# Patient Record
Sex: FEMALE | Race: WHITE | NOT HISPANIC OR LATINO | Employment: OTHER | URBAN - METROPOLITAN AREA
[De-identification: names, ages, dates, MRNs, and addresses within clinical notes are randomized per-mention and may not be internally consistent; named-entity substitution may affect disease eponyms.]

---

## 2019-07-21 ENCOUNTER — HOSPITAL ENCOUNTER (INPATIENT)
Facility: HOSPITAL | Age: 79
LOS: 14 days | DRG: 987 | End: 2019-08-04
Attending: SURGERY | Admitting: INTERNAL MEDICINE
Payer: MEDICARE

## 2019-07-21 ENCOUNTER — APPOINTMENT (INPATIENT)
Dept: RADIOLOGY | Facility: HOSPITAL | Age: 79
DRG: 987 | End: 2019-07-21
Payer: MEDICARE

## 2019-07-21 DIAGNOSIS — J96.02 ACUTE HYPERCAPNIC RESPIRATORY FAILURE (HCC): ICD-10-CM

## 2019-07-21 DIAGNOSIS — L89.811: ICD-10-CM

## 2019-07-21 DIAGNOSIS — L89.153 PRESSURE INJURY OF SACRAL REGION, STAGE 3 (HCC): ICD-10-CM

## 2019-07-21 DIAGNOSIS — Z51.5 COMFORT MEASURES ONLY STATUS: ICD-10-CM

## 2019-07-21 DIAGNOSIS — N90.89 LABIAL LESION: ICD-10-CM

## 2019-07-21 DIAGNOSIS — K92.2 GI BLEED: ICD-10-CM
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
## 2019-07-21 DIAGNOSIS — E43 SEVERE PROTEIN-CALORIE MALNUTRITION (HCC): ICD-10-CM

## 2019-07-21 PROBLEM — R26.2 AMBULATORY DYSFUNCTION: Status: ACTIVE | Noted: 2019-07-21

## 2019-07-21 PROBLEM — S00.03XA SCALP HEMATOMA: Status: ACTIVE | Noted: 2019-07-21

## 2019-07-21 LAB
ABO GROUP BLD: NORMAL
ALBUMIN SERPL BCP-MCNC: 2.5 G/DL (ref 3.5–5)
ALP SERPL-CCNC: 77 U/L (ref 46–116)
ALT SERPL W P-5'-P-CCNC: 35 U/L (ref 12–78)
ANION GAP SERPL CALCULATED.3IONS-SCNC: 6 MMOL/L (ref 4–13)
ARTERIAL PATENCY WRIST A: NO
AST SERPL W P-5'-P-CCNC: 74 U/L (ref 5–45)
BASE EXCESS BLDA CALC-SCNC: 11 MMOL/L (ref -2–3)
BASE EXCESS BLDA CALC-SCNC: 2.4 MMOL/L
BASE EXCESS BLDA CALC-SCNC: 5 MMOL/L (ref -2–3)
BASOPHILS # BLD AUTO: 0.06 THOUSANDS/ΜL (ref 0–0.1)
BASOPHILS NFR BLD AUTO: 0 % (ref 0–1)
BILIRUB SERPL-MCNC: 0.78 MG/DL (ref 0.2–1)
BLD GP AB SCN SERPL QL: NEGATIVE
BUN SERPL-MCNC: 16 MG/DL (ref 5–25)
CA-I BLD-SCNC: 1 MMOL/L (ref 1.12–1.32)
CA-I BLD-SCNC: 1.15 MMOL/L (ref 1.12–1.32)
CALCIUM SERPL-MCNC: 8.3 MG/DL (ref 8.3–10.1)
CHLORIDE SERPL-SCNC: 100 MMOL/L (ref 100–108)
CK MB SERPL-MCNC: 1.7 % (ref 0–2.5)
CK MB SERPL-MCNC: 24.4 NG/ML (ref 0–5)
CK SERPL-CCNC: 1438 U/L (ref 26–192)
CO2 SERPL-SCNC: 31 MMOL/L (ref 21–32)
CREAT SERPL-MCNC: 0.47 MG/DL (ref 0.6–1.3)
EOSINOPHIL # BLD AUTO: 0 THOUSAND/ΜL (ref 0–0.61)
EOSINOPHIL NFR BLD AUTO: 0 % (ref 0–6)
ERYTHROCYTE [DISTWIDTH] IN BLOOD BY AUTOMATED COUNT: 12.5 % (ref 11.6–15.1)
GFR SERPL CREATININE-BSD FRML MDRD: 95 ML/MIN/1.73SQ M
GLUCOSE SERPL-MCNC: 113 MG/DL (ref 65–140)
GLUCOSE SERPL-MCNC: 119 MG/DL (ref 65–140)
GLUCOSE SERPL-MCNC: 119 MG/DL (ref 65–140)
HCO3 BLDA-SCNC: 29.1 MMOL/L (ref 22–28)
HCO3 BLDA-SCNC: 34.1 MMOL/L (ref 24–30)
HCO3 BLDA-SCNC: 36.9 MMOL/L (ref 24–30)
HCT VFR BLD AUTO: 49.7 % (ref 34.8–46.1)
HCT VFR BLD CALC: 47 % (ref 34.8–46.1)
HCT VFR BLD CALC: 48 % (ref 34.8–46.1)
HGB BLD-MCNC: 16.6 G/DL (ref 11.5–15.4)
HGB BLDA-MCNC: 16 G/DL (ref 11.5–15.4)
HGB BLDA-MCNC: 16.3 G/DL (ref 11.5–15.4)
IMM GRANULOCYTES # BLD AUTO: 0.07 THOUSAND/UL (ref 0–0.2)
IMM GRANULOCYTES NFR BLD AUTO: 0 % (ref 0–2)
INR PPP: 1.47 (ref 0.84–1.19)
LYMPHOCYTES # BLD AUTO: 0.93 THOUSANDS/ΜL (ref 0.6–4.47)
LYMPHOCYTES NFR BLD AUTO: 6 % (ref 14–44)
MCH RBC QN AUTO: 31.3 PG (ref 26.8–34.3)
MCHC RBC AUTO-ENTMCNC: 33.4 G/DL (ref 31.4–37.4)
MCV RBC AUTO: 94 FL (ref 82–98)
MONOCYTES # BLD AUTO: 1.18 THOUSAND/ΜL (ref 0.17–1.22)
MONOCYTES NFR BLD AUTO: 7 % (ref 4–12)
NASAL CANNULA: 4
NEUTROPHILS # BLD AUTO: 14.69 THOUSANDS/ΜL (ref 1.85–7.62)
NEUTS SEG NFR BLD AUTO: 87 % (ref 43–75)
NRBC BLD AUTO-RTO: 0 /100 WBCS
O2 CT BLDA-SCNC: 21.1 ML/DL (ref 16–23)
OXYHGB MFR BLDA: 93.2 % (ref 94–97)
PCO2 BLD: 36 MMOL/L (ref 21–32)
PCO2 BLD: 38 MMOL/L (ref 21–32)
PCO2 BLD: 51.2 MM HG (ref 42–50)
PCO2 BLD: 71.2 MM HG (ref 42–50)
PCO2 BLDA: 52.5 MM HG (ref 36–44)
PH BLD: 7.29 [PH] (ref 7.3–7.4)
PH BLD: 7.47 [PH] (ref 7.3–7.4)
PH BLDA: 7.36 [PH] (ref 7.35–7.45)
PLATELET # BLD AUTO: 294 THOUSANDS/UL (ref 149–390)
PMV BLD AUTO: 10.6 FL (ref 8.9–12.7)
PO2 BLD: 21 MM HG (ref 35–45)
PO2 BLD: 80 MM HG (ref 35–45)
PO2 BLDA: 73.7 MM HG (ref 75–129)
POTASSIUM BLD-SCNC: 3.2 MMOL/L (ref 3.5–5.3)
POTASSIUM BLD-SCNC: 3.7 MMOL/L (ref 3.5–5.3)
POTASSIUM SERPL-SCNC: 4.1 MMOL/L (ref 3.5–5.3)
PROT SERPL-MCNC: 6.6 G/DL (ref 6.4–8.2)
PROTHROMBIN TIME: 17.4 SECONDS (ref 11.6–14.5)
RBC # BLD AUTO: 5.3 MILLION/UL (ref 3.81–5.12)
RH BLD: POSITIVE
SAO2 % BLD FROM PO2: 36 % (ref 95–98)
SAO2 % BLD FROM PO2: 94 % (ref 95–98)
SODIUM BLD-SCNC: 138 MMOL/L (ref 136–145)
SODIUM BLD-SCNC: 140 MMOL/L (ref 136–145)
SODIUM SERPL-SCNC: 137 MMOL/L (ref 136–145)
SPECIMEN EXPIRATION DATE: NORMAL
SPECIMEN SOURCE: ABNORMAL
TSH SERPL DL<=0.05 MIU/L-ACNC: 1.1 UIU/ML (ref 0.36–3.74)
WBC # BLD AUTO: 16.93 THOUSAND/UL (ref 4.31–10.16)

## 2019-07-21 PROCEDURE — 85610 PROTHROMBIN TIME: CPT | Performed by: SURGERY

## 2019-07-21 PROCEDURE — 94760 N-INVAS EAR/PLS OXIMETRY 1: CPT

## 2019-07-21 PROCEDURE — 82553 CREATINE MB FRACTION: CPT | Performed by: SURGERY

## 2019-07-21 PROCEDURE — 86901 BLOOD TYPING SEROLOGIC RH(D): CPT | Performed by: PHYSICIAN ASSISTANT

## 2019-07-21 PROCEDURE — 82947 ASSAY GLUCOSE BLOOD QUANT: CPT

## 2019-07-21 PROCEDURE — 82805 BLOOD GASES W/O2 SATURATION: CPT | Performed by: STUDENT IN AN ORGANIZED HEALTH CARE EDUCATION/TRAINING PROGRAM

## 2019-07-21 PROCEDURE — 73552 X-RAY EXAM OF FEMUR 2/>: CPT

## 2019-07-21 PROCEDURE — 36415 COLL VENOUS BLD VENIPUNCTURE: CPT | Performed by: SURGERY

## 2019-07-21 PROCEDURE — NC001 PR NO CHARGE: Performed by: EMERGENCY MEDICINE

## 2019-07-21 PROCEDURE — 94668 MNPJ CHEST WALL SBSQ: CPT

## 2019-07-21 PROCEDURE — 82550 ASSAY OF CK (CPK): CPT | Performed by: SURGERY

## 2019-07-21 PROCEDURE — 86900 BLOOD TYPING SEROLOGIC ABO: CPT | Performed by: PHYSICIAN ASSISTANT

## 2019-07-21 PROCEDURE — 1123F ACP DISCUSS/DSCN MKR DOCD: CPT | Performed by: OBSTETRICS & GYNECOLOGY

## 2019-07-21 PROCEDURE — 84443 ASSAY THYROID STIM HORMONE: CPT | Performed by: SURGERY

## 2019-07-21 PROCEDURE — 36600 WITHDRAWAL OF ARTERIAL BLOOD: CPT

## 2019-07-21 PROCEDURE — 80053 COMPREHEN METABOLIC PANEL: CPT | Performed by: SURGERY

## 2019-07-21 PROCEDURE — 82330 ASSAY OF CALCIUM: CPT

## 2019-07-21 PROCEDURE — 99285 EMERGENCY DEPT VISIT HI MDM: CPT

## 2019-07-21 PROCEDURE — 94664 DEMO&/EVAL PT USE INHALER: CPT

## 2019-07-21 PROCEDURE — 85014 HEMATOCRIT: CPT

## 2019-07-21 PROCEDURE — 72170 X-RAY EXAM OF PELVIS: CPT

## 2019-07-21 PROCEDURE — 94660 CPAP INITIATION&MGMT: CPT

## 2019-07-21 PROCEDURE — 84295 ASSAY OF SERUM SODIUM: CPT

## 2019-07-21 PROCEDURE — 36415 COLL VENOUS BLD VENIPUNCTURE: CPT | Performed by: PHYSICIAN ASSISTANT

## 2019-07-21 PROCEDURE — 84132 ASSAY OF SERUM POTASSIUM: CPT

## 2019-07-21 PROCEDURE — 93005 ELECTROCARDIOGRAM TRACING: CPT

## 2019-07-21 PROCEDURE — 82803 BLOOD GASES ANY COMBINATION: CPT

## 2019-07-21 PROCEDURE — 86850 RBC ANTIBODY SCREEN: CPT | Performed by: PHYSICIAN ASSISTANT

## 2019-07-21 PROCEDURE — 85025 COMPLETE CBC W/AUTO DIFF WBC: CPT | Performed by: SURGERY

## 2019-07-21 PROCEDURE — 99221 1ST HOSP IP/OBS SF/LOW 40: CPT | Performed by: SURGERY

## 2019-07-21 RX ORDER — DOCUSATE SODIUM 100 MG/1
100 CAPSULE, LIQUID FILLED ORAL 2 TIMES DAILY
Status: DISCONTINUED | OUTPATIENT
Start: 2019-07-21 | End: 2019-07-24

## 2019-07-21 RX ORDER — THIAMINE MONONITRATE (VIT B1) 100 MG
100 TABLET ORAL DAILY
Status: DISCONTINUED | OUTPATIENT
Start: 2019-07-21 | End: 2019-07-25

## 2019-07-21 RX ORDER — DIAZEPAM 5 MG/1
5 TABLET ORAL EVERY 6 HOURS PRN
COMMUNITY
End: 2019-08-04 | Stop reason: HOSPADM

## 2019-07-21 RX ORDER — SODIUM CHLORIDE, SODIUM GLUCONATE, SODIUM ACETATE, POTASSIUM CHLORIDE, MAGNESIUM CHLORIDE, SODIUM PHOSPHATE, DIBASIC, AND POTASSIUM PHOSPHATE .53; .5; .37; .037; .03; .012; .00082 G/100ML; G/100ML; G/100ML; G/100ML; G/100ML; G/100ML; G/100ML
125 INJECTION, SOLUTION INTRAVENOUS CONTINUOUS
Status: DISCONTINUED | OUTPATIENT
Start: 2019-07-21 | End: 2019-07-21

## 2019-07-21 RX ORDER — PRAVASTATIN SODIUM 40 MG
40 TABLET ORAL
Status: DISCONTINUED | OUTPATIENT
Start: 2019-07-21 | End: 2019-07-26

## 2019-07-21 RX ORDER — CHLORHEXIDINE GLUCONATE 0.12 MG/ML
15 RINSE ORAL EVERY 12 HOURS SCHEDULED
Status: DISCONTINUED | OUTPATIENT
Start: 2019-07-21 | End: 2019-07-26

## 2019-07-21 RX ORDER — LISINOPRIL 10 MG/1
10 TABLET ORAL DAILY
Status: DISCONTINUED | OUTPATIENT
Start: 2019-07-22 | End: 2019-07-21

## 2019-07-21 RX ORDER — ACETAMINOPHEN 325 MG/1
975 TABLET ORAL EVERY 8 HOURS PRN
Status: DISCONTINUED | OUTPATIENT
Start: 2019-07-21 | End: 2019-07-27

## 2019-07-21 RX ORDER — PROPRANOLOL HYDROCHLORIDE 60 MG/1
60 TABLET ORAL 2 TIMES DAILY
COMMUNITY
End: 2019-08-04 | Stop reason: HOSPADM

## 2019-07-21 RX ORDER — SODIUM CHLORIDE, SODIUM GLUCONATE, SODIUM ACETATE, POTASSIUM CHLORIDE, MAGNESIUM CHLORIDE, SODIUM PHOSPHATE, DIBASIC, AND POTASSIUM PHOSPHATE .53; .5; .37; .037; .03; .012; .00082 G/100ML; G/100ML; G/100ML; G/100ML; G/100ML; G/100ML; G/100ML
1000 INJECTION, SOLUTION INTRAVENOUS ONCE
Status: COMPLETED | OUTPATIENT
Start: 2019-07-21 | End: 2019-07-21

## 2019-07-21 RX ORDER — PROPRANOLOL HYDROCHLORIDE 20 MG/1
60 TABLET ORAL EVERY 12 HOURS SCHEDULED
Status: DISCONTINUED | OUTPATIENT
Start: 2019-07-22 | End: 2019-07-22

## 2019-07-21 RX ORDER — SODIUM CHLORIDE, SODIUM GLUCONATE, SODIUM ACETATE, POTASSIUM CHLORIDE, MAGNESIUM CHLORIDE, SODIUM PHOSPHATE, DIBASIC, AND POTASSIUM PHOSPHATE .53; .5; .37; .037; .03; .012; .00082 G/100ML; G/100ML; G/100ML; G/100ML; G/100ML; G/100ML; G/100ML
75 INJECTION, SOLUTION INTRAVENOUS CONTINUOUS
Status: DISCONTINUED | OUTPATIENT
Start: 2019-07-21 | End: 2019-07-21

## 2019-07-21 RX ORDER — SIMVASTATIN 20 MG
20 TABLET ORAL
COMMUNITY
End: 2019-08-04 | Stop reason: HOSPADM

## 2019-07-21 RX ORDER — BISACODYL 10 MG
10 SUPPOSITORY, RECTAL RECTAL DAILY PRN
Status: DISCONTINUED | OUTPATIENT
Start: 2019-07-21 | End: 2019-07-26

## 2019-07-21 RX ORDER — ONDANSETRON 2 MG/ML
4 INJECTION INTRAMUSCULAR; INTRAVENOUS EVERY 6 HOURS PRN
Status: DISCONTINUED | OUTPATIENT
Start: 2019-07-21 | End: 2019-08-03

## 2019-07-21 RX ORDER — LANOLIN ALCOHOL/MO/W.PET/CERES
400 CREAM (GRAM) TOPICAL DAILY
Status: DISCONTINUED | OUTPATIENT
Start: 2019-07-21 | End: 2019-07-27

## 2019-07-21 RX ORDER — HEPARIN SODIUM 5000 [USP'U]/ML
5000 INJECTION, SOLUTION INTRAVENOUS; SUBCUTANEOUS EVERY 8 HOURS SCHEDULED
Status: DISCONTINUED | OUTPATIENT
Start: 2019-07-21 | End: 2019-07-22

## 2019-07-21 RX ORDER — AMLODIPINE BESYLATE AND BENAZEPRIL HYDROCHLORIDE 5; 10 MG/1; MG/1
1 CAPSULE ORAL DAILY
COMMUNITY
End: 2019-08-04 | Stop reason: HOSPADM

## 2019-07-21 RX ORDER — SODIUM CHLORIDE, SODIUM GLUCONATE, SODIUM ACETATE, POTASSIUM CHLORIDE, MAGNESIUM CHLORIDE, SODIUM PHOSPHATE, DIBASIC, AND POTASSIUM PHOSPHATE .53; .5; .37; .037; .03; .012; .00082 G/100ML; G/100ML; G/100ML; G/100ML; G/100ML; G/100ML; G/100ML
100 INJECTION, SOLUTION INTRAVENOUS CONTINUOUS
Status: DISCONTINUED | OUTPATIENT
Start: 2019-07-21 | End: 2019-07-25

## 2019-07-21 RX ORDER — AMLODIPINE BESYLATE 5 MG/1
5 TABLET ORAL DAILY
Status: DISCONTINUED | OUTPATIENT
Start: 2019-07-22 | End: 2019-07-21

## 2019-07-21 RX ORDER — SENNOSIDES 8.6 MG
2 TABLET ORAL DAILY
Status: DISCONTINUED | OUTPATIENT
Start: 2019-07-21 | End: 2019-07-24

## 2019-07-21 RX ADMIN — HEPARIN SODIUM 5000 UNITS: 5000 INJECTION INTRAVENOUS; SUBCUTANEOUS at 22:08

## 2019-07-21 RX ADMIN — SODIUM CHLORIDE, SODIUM GLUCONATE, SODIUM ACETATE, POTASSIUM CHLORIDE, MAGNESIUM CHLORIDE, SODIUM PHOSPHATE, DIBASIC, AND POTASSIUM PHOSPHATE 1000 ML: .53; .5; .37; .037; .03; .012; .00082 INJECTION, SOLUTION INTRAVENOUS at 18:30

## 2019-07-21 RX ADMIN — SODIUM CHLORIDE, SODIUM GLUCONATE, SODIUM ACETATE, POTASSIUM CHLORIDE, MAGNESIUM CHLORIDE, SODIUM PHOSPHATE, DIBASIC, AND POTASSIUM PHOSPHATE 75 ML/HR: .53; .5; .37; .037; .03; .012; .00082 INJECTION, SOLUTION INTRAVENOUS at 20:57

## 2019-07-21 RX ADMIN — SODIUM CHLORIDE, SODIUM GLUCONATE, SODIUM ACETATE, POTASSIUM CHLORIDE, MAGNESIUM CHLORIDE, SODIUM PHOSPHATE, DIBASIC, AND POTASSIUM PHOSPHATE 125 ML/HR: .53; .5; .37; .037; .03; .012; .00082 INJECTION, SOLUTION INTRAVENOUS at 15:00

## 2019-07-21 NOTE — RESPIRATORY THERAPY NOTE
RT Protocol Note  Luan Ruffin 66 y o  female MRN: [id number]  Unit/Bed#: [facility] 609-01 Encounter: [id number]    Assessment    Principal Problem:    Fall  Active Problems:    Pressure injury of head, stage 1    Pressure injury of sacral region, stage 3 (HCC)    Severe protein-calorie malnutrition ([address] )    Ambulatory dysfunction      Home Pulmonary Medications:  None listed         History reviewed  No pertinent past medical history    Social History     Socioeconomic History    Marital status: Single     Spouse name: None    Number of children: None    Years of education: None    Highest education level: None   Occupational History    None   Social Needs    Financial resource strain: None    Food insecurity:     Worry: None     Inability: None    Transportation needs:     Medical: None     Non-medical: None   Tobacco Use    Smoking status: Never Smoker    Smokeless tobacco: Never Used   Substance and Sexual Activity    Alcohol use: Not Currently     Frequency: Monthly or less     Drinks per session: 1 or 2     Binge frequency: Less than monthly     Comment: only socially in the past    Drug use: Never    Sexual activity: Not Currently     Partners: Male     Birth control/protection: None   Lifestyle    Physical activity:     Days per week: None     Minutes per session: None    Stress: None   Relationships    Social connections:     Talks on phone: None     Gets together: None     Attends Judaism service: None     Active member of club or organization: None     Attends meetings of clubs or organizations: None     Relationship status: None    Intimate partner violence:     Fear of current or ex partner: None     Emotionally abused: None     Physically abused: None     Forced sexual activity: None   Other Topics Concern    None   Social History Narrative    None       Subjective         Objective    Physical Exam:   Assessment Type: Assess only  General Appearance: Awake  Respiratory Pattern: Assisted  Chest Assessment: Chest expansion symmetrical  Bilateral Breath Sounds: Diminished, Coarse(slightly coarse)    Vitals:  Blood pressure 137/67, pulse 94, temperature (!) 97 °F (36 1 °C), resp  rate 16, weight 42 5 kg (93 lb 11 1 oz), SpO2 97 %  Imaging and other studies: I have personally reviewed pertinent reports  Plan    Respiratory Plan: Vent/NIV/HFNC        Resp Comments: pt assessed at this time for respiratory protocol  pt is on bipap at this time  pt is being transferred to ICU  pt admitted for a fall, pt has no pulmonary history and does not take any pulmonary medications

## 2019-07-21 NOTE — PROGRESS NOTES
Resident paged by patient's nurse  Patient was noted to be hypoxic and tachycardic on the floor and not responding to 4 L of nasal cannula  Upon arrival patient's oxygen was noted to be around 84% and heart rate 130  Asked nurse to go grab a non rebreather oxygen mask and run a 1 L bolus  Patient was bumped to 10 L nasal cannula and improved to 87% oxygen sat  Non-rebreather was then placed and patient improved to 95%  Respiratory was called for an ABG which showed a pH of 7 28 and the patient was hypercapnic with a pCO2 of 71 2  Decision was made to place patient on BiPAP and upgrade her to the surgical critical care team for further monitoring  Family was updated  Attempt was made to call daughter to establish patient is code status, however this was unsuccessful  Given family members who were here they have deferred to patient's daughter and requested a level 1 full code until this status is confirmed with daughter as decision maker

## 2019-07-21 NOTE — ED PROVIDER NOTES
Emergency Department Airway Evaluation and Management Form    History  Obtained from: EMS  Patient has no allergy information on record  No chief complaint on file  HPI  Pt  Found down after 2 days  Pressure ulcers  Hip pain right  No past medical history on file  No past surgical history on file  No family history on file  Social History     Tobacco Use    Smoking status: Not on file   Substance Use Topics    Alcohol use: Not on file    Drug use: Not on file     I have reviewed and agree with the history as documented  Review of Systems  Unable to complete secondary to acuity  Physical Exam  /63   Pulse 80   Temp (!) 96 9 °F (36 1 °C) (Tympanic)   Resp 22   Wt 42 5 kg (93 lb 11 1 oz)   SpO2 97% Comment: 3l NC    Physical Exam  Airway intact  Trachea midline  Breath sounds bilaterally  Chest nontender  Pulses intact  Vitals reviewed  GCS 14  Moves all 4 extremities  ED Medications  Medications - No data to display    Intubation  Procedures    Notes  No acute airway issues      Final Diagnosis  Final diagnoses:   None       ED Provider  Electronically Signed by     Duarte Hansen MD  07/21/19 7659

## 2019-07-21 NOTE — TRAUMA DOCUMENTATION
Daughter (Anny Hernandez) [phone]    She is currently in [facility]

## 2019-07-21 NOTE — H&P
H&P Exam - Trauma   Hector Miller 66 y o  female MRN: [id number]  Unit/Bed#: ED 20 Encounter: [id number]    Assessment/Plan   Trauma Alert: Level B  Model of Arrival: Ambulance  Trauma Team: Attending Dr Audi Santacruz and Residents Lashonda Marrero  Consultants: None    Trauma Active Problems:   Fall w/ prolonged downtime  Multiple pressure wounds  Incidental finding lung nodule and fungating vulvar mass    Trauma Plan:   Admit to trauma service  CT C-Spine negative, no pain, C-Collar cleared in standard fashion  IVF 75cc/hr  Geriatrics cs, MVI, ensure supplementation  Local wound care to pressure ulcers, wound care consult   Dedicated R hip/knee XR pending as R leg internally rotated  Home antihypertensives  Hold home benzodiazepine    Chief Complaint: "I lost weight recently"    History of Present Illness   HPI:  Hector Miller is a 66 y o  female w/ PMHx significant for HPTN, and HLD who presents with multiple pressure wounds after a fall and estimated downtime 24hrs  Per the patient's family the patient spoke to them on the phone at approximately noon on 7/20 but had not answered her phone last evening or this morning  As a result they went to her residence early this afternoon to check on her, and found her face down on the kitchen floor unable to move prompting EMS activation  Patient remembers falling, cannot recall if she lost consciousness or not  No complaints at this time  Admits to recent history of weight loss and short term memory difficulty  Mechanism:Fall    Review of Systems   Constitutional: Positive for fever  Respiratory: Negative  Cardiovascular: Negative  Gastrointestinal: Negative  Genitourinary: Negative  Musculoskeletal: Negative  Neurological: Negative  Historical Information   History is unobtainable from the patient due to poor short term memory    Efforts to obtain history included the following sources: family member, other medical personnel    No past medical history on file  No past surgical history on file  Social History   Social History     Substance and Sexual Activity   Alcohol Use Not on file     Social History     Substance and Sexual Activity   Drug Use Not on file     Social History     Tobacco Use   Smoking Status Not on file       There is no immunization history on file for this patient  Last Tetanus: unknown  Family History: Non-contributory  Unable to obtain/limited by poor short term memory      Meds/Allergies   all current active meds have been reviewed, current meds:   Current Facility-Administered Medications   Medication Dose Route Frequency    acetaminophen (TYLENOL) tablet 975 mg  975 mg Oral Q8H PRN    bisacodyl (DULCOLAX) rectal suppository 10 mg  10 mg Rectal Daily PRN    docusate sodium (COLACE) capsule 100 mg  100 mg Oral BID    multi-electrolyte (ISOLYTE-S PH 7 4 equivalent) IV solution  125 mL/hr Intravenous Continuous    ondansetron (ZOFRAN) injection 4 mg  4 mg Intravenous Q6H PRN    pravastatin (PRAVACHOL) tablet 40 mg  40 mg Oral Daily With Dinner    senna (SENOKOT) tablet 17 2 mg  2 tablet Oral Daily    and PTA meds:   Prior to Admission Medications   Prescriptions Last Dose Informant Patient Reported? Taking?    amLODIPine-benazepril (LOTREL 5-10) 5-10 MG per capsule   Yes Yes   Sig: Take 1 capsule by mouth daily   diazepam (VALIUM) 5 mg tablet   Yes Yes   Sig: Take 5 mg by mouth every 6 (six) hours as needed for anxiety   propranolol (INDERAL) 60 mg tablet   Yes Yes   Sig: Take 60 mg by mouth 2 (two) times a day   simvastatin (ZOCOR) 20 mg tablet   Yes Yes   Sig: Take 20 mg by mouth daily at bedtime      Facility-Administered Medications: None       No Known Allergies      PHYSICAL EXAM        Objective   Vitals:   First set: Temperature: (!) 96 9 °F (36 1 °C) (07/21/19 1411)  Pulse: (!) 129 (07/21/19 1411)  Respirations: 22 (07/21/19 1411)  Blood Pressure: (!) 179/85 (07/21/19 1411)    Primary Survey:   (A) Airway: intact  (B) Breathing: B/L breath sounds  (C) Circulation: Pulses:   normal  (D) Disabliity:  GCS Total:  14, Eye Opening:   Spontaneous = 4, Motor Response: Obeys commands = 6 and Verbal Response:  Confused = 4  (E) Expose:  Completed    Secondary Survey: (Click on Physical Exam tab above)  Physical Exam   Constitutional:   Frail, cachectic, bitemporal wasting     HENT:   Head:       Eyes: Pupils are equal, round, and reactive to light  EOM are normal  No scleral icterus  Neck: Neck supple  No JVD present  No tracheal deviation present  Rigid C-Collar in place   Cardiovascular: Normal rate, regular rhythm and normal heart sounds  No murmur heard  Pulmonary/Chest: Effort normal and breath sounds normal  No respiratory distress  Pes excavatum   Abdominal: Soft  Bowel sounds are normal  She exhibits no distension  There is no tenderness  Genitourinary:         Musculoskeletal: Normal range of motion  She exhibits no edema  Neurological: She is alert  Skin: Skin is warm and dry  Capillary refill takes less than 2 seconds  Invasive Devices     Peripheral Intravenous Line            Peripheral IV 07/21/19 Left Antecubital less than 1 day    Peripheral IV 07/21/19 Right Forearm less than 1 day                Lab Results:   Results: I have personally reviewed pertinent reports  and I have personally reviewed pertinent films in PACS, BMP/CMP:   Lab Results   Component Value Date    CO2 38 (H) 07/21/2019    GLUCOSE 119 07/21/2019   , CBC:   Lab Results   Component Value Date    WBC 16 93 (H) 07/21/2019    HGB 16 6 (H) 07/21/2019    HGB 16 3 (H) 07/21/2019    HCT 49 7 (H) 07/21/2019    HCT 48 (H) 07/21/2019    MCV 94 07/21/2019     07/21/2019    MCH 31 3 07/21/2019    MCHC 33 4 07/21/2019    RDW 12 5 07/21/2019    MPV 10 6 07/21/2019    NRBC 0 07/21/2019   , Coagulation:   Lab Results   Component Value Date    INR 1 47 (H) 07/21/2019    and CK:  No results found for: CKTOTAL  Imaging/EKG Studies:   CXR: No acute traumatic injurty  Pelvis XR: No acute traumatic injury  CTH:No acute intracranial abnormality  Left scalp soft tissue swelling /left preseptal/left forehead soft tissue swelling  CT C-Spine: no acute fracture or dislocation  CT C/a/p:Limited exam without IV or oral contrast and motion artifact      No acute posttraumatic CT noncontrast findings      Lung nodule for which nonemergent outpatient unenhanced chest CT is recommended in 3 months, I cannot exclude neoplasm    Other Studies: Fast negativex4    Code Status: Level 1 - Full Code  Advance Directive and Living Will:      Power of :    POLST:

## 2019-07-22 ENCOUNTER — APPOINTMENT (INPATIENT)
Dept: RADIOLOGY | Facility: HOSPITAL | Age: 79
DRG: 987 | End: 2019-07-22
Payer: MEDICARE

## 2019-07-22 ENCOUNTER — TELEPHONE (OUTPATIENT)
Dept: OTHER | Facility: OTHER | Age: 79
End: 2019-07-22

## 2019-07-22 PROBLEM — J96.02 ACUTE HYPERCAPNIC RESPIRATORY FAILURE (HCC): Status: ACTIVE | Noted: 2019-07-22

## 2019-07-22 LAB
ANION GAP SERPL CALCULATED.3IONS-SCNC: 4 MMOL/L (ref 4–13)
ARTERIAL PATENCY WRIST A: YES
ATRIAL RATE: 122 BPM
ATRIAL RATE: 125 BPM
BASE EXCESS BLDA CALC-SCNC: 3 MMOL/L
BASE EXCESS BLDA CALC-SCNC: 4.4 MMOL/L
BASE EXCESS BLDA CALC-SCNC: 4.9 MMOL/L
BASOPHILS # BLD AUTO: 0.03 THOUSANDS/ΜL (ref 0–0.1)
BASOPHILS NFR BLD AUTO: 0 % (ref 0–1)
BILIRUB UR QL STRIP: NEGATIVE
BUN SERPL-MCNC: 11 MG/DL (ref 5–25)
CALCIUM SERPL-MCNC: 8.1 MG/DL (ref 8.3–10.1)
CHLORIDE SERPL-SCNC: 98 MMOL/L (ref 100–108)
CK MB SERPL-MCNC: 1.8 % (ref 0–2.5)
CK MB SERPL-MCNC: 15.6 NG/ML (ref 0–5)
CK SERPL-CCNC: 864 U/L (ref 26–192)
CLARITY UR: CLEAR
CO2 SERPL-SCNC: 35 MMOL/L (ref 21–32)
COLOR UR: ABNORMAL
CREAT SERPL-MCNC: 0.43 MG/DL (ref 0.6–1.3)
EOSINOPHIL # BLD AUTO: 0 THOUSAND/ΜL (ref 0–0.61)
EOSINOPHIL NFR BLD AUTO: 0 % (ref 0–6)
ERYTHROCYTE [DISTWIDTH] IN BLOOD BY AUTOMATED COUNT: 12.6 % (ref 11.6–15.1)
GFR SERPL CREATININE-BSD FRML MDRD: 98 ML/MIN/1.73SQ M
GLUCOSE SERPL-MCNC: 74 MG/DL (ref 65–140)
GLUCOSE SERPL-MCNC: 89 MG/DL (ref 65–140)
GLUCOSE UR STRIP-MCNC: NEGATIVE MG/DL
HCO3 BLDA-SCNC: 30.9 MMOL/L (ref 22–28)
HCO3 BLDA-SCNC: 33.1 MMOL/L (ref 22–28)
HCO3 BLDA-SCNC: 33.1 MMOL/L (ref 22–28)
HCT VFR BLD AUTO: 46.5 % (ref 34.8–46.1)
HGB BLD-MCNC: 15.1 G/DL (ref 11.5–15.4)
HGB UR QL STRIP.AUTO: NEGATIVE
IMM GRANULOCYTES # BLD AUTO: 0.08 THOUSAND/UL (ref 0–0.2)
IMM GRANULOCYTES NFR BLD AUTO: 0 % (ref 0–2)
IPAP: 16
KETONES UR STRIP-MCNC: ABNORMAL MG/DL
LEUKOCYTE ESTERASE UR QL STRIP: NEGATIVE
LYMPHOCYTES # BLD AUTO: 1.23 THOUSANDS/ΜL (ref 0.6–4.47)
LYMPHOCYTES NFR BLD AUTO: 6 % (ref 14–44)
MCH RBC QN AUTO: 31.2 PG (ref 26.8–34.3)
MCHC RBC AUTO-ENTMCNC: 32.5 G/DL (ref 31.4–37.4)
MCV RBC AUTO: 96 FL (ref 82–98)
MONOCYTES # BLD AUTO: 1.33 THOUSAND/ΜL (ref 0.17–1.22)
MONOCYTES NFR BLD AUTO: 7 % (ref 4–12)
NASAL CANNULA: 3
NEUTROPHILS # BLD AUTO: 16.6 THOUSANDS/ΜL (ref 1.85–7.62)
NEUTS SEG NFR BLD AUTO: 87 % (ref 43–75)
NITRITE UR QL STRIP: NEGATIVE
NON VENT- BIPAP: ABNORMAL
NON VENT- BIPAP: ABNORMAL
NRBC BLD AUTO-RTO: 0 /100 WBCS
O2 CT BLDA-SCNC: 21.2 ML/DL (ref 16–23)
O2 CT BLDA-SCNC: 21.3 ML/DL (ref 16–23)
O2 CT BLDA-SCNC: 21.4 ML/DL (ref 16–23)
OXYHGB MFR BLDA: 97.6 % (ref 94–97)
OXYHGB MFR BLDA: 98 % (ref 94–97)
OXYHGB MFR BLDA: 98 % (ref 94–97)
P AXIS: 77 DEGREES
P AXIS: 79 DEGREES
PCO2 BLDA: 61.3 MM HG (ref 36–44)
PCO2 BLDA: 63.9 MM HG (ref 36–44)
PCO2 BLDA: 67.6 MM HG (ref 36–44)
PEEP MAX SETTING VENT: 5 CM[H2O]
PEEP MAX SETTING VENT: 5 CM[H2O]
PH BLDA: 7.31 [PH] (ref 7.35–7.45)
PH BLDA: 7.32 [PH] (ref 7.35–7.45)
PH BLDA: 7.33 [PH] (ref 7.35–7.45)
PH UR STRIP.AUTO: 6 [PH]
PHOSPHATE SERPL-MCNC: 3.4 MG/DL (ref 2.3–4.1)
PLATELET # BLD AUTO: 266 THOUSANDS/UL (ref 149–390)
PMV BLD AUTO: 10.9 FL (ref 8.9–12.7)
PO2 BLDA: 132.3 MM HG (ref 75–129)
PO2 BLDA: 147.4 MM HG (ref 75–129)
PO2 BLDA: 148.1 MM HG (ref 75–129)
POTASSIUM SERPL-SCNC: 3.3 MMOL/L (ref 3.5–5.3)
PR INTERVAL: 150 MS
PR INTERVAL: 164 MS
PROT UR STRIP-MCNC: NEGATIVE MG/DL
QRS AXIS: 72 DEGREES
QRS AXIS: 76 DEGREES
QRSD INTERVAL: 88 MS
QRSD INTERVAL: 88 MS
QT INTERVAL: 296 MS
QT INTERVAL: 300 MS
QTC INTERVAL: 421 MS
QTC INTERVAL: 433 MS
RBC # BLD AUTO: 4.84 MILLION/UL (ref 3.81–5.12)
SODIUM SERPL-SCNC: 137 MMOL/L (ref 136–145)
SP GR UR STRIP.AUTO: 1.02 (ref 1–1.03)
SPECIMEN SOURCE: ABNORMAL
T WAVE AXIS: 163 DEGREES
T WAVE AXIS: 245 DEGREES
UROBILINOGEN UR QL STRIP.AUTO: 1 E.U./DL
VENT BIPAP FIO2: 40 %
VENT BIPAP FIO2: 40 %
VENTRICULAR RATE: 122 BPM
VENTRICULAR RATE: 125 BPM
VIT B12 SERPL-MCNC: 1826 PG/ML (ref 100–900)
WBC # BLD AUTO: 19.27 THOUSAND/UL (ref 4.31–10.16)

## 2019-07-22 PROCEDURE — 99232 SBSQ HOSP IP/OBS MODERATE 35: CPT | Performed by: SURGERY

## 2019-07-22 PROCEDURE — 94760 N-INVAS EAR/PLS OXIMETRY 1: CPT

## 2019-07-22 PROCEDURE — 94668 MNPJ CHEST WALL SBSQ: CPT

## 2019-07-22 PROCEDURE — 93010 ELECTROCARDIOGRAM REPORT: CPT | Performed by: INTERNAL MEDICINE

## 2019-07-22 PROCEDURE — 94660 CPAP INITIATION&MGMT: CPT

## 2019-07-22 PROCEDURE — 82553 CREATINE MB FRACTION: CPT | Performed by: STUDENT IN AN ORGANIZED HEALTH CARE EDUCATION/TRAINING PROGRAM

## 2019-07-22 PROCEDURE — 87040 BLOOD CULTURE FOR BACTERIA: CPT | Performed by: FAMILY MEDICINE

## 2019-07-22 PROCEDURE — 81003 URINALYSIS AUTO W/O SCOPE: CPT | Performed by: FAMILY MEDICINE

## 2019-07-22 PROCEDURE — 82805 BLOOD GASES W/O2 SATURATION: CPT | Performed by: PHYSICIAN ASSISTANT

## 2019-07-22 PROCEDURE — 85025 COMPLETE CBC W/AUTO DIFF WBC: CPT | Performed by: STUDENT IN AN ORGANIZED HEALTH CARE EDUCATION/TRAINING PROGRAM

## 2019-07-22 PROCEDURE — 82607 VITAMIN B-12: CPT | Performed by: FAMILY MEDICINE

## 2019-07-22 PROCEDURE — 99222 1ST HOSP IP/OBS MODERATE 55: CPT | Performed by: NURSE PRACTITIONER

## 2019-07-22 PROCEDURE — 71045 X-RAY EXAM CHEST 1 VIEW: CPT

## 2019-07-22 PROCEDURE — 36600 WITHDRAWAL OF ARTERIAL BLOOD: CPT

## 2019-07-22 PROCEDURE — 82948 REAGENT STRIP/BLOOD GLUCOSE: CPT

## 2019-07-22 PROCEDURE — 82805 BLOOD GASES W/O2 SATURATION: CPT | Performed by: STUDENT IN AN ORGANIZED HEALTH CARE EDUCATION/TRAINING PROGRAM

## 2019-07-22 PROCEDURE — 80048 BASIC METABOLIC PNL TOTAL CA: CPT | Performed by: STUDENT IN AN ORGANIZED HEALTH CARE EDUCATION/TRAINING PROGRAM

## 2019-07-22 PROCEDURE — 82550 ASSAY OF CK (CPK): CPT | Performed by: STUDENT IN AN ORGANIZED HEALTH CARE EDUCATION/TRAINING PROGRAM

## 2019-07-22 PROCEDURE — 84100 ASSAY OF PHOSPHORUS: CPT | Performed by: PHYSICIAN ASSISTANT

## 2019-07-22 RX ORDER — POTASSIUM CHLORIDE 14.9 MG/ML
20 INJECTION INTRAVENOUS ONCE
Status: COMPLETED | OUTPATIENT
Start: 2019-07-22 | End: 2019-07-22

## 2019-07-22 RX ORDER — SODIUM CHLORIDE, SODIUM GLUCONATE, SODIUM ACETATE, POTASSIUM CHLORIDE, MAGNESIUM CHLORIDE, SODIUM PHOSPHATE, DIBASIC, AND POTASSIUM PHOSPHATE .53; .5; .37; .037; .03; .012; .00082 G/100ML; G/100ML; G/100ML; G/100ML; G/100ML; G/100ML; G/100ML
500 INJECTION, SOLUTION INTRAVENOUS ONCE
Status: COMPLETED | OUTPATIENT
Start: 2019-07-22 | End: 2019-07-23

## 2019-07-22 RX ORDER — HEPARIN SODIUM 5000 [USP'U]/ML
5000 INJECTION, SOLUTION INTRAVENOUS; SUBCUTANEOUS EVERY 12 HOURS SCHEDULED
Status: DISCONTINUED | OUTPATIENT
Start: 2019-07-22 | End: 2019-07-26

## 2019-07-22 RX ADMIN — HEPARIN SODIUM 5000 UNITS: 5000 INJECTION INTRAVENOUS; SUBCUTANEOUS at 21:38

## 2019-07-22 RX ADMIN — SODIUM CHLORIDE, SODIUM GLUCONATE, SODIUM ACETATE, POTASSIUM CHLORIDE, MAGNESIUM CHLORIDE, SODIUM PHOSPHATE, DIBASIC, AND POTASSIUM PHOSPHATE 500 ML: .53; .5; .37; .037; .03; .012; .00082 INJECTION, SOLUTION INTRAVENOUS at 23:43

## 2019-07-22 RX ADMIN — HEPARIN SODIUM 5000 UNITS: 5000 INJECTION INTRAVENOUS; SUBCUTANEOUS at 06:02

## 2019-07-22 RX ADMIN — CALCIUM GLUCONATE 1 G: 98 INJECTION, SOLUTION INTRAVENOUS at 08:16

## 2019-07-22 RX ADMIN — CHLORHEXIDINE GLUCONATE 0.12% ORAL RINSE 15 ML: 1.2 LIQUID ORAL at 08:00

## 2019-07-22 RX ADMIN — POTASSIUM CHLORIDE 20 MEQ: 200 INJECTION, SOLUTION INTRAVENOUS at 07:47

## 2019-07-22 RX ADMIN — POTASSIUM CHLORIDE 20 MEQ: 200 INJECTION, SOLUTION INTRAVENOUS at 11:35

## 2019-07-22 RX ADMIN — SODIUM CHLORIDE, SODIUM GLUCONATE, SODIUM ACETATE, POTASSIUM CHLORIDE, MAGNESIUM CHLORIDE, SODIUM PHOSPHATE, DIBASIC, AND POTASSIUM PHOSPHATE 75 ML/HR: .53; .5; .37; .037; .03; .012; .00082 INJECTION, SOLUTION INTRAVENOUS at 11:01

## 2019-07-22 RX ADMIN — CHLORHEXIDINE GLUCONATE 0.12% ORAL RINSE 15 ML: 1.2 LIQUID ORAL at 21:38

## 2019-07-22 RX ADMIN — POTASSIUM CHLORIDE 20 MEQ: 200 INJECTION, SOLUTION INTRAVENOUS at 09:27

## 2019-07-22 NOTE — PROGRESS NOTES
Progress Note/Tertiary Exam - Critical Care   Sourav Sandoval 66 y o  female MRN: [id number]  Unit/Bed#: ICU 12 Encounter: [id number]    Assessment:   Principal Problem:    Fall  Active Problems:    Pressure injury of head, stage 1    Pressure injury of sacral region, stage 3 (HCC)    Severe protein-calorie malnutrition (HCC)    Ambulatory dysfunction  Resolved Problems:    * No resolved hospital problems   *      Plan  Neuro:   1) Hx of dementia  · Pain controlled with:  · PRN: tylenol  · Delirium Precautions  · CAM ICU per protocol  · Regulate sleep/wake cycle+  · Trend neuro exam  2) Hx headaches  · Continue PTA propanolol    CV:   1) Hx HTN  · Currently with mild hypotension  · Hold PTA lotrel  · MAP goal > 65  · Systolic (99DCV), VHN:189 , Min:91 , Max:179   ·   · Rhythm: NSR  · Follow rhythm on telemetry    Lun) Acute hypoxic and hypercapnic respiratory insufficiency  · Likely 2/2 aspiration event  · Weaned off BiPAP last PM  · Repeat ABG revealed resolution of acidosis and improvement of hypercapnia  · F/u ABG this AM  · Aspiration precautions  · Pulmonary Hygiene, encourage IS, respiratory clearance protocol  · Wean NC as able    GI:   1) Suspected aspiration event  · NPO pending formal speech and swallow eval today  · Bowel regimen: colace , sennakot  and dulcolax suppository     FEN:   · Fluid/Diuretic plan: isolyte at 84cc/hr  · Nutrition/diet plan: NPO currently- pending formal speech and swallow  · Malnutrition evidenced by cachexia and BMI 14 9  · If not cleared by speech, may require alternative means of enteral feeding (I e - keofed)  · Geriatrics consult  · Replete electrolytes with goals: K >4 0, Mag >2 0, and Phos >3 0    :   1) Vulvar mass  · Will require outpatient follow-up  · 'Indwelling Chu present: no   · Trend UOP and BUN/creat  · Strict I and O    Intake/Output Summary (Last 24 hours) at 2019 0614  Last data filed at 2019 0400  Gross per 24 hour   Intake    Output 527 ml   Net -527 ml   ·     ID:   1) Leukocytosis  · Concern for poss  Asp  PNA  · Remains afebrile, but increasing leukocytosis  · Continue to monitor off abx  · Trend temps and WBC count  · Temp (24hrs), Av 7 °F (36 5 °C), Min:96 9 °F (36 1 °C), Max:98 9 °F (37 2 °C)  ·     Heme:   1) No acute issues  · Trend hgb and plts  · Transfuse as needed for goal hgb >7 0    Endo:   1) No acute issues  · Glycemic control plan: Blood glucose controlled on current regimen    MSK/Skin:  1) Mild CK elevation, multiple pressure ulcers  · Continue IVF for CK elevation  · Local wound care to pressure ulcers- all present on admission  · Wound care consult  · Mobility goal: early mobility as able in medical course  · PT consult: yes  · OT consult: yes  · Frequent turning and pressure off-loading  ISAR Screening Tool    1  Before the illness or injury that brought you to the Emergency, did you need someone to help you on a regular basis? 1=Yes   2  Since the illness or injury that brought you to the Emergency, have you needed more help than usual to take care of yourself? 1=Yes   3  Have you been hospitalized for one or more nights during the past 6 months (excluding a stay in the Emergency Department)? 0=No   4  In general, do you see well? 0=Yes   5  In general, do you have serious problems with your memory? 0=No   6  Do you take more than three different medications everyday? 1=Yes   TOTAL   3     · Did you order a geriatric consult if the score was 2 or greater?: yes    VTE Prophylaxis:  · Pharmacologic Prophylaxis:Heparin  · Mechanical Prophylaxis: sequential compression device    Disposition: Pt remains stable  Continue to observe in ICU, but may be appropriate for transition to SD/MS today      ______________________________________________________________________      HPI/24hr events:  History evening, patient found to be hypoxic on floor following a witnessed aspiration event  Placed non-rebreather    ABG revealed mixed hypoxic and hypercapnic respiratory failure  Patient placed on BiPAP  Weaned off BiPAP yesterday evening to 4 L nasal cannula  Repeat ABG on BiPAP revealed resolution respiratory acidosis and improvement of hypercapnia  Patient continues to sat well on 4 L nasal cannula  Review of Systems   Constitutional: Negative for chills, diaphoresis, fatigue and fever  Eyes: Negative for pain and visual disturbance  Respiratory: Negative for cough, shortness of breath and wheezing  Cardiovascular: Negative for chest pain and palpitations  Gastrointestinal: Negative for abdominal distention, abdominal pain, constipation, diarrhea, nausea and vomiting  Musculoskeletal: Negative for back pain, neck pain and neck stiffness  Neurological: Negative for dizziness, facial asymmetry, weakness, light-headedness, numbness and headaches      ______________________________________________________________________  Temperature:   Temp (24hrs), Av 7 °F (36 5 °C), Min:96 9 °F (36 1 °C), Max:98 9 °F (37 2 °C)    Current Temperature: 97 9 °F (36 6 °C)    Vitals:    19 0300 19 0400 19 0500 19 0600   BP: 121/64 (!) 91/48 (!) 94/47    Pulse: 86 76 74    Resp: (!) 28 13 13    Temp:       TempSrc:       SpO2: 100% 100% 100%    Weight:    32 4 kg (71 lb 6 9 oz)   Height:                  Weights:   IBW: 40 9 kg    Body mass index is 14 93 kg/m²    Weight (last 2 days)     Date/Time   Weight    19 0600   32 4 (71 43)    19 2045   32 4 (71 43)    19 14:16:44   42 5 (93 7)            Height: 4' 10" (147 3 cm)  Hemodynamic Monitoring:  N/A     Noninvasive/Ventilator Settings:  Respiratory    Lab Data (Last 4 hours)    None         O2/Vent Data (Last 4 hours)    None              Lab Results   Component Value Date    PHART 7 361 2019    UXH6EYZ 52 5 (H) 2019    PO2ART 73 7 (L) 2019    RXR9DPA 29 1 (H) 2019    BEART 2 4 2019    SOURCE Brachial, Right 2019 SpO2: SpO2: 100 %, SpO2 Activity:  , SpO2 Device: O2 Device: Nasal cannula, Capnography:    ______________________________________________________________________  Physical Exam:     Physical Exam   Constitutional: She appears lethargic  She appears cachectic  She is sleeping  No distress  Nasal cannula in place  HENT:   Head: Normocephalic and atraumatic  Nose: Nose normal    Eyes: Pupils are equal, round, and reactive to light  Conjunctivae are normal    Neck: Normal range of motion  Neck supple  Cardiovascular: Normal rate, regular rhythm, normal heart sounds and intact distal pulses  Pulmonary/Chest: Effort normal  She has no wheezes  She has rales (Significant crackles throughout both lungs in all fields)  Abdominal: Soft  Bowel sounds are normal  She exhibits no distension  There is no tenderness  There is no guarding  Musculoskeletal: Normal range of motion  She exhibits no edema  Neurological: She appears lethargic  GCS 11: E3, V3, M5  Per RN report, pt gave full name and date of birth this AM     Skin: Skin is warm and dry  Capillary refill takes less than 2 seconds  She is not diaphoretic  Multiple pressure ulcer areas (unstageable on left side of face, stage 3/unstageable pressure ulcer over mons pubis, stage 3/unstageable sacral ulcer)     ______________________________________________________________________  Intake and Outputs:  I/O       07/20 0701 - 07/21 0700 07/21 0701 - 07/22 0700    Urine (mL/kg/hr)  527    Stool  0    Total Output  527    Net  -527          Unmeasured Urine Occurrence  1 x    Unmeasured Stool Occurrence  1 x         Nutrition:        Diet Orders   (From admission, onward)            Start     Ordered    07/21/19 2025  Diet NPO  Diet effective now     Question Answer Comment   Diet Type NPO    RD to adjust diet per protocol?  Yes        07/21/19 2029 07/21/19 1530  Dietary nutrition supplements  Once     Question Answer Comment   Select Supplement: Ensure Enlive-Chocolate    Frequency Breakfast, Lunch, Dinner        07/21/19 1529        Labs:   Results from last 7 days   Lab Units 07/21/19  1845 07/21/19  1422   WBC Thousand/uL  --  16 93*   HEMOGLOBIN g/dL  --  16 6*   I STAT HEMOGLOBIN g/dl 16 0* 16 3*   HEMATOCRIT %  --  49 7*   HEMATOCRIT, ISTAT % 47* 48*   PLATELETS Thousands/uL  --  294   NEUTROS PCT %  --  87*   MONOS PCT %  --  7    Results from last 7 days   Lab Units 07/21/19  1845 07/21/19  1422   SODIUM mmol/L  --  137   POTASSIUM mmol/L  --  4 1   CHLORIDE mmol/L  --  100   CO2 mmol/L  --  31   CO2, I-STAT mmol/L 36* 38*   BUN mg/dL  --  16   CREATININE mg/dL  --  0 47*   CALCIUM mg/dL  --  8 3   ALK PHOS U/L  --  77   ALT U/L  --  35   AST U/L  --  74*   GLUCOSE, ISTAT mg/dl 119 119              Results from last 7 days   Lab Units 07/21/19  1423   INR  1 47*         No results found for: TROPONINI  Imaging:  I have personally reviewed pertinent reports     and I have personally reviewed pertinent films in PACS  Micro:  No results found for: Ellen Gayle, WOUNDCULT, SPUTUMCULTUR  Allergies: No Known Allergies  Medications:   Scheduled Meds:  Current Facility-Administered Medications:  acetaminophen 975 mg Oral Q8H PRN Rad Corey MD    bisacodyl 10 mg Rectal Daily PRN Rad Corey MD    chlorhexidine 15 mL Swish & Spit Q12H [facility] Rad Corey MD    docusate sodium 100 mg Oral BID Rad Corey MD    folic acid 410 mcg Oral Daily Rad Corey MD    heparin (porcine) 5,000 Units Subcutaneous [facility] Rad Corey MD    multi-electrolyte 84 mL/hr Intravenous Continuous Rad Corey MD Last Rate: 84 mL/hr (07/21/19 2133)   multivitamin-minerals 1 tablet Oral Daily Rad Corey MD    ondansetron 4 mg Intravenous Q6H PRN Rad Corey MD    pravastatin 40 mg Oral Daily With Felix Segura MD    propranolol 60 mg Oral Q12H [facility] Rad Corey MD    senna 2 tablet Oral Daily Rad Corey MD    thiamine 100 mg Oral Daily Rad Corey MD      Continuous Infusions:  multi-electrolyte 84 mL/hr Last Rate: 84 mL/hr (07/21/19 2133)     PRN Meds:    acetaminophen 975 mg Q8H PRN   bisacodyl 10 mg Daily PRN   ondansetron 4 mg Q6H PRN       Invasive lines and devices: Invasive Devices     Peripheral Intravenous Line            Peripheral IV 07/21/19 Left Antecubital 1 day    Peripheral IV 07/21/19 Right Forearm 1 day                   Counseling / Coordination of Care  Total Critical Care time spent 47 minutes excluding procedures, teaching and family updates  Code Status: Level 1 - Full Code    Portions of the record may have been created with voice recognition software  Occasional wrong word or "sound a like" substitutions may have occurred due to the inherent limitations of voice recognition software  Read the chart carefully and recognize, using context, where substitutions have occurred      Otilio Proctor PA-C

## 2019-07-22 NOTE — CONSULTS
Consult Note - Wound   Blease Denise 66 y o  female MRN: [id number]  Unit/Bed#: ICU 12 Encounter: [id number]      History and Present Illness: Patient is a 66year old female that was found down and estimated 24 hours   Patient was found face down on the kitchen floor and unable to move  Patient with acute hypercapnic respiratory failure , severe protein- calorie malnutrition and several pressure injuries related to the length of time on the floor  Assessment Findings:   1  Bilateral heels dry and intact   2  Bilateral knees with blanchable redness   3  Left hip stage 2 - present on admission   4  Mid lower abdominal area - unstageable area present on admission Area appearance of a absorbed DTI blood blistered that is intact  5  Right hip - stage 1 - present on admission area   6  Sacral - evolving DTI with mid center open area - present on admission   Patient has bony prominences of the region   7  Left upper face and left lower face / neck - unstageable areas - present on admission   Areas with slough and brown tissue in the wound bed   Upper area is appearance of a abrasion and pressure mixed           Plan:   1  Hydraguard to bilateral heels bid and prn   2  Apply skin nourishing cream to the skin daily   3  EHOB cushion when OOB in the chair   4  Turn and reposition every 2 hours to offload and prevent pressure   5  Cleanse sacral , buttocks with soap and water apply calazime paste tid and prn   6  Left face / neck area - cleanse with NSS apply hydrocolloid change every 3 days   7  Right and left hip - cleanse with soap and water then apply allevyn foam xiomara with a T and date change every 3 days   8  Midline lower abdominal area - apply 3 M no sting cavilon daily to the [address] [facility] [address] / unstageable area   9  Elevate heels off of the bed with pillows           Vitals: Blood pressure (!) 101/48, pulse 70, temperature 97 9 °F (36 6 °C), temperature source Axillary, resp   rate 14, height 4' 10" (1 473 m), weight 32 4 kg (71 lb 6 9 oz), SpO2 100 %  ,Body mass index is 14 93 kg/m²  Wound 07/21/19 Pressure Injury Hip Anterior; Left (Active)   Wound Image   7/22/2019 12:17 PM   Staging Stage II 7/22/2019  8:00 AM   Sandra-wound Assessment Clean;Dry; Intact 7/22/2019 12:00 PM   Wound Length (cm) 1 5 cm 7/22/2019 12:17 PM   Wound Width (cm) 1 5 cm 7/22/2019 12:17 PM   Calculated Wound Area (cm^2) 2 25 cm^2 7/22/2019 12:17 PM   Drainage Amount None 7/22/2019 12:00 PM   Dressing Protective barrier; Foam, Silicon (eg  Allevyn, etc) 7/22/2019 12:17 PM       Wound 07/22/19 Pressure Injury Sacrum Mid (Active)   Wound Image   7/22/2019 12:24 PM   Wound Description Fragile; Non-blanchable erythema 7/22/2019 12:00 PM   Staging Deep Tissue Injury 7/22/2019 12:24 PM   Sandra-wound Assessment Maceration;Pink 7/22/2019 12:00 PM   Wound Length (cm) 3 cm 7/22/2019 12:24 PM   Wound Width (cm) 3 cm 7/22/2019 12:24 PM   Calculated Wound Area (cm^2) 9 cm^2 7/22/2019 12:24 PM   Drainage Amount None 7/22/2019 12:00 PM   Treatments Site care 7/22/2019  8:00 AM   Dressing Open to air 7/22/2019 12:00 PM       Wound 07/22/19 Pressure Injury Other (Comment) Anterior (Active)   Wound Image   7/22/2019 12:17 PM   Wound Description Non-blanchable erythema 7/22/2019 12:00 PM   Staging Unstagable 7/22/2019 12:00 PM   Sandra-wound Assessment Dry; Intact 7/22/2019 12:00 PM   Wound Length (cm) 2 5 cm 7/22/2019 12:17 PM   Wound Width (cm) 5 5 cm 7/22/2019 12:17 PM   Calculated Wound Area (cm^2) 13 75 cm^2 7/22/2019 12:17 PM   Drainage Amount None 7/22/2019 12:00 PM   Dressing Open to air 7/22/2019 12:00 PM       Wound 07/22/19 Pressure Injury Hip Anterior;Proximal;Right (Active)   Wound Image   7/22/2019 12:18 PM   Wound Description Dry; Intact 7/22/2019 12:00 PM   Staging Stage I 7/22/2019 12:18 PM   Sandra-wound Assessment Dry; Intact 7/22/2019 12:00 PM   Wound Length (cm) 2 5 cm 7/22/2019 12:18 PM   Wound Width (cm) 2 cm 7/22/2019 12:18 PM   Calculated Wound Area (cm^2) 5 cm^2 7/22/2019 12:18 PM   Drainage Amount None 7/22/2019 12:00 PM   Dressing Protective barrier; Foam, Silicon (eg  Allevyn, etc) 7/22/2019 12:00 PM   Dressing Status Clean;Dry; Intact 7/22/2019 12:00 PM       Wound 07/22/19 Pressure Injury Face (Active)   Wound Image   7/22/2019 12:20 PM   Wound Description Dry; Intact 7/22/2019 12:00 PM   Staging Unstagable 7/22/2019 12:20 PM   Sandra-wound Assessment Dry; Intact 7/22/2019 12:00 PM   Wound Length (cm) 2 7 cm 7/22/2019 12:20 PM   Wound Width (cm) 1 cm 7/22/2019 12:20 PM   Calculated Wound Area (cm^2) 2 7 cm^2 7/22/2019 12:20 PM   Drainage Amount None 7/22/2019 12:00 PM   Dressing Hydrocolloid 7/22/2019 12:00 PM   Dressing Status Clean;Dry; Intact 7/22/2019 12:00 PM       Wound 07/22/19 Pressure Injury (Active)   Wound Image   7/22/2019 12:22 PM   Wound Description Dry; Intact 7/22/2019 12:00 PM   Staging Unstagable 7/22/2019 12:22 PM   Sandra-wound Assessment Dry; Intact 7/22/2019 12:00 PM   Wound Length (cm) 4 cm 7/22/2019 12:22 PM   Wound Width (cm) 2 cm 7/22/2019 12:22 PM   Calculated Wound Area (cm^2) 8 cm^2 7/22/2019 12:22 PM   Drainage Amount None 7/22/2019 12:00 PM   Dressing Open to air 7/22/2019 12:00 PM       Wound care will follow weekly call with questions or concerns at [address] [place] [address]

## 2019-07-22 NOTE — PLAN OF CARE
Problem: Nutrition/Hydration-ADULT  Goal: Nutrient/Hydration intake appropriate for improving, restoring or maintaining nutritional needs  Description  Monitor and assess patient's nutrition/hydration status for malnutrition (ex- brittle hair, bruises, dry skin, pale skin and conjunctiva, muscle wasting, smooth red tongue, and disorientation)  Collaborate with interdisciplinary team and initiate plan and interventions as ordered  Monitor patient's weight and dietary intake as ordered or per policy  Utilize nutrition screening tool and intervene per policy  Determine patient's food preferences and provide high-protein, high-caloric foods as appropriate       INTERVENTIONS:  - Monitor oral intake, urinary output, labs, and treatment plans  - Assess nutrition and hydration status and recommend course of action  - Evaluate amount of meals eaten  - Assist patient with eating if necessary   - Allow adequate time for meals  - Recommend/ encourage appropriate diets, oral nutritional supplements, and vitamin/mineral supplements  - Order, calculate, and assess calorie counts as needed  - Recommend, monitor, and adjust tube feedings and TPN/PPN based on assessed needs  - Assess need for intravenous fluids  - Provide specific nutrition/hydration education as appropriate  - Include patient/family/caregiver in decisions related to nutrition  Outcome: Progressing     Problem: Prexisting or High Potential for Compromised Skin Integrity  Goal: Skin integrity is maintained or improved  Description  INTERVENTIONS:  - Identify patients at risk for skin breakdown  - Assess and monitor skin integrity  - Assess and monitor nutrition and hydration status  - Monitor labs (i e  albumin)  - Assess for incontinence   - Turn and reposition patient  - Assist with mobility/ambulation  - Relieve pressure over bony prominences  - Avoid friction and shearing  - Provide appropriate hygiene as needed including keeping skin clean and dry  - Evaluate need for skin moisturizer/barrier cream  - Collaborate with interdisciplinary team (i e  Nutrition, Rehabilitation, etc )   - Patient/family teaching  Outcome: Progressing     Problem: Potential for Falls  Goal: Patient will remain free of falls  Description  INTERVENTIONS:  - Assess patient frequently for physical needs  -  Identify cognitive and physical deficits and behaviors that affect risk of falls    -  [place] fall precautions as indicated by assessment   - Educate patient/family on patient safety including physical limitations  - Instruct patient to call for assistance with activity based on assessment  - Modify environment to reduce risk of injury  - Consider OT/PT consult to assist with strengthening/mobility  Outcome: Progressing     Problem: PAIN - ADULT  Goal: Verbalizes/displays adequate comfort level or baseline comfort level  Description  Interventions:  - Encourage patient to monitor pain and request assistance  - Assess pain using appropriate pain scale  - Administer analgesics based on type and severity of pain and evaluate response  - Implement non-pharmacological measures as appropriate and evaluate response  - Consider cultural and social influences on pain and pain management  - Notify physician/advanced practitioner if interventions unsuccessful or patient reports new pain  Outcome: Progressing     Problem: INFECTION - ADULT  Goal: Absence or prevention of progression during hospitalization  Description  INTERVENTIONS:  - Assess and monitor for signs and symptoms of infection  - Monitor lab/diagnostic results  - Monitor all insertion sites, i e  indwelling lines, tubes, and drains  - Monitor endotracheal (as able) and nasal secretions for changes in amount and color  - [place] appropriate cooling/warming therapies per order  - Administer medications as ordered  - Instruct and encourage patient and family to use good hand hygiene technique  - Identify and instruct in appropriate isolation precautions for identified infection/condition  Outcome: Progressing     Problem: SAFETY ADULT  Goal: Patient will remain free of falls  Description  INTERVENTIONS:  - Assess patient frequently for physical needs  -  Identify cognitive and physical deficits and behaviors that affect risk of falls  -  [place] fall precautions as indicated by assessment   - Educate patient/family on patient safety including physical limitations  - Instruct patient to call for assistance with activity based on assessment  - Modify environment to reduce risk of injury  - Consider OT/PT consult to assist with strengthening/mobility  Outcome: Progressing     Problem: DISCHARGE PLANNING  Goal: Discharge to home or other facility with appropriate resources  Description  INTERVENTIONS:  - Identify barriers to discharge w/patient and caregiver  - Arrange for needed discharge resources and transportation as appropriate  - Identify discharge learning needs (meds, wound care, etc )  - Arrange for interpretive services to assist at discharge as needed  - Refer to Case Management Department for coordinating discharge planning if the patient needs post-hospital services based on physician/advanced practitioner order or complex needs related to functional status, cognitive ability, or social support system  Outcome: Progressing     Problem: Knowledge Deficit  Goal: Patient/family/caregiver demonstrates understanding of disease process, treatment plan, medications, and discharge instructions  Description  Complete learning assessment and assess knowledge base    Interventions:  - Provide teaching at level of understanding  - Provide teaching via preferred learning methods  Outcome: Progressing

## 2019-07-22 NOTE — PHYSICAL THERAPY NOTE
Physical Therapy Cancellation Note      PT orders received, chart review performed  Pt currently on BiPAP due to CO2 levels  PT to hold evaluation at this time and continue to follow       Augie Landin, PT, DPT

## 2019-07-22 NOTE — CONSULTS
Consultation - Geriatric Medicine   Olivier Islas 66 y o  female MRN: [id number]  Unit/Bed#: ICU 12 Encounter: [id number]      Assessment/Plan     Assessment/Plan:  1 )  Ambulatory dysfunction- no assistive device at baseline  PT/OT eval pending - will likely require short term rehab for strength and balance training  Fall precautions  2 )  Fall- first fall per pt, does not remember events  PT/OT  Eval for reason for fall  3 )  Physical deconditioning- related to acute and chronic conditions  Turn and reposition frequently  Monitor skin integrity  Ensure adequate hydration/nutrition  DVT proph (heparin)  Encourage cough and deep breathing exercises  4 )  Short term memory loss - chronic for pt  Unable to eval minicog secondary to acute condition, however, pt forgetful during interview  Recommend follow up with  Senior care for full cog/functional eval as well as pt/family support  Family considering increased care for pt safety  5 )  Delirium risk -related to underlying memory loss, recent fall, environment change  First-line treatment of delirium is reinforce, redirect, reassure  Include family as able  Avoid deliriogenic medications such as tramadol, Benadryl, Ambien, high-dose narcotics, benzodiazepine  Identify and treat reversible causes confusion such as pain, constipation, urinary retention, hypoxia, electrolyte imbalance, anemia, infection  Engage in daily activity (social, physical, cognitive)  Monitor sleep/wake cycles  Ensure adequate hydration nutrition  Mobilize early and often according to patient plan of care  6 )  PCM- +weight loss  Encourage sm, frequent protein/nutritionally dense meals and snacks  Protein supplements as needed  Consider nutrition consult to optimize diet for healing   7 )  Swallow issues - hypoxia likely secondary to aspiration  ST eval - follow diet as directed  Aspiration precautions  8 )  Pressure areas - from prolonged down time  Wound care following    Off load pressure  Local wound care  Nutritional supplements and monitor  9 )  Constipation - since admission  [place] continues  Monitor  10 )  MIld pain - "sore"  Tylenol prn  Consider geriatric pain protocol if pt experiences more pain once more mobile  All other acute and chronic conditions managed by med team       History of Present Illness   Physician Requesting Consult: Netta Lyles MD  Reason for Consult / Principal Problem: fall  Hx and PE limited by: memory loss  HPI: Zay Ventura is a 66y o  year old female who presents with multiple pressure wounds after fall  Pt lives alone and was found >24hr after fall  PMH includes weight loss and short term memory loss  During stay pt had hypoxic episode and is now in ICU with bipap, likely related to aspiration  Patient seen - sister at bedside  Poor historian - aware she is in [place], but unaware that she had a fall, despite frequent reminders from sister  Per sister patient lives at home alone and is somewhat socially isolated per family  Family checks in on her daily  She is fairly independent with adl/iadls  She does not use assistive dev ice at home and does not have fall history  She wears glasses, no dentures or hearing aides  She denies chronic pain, insomnia, anxiety/deprsession  She denies urinary incont and constipation  She reports poor appetite and weight loss  Her daughter often cooks for her  Presently she reports mild soreness  Inpatient consult to Gerontology  Consult performed by: KATHIE Benson  Consult ordered by: Aristides Salomon MD          Review of Systems   Constitutional: Positive for activity change and appetite change  Negative for chills and fatigue  HENT: Negative for congestion  Respiratory: Negative for cough and shortness of breath  Cardiovascular: Negative for chest pain  Gastrointestinal: Negative for abdominal pain, constipation, diarrhea, nausea and vomiting          Swallow issues last night   Genitourinary: Negative for difficulty urinating  Musculoskeletal: Positive for gait problem  Skin: Positive for wound  Neurological: Negative for dizziness and light-headedness  Psychiatric/Behavioral: Positive for decreased concentration (forgetful)  Geriatric Conditions: memory, adl/iadls ability, amb dys    Historical Information   History reviewed  No pertinent past medical history  History reviewed  No pertinent surgical history  Social History   Social History     Substance and Sexual Activity   Alcohol Use Not Currently    Frequency: Monthly or less    Drinks per session: 1 or 2    Binge frequency: Less than monthly    Comment: only socially in the past     Social History     Substance and Sexual Activity   Drug Use Never     Social History     Tobacco Use   Smoking Status Never Smoker   Smokeless Tobacco Never Used     Family History: non-contributory    Meds/Allergies   all current active meds have been reviewed    No Known Allergies    Objective     Intake/Output Summary (Last 24 hours) at 7/23/2019 0940  Last data filed at 7/23/2019 0800  Gross per 24 hour   Intake 2762 45 ml   Output 710 ml   Net 2052  45 ml     Invasive Devices     Peripheral Intravenous Line            Peripheral IV 07/21/19 Left Antecubital 2 days    Peripheral IV 07/21/19 Right Forearm 2 days                Physical Exam   Constitutional: She appears well-developed  No distress  thin   HENT:   Head: Normocephalic  Cardiovascular: Normal rate and regular rhythm  Exam reveals no friction rub  No murmur heard  Pulmonary/Chest: Effort normal and breath sounds normal  No respiratory distress  She has no wheezes  She has no rales  Poor effort  Bipap on   Abdominal: Soft  Bowel sounds are normal  She exhibits no distension  There is no tenderness  There is no guarding  Musculoskeletal: Normal range of motion  Neurological: She is alert     Oriented to person, place, partial time/situation  Forgetful  Able to make needs known  Unable to eval mini cog secondary to weakness     Skin: Skin is warm and dry  She is not diaphoretic  Multiple skin wounds including left cheek   Psychiatric: She has a normal mood and affect  Nursing note and vitals reviewed        Lab Results:   Lab Results   Component Value Date    WBC 11 12 (H) 07/23/2019    RBC 4 26 07/23/2019    HGB 13 0 07/23/2019    HCT 41 9 07/23/2019    MCV 98 07/23/2019     07/23/2019     Lab Results   Component Value Date    FQURSDNM93 1,826 (H) 07/22/2019     No results found for: TSH, J6FWWDX, FREET4  No components found for: VITAMIND1, 25-DIHYDROXY  Lab Results   Component Value Date    SODIUM 143 07/23/2019    K 3 4 (L) 07/23/2019     07/23/2019    CO2 33 (H) 07/23/2019    CO2 36 (H) 07/21/2019    BUN 16 07/23/2019    CREATININE 0 29 (L) 07/23/2019    GLUCOSE 119 07/21/2019    CALCIUM 8 3 07/23/2019    AST 74 (H) 07/21/2019    ALT 35 07/21/2019    ALKPHOS 77 07/21/2019    EGFR 111 07/23/2019     Lab Results   Component Value Date    COLORU Dk Yellow 07/22/2019    CLARITYU Clear 07/22/2019    SPECGRAV 1 024 07/22/2019    PHUR 6 0 07/22/2019    LEUKOCYTESUR Negative 07/22/2019    NITRITE Negative 07/22/2019    GLUCOSEU Negative 07/22/2019    KETONESU 15 (1+) (A) 07/22/2019    BILIRUBINUR Negative 07/22/2019    BLOODU Negative 07/22/2019     No results found for: PREALBUMIN, LABPRE  Lab Results   Component Value Date    INR 1 47 (H) 07/21/2019     No results found for: BLOODCX  No results found for: URINECX    Imaging Studies: reviewed in EMR    EKG:  Qtc - 421ms    Therapies:   PT/OT: eval pending    VTE Prophylaxis: Heparin    Code Status: Level 1 - Full Code  Advance Directive and Living Will:      Power of :    POLST:      Family and Social Support: sister present, daughter returning tomorrow  Freedom of Choice: Yes

## 2019-07-22 NOTE — PLAN OF CARE
Problem: Nutrition/Hydration-ADULT  Goal: Nutrient/Hydration intake appropriate for improving, restoring or maintaining nutritional needs  Description  Monitor and assess patient's nutrition/hydration status for malnutrition (ex- brittle hair, bruises, dry skin, pale skin and conjunctiva, muscle wasting, smooth red tongue, and disorientation)  Collaborate with interdisciplinary team and initiate plan and interventions as ordered  Monitor patient's weight and dietary intake as ordered or per policy  Utilize nutrition screening tool and intervene per policy  Determine patient's food preferences and provide high-protein, high-caloric foods as appropriate       INTERVENTIONS:  - Monitor oral intake, urinary output, labs, and treatment plans  - Assess nutrition and hydration status and recommend course of action  - Evaluate amount of meals eaten  - Assist patient with eating if necessary   - Allow adequate time for meals  - Recommend/ encourage appropriate diets, oral nutritional supplements, and vitamin/mineral supplements  - Order, calculate, and assess calorie counts as needed  - Recommend, monitor, and adjust tube feedings and TPN/PPN based on assessed needs  - Assess need for intravenous fluids  - Provide specific nutrition/hydration education as appropriate  - Include patient/family/caregiver in decisions related to nutrition  Outcome: Progressing     Problem: Prexisting or High Potential for Compromised Skin Integrity  Goal: Skin integrity is maintained or improved  Description  INTERVENTIONS:  - Identify patients at risk for skin breakdown  - Assess and monitor skin integrity  - Assess and monitor nutrition and hydration status  - Monitor labs (i e  albumin)  - Assess for incontinence   - Turn and reposition patient  - Assist with mobility/ambulation  - Relieve pressure over bony prominences  - Avoid friction and shearing  - Provide appropriate hygiene as needed including keeping skin clean and dry  - Evaluate need for skin moisturizer/barrier cream  - Collaborate with interdisciplinary team (i e  Nutrition, Rehabilitation, etc )   - Patient/family teaching  Outcome: Progressing     Problem: Potential for Falls  Goal: Patient will remain free of falls  Description  INTERVENTIONS:  - Assess patient frequently for physical needs  -  Identify cognitive and physical deficits and behaviors that affect risk of falls    -  [place] fall precautions as indicated by assessment   - Educate patient/family on patient safety including physical limitations  - Instruct patient to call for assistance with activity based on assessment  - Modify environment to reduce risk of injury  - Consider OT/PT consult to assist with strengthening/mobility  Outcome: Progressing     Problem: PAIN - ADULT  Goal: Verbalizes/displays adequate comfort level or baseline comfort level  Description  Interventions:  - Encourage patient to monitor pain and request assistance  - Assess pain using appropriate pain scale  - Administer analgesics based on type and severity of pain and evaluate response  - Implement non-pharmacological measures as appropriate and evaluate response  - Consider cultural and social influences on pain and pain management  - Notify physician/advanced practitioner if interventions unsuccessful or patient reports new pain  Outcome: Progressing     Problem: INFECTION - ADULT  Goal: Absence or prevention of progression during hospitalization  Description  INTERVENTIONS:  - Assess and monitor for signs and symptoms of infection  - Monitor lab/diagnostic results  - Monitor all insertion sites, i e  indwelling lines, tubes, and drains  - Monitor endotracheal (as able) and nasal secretions for changes in amount and color  - [place] appropriate cooling/warming therapies per order  - Administer medications as ordered  - Instruct and encourage patient and family to use good hand hygiene technique  - Identify and instruct in appropriate isolation precautions for identified infection/condition  Outcome: Progressing     Problem: SAFETY ADULT  Goal: Patient will remain free of falls  Description  INTERVENTIONS:  - Assess patient frequently for physical needs  -  Identify cognitive and physical deficits and behaviors that affect risk of falls  -  [place] fall precautions as indicated by assessment   - Educate patient/family on patient safety including physical limitations  - Instruct patient to call for assistance with activity based on assessment  - Modify environment to reduce risk of injury  - Consider OT/PT consult to assist with strengthening/mobility  Outcome: Progressing     Problem: DISCHARGE PLANNING  Goal: Discharge to home or other facility with appropriate resources  Description  INTERVENTIONS:  - Identify barriers to discharge w/patient and caregiver  - Arrange for needed discharge resources and transportation as appropriate  - Identify discharge learning needs (meds, wound care, etc )  - Arrange for interpretive services to assist at discharge as needed  - Refer to Case Management Department for coordinating discharge planning if the patient needs post-hospital services based on physician/advanced practitioner order or complex needs related to functional status, cognitive ability, or social support system  Outcome: Progressing     Problem: Knowledge Deficit  Goal: Patient/family/caregiver demonstrates understanding of disease process, treatment plan, medications, and discharge instructions  Description  Complete learning assessment and assess knowledge base    Interventions:  - Provide teaching at level of understanding  - Provide teaching via preferred learning methods  Outcome: Progressing

## 2019-07-22 NOTE — PROGRESS NOTES
Patient was brought up from ER for admition r/t fall at home and laying face down for 2 days  Multiple pressure ulcers, stage 1-2, on frontal bony prominences  While patient was setling in O2 sats dropped into upper 70's while on 2L O2 and HR increased into the 130's  Trauma notified, responded and had respiratory draw blood gases and put patient on Hi flow O2 and bolus NSS wide open  Was determined neede to be placed as level 1 step down and arrangements made for patient to be transferred to ICU bed 12

## 2019-07-22 NOTE — DISCHARGE INSTR - OTHER ORDERS
Plan:   1  Hydraguard to bilateral heels bid and prn   2  Apply skin nourishing cream to the skin daily   3  EHOB cushion when OOB in the chair   4  Turn and reposition every 2 hours to offload and prevent pressure   5  Cleanse sacral , buttocks with soap and water apply calazime paste tid and prn   6  Left face / neck area - cleanse with NSS apply hydrocolloid change every 3 days   7  Right and left hip - cleanse with soap and water then apply allevyn foam xiomara with a T and date change every 3 days   8  Midline lower abdominal area - apply 3 M no sting cavilon daily to the [address] / unstageable area   9   Elevate heels off of the bed with pillows

## 2019-07-22 NOTE — OCCUPATIONAL THERAPY NOTE
OT CANCEL NOTE    OT orders received  Chart reviewed  Pt is currently on BiPAP and not appropriate for skilled OT services  Will hold initial OT evaluation  Will continue to follow pt on caseload and see pt when medically stable and as clinically appropriate      Winter MONTOYA, OTR/L

## 2019-07-22 NOTE — NUTRITION
Replete K+  If unable to safely advance PO diet in next 24 hrs then consider an alternate means of nutrition support and reconsult RD for recs  Check Ionized Ca

## 2019-07-22 NOTE — PROGRESS NOTES
Progress Note - Critical Care   Luis Lopez 66 y o  female MRN: [id number]  Unit/Bed#: [facility] 609-01 Encounter: [id number]    Assessment: 66year old F status post fall, found down with multiple pressure ulcers, now with acute hypoxic and hypercapnic respiratory insufficiency secondary to likely aspiration    Plan:      Neuro: hx of dementia   - maintain sleep wake cycle   - delirium precautions   - pain control prn with tylenol for now   - hx of propanolol for headaches, hold for now while npo                 CV: hx of hypertension   - holding po meds   - labetalol prn                 Lung: acute hypoxic/hypercapnic respiratory insufficiency likely secondary to aspiraton   - continue BiPAP for now, wean off at 2200   - recheck ABG   - aspiration precautions   - pulmonary toilet                 GI: NPO   - speech evaluation                 FEN: elevated CK, malnutrition with BMI 14 9   - continue isolyte @ 75   - follow urine output   - trend creatinine   - resume diet when cleared by speech, otherwise consider keofed                 : vulvar mass   - will likely need further workup                 ID: no acute issues                 Heme: no acute issues   - heparin ppx                 Endo: no acute issues                            Msk/Skin: mild CK elevation, multiple pressure ulcers   - frequent turning and offloading   - local wound care                 Disposition: SD1    Chief Complaint: can you talk to my daughter? HPI/24hr events: hypoxic on the floor after witnessed aspiration event   Responded to facemask and biPAP    Physical Exam:  General: no acute events, awake and alert  cachectic  Eyes: PERRL  ENT: moist mucous membranes  Neck: supple  CV: RRR +S1/S2  Chest: breath sounds bilaterally  Abdomen: soft, NT ND  Extremities: atraumatic, no edema      Vitals:    07/21/19 1700 07/21/19 1710 07/21/19 1720 07/21/19 1900   BP:       Pulse: 93 87 94    Resp:       Temp: (!) 97 °F (36 1 °C)      TempSrc: SpO2: 98% 98% 97% 100%   Weight:                 Temperature:   Temp (24hrs), Av °F (36 1 °C), Min:96 9 °F (36 1 °C), Max:97 °F (36 1 °C)    Current: Temperature: (!) 97 °F (36 1 °C)    Weights:   IBW: -92 5 kg    There is no height or weight on file to calculate BMI  Weight (last 2 days)     Date/Time   Weight    19 14:16:44   42 5 (93 7)              Hemodynamic Monitoring:  N/A     Non-Invasive/Invasive Ventilation Settings:  Respiratory    Lab Data (Last 4 hours)    None         O2/Vent Data (Last 4 hours)       1900          Non-Invasive Ventilation Mode BiPAP                 No results found for: PHART, VBH1APU, PO2ART, CIR9RIF, A2OWODVK, BEART, SOURCE  SpO2: SpO2: 100 %    Intake and Outputs:  I/O     None        Nutrition:        Diet Orders   (From admission, onward)            Start     Ordered    19  Diet NPO  Diet effective now     Question Answer Comment   Diet Type NPO    RD to adjust diet per protocol?  Yes        19 1530  Dietary nutrition supplements  Once     Question Answer Comment   Select Supplement: Ensure Enlive-Chocolate    Frequency Breakfast, Lunch, Dinner        19 1529          Labs:   Results from last 7 days   Lab Units 19  1845 19  1422   WBC Thousand/uL  --  16 93*   HEMOGLOBIN g/dL  --  16 6*   I STAT HEMOGLOBIN g/dl 16 0* 16 3*   HEMATOCRIT %  --  49 7*   HEMATOCRIT, ISTAT % 47* 48*   PLATELETS Thousands/uL  --  294   NEUTROS PCT %  --  87*   MONOS PCT %  --  7    Results from last 7 days   Lab Units 19  1845 19  1422   SODIUM mmol/L  --  137   POTASSIUM mmol/L  --  4 1   CHLORIDE mmol/L  --  100   CO2 mmol/L  --  31   CO2, I-STAT mmol/L 36* 38*   BUN mg/dL  --  16   CREATININE mg/dL  --  0 47*   CALCIUM mg/dL  --  8 3   ALK PHOS U/L  --  77   ALT U/L  --  35   AST U/L  --  74*   GLUCOSE, ISTAT mg/dl 119 119              Results from last 7 days   Lab Units 19  1423   INR  1 47*         No results found for: TROPONINI    Imaging: I have personally reviewed pertinent reports  EKG: sinus    Micro:  No results found for: Silvio Sanchez, WOUNDCULT, SPUTUMCULTUR    Allergies: No Known Allergies    Medications:   Scheduled Meds:  Current Facility-Administered Medications:  acetaminophen 975 mg Oral Q8H PRN Denny Keita PA-C    [START ON 7/22/2019] amLODIPine 5 mg Oral Daily Denny Keita PA-C    And        [START ON 7/22/2019] lisinopril 10 mg Oral Daily Diya Davila PA-C    bisacodyl 10 mg Rectal Daily PRN Diya Davila PA-C    docusate sodium 100 mg Oral BID Diya Davila PA-C    folic acid 504 mcg Oral Daily Denny Keita PA-C    multi-electrolyte 75 mL/hr Intravenous Continuous Denny Keita PA-C Last Rate: 75 mL/hr (07/21/19 1530)   multivitamin-minerals 1 tablet Oral Daily Pilar R ALEXIA Davila    ondansetron 4 mg Intravenous Q6H PRN Denny Keita PA-C    pravastatin 40 mg Oral Daily With Alexander Aguirre PA-C    [START ON 7/22/2019] propranolol 60 mg Oral Q12H [address] Diya Davila PA-C    senna 2 tablet Oral Daily Denny Keita PA-C    thiamine 100 mg Oral Daily Diya Davila PA-C      Continuous Infusions:  multi-electrolyte 75 mL/hr Last Rate: 75 mL/hr (07/21/19 1530)     PRN Meds:    acetaminophen 975 mg Q8H PRN   bisacodyl 10 mg Daily PRN   ondansetron 4 mg Q6H PRN       VTE Pharmacologic Prophylaxis: Heparin  VTE Mechanical Prophylaxis: sequential compression device    Invasive lines and devices: Invasive Devices     Peripheral Intravenous Line            Peripheral IV 07/21/19 Left Antecubital less than 1 day    Peripheral IV 07/21/19 Right Forearm less than 1 day                    Code Status: Level 1 - Full Code     Portions of the record may have been created with voice recognition software    Occasional wrong word or "sound a like" substitutions may have occurred due to the inherent limitations of voice recognition software  Read the chart carefully and recognize, using context, where substitutions have occurred       Shahram Melo MD

## 2019-07-22 NOTE — UTILIZATION REVIEW
Initial Clinical Review    Admission: Date/Time/Statement: 7/21/19 @ 1427     Orders Placed This Encounter   Procedures    Inpatient Admission     Standing Status:   Standing     Number of Occurrences:   1     Order Specific Question:   Admitting Physician     Answer:   Dillon Killian     Order Specific Question:   Level of Care     Answer:   Med Surg [16]     Order Specific Question:   Bed Type     Answer:   Trauma [7]     Order Specific Question:   Estimated length of stay     Answer:   More than 2 Midnights     Order Specific Question:   Certification     Answer:   I certify that inpatient services are medically necessary for this patient for a duration of greater than two midnights  See H&P and MD Progress Notes for additional information about the patient's course of treatment  ED Arrival Information     Expected Arrival Acuity Means of Arrival Escorted By Service Admission Type    - 7/21/2019 14:09 Immediate Ambulance OS Emergency [address]    Arrival Complaint    -        Chief Complaint   Patient presents with    Fall     Pt last seen 2 days ago  Pt found down on the floor, face down  Pt has deformity noted to the RLE  Pt has baseline dementia and stated that she "fell"  Assessment/Plan: 67 yo f presents to the ER after being found face down for > 24 hours on the kitchen floor unable to move  Patient remembers falling, unclear if she had LOC GCS 14, found to have multiple pressure wounds on her frontal bony prominences  She became  hypoxic, O2 sats dropped into the upper 70's  and HR increased to 130's  She was started on Hi Flow o2 and 1 L NS Bolus  Admitted Inpatient to a level 1 step down bed with a diagnosis of  Acute respiratory failure  S/p fall             ED Triage Vitals   Temperature Pulse Respirations Blood Pressure SpO2   07/21/19 1411 07/21/19 1411 07/21/19 1411 07/21/19 1411 07/21/19 1411   (!) 96 9 °F (36 1 °C) (!) 129 22 (!) 179/85 90 % - 3 L NC o2 Temp Source Heart Rate Source Patient Position - Orthostatic VS BP Location FiO2 (%)   07/21/19 1411 07/21/19 1411 07/21/19 1411 -- --   Tympanic Monitor Lying        Pain Score       07/22/19 0800       No Pain        Wt Readings from Last 1 Encounters:   07/22/19 32 4 kg (71 lb 6 9 oz)     Additional Vital Signs:   Date/Time  Temp  Pulse  Resp  BP  MAP (mmHg)  SpO2  O2 Device  Patient Position - Orthostatic VS   07/22/19 0900    74  15  84/54Abnormal   66  100 %       07/22/19 0800  97 5 °F (36 4 °C)  66  12  94/55  72  99 %  Other (comment)      O2 Device: BiPAP at 07/22/19 0800   07/22/19 0500    74  13  94/47Abnormal   59  100 %       07/22/19 0300    86  28Abnormal   121/64  90  100 %       07/22/19 0200    94  20  124/65  83  95 %       07/22/19 0100    94  28Abnormal   124/69  95  100 %       07/22/19 0000  97 9 °F (36 6 °C)  86  31Abnormal   139/68  106  98 %       07/21/19 2300    94  32Abnormal   113/67  80  96 %       07/21/19 2249            98 %  Nasal cannula   4 L NC      07/21/19 2200    98  26Abnormal   127/58  89  96 %       07/21/19 2100    92  26Abnormal   132/68  90  100 %       07/21/19 2045  98 9 °F (37 2 °C)  96  28Abnormal   117/90  109  100 %       07/21/19 1700  97 °F (36 1 °C)Abnormal   93        98 %       07/21/19 1659      16  137/67           07/21/19 15:00:09    86  17  149/76    97 %    Lying   07/21/19 1445    96  16  155/85    98 %       07/21/19 1430    77  16  148/71    98 %       07/21/19 14:16:44    80  22  149/63    97 %     Lying     7/21 6:58 PM  - pt became hypoxic and tachycardic ot responding to 4 L NC o2 - sat was 94% and  - started on a non rebreather and 1 L Isolyte bolus given - ABG showed pH 7 28  pCo2 71 2 placed on Bipap       Pertinent Labs/Diagnostic Test Results:   Results from last 7 days   Lab Units 07/22/19  0602 07/21/19  1845 07/21/19  1422   WBC Thousand/uL 19 27*  --  16 93* HEMOGLOBIN g/dL 15 1  --  16 6*   I STAT HEMOGLOBIN g/dl  --  16 0* 16 3*   HEMATOCRIT % 46 5*  --  49 7*   HEMATOCRIT, ISTAT %  --  47* 48*   PLATELETS Thousands/uL 266  --  294   NEUTROS ABS Thousands/µL 16 60*  --  14 69*     Results from last 7 days   Lab Units 07/22/19  0602 07/21/19  1845 07/21/19  1422   SODIUM mmol/L 137  --  137   POTASSIUM mmol/L 3 3*  --  4 1   CHLORIDE mmol/L 98*  --  100   CO2 mmol/L 35*  --  31   CO2, I-STAT mmol/L  --  36* 38*   ANION GAP mmol/L 4  --  6   BUN mg/dL 11  --  16   CREATININE mg/dL 0 43*  --  0 47*   EGFR ml/min/1 73sq m 98  --  95   CALCIUM mg/dL 8 1*  --  8 3   CALCIUM, IONIZED, ISTAT mmol/L  --  1 15 1 00*   PHOSPHORUS mg/dL 3 4  --   --      Results from last 7 days   Lab Units 07/21/19  1422   AST U/L 74*   ALT U/L 35   ALK PHOS U/L 77   TOTAL PROTEIN g/dL 6 6   ALBUMIN g/dL 2 5*   TOTAL BILIRUBIN mg/dL 0 78     Results from last 7 days   Lab Units 07/22/19  0602 07/21/19  1422   GLUCOSE RANDOM mg/dL 89 113     Results from last 7 days   Lab Units 07/22/19  0711 07/21/19  2244   PH ART  7 308* 7 361   PCO2 ART mm Hg 67 6* 52 5*   PO2 ART mm Hg 147 4* 73 7*   HCO3 ART mmol/L 33 1* 29 1*   BASE EXC ART mmol/L 4 4 2 4   O2 CONTENT ART mL/dL 21 4 21 1   O2 HGB, ARTERIAL % 98 0* 93 2*   ABG SOURCE  Radial, Left Brachial, Right     Results from last 7 days   Lab Units 07/21/19  1845 07/21/19  1422   PH, SANTOS I-STAT  7 289* 7 466*   PCO2, SANTOS ISTAT mm HG 71 2* 51 2*   PO2, SANTOS ISTAT mm HG 80 0* 21 0*   HCO3, SANTOS ISTAT mmol/L 34 1* 36 9*   I STAT BASE EXC mmol/L 5* 11*   I STAT O2 SAT % 94* 36*     Results from last 7 days   Lab Units 07/21/19  1422   CK TOTAL U/L 1,438*   CK MB INDEX % 1 7   CK MB ng/mL 24 4*     Results from last 7 days   Lab Units 07/21/19  1423   PROTIME seconds 17 4*   INR  1 47*       CXR: No acute traumatic injurty  Pelvis XR: No acute traumatic injury  CTH:No acute intracranial abnormality    Left scalp soft tissue swelling /left preseptal/left forehead soft tissue swelling  CT C-Spine: no acute fracture or dislocation  CT C/a/p:Limited exam without IV or oral contrast and motion artifact      ECG - none    ED Treatment:   Medication Administration from 07/21/2019 1355 to 07/21/2019 1646       Date/Time Order Dose Route Action     07/21/2019 1500 multi-electrolyte (ISOLYTE-S PH 7 4 equivalent) IV solution 125 mL/hr Intravenous New Bag     07/21/2019 1530 multi-electrolyte (ISOLYTE-S PH 7 4 equivalent) IV solution 75 mL/hr Intravenous Rate/Dose Change        History reviewed  No pertinent past medical history  Present on Admission:   Pressure injury of head, stage 1   Pressure injury of sacral region, stage 3 (HCC)      Admitting Diagnosis: Hip injury [S79 919A]  Injury, unspecified, initial encounter [T14 90XA]  Age/Sex: 66 y o  female  Admission Orders:    Current Facility-Administered Medications:  acetaminophen 975 mg Oral Q8H PRN    bisacodyl 10 mg Rectal Daily PRN    chlorhexidine 15 mL Swish & Spit Q12H [address]    docusate sodium 100 mg Oral BID    folic acid 709 mcg Oral Daily    heparin (porcine) 5,000 Units Subcutaneous Q8H [address]    multi-electrolyte 75 mL/hr Intravenous Continuous    multivitamin-minerals 1 tablet Oral Daily    ondansetron 4 mg Intravenous Q6H PRN    potassium chloride 20 mEq Intravenous Once 7/22   Followed by       potassium chloride 20 mEq Intravenous Once 7/22   pravastatin 40 mg Oral Daily With Dinner    senna 2 tablet Oral Daily    thiamine 100 mg Oral Daily    Calcium Gluconate   1 G IV Once  7/22   Isolyte 1L IV Once 7/21            Nursing Orders -  VS q 2 - I & O q 20  Daily weights  - Turn q 2 -  neuro cks q shift - dysphagia eval  - up with assistance - Diet NPO - PT/OT  Speech therapy eval    IP CONSULT TO GERONTOLOGY    IP CONSULT TO WOUND CARE - pressure ulcers     Network Utilization Review Department  Phone: [phone]; Fax [phone]  [email or web address]  ATTENTION: Please call with any questions or concerns to [phone]  and carefully listen to the prompts so that you are directed to the right person  Send all requests for admission clinical reviews, approved or denied determinations and any other requests to fax [phone]   All voicemails are confidential

## 2019-07-22 NOTE — SPEECH THERAPY NOTE
Speech Language/Pathology  Attempted x2 to assess the pt  Pt remains on BIPAP  Pt's sister is in her room, states yesterday the pt took a sip of juice, could not get it down & then began to sound gurgly & was unable to catch her breath  She was then transferred to ICU requiring BIPAP  Per sister, the pt was a poor eater at home- c/o no appetite & stated she lived on Ensure  Will fully assess when she is off of BIPAP

## 2019-07-22 NOTE — SOCIAL WORK
Pt is asleep, on BIPAP  CM met pt's sister Yue Chacon at bedside, introduce self and made aware of CM role at dc  Pt has a LW and POA  POA is Dtr [phone] currently on vacation and will be at the hospital tomorrow 7/23  Pt was  and Marlen Aguilera is the only child  Pt lives alone in a 2 story house with 1 CHRISTY from the back door  Pt has a 1st flr set up  Pt was IPTA with all ADL's, drive and retired  PCP is Dr Nuha Thakkar  Pharmacy is [facility]  Pt has no DME  Pt has no hx with [facility] and [facility]  CM will follow with pt's dc needs  CM reviewed d/c planning process including the following: identifying help at home, patient preference for d/c planning needs, Discharge Lounge, [facility] Meds to Bed program, availability of treatment team to discuss questions or concerns patient and/or family may have regarding understanding medications and recognizing signs and symptoms once discharged  CM also encouraged patient to follow up with all recommended appointments after discharge  Patient advised of importance for patient and family to participate in managing patients medical well being

## 2019-07-22 NOTE — MALNUTRITION/BMI
This medical record reflects one or more clinical indicators suggestive of malnutrition  Malnutrition Findings:   Malnutrition type: Chronic illness(in the setting of severe orbital-hollow look, depressions dark Elim IRA reflects body fat loss; temples hollowing, scooping, depression, clavicle protruding prominent bone reflects muscle mass loss; treated with rec for PO diet when deemed safe to swallow )  Degree of Malnutrition: Other severe protein calorie malnutrition  Malnutrition Characteristics: Muscle loss, Fat loss, Inadequate energy    BMI Findings:  BMI Classifications: Underweight < 18 5(BMI = 14 93)     Body mass index is 14 93 kg/m²  See Nutrition note dated 7/22/2019 for additional details  Completed nutrition assessment is viewable in the nutrition documentation

## 2019-07-22 NOTE — RESPIRATORY THERAPY NOTE
RT Protocol Note  Thomas Perez 66 y o  female MRN: [id number]  Unit/Bed#: ICU 12 Encounter: [id number]    Assessment    Principal Problem:    Fall  Active Problems:    Pressure injury of head, stage 1    Pressure injury of sacral region, stage 3 (HCC)    Severe protein-calorie malnutrition ([address] )    Ambulatory dysfunction      Home Pulmonary Medications:  None       History reviewed  No pertinent past medical history    Social History     Socioeconomic History    Marital status: Single     Spouse name: None    Number of children: None    Years of education: None    Highest education level: None   Occupational History    None   Social Needs    Financial resource strain: None    Food insecurity:     Worry: None     Inability: None    Transportation needs:     Medical: None     Non-medical: None   Tobacco Use    Smoking status: Never Smoker    Smokeless tobacco: Never Used   Substance and Sexual Activity    Alcohol use: Not Currently     Frequency: Monthly or less     Drinks per session: 1 or 2     Binge frequency: Less than monthly     Comment: only socially in the past    Drug use: Never    Sexual activity: Not Currently     Partners: Male     Birth control/protection: None   Lifestyle    Physical activity:     Days per week: None     Minutes per session: None    Stress: None   Relationships    Social connections:     Talks on phone: None     Gets together: None     Attends Synagogue service: None     Active member of club or organization: None     Attends meetings of clubs or organizations: None     Relationship status: None    Intimate partner violence:     Fear of current or ex partner: None     Emotionally abused: None     Physically abused: None     Forced sexual activity: None   Other Topics Concern    None   Social History Narrative    None       Subjective         Objective    Physical Exam:   Assessment Type: Assess only  General Appearance: Alert, Awake  Respiratory Pattern: Normal  Chest Assessment: Chest expansion symmetrical  Bilateral Breath Sounds: Crackles  R Breath Sounds: Crackles, Coarse  L Breath Sounds: Crackles  O2 Device: 4L NC    Vitals:  Blood pressure 127/58, pulse 98, temperature 98 9 °F (37 2 °C), temperature source Rectal, resp  rate (!) 26, height 4' 10" (1 473 m), weight 32 4 kg (71 lb 6 9 oz), SpO2 98 %  Results from last 7 days   Lab Units 07/21/19  2244   PH ART  7 361   PCO2 ART mm Hg 52 5*   PO2 ART mm Hg 73 7*   HCO3 ART mmol/L 29 1*   BASE EXC ART mmol/L 2 4   O2 CONTENT ART mL/dL 21 1   O2 HGB, ARTERIAL % 93 2*   ABG SOURCE  Brachial, Right   SABI TEST  No       Imaging and other studies: I have personally reviewed pertinent reports  O2 Device: 4L NC     Plan    Respiratory Plan: Vent/NIV/HFNC  Airway Clearance Plan: Flutter, Incentive Spirometer     Resp Comments: (P) Pt found down s/p fall with likely aspiration  Pt now weaned off of bipap and is awake/alert  Pt appears comfortable and in no distress sating well on 4L NC  BS are coarse/crackles bilaterally  CT of chest shows pleural parenchymal thickening and bronchiectasis changes  Pt started on IS and flutter valve for secretion clearance  Pt has nonproductive cough at this time  Pt needs coaching and encouragement  Will continue to encourage IS/flutter valve per AW clearance protocol

## 2019-07-22 NOTE — RESPIRATORY THERAPY NOTE
resp care      07/22/19 0731   Non-Invasive Information   Interface Face mask   Non-Invasive Ventilation Mode BiPAP   $ CPAP/BiPAP Charge - Initial & Daily Mgmt Yes   $ Pulse Oximetry Spot Check Charge Completed   Resp Comments Pt placed back on bipap at this time due to CO2 level   Bipap settings adjusted to 14/5 per PA/    Non-Invasive Settings   IPAP (cm) 14 cm   EPAP (cm) 5 cm   Rate (Set) 8   FiO2 (%) 40   Rise Time 2   Inspiratory Time (Set) 1   Humidification   (heater)   Temperature (Set) 31   Non-Invasive Readings   Total Rate 13   Vt (mL) (Mech) 471   MV (Mech) 6   Peak Pressure (Obs) 15   Spontaneous Vt (mL) 470   Heater Temperature (Obs) 31   Leak (lpm) 0   Skin Intervention Skin intact   Non-Invasive Alarms   Insp Pressure High (cm H20) 30   Insp Pressure Low (cm H20) 5   Low Insp Pressure Time (sec) 20 sec   MV Low (L/min) 3   Vt High (mL) 1200   Vt Low (mL) 200   High Resp Rate (BPM) 45 BPM   Low Resp Rate (BPM) 8 BPM   Apnea Interval (sec) 20   Apnea Rate 8

## 2019-07-23 ENCOUNTER — APPOINTMENT (INPATIENT)
Dept: RADIOLOGY | Facility: HOSPITAL | Age: 79
DRG: 987 | End: 2019-07-23
Payer: MEDICARE

## 2019-07-23 ENCOUNTER — APPOINTMENT (INPATIENT)
Dept: NON INVASIVE DIAGNOSTICS | Facility: HOSPITAL | Age: 79
DRG: 987 | End: 2019-07-23
Payer: MEDICARE

## 2019-07-23 LAB
ANION GAP SERPL CALCULATED.3IONS-SCNC: 6 MMOL/L (ref 4–13)
BASOPHILS # BLD AUTO: 0.01 THOUSANDS/ΜL (ref 0–0.1)
BASOPHILS NFR BLD AUTO: 0 % (ref 0–1)
BUN SERPL-MCNC: 16 MG/DL (ref 5–25)
CA-I BLD-SCNC: 1.11 MMOL/L (ref 1.12–1.32)
CALCIUM SERPL-MCNC: 8.3 MG/DL (ref 8.3–10.1)
CHLORIDE SERPL-SCNC: 104 MMOL/L (ref 100–108)
CO2 SERPL-SCNC: 33 MMOL/L (ref 21–32)
CORTIS SERPL-MCNC: 45.9 UG/DL
CREAT SERPL-MCNC: 0.29 MG/DL (ref 0.6–1.3)
EOSINOPHIL # BLD AUTO: 0 THOUSAND/ΜL (ref 0–0.61)
EOSINOPHIL NFR BLD AUTO: 0 % (ref 0–6)
ERYTHROCYTE [DISTWIDTH] IN BLOOD BY AUTOMATED COUNT: 12.9 % (ref 11.6–15.1)
GFR SERPL CREATININE-BSD FRML MDRD: 111 ML/MIN/1.73SQ M
GLUCOSE SERPL-MCNC: 137 MG/DL (ref 65–140)
GLUCOSE SERPL-MCNC: 63 MG/DL (ref 65–140)
GLUCOSE SERPL-MCNC: 83 MG/DL (ref 65–140)
GLUCOSE SERPL-MCNC: 89 MG/DL (ref 65–140)
HCT VFR BLD AUTO: 41.9 % (ref 34.8–46.1)
HGB BLD-MCNC: 13 G/DL (ref 11.5–15.4)
IMM GRANULOCYTES # BLD AUTO: 0.02 THOUSAND/UL (ref 0–0.2)
IMM GRANULOCYTES NFR BLD AUTO: 0 % (ref 0–2)
LYMPHOCYTES # BLD AUTO: 1.06 THOUSANDS/ΜL (ref 0.6–4.47)
LYMPHOCYTES NFR BLD AUTO: 10 % (ref 14–44)
MCH RBC QN AUTO: 30.5 PG (ref 26.8–34.3)
MCHC RBC AUTO-ENTMCNC: 31 G/DL (ref 31.4–37.4)
MCV RBC AUTO: 98 FL (ref 82–98)
MONOCYTES # BLD AUTO: 0.76 THOUSAND/ΜL (ref 0.17–1.22)
MONOCYTES NFR BLD AUTO: 7 % (ref 4–12)
NEUTROPHILS # BLD AUTO: 9.27 THOUSANDS/ΜL (ref 1.85–7.62)
NEUTS SEG NFR BLD AUTO: 83 % (ref 43–75)
NRBC BLD AUTO-RTO: 0 /100 WBCS
PHOSPHATE SERPL-MCNC: 2.7 MG/DL (ref 2.3–4.1)
PLATELET # BLD AUTO: 225 THOUSANDS/UL (ref 149–390)
PMV BLD AUTO: 11.5 FL (ref 8.9–12.7)
POTASSIUM SERPL-SCNC: 3.4 MMOL/L (ref 3.5–5.3)
RBC # BLD AUTO: 4.26 MILLION/UL (ref 3.81–5.12)
SODIUM SERPL-SCNC: 143 MMOL/L (ref 136–145)
WBC # BLD AUTO: 11.12 THOUSAND/UL (ref 4.31–10.16)

## 2019-07-23 PROCEDURE — 84100 ASSAY OF PHOSPHORUS: CPT | Performed by: PHYSICIAN ASSISTANT

## 2019-07-23 PROCEDURE — 94760 N-INVAS EAR/PLS OXIMETRY 1: CPT

## 2019-07-23 PROCEDURE — G8997 SWALLOW GOAL STATUS: HCPCS

## 2019-07-23 PROCEDURE — 94668 MNPJ CHEST WALL SBSQ: CPT

## 2019-07-23 PROCEDURE — 71045 X-RAY EXAM CHEST 1 VIEW: CPT

## 2019-07-23 PROCEDURE — 97163 PT EVAL HIGH COMPLEX 45 MIN: CPT

## 2019-07-23 PROCEDURE — 92610 EVALUATE SWALLOWING FUNCTION: CPT

## 2019-07-23 PROCEDURE — 80048 BASIC METABOLIC PNL TOTAL CA: CPT | Performed by: PHYSICIAN ASSISTANT

## 2019-07-23 PROCEDURE — 93306 TTE W/DOPPLER COMPLETE: CPT

## 2019-07-23 PROCEDURE — G8979 MOBILITY GOAL STATUS: HCPCS

## 2019-07-23 PROCEDURE — 82948 REAGENT STRIP/BLOOD GLUCOSE: CPT

## 2019-07-23 PROCEDURE — 82330 ASSAY OF CALCIUM: CPT | Performed by: PHYSICIAN ASSISTANT

## 2019-07-23 PROCEDURE — 94660 CPAP INITIATION&MGMT: CPT

## 2019-07-23 PROCEDURE — 82533 TOTAL CORTISOL: CPT | Performed by: FAMILY MEDICINE

## 2019-07-23 PROCEDURE — 85025 COMPLETE CBC W/AUTO DIFF WBC: CPT | Performed by: PHYSICIAN ASSISTANT

## 2019-07-23 PROCEDURE — NC001 PR NO CHARGE: Performed by: ANESTHESIOLOGY

## 2019-07-23 PROCEDURE — 99233 SBSQ HOSP IP/OBS HIGH 50: CPT | Performed by: ANESTHESIOLOGY

## 2019-07-23 PROCEDURE — 93306 TTE W/DOPPLER COMPLETE: CPT | Performed by: INTERNAL MEDICINE

## 2019-07-23 PROCEDURE — G8996 SWALLOW CURRENT STATUS: HCPCS

## 2019-07-23 PROCEDURE — G8978 MOBILITY CURRENT STATUS: HCPCS

## 2019-07-23 RX ORDER — MIRTAZAPINE 15 MG/1
15 TABLET, FILM COATED ORAL
Status: DISCONTINUED | OUTPATIENT
Start: 2019-07-23 | End: 2019-07-23

## 2019-07-23 RX ORDER — MIRTAZAPINE 15 MG/1
7.5 TABLET, FILM COATED ORAL
Status: DISCONTINUED | OUTPATIENT
Start: 2019-07-23 | End: 2019-07-27

## 2019-07-23 RX ORDER — ALBUMIN, HUMAN INJ 5% 5 %
12.5 SOLUTION INTRAVENOUS ONCE
Status: COMPLETED | OUTPATIENT
Start: 2019-07-23 | End: 2019-07-23

## 2019-07-23 RX ADMIN — Medication 1 TABLET: at 09:40

## 2019-07-23 RX ADMIN — PRAVASTATIN SODIUM 40 MG: 40 TABLET ORAL at 16:42

## 2019-07-23 RX ADMIN — CHLORHEXIDINE GLUCONATE 0.12% ORAL RINSE 15 ML: 1.2 LIQUID ORAL at 08:46

## 2019-07-23 RX ADMIN — THIAMINE HCL TAB 100 MG 100 MG: 100 TAB at 09:40

## 2019-07-23 RX ADMIN — BISACODYL 10 MG: 10 SUPPOSITORY RECTAL at 12:58

## 2019-07-23 RX ADMIN — ALBUMIN (HUMAN) 12.5 G: 12.5 SOLUTION INTRAVENOUS at 01:57

## 2019-07-23 RX ADMIN — SODIUM CHLORIDE, SODIUM GLUCONATE, SODIUM ACETATE, POTASSIUM CHLORIDE, MAGNESIUM CHLORIDE, SODIUM PHOSPHATE, DIBASIC, AND POTASSIUM PHOSPHATE 40 ML/HR: .53; .5; .37; .037; .03; .012; .00082 INJECTION, SOLUTION INTRAVENOUS at 22:26

## 2019-07-23 RX ADMIN — HEPARIN SODIUM 5000 UNITS: 5000 INJECTION INTRAVENOUS; SUBCUTANEOUS at 08:46

## 2019-07-23 RX ADMIN — CHLORHEXIDINE GLUCONATE 0.12% ORAL RINSE 15 ML: 1.2 LIQUID ORAL at 20:22

## 2019-07-23 RX ADMIN — DOCUSATE SODIUM 100 MG: 100 CAPSULE, LIQUID FILLED ORAL at 09:40

## 2019-07-23 RX ADMIN — SENNOSIDES 17.2 MG: 8.6 TABLET, FILM COATED ORAL at 09:40

## 2019-07-23 RX ADMIN — HEPARIN SODIUM 5000 UNITS: 5000 INJECTION INTRAVENOUS; SUBCUTANEOUS at 20:22

## 2019-07-23 RX ADMIN — MIRTAZAPINE 7.5 MG: 15 TABLET, FILM COATED ORAL at 22:27

## 2019-07-23 RX ADMIN — SODIUM CHLORIDE, SODIUM GLUCONATE, SODIUM ACETATE, POTASSIUM CHLORIDE, MAGNESIUM CHLORIDE, SODIUM PHOSPHATE, DIBASIC, AND POTASSIUM PHOSPHATE 75 ML/HR: .53; .5; .37; .037; .03; .012; .00082 INJECTION, SOLUTION INTRAVENOUS at 06:06

## 2019-07-23 RX ADMIN — DOCUSATE SODIUM 100 MG: 100 CAPSULE, LIQUID FILLED ORAL at 18:21

## 2019-07-23 RX ADMIN — FOLIC ACID TAB 400 MCG 400 MCG: 400 TAB at 09:40

## 2019-07-23 NOTE — SOCIAL WORK
CM met pt's dtr Dio Farris at bedside, introduce self and made aware of CM role at [facility]  Dio Farris reported that pt lives alone on a 2 story house with 1 CHRISTY and 13 steps to the 2nd flr bathroom and bedroom  Pt was dx with anxiety with medication and PCP Dr Palacios Confer manage  Pt has no hx with IP psych, alc and druh tx  Pt has transportation when [facility]  Dio Farris gave hospital copy of pt's LW and POA, in pt's chart

## 2019-07-23 NOTE — PLAN OF CARE
Problem: PHYSICAL THERAPY ADULT  Goal: Performs mobility at highest level of function for planned discharge setting  See evaluation for individualized goals  Description    Note:   Prognosis: Good  Problem List: Decreased strength, Decreased endurance, Impaired balance, Decreased mobility, Decreased cognition, Pain, Decreased skin integrity, Decreased safety awareness  Assessment: Pt is a 65 yo female admitted to [place] [address] on 7/21/2019 s/p fall in kitchen and being on the ground for 24 hours  Two patient identifiers were used to confirm  DX: multiple pressure sores on head and sacral region, acute hypoxic and hypercapnic respiratory insufficiency secondary likely to aspiration  Imaging includes: CT of chest/pelvis: (-) for cardiopulm, no acute osseous findings at pelvis, upper lobe interstitial changes  CT cervical spine: no acute fracture or dislocation  Nima of femur: no acute osseous abnormality  Pt's impairments include increased pain in rectum, limited mobility, and decreased endurance  These impairments limit the ability of the patient to perform mobility without increased assistance, return to PLOF and participate in everyday life activities  Pt would benefit from continued skilled therapy while in the hospital to improve functional mobility and to lower the level of assist needed for mobility  Recommend discharge to rehab  At the end of the session the patient was left in seated position with call bell and phone within reach  Pt's daughter was present during some of the session  Education about the importance of sitting upright in the chair and pressure relief when compared to supine in the bed  Barriers to Discharge: Inaccessible home environment     Recommendation: (rehab)     PT - OK to Discharge: No    See flowsheet documentation for full assessment

## 2019-07-23 NOTE — PROGRESS NOTES
Progress Note - ICU Transfer to Step down/med  surg  Sarah Hodgkin 66 y o  female MRN: [id number]    Unit/Bed#: ICU 12 Encounter: [id number]    Code Status: Level 1 - Full Code    Reason for ICU adm: fall w/ prolonged downtime    Active problems: Principal Problem:    Fall  Active Problems:    Pressure injury of head, stage 1    Pressure injury of sacral region, stage 3 (HCC)    Severe protein-calorie malnutrition (HCC)    Ambulatory dysfunction    Acute hypercapnic respiratory failure ([address] )  Resolved Problems:    * No resolved hospital problems  *    Consultants: trauma, geriatric, wound care, nutrition    History of Present Illness/Summary of clinical course:   Pt was initially admitted under trauma service after found down but family member after prolonged period of time, estimated 24hr  Family last spoke to patient around noon of 7/20/19 but was unable to reach her despite multiple attempt  A family member when to pt's home on the afternoon of 7/21/19 and found her face down on the kitchen floor and unable to move  Pt's PMHx significant for dementia, recent weight loss, HTN and HDL  Pt had trauma x ray and CT scan of head, cervical spine, chest, abdomen and pelvis as well as x ray of the femur  No significant osseous trauma noted  Incidental finding includes nodular and interstitial changes including 9mm left upper lobe lung nodule  Pt has multiple wound consistent with pressure ulcers on left face, abdomen, pelvis, sacrum and over the mons pubis  Pt also had 1 fungating mass on the right vulva as noted by CCU H&P  Pt was initially on med surg floor but was noted to be hypoxic and tachycardic and not responding to nasal cannula oxygen  Pt's oxygenation improved with non-rebreather  ABG showed patient to have pH of 7 28 and pCO2 of 71 2 pt was placed on bipap and admitted to the ICU  Pt did have a witness aspiration event on 7/21/19  Pt's pCO2 improved on bipap    On the day of transfer, pt received bipap overnight and now maintaining adequate saturation with 4L NC  Pt is hemodynamically stable and passed for dyphagia 3 diet  Pt had bowel movement and making adequate urine  Pt no longer require ICU level care, stable to transfer to med surg floor  Recent or scheduled procedures:   none    Outstanding/pending diagnostics:   Alpha-1-antitrypsin, AM cortiol, procalcitonin, blood, urine, sputum culture        Mobilization Plan: PT/OT consulted    Nutrition Plan: dysphagia 3 with nectar thick liquid and nutritional supplement  Discharge Plan:   Patient should be ready for [facility] surg on 7[facility]23[facility]  Initial PT/OT/ST Recommendations: awaiting recommendation  Initial /Plan: awaiting recommendation     Specific Diagnosis Plan:  Neuro:   Dementia  -CAM icu  -maintain day night cycle  -Vit B12 wnl  -hold PTA valium  -head CT negative for acute intracranial pathology     Apathy:  -starting Remeron 7 5mg HS for appetite and depressed mood     Pain:  -tylenol 975mg Q8hr prn     CV:   HTN  -hold amlodipine-benazepril, soft bp  -contninue simvastatin 20mg HS  -continue to monitor     Pulm:   Hypercarbic respiratory failure  -improving, maintaining saturation with NC  -follow up CXR to evaluate for aspiration  -follow up sputum culture  -if mental status declines/worsening O2 saturation, repeat ABG  -continuous pulse ox    CT chest abdomen pelvis 7/21/19:  Numerous nodular and interstitial changes including 9 mm left upper lobe lung nodule  Short-term follow-up recommended with 3 month unenhanced chest CT  Limited exam without IV or oral contrast and motion artifact  No acute posttraumatic CT noncontrast findings      Pulmonary nodule  -will discuss with patient and family  -outpatient follow up vs  Wait and see    -f/u alpha-1-antitypsin level     F/E/N  F: isolyte at 40cc/hr  E: goal K/Phos/Mg   4 0/3 0/2 0  N: dysphagia level 3 diet, nectar thick, nutritional supplements  Pt at risk of developing re-feeding syndrome, watch for hypo-phosphatemia      GI:  Bowel regiment with senokot  Patient had a BM today with manual disimpaction   See nutrition for plans regarding malnourishment   Patient may need keofed for tube feed if oral intake inadequate     : Monitor urine output  eGFR: 111  UA negative nitrites/leukocytes, +ketone, likely dehydrated     ID:   Follow up fever carve, cultures  -procalcitonin pending     Heme:   Mild leukocytosis, continue to monitor     Endo: Follow up morning cortisol  Q6 POCT glucose                Msk/Skin:   S/p fall  CK trending down, continue IVF  PT/OT consulted    Spoke with Rich Shaw at 3:10 regarding transfer         Philippe Villaseñor MD  Family Medicine Resident  PGY-2

## 2019-07-23 NOTE — PHYSICAL THERAPY NOTE
Physical Therapy Evaluation    Patient Name: Larisa Carlin    CKXQO'Z Date: 7/23/2019     Problem List  Patient Active Problem List   Diagnosis    Fall    Pressure injury of head, stage 1    Pressure injury of sacral region, stage 3 ([address] )    Scalp hematoma    Severe protein-calorie malnutrition ([address] )    Ambulatory dysfunction    Acute hypercapnic respiratory failure ([address] )        Past Medical History  History reviewed  No pertinent past medical history  Past Surgical History  History reviewed  No pertinent surgical history  07/23/19 0538   Note Type   Note type Eval/Treat   Pain Assessment   Pain Assessment Canela-Baker FACES   Canela-Baker FACES Pain Rating 6   Pain Type Acute pain   Pain Location Rectum   Home Living   Type of Home House   Home Layout Two level   Additional Comments Pt lives at home alone in a multi story home  Pt's bathroom is on the second floor but bedroom and kitchen on the first  Pt was I for mobility and ADL's  Pt reports that someone cooks for her  Pt has 1 CHRISTY and steps to get to the second floor  Pt does not own any DME  Pt approved verbally that the therapist can ask her daughter questions  Pt's daughter commented that the plan for her mom will be to not return to her home but look for assisted living options  This has been a discussion with the patient for multiple years prior      Prior Function   Level of [place] Independent with ADLs and functional mobility   Lives With   (alone)   Rece[facility]   IADLs Needs assistance   Falls in the last 6 months 1 to 4   Restrictions/Precautions   Weight Bearing Precautions Per Order No   Other Precautions Cognitive;O2;Fall Risk;Pain;Telemetry;Multiple lines   General   Family/Caregiver Present Yes   Cognition   Overall Cognitive Status Impaired   Arousal/Participation Alert   Orientation Level Oriented to person;Disoriented to place; Disoriented to time;Disoriented to situation   Memory Decreased recall of recent events   Following Commands Follows one step commands with increased time or repetition   RLE Assessment   RLE Assessment WNL   LLE Assessment   LLE Assessment WNL   Coordination   Movements are Fluid and Coordinated 1   Sensation WFL   Light Touch   RLE Light Touch Grossly intact   LLE Light Touch Grossly intact   Bed Mobility   Rolling L 3  Moderate assistance   Supine to Sit 3  Moderate assistance   Additional Comments mulitple cueing to perform steps throughout transfer  Repetition needed   Transfers   Sit to Stand 3  Moderate assistance   Additional items Assist x 2   Stand to Sit 3  Moderate assistance   Additional items Assist x 2   Ambulation/Elevation   Gait pattern Step to;Excessively slow; Foward flexed; Antalgic   Gait Assistance 4  Minimal assist   Additional items Assist x 2   Assistive Device None   Distance 2ftx1   Balance   Static Sitting Poor +   Dynamic Sitting Poor   Static Standing Poor -   Dynamic Standing Poor -   Ambulatory Poor   Endurance Deficit   Endurance Deficit Yes   Activity Tolerance   Activity Tolerance Patient limited by fatigue;Patient limited by pain   Nurse Made Aware Nurse approved therapy session   Assessment   Prognosis Good   Problem List Decreased strength;Decreased endurance; Impaired balance;Decreased mobility; Decreased cognition;Pain;Decreased skin integrity; Decreased safety awareness   Assessment Pt is a 65 yo female admitted to [place] [address] on 7/21/2019 s/p fall in kitchen and being on the ground for 24 hours  Two patient identifiers were used to confirm  DX: multiple pressure sores on head and sacral region, acute hypoxic and hypercapnic respiratory insufficiency secondary likely to aspiration  Imaging includes: CT of chest/pelvis: (-) for cardiopulm, no acute osseous findings at pelvis, upper lobe interstitial changes  CT cervical spine: no acute fracture or dislocation   Nima of femur: no acute osseous abnormality  Pt's impairments include increased pain in rectum, limited mobility, and decreased endurance  These impairments limit the ability of the patient to perform mobility without increased assistance, return to PLOF and participate in everyday life activities  Pt would benefit from continued skilled therapy while in the hospital to improve functional mobility and to lower the level of assist needed for mobility  Recommend discharge to rehab  At the end of the session the patient was left in seated position with call bell and phone within reach  Pt's daughter was present during some of the session  Education about the importance of sitting upright in the chair and pressure relief when compared to supine in the bed  Barriers to Discharge Inaccessible home environment   Goals   STG Expiration Date 08/02/19   Short Term Goal #1 STG 1: Pt will perform bed mobility at a S level to increase participation in activity  STG 2: Pt will perform transfers at a S level to improve mobility and reduce the need for assistance upon d/c home  STG 3: Pt will ambulate 50ft, least restrictive device at a S level to improve safety with mobility and decrease the level of assist needed  Treatment Day 0   Plan   Treatment/Interventions LE strengthening/ROM; Functional transfer training; Therapeutic exercise; Endurance training;Bed mobility;Gait training   PT Frequency   (3-5x a week)   Recommendation   Recommendation   (rehab)   PT - OK to Discharge No   Modified [place] Scale   Modified [place] Scale 4   Barthel Index   Feeding 5   Bathing 0   Grooming Score 0   Dressing Score 0   Bladder Score 0   Bowels Score 0   Toilet Use Score 0   Transfers (Bed/Chair) Score 5   Mobility (Level Surface) Score 0   Stairs Score 0   Barthel Index Score 10   Rachel Singletary, Pt, DPT

## 2019-07-23 NOTE — PROGRESS NOTES
Progress Note - Critical Care   Sonya Reddy 66 y o  female MRN: [id number]  Unit/Bed#: ICU 12 Encounter: [id number]    ______________________________________________________________________  Assessment and Plan:     Patient Active Problem List    Diagnosis Date Noted    Acute hypercapnic respiratory failure ([facility] [address] ) 07/22/2019    Fall 07/21/2019    Pressure injury of head, stage 1 07/21/2019    Pressure injury of sacral region, stage 3 ([facility] [address] ) 07/21/2019    Scalp hematoma 07/21/2019    Severe protein-calorie malnutrition ([facility] ) 07/21/2019    Ambulatory dysfunction 07/21/2019         Invasive lines and devices: Invasive Devices     Peripheral Intravenous Line            Peripheral IV 07/21/19 Left Antecubital 2 days    Peripheral IV 07/21/19 Right Forearm 2 days                 Neuro:   Dementia  -CAM icu  -maintain day night cycle  -Vit B12 wnl  -hold PTA valium  -head CT negative for acute intracranial pathology    Apathy:  Consider MDD in the setting of chronic illness, will evaluate with PHQ-9  Consider starting SSRI    Pain:  -tylenol 975mg Q8hr prn    CV:   HTN  -hold amlodipine-benazepril, soft bp  -contninue simvastatin 20mg HS  -continue to monitor    Pulm:   Hypercarbic respiratory failure  -follow up CXR to evaluate for aspiration, witnessed aspiration event by family  -follow up sputum culture  -trial off bipap  -if mental status declines, repeat ABG  -continuous pulse ox    Pulmonary nodule  -will discuss with patient and family  -outpatient follow up vs  Wait and see    F/E/N  F: isolyte at 75cc/jr  E: goal K/Phos/Mg   4 0/3 0/2 0  N: currently NPO, will follow up speech and swallow, if passed will advance diet as tolerated with nutritional supplement  Monitor for refeeding syndrome    GI:  Currently NPO  Bowel regiment with senokot  See nutrition for plans regarding malnourishment     :    Monitor urine output  eGFR: 111  UA negative nitrites/leukocytes, +ketone, likely dehydrated    ID: Follow up fever carve, cultures  -procalcitonin pending    Heme:   Mild leukocytosis, continue to monitor    Endo: Follow up morning cortisol  Q6 POCT glucose     Msk/Skin:   S/p fall  CK trending down, continue IVF  PT/OT when appropriate    Disposition:   Pending disposition after goal of care discussion with daughter  Code Status: Level 1 - Full Code    ______________________________________________________________________    HPI/24hr events:   No acute events per night team  Pt was placed back on bipap overnight  Pt seen and evaluated at bedside  Pt sleeping, awoken after a few gentle commands  Pt is somnolent but does answer questions and follow commands  Pt denies any pain or difficulty breathing  Pt states "she wasn't this sick" before  When told about needing to eat and gain weight, pt responded "I don't care"  Pt seems apathetic regarding her medical situation, stating she doesn't want to live this long and that her parents  "young" as well  Pt denies any recent falls prior to this episode     ______________________________________________________________________    Physical Exam   Constitutional: No distress  Frail, cachectic, older than stated age, poor glooming, severely malnurished   HENT:   Nose: Nose normal    Edema present around left orbit around left sided facial ulceration   Eyes:   Some edema on the left side of the face surrounding ulceration   Cardiovascular: Normal rate and regular rhythm  Pulmonary/Chest: Effort normal and breath sounds normal  No stridor  She has no wheezes  On bipap   Abdominal: Soft  Bowel sounds are normal  She exhibits no distension  There is no tenderness  Musculoskeletal: She exhibits no edema, tenderness or deformity  Weakly moves all 4 extremities on command  Extremely thin extremities   Neurological: She is alert  Pt follows command and answers questions   Skin: She is not diaphoretic     Dry, scaling of the skin noted on bilateral feet Psychiatric:   Depressed mood, apathetic   Vitals reviewed  ______________________________________________________________________  Vitals:    19 0400 19 0425 19 0500 19 0600   BP: (!) 93/44  (!) 90/45 98/55   BP Location: Left arm      Pulse: 68  66 68   Resp: 18  15 21   Temp: (!) 97 °F (36 1 °C)      TempSrc: Axillary      SpO2: 99% 99% 99% 100%   Weight:    31 9 kg (70 lb 5 2 oz)   Height:                  Temperature:   Temp (24hrs), Av 6 °F (36 4 °C), Min:97 °F (36 1 °C), Max:98 5 °F (36 9 °C)    Current Temperature: (!) 97 °F (36 1 °C)    Weights:   IBW: 40 9 kg    Body mass index is 14 7 kg/m²  Weight (last 2 days)     Date/Time   Weight    19 06   31 9 (70 33)    19 1728   32 4 (71 43)    19 0600   32 4 (71 43)    19 2045   32 4 (71 43)    19 14:16:44   42 5 (93 7)              Non-Invasive/Invasive Ventilation Settings:  Respiratory    Lab Data (Last 4 hours)    None         O2/Vent Data (Last 4 hours)      425          Non-Invasive Ventilation Mode BiPAP AVAP                 Lab Results   Component Value Date    PHART 7 321 (L) 2019    HGO9LAM 61 3 (HH) 2019    PO2ART 132 3 (H) 2019    GXJ0RQL 30 9 (H) 2019    BEART 3 0 2019    SOURCE Radial, Right 2019       Intake and Outputs:  I/O       701 -  0700  07 -  0700  07 -  0700    I V  (mL/kg)  3040 3 (95 3)     IV Piggyback  650     Total Intake(mL/kg)  3690 3 (115 7)     Urine (mL/kg/hr) 527 1024 (1 3)     Stool 0      Total Output 527 1024     Net -527 +2666 3            Unmeasured Urine Occurrence 1 x      Unmeasured Stool Occurrence 1 x            Nutrition:        Diet Orders   (From admission, onward)            Start     Ordered    19  Diet NPO  Diet effective now     Question Answer Comment   Diet Type NPO    RD to adjust diet per protocol?  Yes        19          Labs:   Results from last 7 days   Lab Units 07/23/19  0615 07/22/19  0602 07/21/19  1845 07/21/19  1422   WBC Thousand/uL 11 12* 19 27*  --  16 93*   HEMOGLOBIN g/dL 13 0 15 1  --  16 6*   I STAT HEMOGLOBIN g/dl  --   --  16 0* 16 3*   HEMATOCRIT % 41 9 46 5*  --  49 7*   HEMATOCRIT, ISTAT %  --   --  47* 48*   PLATELETS Thousands/uL 225 266  --  294   NEUTROS PCT % 83* 87*  --  87*   MONOS PCT % 7 7  --  7     Results from last 7 days   Lab Units 07/23/19  0615 07/22/19  0602 07/21/19  1845 07/21/19  1422   POTASSIUM mmol/L 3 4* 3 3*  --  4 1   CHLORIDE mmol/L 104 98*  --  100   CO2 mmol/L 33* 35*  --  31   CO2, I-STAT mmol/L  --   --  36* 38*   BUN mg/dL 16 11  --  16   CREATININE mg/dL 0 29* 0 43*  --  0 47*   CALCIUM mg/dL 8 3 8 1*  --  8 3   ALK PHOS U/L  --   --   --  77   ALT U/L  --   --   --  35   AST U/L  --   --   --  74*   GLUCOSE, ISTAT mg/dl  --   --  119 119         Lab Results   Component Value Date    PHOS 2 7 07/23/2019    PHOS 3 4 07/22/2019      Results from last 7 days   Lab Units 07/21/19  1423   INR  1 47*     No results found for: TROPONINI      ABG:  Lab Results   Component Value Date    PHART 7 321 (L) 07/22/2019    MEM3MMW 61 3 (HH) 07/22/2019    PO2ART 132 3 (H) 07/22/2019    RXN1RGV 30 9 (H) 07/22/2019    BEART 3 0 07/22/2019    SOURCE Radial, Right 07/22/2019       Imaging: I have personally reviewed pertinent reports  XR trauma 7/21/19:  1  Upper lobe predominant interstitial changes  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, favor sarcoidosis  2   No acute cardiopulmonary findings  3   No acute osseous findings at the pelvis  CT C spine 7/21/19:  No acute fracture or dislocation    CT C/A/P 7/21/19:  Limited exam without IV or oral contrast and motion artifact  No acute posttraumatic CT noncontrast findings  Lung nodule for which nonemergent outpatient unenhanced chest CT is recommended in 3 months, I cannot exclude neoplasm  CT head wo contrast 7/21/19:  No acute intracranial abnormality    Left scalp soft tissue swelling /left preseptal/left forehead soft tissue swelling  XR femur 2 vw right 7/21/19:  No acute osseous abnormality    XR pelvis AP 7/21/19:  No acute osseous abnormality    CXR portable 7/22/19:  No radiographic evidence of aspiration or other acute cardiopulmonary abnormality      CXR portable ICU 7/23/19:  Pending official read    EKG: Telemetry reviewed     Micro:  No results found for: Abhijeet Nicole, WOUNDCULT, SPUTUMCULTUR    Allergies: No Known Allergies    Medications:   Scheduled Meds:  Current Facility-Administered Medications:  acetaminophen 975 mg Oral Q8H PRN Nathan Garcia MD    bisacodyl 10 mg Rectal Daily PRN Nathan Garcia MD    chlorhexidine 15 mL Swish & Spit Q12H [address] Nathan Garcia MD    docusate sodium 100 mg Oral BID Nathan Garcia MD    folic acid 124 mcg Oral Daily Nathan Garcia MD    heparin (porcine) 5,000 Units Subcutaneous Q12H [address] [place], [place]    multi-electrolyte 75 mL/hr Intravenous Continuous Nathan Garcia MD Last Rate: 75 mL/hr (07/23/19 0606)   multivitamin-minerals 1 tablet Oral Daily Nathan Garcia MD    ondansetron 4 mg Intravenous Q6H PRN Nathan Garcia MD    pravastatin 40 mg Oral Daily With Syed Stevens MD    senna 2 tablet Oral Daily Nathan Garcia MD    thiamine 100 mg Oral Daily Nathan Garcia MD      Continuous Infusions:  multi-electrolyte 75 mL/hr Last Rate: 75 mL/hr (07/23/19 0606)     PRN Meds:    acetaminophen 975 mg Q8H PRN   bisacodyl 10 mg Daily PRN   ondansetron 4 mg Q6H PRN       VTE Pharmacologic Prophylaxis: Heparin  VTE Mechanical Prophylaxis: sequential compression device      Dorothy Paula MD  Family Medicine Resident  PGY-2

## 2019-07-23 NOTE — SPEECH THERAPY NOTE
Bedside Swallow Evaluation:    Summary:  Pt presents w/ mild oropharyngeal dysphagia with regular solids and thin liquids characterized by min prolonged mastication time with regular solids and min delayed swallow initiation time with thin liquids  The patient did not present with any overt cough with thin liquids, but there is concern for recent aspiration event with thin liquids which led to respiratory distress and need for bi-pap mask  From H&P: Kenan Ceron is a 66 y o  female w/ PMHx significant for HPTN, and HLD who presents with multiple pressure wounds after a fall and estimated downtime 24hrs  Per the patient's family the patient spoke to them on the phone at approximately noon on 7/20 but had not answered her phone last evening or this morning  As a result they went to her residence early this afternoon to check on her, and found her face down on the kitchen floor unable to move prompting EMS activation  Patient remembers falling, cannot recall if she lost consciousness or not  No complaints at this time  Admits to recent history of weight loss and short term memory difficulty  Patient's sister reported that she took a sip of juice upon admission and had difficulty swallowing it, with resulting gurgly vocal quality  The patient was unable to catch her breath and was transferred to ICU requiring bipap  Dysphagia consult received and is able to be completed today as patient is off bipap  Recommendations:  Diet: Level 3/soft  Liquid: Nectar thick   Meds: Whole with liquid  Supervision: Assist with tray set up   Positioning:Upright  Strategies: Pt to take PO/Meds only when fully alert and upright     Oral care: As needed   Aspiration precautions    Therapy Prognosis: Good  Prognosis considerations: Poor PO intake at home  Frequency: 3-5 x week    Consider consult w/:  Nutrition-possible supplement for malnutrition and poor PO intake     Reason for consult:  R/o aspiration  Determine safest and least restrictive diet  Change in mental status  respiratory compromise  Family reported cough w/ PO  poor intake  weight loss     Precautions:  None    Current diet:  NPO  Premorbid diet[de-identified]  Regular with thin liquids   Previous VBS:  None on record  O2 requirement:  NC  Voice/Speech:  WFL  Social:  Lives alone with good support   Follows commands:  WFL                        Cognitive Status:  Appears adequate  Oral [facility] exam:  Dentition: WFL-natural  Labial strength and ROM: WFL  Lingual strength and ROM: WFL  Mandibular strength and ROM: WFL  Velum: Not visualized  Secretion management: [facility]  Oral care provided    Items administered:  Puree,, hard solid, nectar thick liquid, thin liquids  Liquids were taken by straw  Oral stage: Mild  Lip closure: WFL  Mastication: Min prolonged with regular solid  Bolus formation: WFL  Bolus control: WFL  Transfer: WFL  Oral residue: No  Pocketing: No    Pharyngeal stage: Mild  Swallow promptness: Min delayed with thin liquids   Laryngeal rise: WFL  Wet voice: No  Throat clear: No  Cough: No  Secondary swallows: No  Audible swallows: No    Esophageal stage:  No s/s reported    Aspiration precautions posted    Results d/w:  Pt, nursing    Goal(s):  Pt will tolerate least restrictive diet w/out s/s aspiration or oral/pharyngeal difficulties

## 2019-07-24 PROBLEM — E86.0 DEHYDRATION: Status: ACTIVE | Noted: 2019-07-24

## 2019-07-24 PROBLEM — E87.6 HYPOKALEMIA: Status: ACTIVE | Noted: 2019-07-24

## 2019-07-24 PROBLEM — N90.89 VULVAR MASS: Status: ACTIVE | Noted: 2019-07-24

## 2019-07-24 LAB
ANION GAP SERPL CALCULATED.3IONS-SCNC: 3 MMOL/L (ref 4–13)
BASOPHILS # BLD AUTO: 0.02 THOUSANDS/ΜL (ref 0–0.1)
BASOPHILS NFR BLD AUTO: 0 % (ref 0–1)
BUN SERPL-MCNC: 8 MG/DL (ref 5–25)
CALCIUM SERPL-MCNC: 8 MG/DL (ref 8.3–10.1)
CHLORIDE SERPL-SCNC: 102 MMOL/L (ref 100–108)
CO2 SERPL-SCNC: 39 MMOL/L (ref 21–32)
CREAT SERPL-MCNC: 0.28 MG/DL (ref 0.6–1.3)
EOSINOPHIL # BLD AUTO: 0.02 THOUSAND/ΜL (ref 0–0.61)
EOSINOPHIL NFR BLD AUTO: 0 % (ref 0–6)
ERYTHROCYTE [DISTWIDTH] IN BLOOD BY AUTOMATED COUNT: 12.7 % (ref 11.6–15.1)
GFR SERPL CREATININE-BSD FRML MDRD: 113 ML/MIN/1.73SQ M
GLUCOSE SERPL-MCNC: 111 MG/DL (ref 65–140)
GLUCOSE SERPL-MCNC: 153 MG/DL (ref 65–140)
GLUCOSE SERPL-MCNC: 73 MG/DL (ref 65–140)
GLUCOSE SERPL-MCNC: 76 MG/DL (ref 65–140)
HCT VFR BLD AUTO: 39.5 % (ref 34.8–46.1)
HGB BLD-MCNC: 12.6 G/DL (ref 11.5–15.4)
IMM GRANULOCYTES # BLD AUTO: 0.03 THOUSAND/UL (ref 0–0.2)
IMM GRANULOCYTES NFR BLD AUTO: 0 % (ref 0–2)
LYMPHOCYTES # BLD AUTO: 1.01 THOUSANDS/ΜL (ref 0.6–4.47)
LYMPHOCYTES NFR BLD AUTO: 11 % (ref 14–44)
MAGNESIUM SERPL-MCNC: 2.2 MG/DL (ref 1.6–2.6)
MCH RBC QN AUTO: 31.5 PG (ref 26.8–34.3)
MCHC RBC AUTO-ENTMCNC: 31.9 G/DL (ref 31.4–37.4)
MCV RBC AUTO: 99 FL (ref 82–98)
MONOCYTES # BLD AUTO: 0.74 THOUSAND/ΜL (ref 0.17–1.22)
MONOCYTES NFR BLD AUTO: 8 % (ref 4–12)
NEUTROPHILS # BLD AUTO: 7.68 THOUSANDS/ΜL (ref 1.85–7.62)
NEUTS SEG NFR BLD AUTO: 81 % (ref 43–75)
NRBC BLD AUTO-RTO: 0 /100 WBCS
PHOSPHATE SERPL-MCNC: 2.5 MG/DL (ref 2.3–4.1)
PLATELET # BLD AUTO: 227 THOUSANDS/UL (ref 149–390)
PMV BLD AUTO: 11.2 FL (ref 8.9–12.7)
POTASSIUM SERPL-SCNC: 3.3 MMOL/L (ref 3.5–5.3)
RBC # BLD AUTO: 4 MILLION/UL (ref 3.81–5.12)
SODIUM SERPL-SCNC: 144 MMOL/L (ref 136–145)
WBC # BLD AUTO: 9.5 THOUSAND/UL (ref 4.31–10.16)

## 2019-07-24 PROCEDURE — 88305 TISSUE EXAM BY PATHOLOGIST: CPT | Performed by: PATHOLOGY

## 2019-07-24 PROCEDURE — 0UBMXZX EXCISION OF VULVA, EXTERNAL APPROACH, DIAGNOSTIC: ICD-10-PCS | Performed by: OBSTETRICS & GYNECOLOGY

## 2019-07-24 PROCEDURE — 94760 N-INVAS EAR/PLS OXIMETRY 1: CPT

## 2019-07-24 PROCEDURE — 82948 REAGENT STRIP/BLOOD GLUCOSE: CPT

## 2019-07-24 PROCEDURE — 83735 ASSAY OF MAGNESIUM: CPT | Performed by: FAMILY MEDICINE

## 2019-07-24 PROCEDURE — 82103 ALPHA-1-ANTITRYPSIN TOTAL: CPT | Performed by: FAMILY MEDICINE

## 2019-07-24 PROCEDURE — 99232 SBSQ HOSP IP/OBS MODERATE 35: CPT | Performed by: NURSE PRACTITIONER

## 2019-07-24 PROCEDURE — 85025 COMPLETE CBC W/AUTO DIFF WBC: CPT | Performed by: FAMILY MEDICINE

## 2019-07-24 PROCEDURE — 92526 ORAL FUNCTION THERAPY: CPT

## 2019-07-24 PROCEDURE — 80048 BASIC METABOLIC PNL TOTAL CA: CPT | Performed by: FAMILY MEDICINE

## 2019-07-24 PROCEDURE — 99232 SBSQ HOSP IP/OBS MODERATE 35: CPT | Performed by: INTERNAL MEDICINE

## 2019-07-24 PROCEDURE — 84100 ASSAY OF PHOSPHORUS: CPT | Performed by: FAMILY MEDICINE

## 2019-07-24 PROCEDURE — 56605 BIOPSY OF VULVA/PERINEUM: CPT | Performed by: OBSTETRICS & GYNECOLOGY

## 2019-07-24 PROCEDURE — 94668 MNPJ CHEST WALL SBSQ: CPT

## 2019-07-24 PROCEDURE — 99221 1ST HOSP IP/OBS SF/LOW 40: CPT | Performed by: OBSTETRICS & GYNECOLOGY

## 2019-07-24 RX ORDER — DOCUSATE SODIUM 100 MG/1
100 CAPSULE, LIQUID FILLED ORAL 2 TIMES DAILY
Status: DISCONTINUED | OUTPATIENT
Start: 2019-07-24 | End: 2019-07-26

## 2019-07-24 RX ORDER — SENNOSIDES 8.6 MG
1 TABLET ORAL
Status: DISCONTINUED | OUTPATIENT
Start: 2019-07-24 | End: 2019-07-26

## 2019-07-24 RX ORDER — POTASSIUM CHLORIDE 750 MG/1
10 TABLET, EXTENDED RELEASE ORAL
Status: DISCONTINUED | OUTPATIENT
Start: 2019-07-24 | End: 2019-07-25

## 2019-07-24 RX ORDER — LIDOCAINE HYDROCHLORIDE 10 MG/ML
5 INJECTION, SOLUTION EPIDURAL; INFILTRATION; INTRACAUDAL; PERINEURAL ONCE
Status: COMPLETED | OUTPATIENT
Start: 2019-07-24 | End: 2019-07-24

## 2019-07-24 RX ADMIN — HEPARIN SODIUM 5000 UNITS: 5000 INJECTION INTRAVENOUS; SUBCUTANEOUS at 08:59

## 2019-07-24 RX ADMIN — PRAVASTATIN SODIUM 40 MG: 40 TABLET ORAL at 16:40

## 2019-07-24 RX ADMIN — Medication 1 TABLET: at 08:59

## 2019-07-24 RX ADMIN — SENNOSIDES 17.2 MG: 8.6 TABLET, FILM COATED ORAL at 08:59

## 2019-07-24 RX ADMIN — THIAMINE HCL TAB 100 MG 100 MG: 100 TAB at 08:59

## 2019-07-24 RX ADMIN — HEPARIN SODIUM 5000 UNITS: 5000 INJECTION INTRAVENOUS; SUBCUTANEOUS at 22:07

## 2019-07-24 RX ADMIN — CHLORHEXIDINE GLUCONATE 0.12% ORAL RINSE 15 ML: 1.2 LIQUID ORAL at 08:59

## 2019-07-24 RX ADMIN — LIDOCAINE HYDROCHLORIDE 5 ML: 10 INJECTION, SOLUTION EPIDURAL; INFILTRATION; INTRACAUDAL; PERINEURAL at 13:30

## 2019-07-24 RX ADMIN — POTASSIUM CHLORIDE 10 MEQ: 750 TABLET, EXTENDED RELEASE ORAL at 16:40

## 2019-07-24 RX ADMIN — FOLIC ACID TAB 400 MCG 400 MCG: 400 TAB at 09:00

## 2019-07-24 RX ADMIN — MIRTAZAPINE 7.5 MG: 15 TABLET, FILM COATED ORAL at 22:07

## 2019-07-24 RX ADMIN — SODIUM CHLORIDE, SODIUM GLUCONATE, SODIUM ACETATE, POTASSIUM CHLORIDE, MAGNESIUM CHLORIDE, SODIUM PHOSPHATE, DIBASIC, AND POTASSIUM PHOSPHATE 100 ML/HR: .53; .5; .37; .037; .03; .012; .00082 INJECTION, SOLUTION INTRAVENOUS at 16:41

## 2019-07-24 RX ADMIN — DOCUSATE SODIUM 100 MG: 100 CAPSULE, LIQUID FILLED ORAL at 08:59

## 2019-07-24 NOTE — ASSESSMENT & PLAN NOTE
Unclear etiology  Patient has no memory of the event  Was found down in her home with multiple pressure ulcerations  PT/OT evaluation  Monitor orthostatics

## 2019-07-24 NOTE — PLAN OF CARE
Problem: Nutrition/Hydration-ADULT  Goal: Nutrient/Hydration intake appropriate for improving, restoring or maintaining nutritional needs  Description  Monitor and assess patient's nutrition/hydration status for malnutrition (ex- brittle hair, bruises, dry skin, pale skin and conjunctiva, muscle wasting, smooth red tongue, and disorientation)  Collaborate with interdisciplinary team and initiate plan and interventions as ordered  Monitor patient's weight and dietary intake as ordered or per policy  Utilize nutrition screening tool and intervene per policy  Determine patient's food preferences and provide high-protein, high-caloric foods as appropriate  INTERVENTIONS:  - Monitor oral intake, urinary output, labs, and treatment plans  - Assess nutrition and hydration status and recommend course of action  - Evaluate amount of meals eaten  - Assist patient with eating if necessary   - Allow adequate time for meals  - Recommend/ encourage appropriate diets, oral nutritional supplements, and vitamin/mineral supplements  - Order, calculate, and assess calorie counts as needed  - Recommend, monitor, and adjust tube feedings and TPN/PPN based on assessed needs  - Assess need for intravenous fluids  - Provide specific nutrition/hydration education as appropriate  - Include patient/family/caregiver in decisions related to nutrition  Outcome: Progressing  Note:   Pt diet transitioned from nectar thick dysphagia III diet to thin liquids  Consuming decent amount of meals and tolerating PO pills        Problem: Prexisting or High Potential for Compromised Skin Integrity  Goal: Skin integrity is maintained or improved  Description  INTERVENTIONS:  - Identify patients at risk for skin breakdown  - Assess and monitor skin integrity  - Assess and monitor nutrition and hydration status  - Monitor labs (i e  albumin)  - Assess for incontinence   - Turn and reposition patient  - Assist with mobility/ambulation  - Relieve pressure over bony prominences  - Avoid friction and shearing  - Provide appropriate hygiene as needed including keeping skin clean and dry  - Evaluate need for skin moisturizer/barrier cream  - Collaborate with interdisciplinary team (i e  Nutrition, Rehabilitation, etc )   - Patient/family teaching  Outcome: Progressing     Problem: Potential for Falls  Goal: Patient will remain free of falls  Description  INTERVENTIONS:  - Assess patient frequently for physical needs  -  Identify cognitive and physical deficits and behaviors that affect risk of falls    -  [place] fall precautions as indicated by assessment   - Educate patient/family on patient safety including physical limitations  - Instruct patient to call for assistance with activity based on assessment  - Modify environment to reduce risk of injury  - Consider OT/PT consult to assist with strengthening/mobility  Outcome: Progressing     Problem: PAIN - ADULT  Goal: Verbalizes/displays adequate comfort level or baseline comfort level  Description  Interventions:  - Encourage patient to monitor pain and request assistance  - Assess pain using appropriate pain scale  - Administer analgesics based on type and severity of pain and evaluate response  - Implement non-pharmacological measures as appropriate and evaluate response  - Consider cultural and social influences on pain and pain management  - Notify physician/advanced practitioner if interventions unsuccessful or patient reports new pain  Outcome: Progressing     Problem: INFECTION - ADULT  Goal: Absence or prevention of progression during hospitalization  Description  INTERVENTIONS:  - Assess and monitor for signs and symptoms of infection  - Monitor lab/diagnostic results  - Monitor all insertion sites, i e  indwelling lines, tubes, and drains  - Monitor endotracheal (as able) and nasal secretions for changes in amount and color  - [place] appropriate cooling/warming therapies per order  - Administer medications as ordered  - Instruct and encourage patient and family to use good hand hygiene technique  - Identify and instruct in appropriate isolation precautions for identified infection/condition  Outcome: Progressing  Note:   WBC count decreased from 11 12 to 9 50 within a 24-hour time frame  Problem: SAFETY ADULT  Goal: Patient will remain free of falls  Description  INTERVENTIONS:  - Assess patient frequently for physical needs  -  Identify cognitive and physical deficits and behaviors that affect risk of falls  -  [place] fall precautions as indicated by assessment   - Educate patient/family on patient safety including physical limitations  - Instruct patient to call for assistance with activity based on assessment  - Modify environment to reduce risk of injury  - Consider OT/PT consult to assist with strengthening/mobility  Outcome: Progressing     Problem: DISCHARGE PLANNING  Goal: Discharge to home or other facility with appropriate resources  Description  INTERVENTIONS:  - Identify barriers to discharge w/patient and caregiver  - Arrange for needed discharge resources and transportation as appropriate  - Identify discharge learning needs (meds, wound care, etc )  - Arrange for interpretive services to assist at discharge as needed  - Refer to Case Management Department for coordinating discharge planning if the patient needs post-hospital services based on physician/advanced practitioner order or complex needs related to functional status, cognitive ability, or social support system  Outcome: Progressing     Problem: Knowledge Deficit  Goal: Patient/family/caregiver demonstrates understanding of disease process, treatment plan, medications, and discharge instructions  Description  Complete learning assessment and assess knowledge base    Interventions:  - Provide teaching at level of understanding  - Provide teaching via preferred learning methods  Outcome: Progressing

## 2019-07-24 NOTE — PROGRESS NOTES
Progress Note - Yokasta Walker 1940, 66 y o  female MRN: [id number]    Unit/Bed#: [facility] 933-01 Encounter: [id number]    Primary Care Provider: Marva Yo DO   Date and time admitted to hospital: 7/21/2019  2:09 PM        * Fall  Assessment & Plan  Unclear etiology  Patient has no memory of the event  Was found down in her home with multiple pressure ulcerations  PT/OT evaluation  Monitor orthostatics  Vulvar mass  Assessment & Plan  Status post biopsy today by OBGYN  Await biopsy results    Severe protein-calorie malnutrition ([address] )  Assessment & Plan  Malnutrition Findings:   Malnutrition type: Chronic illness(in the setting of severe orbital-hollow look, depressions dark [place] reflects body fat loss; temples hollowing, scooping, depression, clavicle protruding prominent bone reflects muscle mass loss; treated with rec for PO diet when deemed safe to swallow )  Degree of Malnutrition: Other severe protein calorie malnutrition    BMI Findings:  BMI Classifications: Underweight < 18 5(BMI = 14 93)     Body mass index is 15 09 kg/m²  Nutritionist evaluation  Protein supplements  Electrolyte supplements  Encourage oral intake    Dehydration  Assessment & Plan  Continue IV fluids  Monitor BMP  Monitor orthostatics    Hypokalemia  Assessment & Plan  Continue supplementation  BMP daily for now  Monitor phosphorus level as well    Acute hypercapnic respiratory failure [facility])  Assessment & Plan  This has resolved  Possibly secondary to aspiration pneumonitis  Was on BiPAP in ICU, currently on room air without shortness of breath  Chest x-ray unremarkable  Continue to monitor respiratory status closely    Ambulatory dysfunction  Assessment & Plan  With the due to malnutrition, deconditioning  PT OT eval   Will likely need placement at a rehab facility versus long-term care facility      Pressure injury of sacral region, stage 3 (HCC)  Assessment & Plan  Local wound care    Pressure injury of head, stage 1  Assessment & Plan  Local wound care        VTE Pharmacologic Prophylaxis:   Pharmacologic: Heparin  Mechanical VTE Prophylaxis in Place: Yes    Patient Centered Rounds: I have performed bedside rounds with nursing staff today  Education and Discussions with Family / Patient:  Patient, plan of care    Time Spent for Care: 20 minutes  More than 50% of total time spent on counseling and coordination of care as described above  Current Length of Stay: 3 day(s)    Current Patient Status: Inpatient   Certification Statement: The patient will continue to require additional inpatient hospital stay due to Discharge planning    Discharge Plan: To be determined    Code Status: Level 1 - Full Code      Subjective:   Denies any acute complaints at this time but history gathering is limited secondary to a poor memory  Objective:     Vitals:   Temp (24hrs), Av 1 °F (36 7 °C), Min:97 6 °F (36 4 °C), Max:98 4 °F (36 9 °C)    Temp:  [97 6 °F (36 4 °C)-98 4 °F (36 9 °C)] 98 2 °F (36 8 °C)  HR:  [68-86] 82  Resp:  [13-23] 20  BP: ()/(43-60) 120/54  SpO2:  [95 %-100 %] 98 %  Body mass index is 15 09 kg/m²  Input and Output Summary (last 24 hours): Intake/Output Summary (Last 24 hours) at 2019 1510  Last data filed at 2019 1319  Gross per 24 hour   Intake 1308 67 ml   Output 975 ml   Net 333 67 ml       Physical Exam:     Physical Exam   Constitutional:   Thin elderly female, sitting in chair   HENT:   Pressure ulceration left face   Eyes: Pupils are equal, round, and reactive to light  EOM are normal    Neck: Neck supple  Cardiovascular: Normal rate and regular rhythm  Pulmonary/Chest: Effort normal  She has no wheezes  She has no rales  Abdominal: Soft  She exhibits no distension  Musculoskeletal: Normal range of motion  She exhibits no edema  Neurological: She is alert  forgetfull   Skin: Skin is warm and dry     Multiple pressure ulcerations       Additional Data: Labs:    Results from last 7 days   Lab Units 07/24/19  0619   WBC Thousand/uL 9 50   HEMOGLOBIN g/dL 12 6   HEMATOCRIT % 39 5   PLATELETS Thousands/uL 227   NEUTROS PCT % 81*   LYMPHS PCT % 11*   MONOS PCT % 8   EOS PCT % 0     Results from last 7 days   Lab Units 07/24/19  0619  07/21/19  1422   SODIUM mmol/L 144   < > 137   POTASSIUM mmol/L 3 3*   < > 4 1   CHLORIDE mmol/L 102   < > 100   CO2 mmol/L 39*   < > 31   CO2, I-STAT   --    < > 38*   BUN mg/dL 8   < > 16   CREATININE mg/dL 0 28*   < > 0 47*   ANION GAP mmol/L 3*   < > 6   CALCIUM mg/dL 8 0*   < > 8 3   ALBUMIN g/dL  --   --  2 5*   TOTAL BILIRUBIN mg/dL  --   --  0 78   ALK PHOS U/L  --   --  77   ALT U/L  --   --  35   AST U/L  --   --  74*   GLUCOSE RANDOM mg/dL 76   < > 113    < > = values in this interval not displayed  Results from last 7 days   Lab Units 07/21/19  1423   INR  1 47*     Results from last 7 days   Lab Units 07/24/19  1121 07/24/19  0625 07/23/19  2354 07/23/19  1723 07/23/19  1144 07/22/19  1817   POC GLUCOSE mg/dl 111 73 89 83 137 74                   * I Have Reviewed All Lab Data Listed Above  * Additional Pertinent Lab Tests Reviewed: All Labs Within Last 24 Hours Reviewed        Recent Cultures (last 7 days):     Results from last 7 days   Lab Units 07/22/19  1818   BLOOD CULTURE  No Growth at 24 hrs  No Growth at 24 hrs         Last 24 Hours Medication List:     Current Facility-Administered Medications:  acetaminophen 975 mg Oral Q8H PRN Mindy Jung MD    bisacodyl 10 mg Rectal Daily PRN Mindy Jung MD    chlorhexidine 15 mL Swish & Spit Q12H [address] Mindy Jung MD    docusate sodium 100 mg Oral BID Mindy Jung MD    folic acid 970 mcg Oral Daily Mindy Jung MD    heparin (porcine) 5,000 Units Subcutaneous Q12H [address] Mindy Jung MD    mirtazapine 7 5 mg Oral HS Mindy Jung MD    multi-electrolyte 100 mL/hr Intravenous Continuous Cammy Farmer MD Last Rate: 100 mL/hr (07/24/19 1209)   multivitamin-minerals 1 tablet Oral Daily Gloria Dalal MD    ondansetron 4 mg Intravenous Q6H PRN Gloria Dalal MD    potassium chloride 10 mEq Oral TID With Meals Rodrick Trejo MD    pravastatin 40 mg Oral Daily With Jonathan John MD    senna 2 tablet Oral Daily Gloria Dalal MD    thiamine 100 mg Oral Daily Gloria Dalal MD         Today, Patient Was Seen By: Peggy Ansari MD    ** Please Note: Dictation voice to text software may have been used in the creation of this document   **

## 2019-07-24 NOTE — OCCUPATIONAL THERAPY NOTE
Occupational Therapy Cancellation Note    Orders received, chart reviewed  Attempted to see pt x2; pt with with diarrhea during both attempts, nursing staff present/aware  Pt requesting to defer session  OT to continue to follow and re-attempt evaluation as able        Parris Mcgovern, OT

## 2019-07-24 NOTE — SOCIAL WORK
CM met with pt and pt's dtr Alo Bianchi to discuss inpt rehab recommendations  A post acute care recommendation was made by your care team for [facility]  Discussed Freedom of Choice with both patient and caregiver  List of facilities given to caregiver via in person  caregiver aware the list is custom filtered for them by preference  and that [facility] post acute providers are designated  Pt's dtr requesting referral to [facility]  Referral made via [address]  Pt's dtr to review SNF list and to provide additional SNF choices

## 2019-07-24 NOTE — ASSESSMENT & PLAN NOTE
With the due to malnutrition, deconditioning  PT OT eval   Will likely need placement at a rehab facility versus long-term care facility

## 2019-07-24 NOTE — PROGRESS NOTES
Progress Note - Clarke Lynch 66 y o  female MRN: [id number]    Unit/Bed#: [facility] 933-01 Encounter: [id number]      Assessment/Plan:  1 )  Ambulatory dysfunction:  Cont pt/ot - rec short term rehab - agree  2 )  Fall:  Cont pt/ot  Fall precautions  Fall reason workup  3 )  Physical deconditioning:  Monitor skin integrity  Turn and reposition frequently  C&db exercises  Cont discussing with family through rehab:  Safety of pt returning home alone in deconditioned state  4 )  ST memory loss:  Frequent reminders  F/u with  senior care associates after acute condition resolved for further support  5 )  Diarrhea:  Reported by pt  Change senna to prn, hold colace for loose stool  6 )  Delirium risk:  No behaviors recorded  Cont delirium precautions as previously written  7 )  PCM:  Nutrition following  Cont to encourage sm frequent meal/snack  Keep hydrated  8 )  Pressure areas: cont local care  Wound team following  Off load pressure  Optimize diet  9 )  Mild pain:  No use of prn tylenol per EMR  Cont to monitor  Subjective:   Patient seen for geriatric follow up  She denies pain  Denies cp/sob/cough  Denies gu distress  Reports diarrhea started this afternoon and continues  She appears dehydrated with chapped lips  She states she is not hungry today  She appears weak  Pt was seen by ob/gyn for labial mass today  Objective:     Vitals: Blood pressure 120/54, pulse 82, temperature 98 2 °F (36 8 °C), resp  rate 20, height 4' 10" (1 473 m), weight 32 7 kg (72 lb 3 2 oz), SpO2 98 %  ,Body mass index is 15 09 kg/m²  Intake/Output Summary (Last 24 hours) at 7/24/2019 1618  Last data filed at 7/24/2019 1319  Gross per 24 hour   Intake 1228 67 ml   Output 975 ml   Net 253 67 ml       Physical Exam:   General:  A&O x3, no distress,thin, dry lips  Cards:  Reg rate & rhythm, norm heart sounds- no murmur, rub, gallop  Pulm:  Norm effort/no distress, cta, no w/r/r      Abd:  Soft, nt, nd, +bs  MS:  Norm ROM, no focal weakness  Ext:  W/d, no edema  Multiple abrasions noted- left cheek, abd  Invasive Devices     Peripheral Intravenous Line            Peripheral IV 07/23/19 Right;Ventral (anterior) Forearm less than 1 day          Drain            Urethral Catheter Non-latex 16 Fr  less than 1 day                Lab, Imaging and other studies: I have personally reviewed pertinent reports      VTE Pharmacologic Prophylaxis: Heparin  VTE Mechanical Prophylaxis: sequential compression device

## 2019-07-24 NOTE — ASSESSMENT & PLAN NOTE
Malnutrition Findings:   Malnutrition type: Chronic illness(in the setting of severe orbital-hollow look, depressions dark [place] reflects body fat loss; temples hollowing, scooping, depression, clavicle protruding prominent bone reflects muscle mass loss; treated with rec for PO diet when deemed safe to swallow )  Degree of Malnutrition: Other severe protein calorie malnutrition    BMI Findings:  BMI Classifications: Underweight < 18 5(BMI = 14 93)     Body mass index is 15 09 kg/m²     Nutritionist evaluation  Protein supplements  Electrolyte supplements  Encourage oral intake

## 2019-07-24 NOTE — ASSESSMENT & PLAN NOTE
This has resolved  Possibly secondary to aspiration pneumonitis  Was on BiPAP in ICU, currently on room air without shortness of breath  Chest x-ray unremarkable    Continue to monitor respiratory status closely

## 2019-07-24 NOTE — PROGRESS NOTES
Biopsy  Date/Time: 7/24/2019 1:47 PM  Performed by: Colletta No, MD  Authorized by: Colletta No, MD     Procedure Details - Skin Biopsy:     Biopsy tissue type: skin    Biopsy method: punch biopsy      Body area: Anogenital    Anogenital location:  Vulva    Vaginal Lesion: Yes      Simple Excision: No      Malignancy: malignancy unknown       Informed consent obtained  Time out was performed and patient was positioned in supine position with knees abducted  [id number] vulvar mass was noted on left labia  5cc of 1% Lidocaine was used for analgesia  Area was cleaned with Betadine  Punch biopsies were obtained using T[place]  Monsel solution was applied for hemostasis  Johan Grover MD  OB/GYN  7/24/2019  1:54 PM

## 2019-07-24 NOTE — SPEECH THERAPY NOTE
Speech Language/Pathology    Speech/Language Pathology Progress Note    Patient Name: Victor Manuel CAMPOSJ Date: 7/24/2019     Problem List  Patient Active Problem List   Diagnosis    Fall    Pressure injury of head, stage 1    Pressure injury of sacral region, stage 3 ([facility] )    Scalp hematoma    Severe protein-calorie malnutrition ([facility] )    Ambulatory dysfunction    Acute hypercapnic respiratory failure ([facility] )        Past Medical History  History reviewed  No pertinent past medical history  Past Surgical History  History reviewed  No pertinent surgical history  Subjective:  "I am really not water drinker"     Objective: The patient is seen for f/u dysphagia therapy  Per daughter patient was off the floor for biopsy and is more fatigued today  She is able to maintain alertness throughout session and is agreeable to PO trials of thin liquids  The patient is trialed with thin liquids via straw  She takes small, single sips  Swallow initiation appears timely, with good laryngeal rise  The patient has an audible swallow, but no overt s/s aspiration  She refuses trial of solids  Respiratory status appears improved today  She continues on NC, but is not SOB  Assessment:  The patient tolerated trials of thin liquids well  Plan/Recommendations:  Recommend level 3/soft diet with thin liquids   Continue ST

## 2019-07-24 NOTE — PLAN OF CARE
Problem: Nutrition/Hydration-ADULT  Goal: Nutrient/Hydration intake appropriate for improving, restoring or maintaining nutritional needs  Description  Monitor and assess patient's nutrition/hydration status for malnutrition (ex- brittle hair, bruises, dry skin, pale skin and conjunctiva, muscle wasting, smooth red tongue, and disorientation)  Collaborate with interdisciplinary team and initiate plan and interventions as ordered  Monitor patient's weight and dietary intake as ordered or per policy  Utilize nutrition screening tool and intervene per policy  Determine patient's food preferences and provide high-protein, high-caloric foods as appropriate       INTERVENTIONS:  - Monitor oral intake, urinary output, labs, and treatment plans  - Assess nutrition and hydration status and recommend course of action  - Evaluate amount of meals eaten  - Assist patient with eating if necessary   - Allow adequate time for meals  - Recommend/ encourage appropriate diets, oral nutritional supplements, and vitamin/mineral supplements  - Order, calculate, and assess calorie counts as needed  - Recommend, monitor, and adjust tube feedings and TPN/PPN based on assessed needs  - Assess need for intravenous fluids  - Provide specific nutrition/hydration education as appropriate  - Include patient/family/caregiver in decisions related to nutrition  Outcome: Progressing     Problem: Prexisting or High Potential for Compromised Skin Integrity  Goal: Skin integrity is maintained or improved  Description  INTERVENTIONS:  - Identify patients at risk for skin breakdown  - Assess and monitor skin integrity  - Assess and monitor nutrition and hydration status  - Monitor labs (i e  albumin)  - Assess for incontinence   - Turn and reposition patient  - Assist with mobility/ambulation  - Relieve pressure over bony prominences  - Avoid friction and shearing  - Provide appropriate hygiene as needed including keeping skin clean and dry  - Evaluate need for skin moisturizer/barrier cream  - Collaborate with interdisciplinary team (i e  Nutrition, Rehabilitation, etc )   - Patient/family teaching  Outcome: Progressing     Problem: Potential for Falls  Goal: Patient will remain free of falls  Description  INTERVENTIONS:  - Assess patient frequently for physical needs  -  Identify cognitive and physical deficits and behaviors that affect risk of falls    -  [place] fall precautions as indicated by assessment   - Educate patient/family on patient safety including physical limitations  - Instruct patient to call for assistance with activity based on assessment  - Modify environment to reduce risk of injury  - Consider OT/PT consult to assist with strengthening/mobility  Outcome: Progressing     Problem: PAIN - ADULT  Goal: Verbalizes/displays adequate comfort level or baseline comfort level  Description  Interventions:  - Encourage patient to monitor pain and request assistance  - Assess pain using appropriate pain scale  - Administer analgesics based on type and severity of pain and evaluate response  - Implement non-pharmacological measures as appropriate and evaluate response  - Consider cultural and social influences on pain and pain management  - Notify physician/advanced practitioner if interventions unsuccessful or patient reports new pain  Outcome: Progressing     Problem: INFECTION - ADULT  Goal: Absence or prevention of progression during hospitalization  Description  INTERVENTIONS:  - Assess and monitor for signs and symptoms of infection  - Monitor lab/diagnostic results  - Monitor all insertion sites, i e  indwelling lines, tubes, and drains  - Monitor endotracheal (as able) and nasal secretions for changes in amount and color  - [place] appropriate cooling/warming therapies per order  - Administer medications as ordered  - Instruct and encourage patient and family to use good hand hygiene technique  - Identify and instruct in appropriate isolation precautions for identified infection/condition  Outcome: Progressing     Problem: SAFETY ADULT  Goal: Patient will remain free of falls  Description  INTERVENTIONS:  - Assess patient frequently for physical needs  -  Identify cognitive and physical deficits and behaviors that affect risk of falls  -  [place] fall precautions as indicated by assessment   - Educate patient/family on patient safety including physical limitations  - Instruct patient to call for assistance with activity based on assessment  - Modify environment to reduce risk of injury  - Consider OT/PT consult to assist with strengthening/mobility  Outcome: Progressing     Problem: DISCHARGE PLANNING  Goal: Discharge to home or other facility with appropriate resources  Description  INTERVENTIONS:  - Identify barriers to discharge w/patient and caregiver  - Arrange for needed discharge resources and transportation as appropriate  - Identify discharge learning needs (meds, wound care, etc )  - Arrange for interpretive services to assist at discharge as needed  - Refer to Case Management Department for coordinating discharge planning if the patient needs post-hospital services based on physician/advanced practitioner order or complex needs related to functional status, cognitive ability, or social support system  Outcome: Progressing     Problem: Knowledge Deficit  Goal: Patient/family/caregiver demonstrates understanding of disease process, treatment plan, medications, and discharge instructions  Description  Complete learning assessment and assess knowledge base    Interventions:  - Provide teaching at level of understanding  - Provide teaching via preferred learning methods  Outcome: Progressing

## 2019-07-24 NOTE — CONSULTS
Consult - OB/GYN   Alaina Thornton 66 y o  female MRN: [id number]  Unit/Bed#: [facility] 933-01 Encounter: [id number]    66 y o   postmenopausal female     Reason for consult: Fungating vulvar mass    Chief complaint:  Constipation    HPI:  Camilo Wise was admitted s/p fall, found down for approximately 24 hours  She was found to have multiple pressure wounds but no traumatic osseous injuries  She was admitted under trauma and was initially on med-surg  She was transferred to ICU due hypoxia and tachycardia requiring BiPAP after witnessed aspiration event on   She was stabilized and transferred back to med-surg on   On initial critical care exam, she was noted to have a fungating vulvar mass  Patient states that she has not noticed any changes in that area  She denies complaints other than constipation  She denies vaginal pain, discharge, burning, or bleeding  She has a history of an uncomplicated vaginal delivery 54 years ago  She states that she has not seen a GYN since that time  She is not sure if she ever had a pap smear  She denies any previous gynecologic problems  She has all pelvic organs intact  She denies any known history of gynecologic cancers  She has baseline dementia and hypertension  She states that at baseline, she is usually active, continent, and without complaint  She states that her appetite is often poor but her daughter supplies her with meals  She has lost approximately 20lbs over the past few months  Her daughter, Darrion Mckenzie, supplemented and agreed with her history  Active Problems:  Patient Active Problem List   Diagnosis    Fall    Pressure injury of head, stage 1    Pressure injury of sacral region, stage 3 ([address] )    Scalp hematoma    Severe protein-calorie malnutrition ([address] )    Ambulatory dysfunction    Acute hypercapnic respiratory failure (HCC)     PMH:  History reviewed  No pertinent past medical history  PSH:  History reviewed   No pertinent surgical history  Meds:  No current facility-administered medications on file prior to encounter  Current Outpatient Medications on File Prior to Encounter   Medication Sig Dispense Refill    amLODIPine-benazepril (LOTREL 5-10) 5-10 MG per capsule Take 1 capsule by mouth daily      diazepam (VALIUM) 5 mg tablet Take 5 mg by mouth every 6 (six) hours as needed for anxiety      propranolol (INDERAL) 60 mg tablet Take 60 mg by mouth 2 (two) times a day      simvastatin (ZOCOR) 20 mg tablet Take 20 mg by mouth daily at bedtime         Allergies:  No Known Allergies    Physical Exam:  /60   Pulse 82   Temp 98 2 °F (36 8 °C)   Resp 20   Ht 4' 10" (1 473 m)   Wt 32 7 kg (72 lb 3 2 oz)   SpO2 98%   BMI 15 09 kg/m²     Physical Exam   Constitutional: She is oriented to person, place, and time  Vital signs are normal  She has a sickly appearance  No distress  Frail, malnourished    HENT:   Head:       Cardiovascular: Normal rate, regular rhythm and normal heart sounds  Exam reveals no gallop and no friction rub  No murmur heard  Pulmonary/Chest: Effort normal and breath sounds normal  No stridor  No respiratory distress  She has no wheezes  She has no rales  Genitourinary: Pelvic exam was performed with patient supine  No labial fusion  There is no rash, tenderness or lesion on the right labia  There is lesion on the left labia  There is no rash or tenderness on the left labia  There is tenderness in the vagina  No bleeding in the vagina  Genitourinary Comments: [id number] fungating skin-colored friable vulvar mass on left labia, nontender  Digital examination of vagina unremarkable  Chu in place  Evidence bowel incontinence with leaked stool on pad   Neurological: She is alert and oriented to person, place, and time  Skin: Skin is warm and dry  She is not diaphoretic  Assessment:   66 y o  Gayle Case with 3x3cm fungating left labial mass    Plan:   Biopsy obtained   Will follow up biopsy results and plan for likely GYN ONC follow up outpatient unless results are available while still inpatient  Patient examined with Dr Lori Parsons MD  OB/GYN  7/24/2019  1:43 PM

## 2019-07-25 ENCOUNTER — APPOINTMENT (INPATIENT)
Dept: RADIOLOGY | Facility: HOSPITAL | Age: 79
DRG: 987 | End: 2019-07-25
Payer: MEDICARE

## 2019-07-25 LAB
A1AT SERPL-MCNC: 158 MG/DL (ref 90–200)
AMMONIA PLAS-SCNC: 45 UMOL/L (ref 11–35)
ANION GAP SERPL CALCULATED.3IONS-SCNC: 0 MMOL/L (ref 4–13)
ANION GAP SERPL CALCULATED.3IONS-SCNC: 2 MMOL/L (ref 4–13)
BASE EX.OXY STD BLDV CALC-SCNC: 59.3 % (ref 60–80)
BASE EX.OXY STD BLDV CALC-SCNC: 95.2 % (ref 60–80)
BASE EXCESS BLDV CALC-SCNC: 11 MMOL/L
BASE EXCESS BLDV CALC-SCNC: 16.5 MMOL/L
BASOPHILS # BLD AUTO: 0.01 THOUSANDS/ΜL (ref 0–0.1)
BASOPHILS NFR BLD AUTO: 0 % (ref 0–1)
BUN SERPL-MCNC: 5 MG/DL (ref 5–25)
BUN SERPL-MCNC: 7 MG/DL (ref 5–25)
CALCIUM SERPL-MCNC: 7.9 MG/DL (ref 8.3–10.1)
CALCIUM SERPL-MCNC: 8.2 MG/DL (ref 8.3–10.1)
CHLORIDE SERPL-SCNC: 98 MMOL/L (ref 100–108)
CHLORIDE SERPL-SCNC: 99 MMOL/L (ref 100–108)
CO2 SERPL-SCNC: 40 MMOL/L (ref 21–32)
CO2 SERPL-SCNC: 41 MMOL/L (ref 21–32)
CREAT SERPL-MCNC: 0.17 MG/DL (ref 0.6–1.3)
CREAT SERPL-MCNC: 0.3 MG/DL (ref 0.6–1.3)
EOSINOPHIL # BLD AUTO: 0.02 THOUSAND/ΜL (ref 0–0.61)
EOSINOPHIL NFR BLD AUTO: 0 % (ref 0–6)
ERYTHROCYTE [DISTWIDTH] IN BLOOD BY AUTOMATED COUNT: 12.6 % (ref 11.6–15.1)
ERYTHROCYTE [DISTWIDTH] IN BLOOD BY AUTOMATED COUNT: 12.6 % (ref 11.6–15.1)
GFR SERPL CREATININE-BSD FRML MDRD: 110 ML/MIN/1.73SQ M
GFR SERPL CREATININE-BSD FRML MDRD: 133 ML/MIN/1.73SQ M
GLUCOSE SERPL-MCNC: 103 MG/DL (ref 65–140)
GLUCOSE SERPL-MCNC: 77 MG/DL (ref 65–140)
GLUCOSE SERPL-MCNC: 79 MG/DL (ref 65–140)
GLUCOSE SERPL-MCNC: 81 MG/DL (ref 65–140)
GLUCOSE SERPL-MCNC: 91 MG/DL (ref 65–140)
GLUCOSE SERPL-MCNC: 96 MG/DL (ref 65–140)
HCO3 BLDV-SCNC: 36.4 MMOL/L (ref 24–30)
HCO3 BLDV-SCNC: 45.3 MMOL/L (ref 24–30)
HCT VFR BLD AUTO: 33.8 % (ref 34.8–46.1)
HCT VFR BLD AUTO: 38.3 % (ref 34.8–46.1)
HGB BLD-MCNC: 10.6 G/DL (ref 11.5–15.4)
HGB BLD-MCNC: 11.8 G/DL (ref 11.5–15.4)
IMM GRANULOCYTES # BLD AUTO: 0.02 THOUSAND/UL (ref 0–0.2)
IMM GRANULOCYTES NFR BLD AUTO: 0 % (ref 0–2)
LYMPHOCYTES # BLD AUTO: 1.08 THOUSANDS/ΜL (ref 0.6–4.47)
LYMPHOCYTES NFR BLD AUTO: 13 % (ref 14–44)
MAGNESIUM SERPL-MCNC: 2.1 MG/DL (ref 1.6–2.6)
MAGNESIUM SERPL-MCNC: 2.1 MG/DL (ref 1.6–2.6)
MCH RBC QN AUTO: 30.6 PG (ref 26.8–34.3)
MCH RBC QN AUTO: 30.6 PG (ref 26.8–34.3)
MCHC RBC AUTO-ENTMCNC: 30.8 G/DL (ref 31.4–37.4)
MCHC RBC AUTO-ENTMCNC: 31.4 G/DL (ref 31.4–37.4)
MCV RBC AUTO: 98 FL (ref 82–98)
MCV RBC AUTO: 99 FL (ref 82–98)
MONOCYTES # BLD AUTO: 0.8 THOUSAND/ΜL (ref 0.17–1.22)
MONOCYTES NFR BLD AUTO: 10 % (ref 4–12)
NEUTROPHILS # BLD AUTO: 6.39 THOUSANDS/ΜL (ref 1.85–7.62)
NEUTS SEG NFR BLD AUTO: 77 % (ref 43–75)
NRBC BLD AUTO-RTO: 0 /100 WBCS
O2 CT BLDV-SCNC: 11.4 ML/DL
O2 CT BLDV-SCNC: 16 ML/DL
PCO2 BLDV: 51.7 MM HG (ref 42–50)
PCO2 BLDV: 75.5 MM HG (ref 42–50)
PH BLDV: 7.4 [PH] (ref 7.3–7.4)
PH BLDV: 7.46 [PH] (ref 7.3–7.4)
PHOSPHATE SERPL-MCNC: 1.9 MG/DL (ref 2.3–4.1)
PHOSPHATE SERPL-MCNC: 2.6 MG/DL (ref 2.3–4.1)
PLATELET # BLD AUTO: 203 THOUSANDS/UL (ref 149–390)
PLATELET # BLD AUTO: 211 THOUSANDS/UL (ref 149–390)
PMV BLD AUTO: 11.2 FL (ref 8.9–12.7)
PMV BLD AUTO: 11.8 FL (ref 8.9–12.7)
PO2 BLDV: 30.2 MM HG (ref 35–45)
PO2 BLDV: 82.9 MM HG (ref 35–45)
POTASSIUM SERPL-SCNC: 3.1 MMOL/L (ref 3.5–5.3)
POTASSIUM SERPL-SCNC: 3.8 MMOL/L (ref 3.5–5.3)
RBC # BLD AUTO: 3.46 MILLION/UL (ref 3.81–5.12)
RBC # BLD AUTO: 3.86 MILLION/UL (ref 3.81–5.12)
SODIUM SERPL-SCNC: 139 MMOL/L (ref 136–145)
SODIUM SERPL-SCNC: 141 MMOL/L (ref 136–145)
WBC # BLD AUTO: 6.94 THOUSAND/UL (ref 4.31–10.16)
WBC # BLD AUTO: 8.32 THOUSAND/UL (ref 4.31–10.16)

## 2019-07-25 PROCEDURE — 97116 GAIT TRAINING THERAPY: CPT

## 2019-07-25 PROCEDURE — 92526 ORAL FUNCTION THERAPY: CPT

## 2019-07-25 PROCEDURE — 99232 SBSQ HOSP IP/OBS MODERATE 35: CPT | Performed by: INTERNAL MEDICINE

## 2019-07-25 PROCEDURE — 82140 ASSAY OF AMMONIA: CPT | Performed by: INTERNAL MEDICINE

## 2019-07-25 PROCEDURE — 84100 ASSAY OF PHOSPHORUS: CPT | Performed by: INTERNAL MEDICINE

## 2019-07-25 PROCEDURE — 94760 N-INVAS EAR/PLS OXIMETRY 1: CPT

## 2019-07-25 PROCEDURE — 82805 BLOOD GASES W/O2 SATURATION: CPT | Performed by: INTERNAL MEDICINE

## 2019-07-25 PROCEDURE — 94760 N-INVAS EAR/PLS OXIMETRY 1: CPT | Performed by: SOCIAL WORKER

## 2019-07-25 PROCEDURE — 82948 REAGENT STRIP/BLOOD GLUCOSE: CPT

## 2019-07-25 PROCEDURE — 83735 ASSAY OF MAGNESIUM: CPT | Performed by: INTERNAL MEDICINE

## 2019-07-25 PROCEDURE — 97110 THERAPEUTIC EXERCISES: CPT

## 2019-07-25 PROCEDURE — 70450 CT HEAD/BRAIN W/O DYE: CPT

## 2019-07-25 PROCEDURE — 85027 COMPLETE CBC AUTOMATED: CPT | Performed by: NURSE PRACTITIONER

## 2019-07-25 PROCEDURE — 80048 BASIC METABOLIC PNL TOTAL CA: CPT | Performed by: INTERNAL MEDICINE

## 2019-07-25 PROCEDURE — 85025 COMPLETE CBC W/AUTO DIFF WBC: CPT | Performed by: INTERNAL MEDICINE

## 2019-07-25 RX ORDER — DEXTROSE, SODIUM CHLORIDE, AND POTASSIUM CHLORIDE 5; .9; .15 G/100ML; G/100ML; G/100ML
75 INJECTION INTRAVENOUS CONTINUOUS
Status: DISCONTINUED | OUTPATIENT
Start: 2019-07-25 | End: 2019-07-25

## 2019-07-25 RX ORDER — LACTULOSE 20 G/30ML
20 SOLUTION ORAL 3 TIMES DAILY
Status: DISCONTINUED | OUTPATIENT
Start: 2019-07-25 | End: 2019-07-27

## 2019-07-25 RX ORDER — DEXTROSE, SODIUM CHLORIDE, AND POTASSIUM CHLORIDE 5; .9; .15 G/100ML; G/100ML; G/100ML
100 INJECTION INTRAVENOUS CONTINUOUS
Status: DISCONTINUED | OUTPATIENT
Start: 2019-07-25 | End: 2019-07-26

## 2019-07-25 RX ORDER — HYDROCORTISONE ACETATE 25 MG/1
25 SUPPOSITORY RECTAL 2 TIMES DAILY
Status: DISCONTINUED | OUTPATIENT
Start: 2019-07-25 | End: 2019-07-27

## 2019-07-25 RX ORDER — POTASSIUM CHLORIDE 20 MEQ/1
20 TABLET, EXTENDED RELEASE ORAL
Status: DISCONTINUED | OUTPATIENT
Start: 2019-07-25 | End: 2019-07-26

## 2019-07-25 RX ADMIN — FOLIC ACID TAB 400 MCG 400 MCG: 400 TAB at 08:20

## 2019-07-25 RX ADMIN — CHLORHEXIDINE GLUCONATE 0.12% ORAL RINSE 15 ML: 1.2 LIQUID ORAL at 08:19

## 2019-07-25 RX ADMIN — POTASSIUM CHLORIDE 20 MEQ: 1500 TABLET, EXTENDED RELEASE ORAL at 16:56

## 2019-07-25 RX ADMIN — HEPARIN SODIUM 5000 UNITS: 5000 INJECTION INTRAVENOUS; SUBCUTANEOUS at 20:45

## 2019-07-25 RX ADMIN — Medication 1 TABLET: at 08:19

## 2019-07-25 RX ADMIN — HYDROCORTISONE ACETATE 25 MG: 25 SUPPOSITORY RECTAL at 18:51

## 2019-07-25 RX ADMIN — DIBASIC SODIUM PHOSPHATE, MONOBASIC POTASSIUM PHOSPHATE AND MONOBASIC SODIUM PHOSPHATE 1 TABLET: 852; 155; 130 TABLET ORAL at 12:27

## 2019-07-25 RX ADMIN — POTASSIUM CHLORIDE 10 MEQ: 750 TABLET, EXTENDED RELEASE ORAL at 08:19

## 2019-07-25 RX ADMIN — POTASSIUM CHLORIDE, DEXTROSE MONOHYDRATE AND SODIUM CHLORIDE 75 ML/HR: 150; 5; 900 INJECTION, SOLUTION INTRAVENOUS at 20:34

## 2019-07-25 RX ADMIN — POTASSIUM CHLORIDE 20 MEQ: 1500 TABLET, EXTENDED RELEASE ORAL at 12:27

## 2019-07-25 RX ADMIN — ACETAMINOPHEN 975 MG: 325 TABLET ORAL at 14:29

## 2019-07-25 RX ADMIN — BISACODYL 10 MG: 10 SUPPOSITORY RECTAL at 16:56

## 2019-07-25 RX ADMIN — LACTULOSE 20 G: 10 SOLUTION ORAL at 20:40

## 2019-07-25 RX ADMIN — SODIUM CHLORIDE, SODIUM GLUCONATE, SODIUM ACETATE, POTASSIUM CHLORIDE, MAGNESIUM CHLORIDE, SODIUM PHOSPHATE, DIBASIC, AND POTASSIUM PHOSPHATE 100 ML/HR: .53; .5; .37; .037; .03; .012; .00082 INJECTION, SOLUTION INTRAVENOUS at 02:35

## 2019-07-25 RX ADMIN — DOCUSATE SODIUM 100 MG: 100 CAPSULE, LIQUID FILLED ORAL at 08:19

## 2019-07-25 RX ADMIN — SENNOSIDES 8.6 MG: 8.6 TABLET, FILM COATED ORAL at 14:29

## 2019-07-25 RX ADMIN — DIBASIC SODIUM PHOSPHATE, MONOBASIC POTASSIUM PHOSPHATE AND MONOBASIC SODIUM PHOSPHATE 1 TABLET: 852; 155; 130 TABLET ORAL at 16:56

## 2019-07-25 RX ADMIN — THIAMINE HCL TAB 100 MG 100 MG: 100 TAB at 08:19

## 2019-07-25 RX ADMIN — PRAVASTATIN SODIUM 40 MG: 40 TABLET ORAL at 16:55

## 2019-07-25 RX ADMIN — HEPARIN SODIUM 5000 UNITS: 5000 INJECTION INTRAVENOUS; SUBCUTANEOUS at 08:20

## 2019-07-25 NOTE — PLAN OF CARE
Problem: Nutrition/Hydration-ADULT  Goal: Nutrient/Hydration intake appropriate for improving, restoring or maintaining nutritional needs  Description  Monitor and assess patient's nutrition/hydration status for malnutrition (ex- brittle hair, bruises, dry skin, pale skin and conjunctiva, muscle wasting, smooth red tongue, and disorientation)  Collaborate with interdisciplinary team and initiate plan and interventions as ordered  Monitor patient's weight and dietary intake as ordered or per policy  Utilize nutrition screening tool and intervene per policy  Determine patient's food preferences and provide high-protein, high-caloric foods as appropriate       INTERVENTIONS:  - Monitor oral intake, urinary output, labs, and treatment plans  - Assess nutrition and hydration status and recommend course of action  - Evaluate amount of meals eaten  - Assist patient with eating if necessary   - Allow adequate time for meals  - Recommend/ encourage appropriate diets, oral nutritional supplements, and vitamin/mineral supplements  - Order, calculate, and assess calorie counts as needed  - Recommend, monitor, and adjust tube feedings and TPN/PPN based on assessed needs  - Assess need for intravenous fluids  - Provide specific nutrition/hydration education as appropriate  - Include patient/family/caregiver in decisions related to nutrition  Outcome: Progressing  Note:   Limited progress

## 2019-07-25 NOTE — RESPIRATORY THERAPY NOTE
resp care      07/25/19 0758   Respiratory Assessment   Assessment Type Pre-treatment   General Appearance Awake; Alert   Respiratory Pattern Normal   Chest Assessment Chest expansion symmetrical   Bilateral Breath Sounds Diminished;Crackles   Resp Comments Pt refusing BIPAP hs  Pt states there is nothing wrong with her breathing  Pt has dry n/p cough  Pt instructed on IS  Will d/c flutter valve and cont IS per ACP x24 hrs  Will get order to d/c bipap     Additional Assessments   SpO2 96 %

## 2019-07-25 NOTE — SOCIAL WORK
TYLER called patient daughter back and informed her that patient was not accepted at [facility]  TYLER reviewed need for SNF choice and was informed by daughter that she will be reviewing choice and will make determination  CM informed patients daughter that she can go today however, daughter need to review choices with patient, her spouse, and patient sister  CM department will await choice with hope that patient daughter will provide choices once she arrives at [facility]

## 2019-07-25 NOTE — PLAN OF CARE
Problem: PHYSICAL THERAPY ADULT  Goal: Performs mobility at highest level of function for planned discharge setting  See evaluation for individualized goals  Description  Treatment/Interventions: Functional transfer training, LE strengthening/ROM, Therapeutic exercise, Endurance training, Patient/family training, Equipment eval/education, Bed mobility, Gait training, Spoke to MD, Spoke to nursing, Spoke to case management, Family  Equipment Recommended: Brett Mu       See flowsheet documentation for full assessment, interventions and recommendations  7/25/2019 1608 by Joseph Petty, PT  Outcome: Progressing  Note:   Prognosis: Fair  Problem List: Decreased strength, Decreased endurance, Impaired balance, Decreased mobility, Decreased cognition, Impaired judgement, Decreased safety awareness, Decreased skin integrity, Pain  Assessment: pt able to inc ambulation distance to 100 feet with use of RW on various surfaces needing minAx1 and chair follow  Inc L lateral lean and L steering of RW during upright mobility  Pt needs constant max verbal instruction and cueing for RW management,manuvering and gait sequence during upright mobility  Pt able to perform sit to stand transfers modAx1  Pt fatigues quickly and reports inc ABD pain during mobility and at rest  Pt able to perform and complete BLE ther ex HEP in sitting AROM  Pt would cont to benefit from skilled inpt PT services to maximize functional independence  Barriers to Discharge: Inaccessible home environment     Recommendation: Short-term skilled PT     PT - OK to Discharge: No    See flowsheet documentation for full assessment       7/25/2019 1607 by Joseph Petty, PT  Outcome: Progressing  Note:   Prognosis: Fair  Problem List: Decreased strength, Decreased endurance, Impaired balance, Decreased mobility, Decreased cognition, Impaired judgement, Decreased safety awareness, Decreased skin integrity, Pain  Assessment: pt able to inc ambulation distance to 100 feet with use of RW on various surfaces needing minAx1 and chair follow  Inc L lateral lean and L steering of RW during upright mobility  Pt needs constant max verbal instruction and cueing for RW management,manuvering and gait sequence during upright mobility  Pt able to perform sit to stand transfers modAx1  Pt fatigues quickly and reports inc ABD pain during mobility and at rest  Pt able to perform and complete BLE ther ex HEP in sitting AROM  Pt would cont to benefit from skilled inpt PT services to maximize functional independence  Barriers to Discharge: Inaccessible home environment     Recommendation: Short-term skilled PT     PT - OK to Discharge: No    See flowsheet documentation for full assessment

## 2019-07-25 NOTE — SPEECH THERAPY NOTE
Speech Language/Pathology    Speech/Language Pathology Progress Note    Patient Name: Clarke Lynch  GNKUL'A Date: 7/25/2019     Problem List  Patient Active Problem List   Diagnosis    Fall    Pressure injury of head, stage 1    Pressure injury of sacral region, stage 3 ([facility] [address] )    Scalp hematoma    Severe protein-calorie malnutrition ([facility] [address] )    Ambulatory dysfunction    Acute hypercapnic respiratory failure ([facility] [address] )    Vulvar mass    Hypokalemia    Dehydration        Past Medical History  History reviewed  No pertinent past medical history  Past Surgical History  History reviewed  No pertinent surgical history  Subjective:  "I hate to waste food, but I keep feeling like I have to have a BM"    Objective: The patient is awake and alert  She is OOB in chair and finishing lunch meal  She had approx 25% intake  The patient needs max encouragement to eat jello as she is perseverating on stomach discomfort and the fear of not making it to the bathroom  With encouragement, the patient does accept jello and 2 sips of thin liquids  She feeds herself  Rate and bite size are adequate  The patient appears to tolerate well, with good oral control and no overt s/s aspiration  She takes small sips via straw with no overt s/s aspiration  The patient will need encouragement to help improve PO intake  Assessment:  Patient appears to be tolerating soft diet and thin liquids well  Plan/Recommendations:  Continue current diet  Continue ST to assess for possible diet upgrade

## 2019-07-25 NOTE — PLAN OF CARE
Problem: Nutrition/Hydration-ADULT  Goal: Nutrient/Hydration intake appropriate for improving, restoring or maintaining nutritional needs  Description  Monitor and assess patient's nutrition/hydration status for malnutrition (ex- brittle hair, bruises, dry skin, pale skin and conjunctiva, muscle wasting, smooth red tongue, and disorientation)  Collaborate with interdisciplinary team and initiate plan and interventions as ordered  Monitor patient's weight and dietary intake as ordered or per policy  Utilize nutrition screening tool and intervene per policy  Determine patient's food preferences and provide high-protein, high-caloric foods as appropriate       INTERVENTIONS:  - Monitor oral intake, urinary output, labs, and treatment plans  - Assess nutrition and hydration status and recommend course of action  - Evaluate amount of meals eaten  - Assist patient with eating if necessary   - Allow adequate time for meals  - Recommend/ encourage appropriate diets, oral nutritional supplements, and vitamin/mineral supplements  - Order, calculate, and assess calorie counts as needed  - Recommend, monitor, and adjust tube feedings and TPN/PPN based on assessed needs  - Assess need for intravenous fluids  - Provide specific nutrition/hydration education as appropriate  - Include patient/family/caregiver in decisions related to nutrition  Outcome: Progressing     Problem: Prexisting or High Potential for Compromised Skin Integrity  Goal: Skin integrity is maintained or improved  Description  INTERVENTIONS:  - Identify patients at risk for skin breakdown  - Assess and monitor skin integrity  - Assess and monitor nutrition and hydration status  - Monitor labs (i e  albumin)  - Assess for incontinence   - Turn and reposition patient  - Assist with mobility/ambulation  - Relieve pressure over bony prominences  - Avoid friction and shearing  - Provide appropriate hygiene as needed including keeping skin clean and dry  - Evaluate need for skin moisturizer/barrier cream  - Collaborate with interdisciplinary team (i e  Nutrition, Rehabilitation, etc )   - Patient/family teaching  Outcome: Progressing     Problem: Potential for Falls  Goal: Patient will remain free of falls  Description  INTERVENTIONS:  - Assess patient frequently for physical needs  -  Identify cognitive and physical deficits and behaviors that affect risk of falls    -  [place] fall precautions as indicated by assessment   - Educate patient/family on patient safety including physical limitations  - Instruct patient to call for assistance with activity based on assessment  - Modify environment to reduce risk of injury  - Consider OT/PT consult to assist with strengthening/mobility  Outcome: Progressing     Problem: PAIN - ADULT  Goal: Verbalizes/displays adequate comfort level or baseline comfort level  Description  Interventions:  - Encourage patient to monitor pain and request assistance  - Assess pain using appropriate pain scale  - Administer analgesics based on type and severity of pain and evaluate response  - Implement non-pharmacological measures as appropriate and evaluate response  - Consider cultural and social influences on pain and pain management  - Notify physician/advanced practitioner if interventions unsuccessful or patient reports new pain  Outcome: Progressing     Problem: INFECTION - ADULT  Goal: Absence or prevention of progression during hospitalization  Description  INTERVENTIONS:  - Assess and monitor for signs and symptoms of infection  - Monitor lab/diagnostic results  - Monitor all insertion sites, i e  indwelling lines, tubes, and drains  - Monitor endotracheal (as able) and nasal secretions for changes in amount and color  - [place] appropriate cooling/warming therapies per order  - Administer medications as ordered  - Instruct and encourage patient and family to use good hand hygiene technique  - Identify and instruct in appropriate isolation precautions for identified infection/condition  Outcome: Progressing     Problem: SAFETY ADULT  Goal: Patient will remain free of falls  Description  INTERVENTIONS:  - Assess patient frequently for physical needs  -  Identify cognitive and physical deficits and behaviors that affect risk of falls  -  [place] fall precautions as indicated by assessment   - Educate patient/family on patient safety including physical limitations  - Instruct patient to call for assistance with activity based on assessment  - Modify environment to reduce risk of injury  - Consider OT/PT consult to assist with strengthening/mobility  Outcome: Progressing     Problem: DISCHARGE PLANNING  Goal: Discharge to home or other facility with appropriate resources  Description  INTERVENTIONS:  - Identify barriers to discharge w/patient and caregiver  - Arrange for needed discharge resources and transportation as appropriate  - Identify discharge learning needs (meds, wound care, etc )  - Arrange for interpretive services to assist at discharge as needed  - Refer to Case Management Department for coordinating discharge planning if the patient needs post-hospital services based on physician/advanced practitioner order or complex needs related to functional status, cognitive ability, or social support system  Outcome: Progressing     Problem: Knowledge Deficit  Goal: Patient/family/caregiver demonstrates understanding of disease process, treatment plan, medications, and discharge instructions  Description  Complete learning assessment and assess knowledge base    Interventions:  - Provide teaching at level of understanding  - Provide teaching via preferred learning methods  Outcome: Progressing

## 2019-07-25 NOTE — SOCIAL WORK
LOS 4 DAYS  CM reviewed chart and [facility] referral and determined that [facility] for [facility]  SNF list was provided to patient daughter/POA for selection of additional choice for SNF  CM called patients daughters home phone at [phone] and reached patient son lucille  CM informed patients son lucille that CM was calling to speak with Marlen Aguilera, patients daughter about discharge planning and SNF choices  Cm was informed by Rosie He that he would call her, informed her that I would be calling so she wasn't upset or worried  CM attempted to call Marlen Aguilera at alternative phone at [phone] and did not make contact  CM will try again in few minutes  CM department will follow through discharge

## 2019-07-26 ENCOUNTER — APPOINTMENT (INPATIENT)
Dept: RADIOLOGY | Facility: HOSPITAL | Age: 79
DRG: 987 | End: 2019-07-26
Payer: MEDICARE

## 2019-07-26 PROBLEM — J96.22 ACUTE AND CHRONIC RESPIRATORY FAILURE WITH HYPERCAPNIA (HCC): Status: ACTIVE | Noted: 2019-07-22

## 2019-07-26 PROBLEM — G93.40 ENCEPHALOPATHY: Status: ACTIVE | Noted: 2019-07-26

## 2019-07-26 LAB
ALBUMIN SERPL BCP-MCNC: 2.1 G/DL (ref 3.5–5)
ALP SERPL-CCNC: 57 U/L (ref 46–116)
ALT SERPL W P-5'-P-CCNC: 36 U/L (ref 12–78)
ANION GAP SERPL CALCULATED.3IONS-SCNC: 0 MMOL/L (ref 4–13)
APTT PPP: 35 SECONDS (ref 23–37)
AST SERPL W P-5'-P-CCNC: 40 U/L (ref 5–45)
BASOPHILS # BLD AUTO: 0.02 THOUSANDS/ΜL (ref 0–0.1)
BASOPHILS NFR BLD AUTO: 0 % (ref 0–1)
BILIRUB DIRECT SERPL-MCNC: 0.06 MG/DL (ref 0–0.2)
BILIRUB SERPL-MCNC: 0.41 MG/DL (ref 0.2–1)
BUN SERPL-MCNC: 3 MG/DL (ref 5–25)
CALCIUM SERPL-MCNC: 7.8 MG/DL (ref 8.3–10.1)
CHLORIDE SERPL-SCNC: 102 MMOL/L (ref 100–108)
CO2 SERPL-SCNC: 39 MMOL/L (ref 21–32)
CREAT SERPL-MCNC: 0.28 MG/DL (ref 0.6–1.3)
EOSINOPHIL # BLD AUTO: 0.07 THOUSAND/ΜL (ref 0–0.61)
EOSINOPHIL NFR BLD AUTO: 1 % (ref 0–6)
ERYTHROCYTE [DISTWIDTH] IN BLOOD BY AUTOMATED COUNT: 12.6 % (ref 11.6–15.1)
GFR SERPL CREATININE-BSD FRML MDRD: 113 ML/MIN/1.73SQ M
GLUCOSE SERPL-MCNC: 113 MG/DL (ref 65–140)
HCT VFR BLD AUTO: 35.8 % (ref 34.8–46.1)
HEMOCCULT STL QL: POSITIVE
HEMOCCULT STL QL: POSITIVE
HGB BLD-MCNC: 11.1 G/DL (ref 11.5–15.4)
IMM GRANULOCYTES # BLD AUTO: 0.02 THOUSAND/UL (ref 0–0.2)
IMM GRANULOCYTES NFR BLD AUTO: 0 % (ref 0–2)
INR PPP: 1.31 (ref 0.84–1.19)
LYMPHOCYTES # BLD AUTO: 1.07 THOUSANDS/ΜL (ref 0.6–4.47)
LYMPHOCYTES NFR BLD AUTO: 15 % (ref 14–44)
MAGNESIUM SERPL-MCNC: 2 MG/DL (ref 1.6–2.6)
MCH RBC QN AUTO: 30.6 PG (ref 26.8–34.3)
MCHC RBC AUTO-ENTMCNC: 31 G/DL (ref 31.4–37.4)
MCV RBC AUTO: 99 FL (ref 82–98)
MONOCYTES # BLD AUTO: 0.55 THOUSAND/ΜL (ref 0.17–1.22)
MONOCYTES NFR BLD AUTO: 8 % (ref 4–12)
NEUTROPHILS # BLD AUTO: 5.25 THOUSANDS/ΜL (ref 1.85–7.62)
NEUTS SEG NFR BLD AUTO: 76 % (ref 43–75)
NRBC BLD AUTO-RTO: 0 /100 WBCS
PHOSPHATE SERPL-MCNC: 2.6 MG/DL (ref 2.3–4.1)
PLATELET # BLD AUTO: 203 THOUSANDS/UL (ref 149–390)
PMV BLD AUTO: 11.3 FL (ref 8.9–12.7)
POTASSIUM SERPL-SCNC: 4 MMOL/L (ref 3.5–5.3)
PROT SERPL-MCNC: 5.2 G/DL (ref 6.4–8.2)
PROTHROMBIN TIME: 15.9 SECONDS (ref 11.6–14.5)
RBC # BLD AUTO: 3.63 MILLION/UL (ref 3.81–5.12)
SODIUM SERPL-SCNC: 141 MMOL/L (ref 136–145)
WBC # BLD AUTO: 6.98 THOUSAND/UL (ref 4.31–10.16)

## 2019-07-26 PROCEDURE — 99221 1ST HOSP IP/OBS SF/LOW 40: CPT | Performed by: INTERNAL MEDICINE

## 2019-07-26 PROCEDURE — C9113 INJ PANTOPRAZOLE SODIUM, VIA: HCPCS | Performed by: INTERNAL MEDICINE

## 2019-07-26 PROCEDURE — 85730 THROMBOPLASTIN TIME PARTIAL: CPT | Performed by: NURSE PRACTITIONER

## 2019-07-26 PROCEDURE — 76705 ECHO EXAM OF ABDOMEN: CPT

## 2019-07-26 PROCEDURE — 80076 HEPATIC FUNCTION PANEL: CPT | Performed by: INTERNAL MEDICINE

## 2019-07-26 PROCEDURE — 97535 SELF CARE MNGMENT TRAINING: CPT

## 2019-07-26 PROCEDURE — G8987 SELF CARE CURRENT STATUS: HCPCS

## 2019-07-26 PROCEDURE — 85025 COMPLETE CBC W/AUTO DIFF WBC: CPT | Performed by: INTERNAL MEDICINE

## 2019-07-26 PROCEDURE — G8988 SELF CARE GOAL STATUS: HCPCS

## 2019-07-26 PROCEDURE — 85610 PROTHROMBIN TIME: CPT | Performed by: NURSE PRACTITIONER

## 2019-07-26 PROCEDURE — 84100 ASSAY OF PHOSPHORUS: CPT | Performed by: INTERNAL MEDICINE

## 2019-07-26 PROCEDURE — 99232 SBSQ HOSP IP/OBS MODERATE 35: CPT | Performed by: INTERNAL MEDICINE

## 2019-07-26 PROCEDURE — 80048 BASIC METABOLIC PNL TOTAL CA: CPT | Performed by: INTERNAL MEDICINE

## 2019-07-26 PROCEDURE — 83735 ASSAY OF MAGNESIUM: CPT | Performed by: INTERNAL MEDICINE

## 2019-07-26 PROCEDURE — 82272 OCCULT BLD FECES 1-3 TESTS: CPT | Performed by: NURSE PRACTITIONER

## 2019-07-26 PROCEDURE — 94760 N-INVAS EAR/PLS OXIMETRY 1: CPT

## 2019-07-26 PROCEDURE — 97167 OT EVAL HIGH COMPLEX 60 MIN: CPT

## 2019-07-26 RX ORDER — PANTOPRAZOLE SODIUM 40 MG/1
40 INJECTION, POWDER, FOR SOLUTION INTRAVENOUS EVERY 12 HOURS SCHEDULED
Status: DISCONTINUED | OUTPATIENT
Start: 2019-07-26 | End: 2019-07-27

## 2019-07-26 RX ORDER — DEXTROSE MONOHYDRATE 50 MG/ML
25 INJECTION, SOLUTION INTRAVENOUS CONTINUOUS
Status: DISCONTINUED | OUTPATIENT
Start: 2019-07-26 | End: 2019-07-27

## 2019-07-26 RX ORDER — MORPHINE SULFATE 100 MG/5ML
5 SOLUTION ORAL EVERY 4 HOURS PRN
Status: DISCONTINUED | OUTPATIENT
Start: 2019-07-26 | End: 2019-07-29

## 2019-07-26 RX ORDER — HEPARIN SODIUM 5000 [USP'U]/ML
5000 INJECTION, SOLUTION INTRAVENOUS; SUBCUTANEOUS EVERY 12 HOURS SCHEDULED
Status: DISCONTINUED | OUTPATIENT
Start: 2019-07-26 | End: 2019-07-26

## 2019-07-26 RX ORDER — MORPHINE SULFATE 100 MG/5ML
10 SOLUTION ORAL EVERY 4 HOURS PRN
Status: DISCONTINUED | OUTPATIENT
Start: 2019-07-26 | End: 2019-07-29

## 2019-07-26 RX ADMIN — CHLORHEXIDINE GLUCONATE 0.12% ORAL RINSE 15 ML: 1.2 LIQUID ORAL at 08:42

## 2019-07-26 RX ADMIN — DIBASIC SODIUM PHOSPHATE, MONOBASIC POTASSIUM PHOSPHATE AND MONOBASIC SODIUM PHOSPHATE 1 TABLET: 852; 155; 130 TABLET ORAL at 12:55

## 2019-07-26 RX ADMIN — MIRTAZAPINE 7.5 MG: 15 TABLET, FILM COATED ORAL at 21:02

## 2019-07-26 RX ADMIN — POTASSIUM CHLORIDE 20 MEQ: 1500 TABLET, EXTENDED RELEASE ORAL at 08:42

## 2019-07-26 RX ADMIN — Medication 1 TABLET: at 08:42

## 2019-07-26 RX ADMIN — LACTULOSE 20 G: 10 SOLUTION ORAL at 08:42

## 2019-07-26 RX ADMIN — ACETAMINOPHEN 975 MG: 325 TABLET ORAL at 20:55

## 2019-07-26 RX ADMIN — DEXTROSE 25 ML/HR: 5 SOLUTION INTRAVENOUS at 13:17

## 2019-07-26 RX ADMIN — DOCUSATE SODIUM 100 MG: 100 CAPSULE, LIQUID FILLED ORAL at 08:42

## 2019-07-26 RX ADMIN — POTASSIUM CHLORIDE 20 MEQ: 1500 TABLET, EXTENDED RELEASE ORAL at 12:55

## 2019-07-26 RX ADMIN — FOLIC ACID TAB 400 MCG 400 MCG: 400 TAB at 08:47

## 2019-07-26 RX ADMIN — HYDROCORTISONE ACETATE 25 MG: 25 SUPPOSITORY RECTAL at 08:42

## 2019-07-26 RX ADMIN — PANTOPRAZOLE SODIUM 40 MG: 40 INJECTION, POWDER, FOR SOLUTION INTRAVENOUS at 21:02

## 2019-07-26 RX ADMIN — DIBASIC SODIUM PHOSPHATE, MONOBASIC POTASSIUM PHOSPHATE AND MONOBASIC SODIUM PHOSPHATE 1 TABLET: 852; 155; 130 TABLET ORAL at 08:42

## 2019-07-26 RX ADMIN — THIAMINE HYDROCHLORIDE 500 MG: 100 INJECTION, SOLUTION INTRAMUSCULAR; INTRAVENOUS at 08:48

## 2019-07-26 RX ADMIN — PANTOPRAZOLE SODIUM 40 MG: 40 INJECTION, POWDER, FOR SOLUTION INTRAVENOUS at 12:55

## 2019-07-26 NOTE — PROGRESS NOTES
Progress Note - Makayla Chavez 1940, 66 y o  female MRN: [id number]    Unit/Bed#: [facility] 933-01 Encounter: [id number]    Primary Care Provider: Lucie Martinez DO   Date and time admitted to hospital: 7/21/2019  2:09 PM        * Fall  Assessment & Plan  Unclear etiology  Possibly due to hypercapnic respiratory failure  Patient has no memory of the event  Was found down in her home with multiple pressure ulcerations  PT/OT evaluation  Monitor orthostatics  Vulvar mass  Assessment & Plan  Status post biopsy by OBGYN  Await biopsy results    Severe protein-calorie malnutrition ([facility] [address] )  Assessment & Plan  Malnutrition Findings:   Malnutrition type: Chronic illness(in the setting of severe orbital-hollow look, depressions dark [place] reflects body fat loss; temples hollowing, scooping, depression, clavicle protruding prominent bone reflects muscle mass loss; treated with rec for PO diet when deemed safe to swallow )  Degree of Malnutrition: Other severe protein calorie malnutrition    BMI Findings:  BMI Classifications: Underweight < 18 5(BMI = 14 93)     Body mass index is 15 67 kg/m²  Nutritionist evaluation  Protein supplements  Electrolyte supplements  Encourage oral intake    Dehydration  Assessment & Plan  Continue IV fluids  Monitor BMP  Monitor orthostatics    Hypokalemia  Assessment & Plan  Continue supplementation  BMP daily for now  Monitor phosphorus level as well    Acute hypercapnic respiratory failure (HCC)  Assessment & Plan  Continues to have elevated CO2  Possibility of interstitial lung disease versus due to malnutrition  Mental status worsening today  Repeat VBG, placed on BiPAP  Chest x-ray unremarkable  Pulmonology evaluation    Ambulatory dysfunction  Assessment & Plan  With the due to malnutrition, deconditioning  PT OT eval   Will likely need placement at a rehab facility versus long-term care facility      Pressure injury of sacral region, stage 3 ([facility] [address] )  Assessment & Plan  Local wound care    Pressure injury of head, stage 1  Assessment & Plan  Local wound care        VTE Pharmacologic Prophylaxis:   Pharmacologic: Heparin  Mechanical VTE Prophylaxis in Place: Yes    Patient Centered Rounds: I have performed bedside rounds with nursing staff today  Education and Discussions with Family / Patient:  Patient and daughter, plan of care    Time Spent for Care: 20 minutes  More than 50% of total time spent on counseling and coordination of care as described above  Current Length of Stay: 4 day(s)    Current Patient Status: Inpatient   Certification Statement: The patient will continue to require additional inpatient hospital stay due to Evaluate encephalopathy, awaiting biopsy, awaiting pulmonology evaluation    Discharge Plan:  SNF when stable    Code Status: Level 1 - Full Code      Subjective:   Reports hemorrhoidal pain this morning  Later had confusion, disorientation  Objective:     Vitals:   Temp (24hrs), Av °F (37 2 °C), Min:98 1 °F (36 7 °C), Max:99 7 °F (37 6 °C)    Temp:  [98 1 °F (36 7 °C)-99 7 °F (37 6 °C)] 99 1 °F (37 3 °C)  HR:  [79-94] 79  Resp:  [16-18] 18  BP: ()/(46-75) 100/55  SpO2:  [93 %-99 %] 97 %  Body mass index is 15 67 kg/m²  Input and Output Summary (last 24 hours): Intake/Output Summary (Last 24 hours) at 2019  Last data filed at 2019 1728  Gross per 24 hour   Intake 1864 99 ml   Output 3700 ml   Net -1835 01 ml       Physical Exam:     Physical Exam   Constitutional:   Malnourished and chronically ill-appearing elderly female, lying in bed   HENT:   Head: Normocephalic and atraumatic  Eyes: No scleral icterus  Right pupil is 2 mm, nonreactive  Left pupil reactive 4 mm   Neck: Neck supple  Cardiovascular: Normal rate and regular rhythm  Pulmonary/Chest: Effort normal    Abdominal: Soft  Musculoskeletal: She exhibits no edema     Neurological:   Alert and oriented to person only  No pronator drift  Moving all 4 extremities   Skin: Skin is warm and dry  Additional Data:     Labs:    Results from last 7 days   Lab Units 07/25/19  0439   WBC Thousand/uL 8 32   HEMOGLOBIN g/dL 11 8   HEMATOCRIT % 38 3   PLATELETS Thousands/uL 203   NEUTROS PCT % 77*   LYMPHS PCT % 13*   MONOS PCT % 10   EOS PCT % 0     Results from last 7 days   Lab Units 07/25/19  1903  07/21/19  1422   SODIUM mmol/L 141   < > 137   POTASSIUM mmol/L 3 8   < > 4 1   CHLORIDE mmol/L 99*   < > 100   CO2 mmol/L 40*   < > 31   CO2, I-STAT   --    < > 38*   BUN mg/dL 7   < > 16   CREATININE mg/dL 0 30*   < > 0 47*   ANION GAP mmol/L 2*   < > 6   CALCIUM mg/dL 8 2*   < > 8 3   ALBUMIN g/dL  --   --  2 5*   TOTAL BILIRUBIN mg/dL  --   --  0 78   ALK PHOS U/L  --   --  77   ALT U/L  --   --  35   AST U/L  --   --  74*   GLUCOSE RANDOM mg/dL 79   < > 113    < > = values in this interval not displayed  Results from last 7 days   Lab Units 07/21/19  1423   INR  1 47*     Results from last 7 days   Lab Units 07/25/19  1717 07/25/19  1201 07/25/19  0609 07/25/19  0056 07/24/19  1730 07/24/19  1121 07/24/19  0625 07/23/19  2354 07/23/19  1723 07/23/19  1144 07/22/19  1817   POC GLUCOSE mg/dl 103 81 91 96 153* 111 73 89 83 137 74                   * I Have Reviewed All Lab Data Listed Above  * Additional Pertinent Lab Tests Reviewed: All Labs Within Last 24 Hours Reviewed    Imaging:    Imaging Reports Reviewed Today Include: CT scan      Recent Cultures (last 7 days):     Results from last 7 days   Lab Units 07/22/19  1818   BLOOD CULTURE  No Growth at 48 hrs  No Growth at 48 hrs         Last 24 Hours Medication List:     Current Facility-Administered Medications:  acetaminophen 975 mg Oral Q8H PRN Erik Salinas MD   bisacodyl 10 mg Rectal Daily PRN Erik Salinas MD   chlorhexidine 15 mL [facility] Erik Salinas MD   dextrose 5 % and sodium chloride 0 9 % with KCl 20 mEq/L 75 mL/hr Intravenous Continuous Sofia Gutierrez MD   docusate sodium 100 mg Oral BID KATHIE Boothe   folic acid 727 mcg Oral Daily Emy Carballo MD   heparin (porcine) 5,000 Units Subcutaneous Q12H [address] Eym Carballo MD   hydrocortisone 25 mg Rectal BID Xander Castillo MD   lactulose 20 g Oral TID Xander Castillo MD   mirtazapine 7 5 mg Oral HS Emy Carballo MD   multivitamin-minerals 1 tablet Oral Daily Emy Carballo MD   ondansetron 4 mg Intravenous Q6H PRN Emy Carballo MD   potassium chloride 20 mEq Oral TID With Meals Xander Castillo MD   potassium-sodium phosphateS 1 tablet Oral TID With Meals Xander Castillo MD   pravastatin 40 mg Oral Daily With Jame Mac MD   senna 1 tablet Oral HS PRN KATHIE Boothe   [START ON 7/26/2019] thiamine 500 mg Intravenous Daily Xander Castillo MD        Today, Patient Was Seen By: Abdi Matamoros MD    ** Please Note: Dictation voice to text software may have been used in the creation of this document   **

## 2019-07-26 NOTE — CONSULTS
Pulmonary Consult Note    Patient Name: Scarlett Leon  : 1940  CSN: [id number]  Consulting Provider: Keeley Nichols MD  Referring Provider: Dr Saeed Hardwick    Reason for Consult: Hypercapnic resp failure  Chief Complaint: Fall    Subjective   HISTORY OF PRESENTING ILLNESS  Scarlett Leon  is a 66 y o  female who has past medical history of dementia, hypertension and hyperlipidemia presented after a fall  Patient is an extremely poor historian and confused  She knows that she is not home, but cannot tell me where she is at present time  She is able to state her full name but does not know the year or present  Collateral information obtained from the chart, as well as patient's son-in-law  Patient has had declining mental status for the past 5 years, especially with short-term memory  She has been losing significant weight and per her son-in-law, forgets to eat and has been living off ensure for the past 5 years  She lives at home alone although family lives nearby  Has multiple wounds and is extremely malnourished, unable to take care of herself  Patient was admitted on  after a fall at home with estimated down time 24 Hours  Per family, had spoken to patient the day prior but patient was not answering the phone and when family went to check on her, they found her face down on the kitchen floor unable to move prompting EMS activation  Patient does not recall this  Patient underwent trauma x-ray and CT scan of the head, C-spine, chest, abdomen/pelvis, as well as x-ray of the femur  No significant osseous trauma was noted  On CT of the chest, patient was noted to have incidental nodular and interstitial changes prominently in the upper lobes  The patient was noted to be more hypoxic and tachycardic on the floor, ABG showed hypercapnic respiratory failure with a pH of 7 28 and pCO2 of 71, the patient was placed on BiPAP and transferred to ICU    Patient was also found to have a fungating left labial mass biopsy was obtained by Ob/gyn  Patient's respiratory status improved in ICU and she was transferred out to the medical floor  Yesterday, she was noted to be more confused and hypotensive  venous blood gas was done, which revealed a pH of 7 39 and pCO2 of 75  Patient was placed on BiPAP and repeat VBG was done 7 46/ 51  Patient is now resting comfortably on 1 L nasal cannula  She remains confused but cooperative  She denies any fever, chills, nausea, vomiting, chest pain, shortness of breath, dizziness  She repeatedly asks to talk to her daughter  She is alert and oriented x1 to self  Review of Systems   Constitutional: Positive for fatigue and unexpected weight change  Negative for chills and fever  HENT: Negative for congestion, rhinorrhea, sneezing and sore throat  Respiratory: Negative for cough, shortness of breath and wheezing  Cardiovascular: Negative for chest pain, palpitations and leg swelling  Gastrointestinal: Negative for abdominal pain, constipation, diarrhea, nausea and vomiting  Genitourinary: Negative for dysuria  Musculoskeletal: Negative for arthralgias  Neurological: Negative for dizziness and numbness  Psychiatric/Behavioral: Positive for confusion  The patient is nervous/anxious  PAST MEDICAL HISTORY  Hypertension, hyperlipidemia, dementia    History reviewed  No pertinent surgical history  FAMILY HISTORY  History reviewed  No pertinent family history  HOME MEDICATIONS    No current facility-administered medications on file prior to encounter        Current Outpatient Medications on File Prior to Encounter   Medication Sig    amLODIPine-benazepril (LOTREL 5-10) 5-10 MG per capsule Take 1 capsule by mouth daily    diazepam (VALIUM) 5 mg tablet Take 5 mg by mouth every 6 (six) hours as needed for anxiety    propranolol (INDERAL) 60 mg tablet Take 60 mg by mouth 2 (two) times a day    simvastatin (ZOCOR) 20 mg tablet Take 20 mg by mouth daily at bedtime       Current Facility-Administered Medications:     acetaminophen (TYLENOL) tablet 975 mg, 975 mg, Oral, Q8H PRN, Jc Escalante MD, 975 mg at 07/25/19 1429    bisacodyl (DULCOLAX) rectal suppository 10 mg, 10 mg, Rectal, Daily PRN, Jc Escalante MD, 10 mg at 07/25/19 1656    chlorhexidine (PERIDEX) 0 12 % oral rinse 15 mL, 15 mL, [employer] [place], Q12H [address], Jc Escalante MD, 15 mL at 07/26/19 0842    dextrose 5 % and sodium chloride 0 9 % with KCl 20 mEq/L infusion (premix), 100 mL/hr, Intravenous, Continuous, KATHIE Gillespie, Last Rate: 100 mL/hr at 07/25/19 2215, 100 mL/hr at 07/25/19 2215    docusate sodium (COLACE) capsule 100 mg, 100 mg, Oral, BID, KATHIE Colon, 100 mg at 41/42/82 8466    folic acid (FOLVITE) tablet 400 mcg, 400 mcg, Oral, Daily, Jc Escalante MD, 400 mcg at 07/26/19 0847    heparin (porcine) subcutaneous injection 5,000 Units, 5,000 Units, Subcutaneous, Q12H [address] **AND** [CANCELED] Platelet count, , , Once, Sera Pradhan MD    hydrocortisone [facility]) rectal suppository 25 mg, 25 mg, Rectal, BID, Ariadne Nogueira MD, 25 mg at 07/26/19 0842    lactulose 20 g/30 mL oral solution 20 g, 20 g, Oral, TID, Ariadne Nogueira MD, 20 g at 07/26/19 9967    mirtazapine (REMERON) tablet 7 5 mg, 7 5 mg, Oral, HS, Jc Escalante MD, 7 5 mg at 07/24/19 2207    multivitamin-minerals (CENTRUM) tablet 1 tablet, 1 tablet, Oral, Daily, Jc Escalante MD, 1 tablet at 07/26/19 0842    ondansetron (ZOFRAN) injection 4 mg, 4 mg, Intravenous, Q6H PRN, Jc Escalante MD    potassium chloride (K-DUR,KLOR-CON) CR tablet 20 mEq, 20 mEq, Oral, TID With Meals, Ariadne Nogueira MD, 20 mEq at 07/26/19 0842    potassium-sodium phosphateS (K-PHOS,PHOSPHA 250) -250 mg tablet 1 tablet, 1 tablet, Oral, TID With Meals, Ariadne Nogueira MD, 1 tablet at 07/26/19 0842    pravastatin (PRAVACHOL) tablet 40 mg, 40 mg, Oral, Daily With Dinner, Jc Escalante MD, 40 mg at 07/25/19 1655    senna (SENOKOT) tablet 8 6 mg, 1 tablet, Oral, HS PRN, KATHIE Gutierrez, 8 6 mg at 07/25/19 1429    thiamine (VITAMIN B1) 500 mg in sodium chloride 0 9 % 50 mL IVPB, 500 mg, Intravenous, Daily, Jonny Del Rosario MD, Last Rate: 100 mL/hr at 07/26/19 0848, 500 mg at 07/26/19 0848      ALLERGIES  No Known Allergies    SOCIAL HISTORY    Social History     Tobacco Use   Smoking Status Never Smoker   Smokeless Tobacco Never Used     Social History     Substance and Sexual Activity   Alcohol Use Not Currently    Frequency: Monthly or less    Drinks per session: 1 or 2    Binge frequency: Less than monthly    Comment: only socially in the past     Social History     Substance and Sexual Activity   Drug Use Never     Living Situation: Lives at home alone       Objective     Vitals:    07/26/19 0320 07/26/19 0559 07/26/19 0725 07/26/19 0805   BP: 103/60  114/64    Pulse: 74  91 89   Resp: 18  20    Temp: (!) 96 8 °F (36 °C)  98 8 °F (37 1 °C)    TempSrc:       SpO2: 97%  94% 94%   Weight:  35 kg (77 lb 2 6 oz)     Height:           Physical Exam   Constitutional: No distress  Thin, malnourished, chronically ill-appearing   HENT:   Head: Normocephalic and atraumatic  Mouth/Throat: No oropharyngeal exudate  Dry mucous membranes   Eyes: No scleral icterus  Right pupil 2 mm, nonreactive  Left pupil 4 mm, reactive   Cardiovascular: Normal rate, regular rhythm and normal heart sounds  No murmur heard  Pulmonary/Chest: Breath sounds normal  No respiratory distress  She has no wheezes  Scattered rhonchi bilateral upper lobes   Abdominal: Soft  Bowel sounds are normal  She exhibits no distension  There is no tenderness  Musculoskeletal: She exhibits no edema  Neurological: She is alert  Oriented x1 to self  Moves all 4 extremities spontaneously  No focal neurological deficits noted   Skin: She is not diaphoretic     Multiple pressure ulcerations          LABS  Results from last 7 days   Lab Units 07/26/19  6306 07/25/19  9238 07/25/19  7234 07/24/19  0619   WBC Thousand/uL 6 98 6 94 8 32 9 50   HEMOGLOBIN g/dL 11 1* 10 6* 11 8 12 6   HEMATOCRIT % 35 8 33 8* 38 3 39 5   PLATELETS Thousands/uL 203 211 203 227   NEUTROS ABS Thousands/µL 5 25  --  6 39 7 68*     Results from last 7 days   Lab Units 07/26/19  0454 07/25/19  1903 07/25/19  0439  07/21/19  1845 07/21/19  1422   GLUCOSE, ISTAT mg/dl  --   --   --   --  119 119   POTASSIUM mmol/L 4 0 3 8 3 1*   < >  --  4 1   CHLORIDE mmol/L 102 99* 98*   < >  --  100   CO2 mmol/L 39* 40* 41*   < >  --  31   CO2, I-STAT mmol/L  --   --   --   --  36* 38*   BUN mg/dL 3* 7 5   < >  --  16   CREATININE mg/dL 0 28* 0 30* 0 17*   < >  --  0 47*   CALCIUM mg/dL 7 8* 8 2* 7 9*   < >  --  8 3   AST U/L 40  --   --   --   --  74*   ALT U/L 36  --   --   --   --  35   ALK PHOS U/L 57  --   --   --   --  77    < > = values in this interval not displayed  Results from last 7 days   Lab Units 07/21/19  1845 07/21/19  1422   GLUCOSE, ISTAT mg/dl 119 119      No results found for: HGBA1C, HGBA1C      Invalid input(s): CKMBMASS, MYOGLOBULIN  No results found for: Alba Katia  Results from last 7 days   Lab Units 07/26/19  0543 07/21/19  1423   INR  1 31* 1 47*     No components found for: Aundrea Walt  Results from last 7 days   Lab Units 07/26/19  0543 07/21/19  1423   INR  1 31* 1 47*   PTT seconds 35  --            Invalid input(s): SILKE  Invalid input(s): PROCA  No components found for: BLOODCULTURE  No components found for: UACULT  No components found for: RSPRTRYCULT  No components found for: GRMSTAIN       IMAGING  CT head wo contrast   Final Result      No acute intracranial hemorrhage seen   Mild periventricular and white matter hypodensity seen related to chronic small vessel ischemic changes                  Workstation performed: [id number]         XR chest portable ICU   Final Result      No acute cardiopulmonary disease              Workstation performed: [id number]         XR chest portable ICU   Final Result      No radiographic evidence of aspiration or other acute cardiopulmonary abnormality  Workstation performed: [id number]         XR pelvis ap only 1 or 2 vw   Final Result      No acute osseous abnormality  Workstation performed: [id number]         XR femur 2 vw right   Final Result      No acute osseous abnormality  Workstation performed: [id number]         CT spine cervical without contrast   Final Result      No acute fracture or dislocation  Workstation performed: [id number]         CT chest abdomen pelvis wo contrast   Final Result      Limited exam without IV or oral contrast and motion artifact  No acute posttraumatic CT noncontrast findings  Lung nodule for which nonemergent outpatient unenhanced chest CT is recommended in 3 months, I cannot exclude neoplasm  Workstation performed: [id number]         CT head wo contrast   Final Result      No acute intracranial abnormality  Left scalp soft tissue swelling /left preseptal/left forehead soft tissue swelling  Workstation performed: [id number]         XR trauma multiple   Final Result      1  Upper lobe predominant interstitial changes  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, favor sarcoidosis  2   No acute cardiopulmonary findings  3   No acute osseous findings at the pelvis  Workstation performed: [id number]         XR chest 1 view   Final Result      1  Upper lobe predominant interstitial changes  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, favor sarcoidosis  2   No acute cardiopulmonary findings  3   No acute osseous findings at the pelvis  Workstation performed: [id number]         XR pelvis ap only 1 or 2 vw   Final Result      1  Upper lobe predominant interstitial changes  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, favor sarcoidosis  2   No acute cardiopulmonary findings  3   No acute osseous findings at the pelvis                 Workstation performed: [id number] right upper quadrant    (Results Pending)       ASSESSMENT AND PLAN     Acute and chronic respiratory failure with hypercapnia (HCC)  Assessment & Plan  - patient presenting with fall and altered mentation  - was noted to be more hypercapnic on the floor, necessitating transitioned to BiPAP  - initial pH of 7 28 with pCO2 of 71, improved after trial of BiPAP  - patient was again found to be confused yesterday with a VBG showing 7 39/75 was placed on BiPAP  - doubt her confusion was due to hypercapnia given compensation and normal pH  Her bicarb on most recent BMP is 39, indicating more of a chronic process and compensation of her respiratory acidosis   - possible etiology of this chronic hypercapnia may be due to some mild interstitial disease versus aspiration events given her declining mental status and functional status, or more likely due to muscle wasting, cachexia and subsequent hypoventilation  - would check procalcitonin to rule out infection given hypotension yesterday, although most recent chest x-ray was relatively unremarkable    Sputum cultures pending and blood cultures have remained without growth to date  - would check arterial blood gas today for more accurate measures of hypercapnia  - recommend noctural bipap     Dehydration  Assessment & Plan  - c/w IV hydration per primary team, appears hypovolemic on exam, borderline hypotensive     Vulvar mass  Assessment & Plan  Followed by ob/gyn s/p biopsy     Ambulatory dysfunction  Assessment & Plan  C/w pt/ot     Severe protein-calorie malnutrition ([address] )  Assessment & Plan  Malnutrition Findings:   Malnutrition type: Chronic illness(in the setting of severe orbital-hollow look, depressions dark [place] reflects body fat loss; temples hollowing, scooping, depression, clavicle protruding prominent bone reflects muscle mass loss; treated with rec for PO diet when deemed safe to swallow )  Degree of Malnutrition: Other severe protein calorie malnutrition    BMI Findings:  BMI Classifications: Underweight < 18 5(BMI = 14 93)     Body mass index is 16 13 kg/m²  Nutrition eval  Diet supplements  Po intake encouraged       Pressure injury of sacral region, stage 3 (HCC)  Assessment & Plan  Wound care     Pressure injury of head, stage 1  Assessment & Plan  Wound care       Case was and to be discussed with Dr Erika Faustin MD  Pulmonary/Critical Care Fellow, PGY4  [facility]

## 2019-07-26 NOTE — ASSESSMENT & PLAN NOTE
Continues to have elevated CO2  Possibility of interstitial lung disease versus due to malnutrition leading to chest wall muscle atrophy  Chest x-ray unremarkable    Pulmonology evaluation pending

## 2019-07-26 NOTE — HOSPICE NOTE
Pt assessed, approved for routine level of care on hospice at SNF or at home, by Dr  Clymer Ferron  Called daughter Alo Bianchi and left voicemail to let her know weekend liaison would reach out to her to meet and discuss hospice cares  Pt does not meet criteria for inpatient hospice at this time  CM had left for the day, was not able to update case management in person

## 2019-07-26 NOTE — ASSESSMENT & PLAN NOTE
Unclear etiology  Possibly due to hypercapnic respiratory failure  Patient has no memory of the event  Was found down in her home with multiple pressure ulcerations  PT/OT evaluation  Monitor orthostatics

## 2019-07-26 NOTE — ASSESSMENT & PLAN NOTE
Malnutrition Findings:   Malnutrition type: Chronic illness(in the setting of severe orbital-hollow look, depressions dark [place] reflects body fat loss; temples hollowing, scooping, depression, clavicle protruding prominent bone reflects muscle mass loss; treated with rec for PO diet when deemed safe to swallow )  Degree of Malnutrition: Other severe protein calorie malnutrition    BMI Findings:  BMI Classifications: Underweight < 18 5(BMI = 14 93)     Body mass index is 16 13 kg/m²     Nutritionist evaluation  Protein supplements  Electrolyte supplements  Encourage oral intake

## 2019-07-26 NOTE — ASSESSMENT & PLAN NOTE
Malnutrition Findings:   Malnutrition type: Chronic illness(in the setting of severe orbital-hollow look, depressions dark [place] reflects body fat loss; temples hollowing, scooping, depression, clavicle protruding prominent bone reflects muscle mass loss; treated with rec for PO diet when deemed safe to swallow )  Degree of Malnutrition: Other severe protein calorie malnutrition    BMI Findings:  BMI Classifications: Underweight < 18 5(BMI = 14 93)     Body mass index is 15 67 kg/m²     Nutritionist evaluation  Protein supplements  Electrolyte supplements  Encourage oral intake

## 2019-07-26 NOTE — PROGRESS NOTES
Progress Note - Maryse Narayanan 1940, 66 y o  female MRN: [id number]    Unit/Bed#: [facility] 933-01 Encounter: [id number]    Primary Care Provider: Viet Adamson DO   Date and time admitted to hospital: 7/21/2019  2:09 PM        * Fall  Assessment & Plan  Unclear etiology  Possibly due to hypercapnic respiratory failure  Patient has no memory of the event  Was found down in her home with multiple pressure ulcerations  PT/OT evaluation  Monitor orthostatics  Encephalopathy  Assessment & Plan  Patient episode of worsening mental status last night where she was disoriented  Lab work and CT head at that time were unremarkable, except for elevated ammonia  Started on p o  Lactulose, awaiting ultrasound of right upper quadrant  Suspect micronutrient deficiency as well, IV thiamine added  Mental status has significantly improved  Vulvar mass  Assessment & Plan  Status post biopsy by OBGYN  Await biopsy results    Severe protein-calorie malnutrition ([address] )  Assessment & Plan  Malnutrition Findings:   Malnutrition type: Chronic illness(in the setting of severe orbital-hollow look, depressions dark [place] reflects body fat loss; temples hollowing, scooping, depression, clavicle protruding prominent bone reflects muscle mass loss; treated with rec for PO diet when deemed safe to swallow )  Degree of Malnutrition: Other severe protein calorie malnutrition    BMI Findings:  BMI Classifications: Underweight < 18 5(BMI = 14 93)     Body mass index is 16 13 kg/m²  Nutritionist evaluation  Protein supplements  Electrolyte supplements  Encourage oral intake    Dehydration  Assessment & Plan  Continue IV fluids  Oral intake is very poor  Monitor BMP    Monitor orthostatics    Hypokalemia  Assessment & Plan  Continue supplementation  BMP daily for now  Monitor phosphorus level as well    Acute and chronic respiratory failure with hypercapnia (HCC)  Assessment & Plan  Continues to have elevated CO2  Possibility of interstitial lung disease versus due to malnutrition leading to chest wall muscle atrophy  Chest x-ray unremarkable  Pulmonology evaluation pending    Ambulatory dysfunction  Assessment & Plan  With the due to malnutrition, deconditioning  PT OT eval   Will likely need placement at a rehab facility versus long-term care facility  Pressure injury of sacral region, stage 3 (HCC)  Assessment & Plan  Local wound care    Pressure injury of head, stage 1  Assessment & Plan  Local wound care        VTE Pharmacologic Prophylaxis:   Pharmacologic: Heparin  Mechanical VTE Prophylaxis in Place: Yes    Patient Centered Rounds: I have performed bedside rounds with nursing staff today  Education and Discussions with Family / Patient:  While rounding on the patient with nursing staff today the patient spontaneously stated that if her health worsened she would not want any heroic measures to prolong her life  I clarified and asked whether she would want to be intubated in the setting of worsening respiratory failure and she declined  I asked whether the patient would want CPR to resuscitate her if her heart stopped and she declined that as well stating that we should allow for natural death  She stated that she had a nice life and has a nice family and was content with approaching the end of her life  I discussed the possibility of hospice upon discharge and she seemed agreeable  Will discuss with the patient's daughter when she comes to the hospital today  Time Spent for Care: 20 minutes  More than 50% of total time spent on counseling and coordination of care as described above  Current Length of Stay: 5 day(s)    Current Patient Status: Inpatient   Certification Statement: The patient will continue to require additional inpatient hospital stay due to Evaluate encephalopathy, awaiting biopsies    Discharge Plan:   To be determined    Code Status: Level 1 - Full Code      Subjective:   Reports some constipation, incomplete evacuation  Denies shortness of breath, chest pain  Appetite is poor  Objective:     Vitals:   Temp (24hrs), Av 8 °F (37 1 °C), Min:96 8 °F (36 °C), Max:99 7 °F (37 6 °C)    Temp:  [96 8 °F (36 °C)-99 7 °F (37 6 °C)] 98 8 °F (37 1 °C)  HR:  [74-94] 89  Resp:  [16-20] 20  BP: ()/(40-76) 114/64  SpO2:  [94 %-99 %] 94 %  Body mass index is 16 13 kg/m²  Input and Output Summary (last 24 hours): Intake/Output Summary (Last 24 hours) at 2019 3130  Last data filed at 2019 0801  Gross per 24 hour   Intake 2586 67 ml   Output 3425 ml   Net -838 33 ml       Physical Exam:     Physical Exam   Constitutional:   Cachectic elderly female, sitting in armchair, appears comfortable   HENT:   Pressure ulceration left face   Eyes: EOM are normal    Neck: Neck supple  Cardiovascular: Normal rate and regular rhythm  Pulmonary/Chest: Effort normal  She has no wheezes  She has no rales  Abdominal: Soft  She exhibits no distension  There is no tenderness  Musculoskeletal: She exhibits no edema  Neurological: She is alert  Oriented to person place and year   Psychiatric: She has a normal mood and affect   Her behavior is normal      Additional Data:     Labs:    Results from last 7 days   Lab Units 19  0454   WBC Thousand/uL 6 98   HEMOGLOBIN g/dL 11 1*   HEMATOCRIT % 35 8   PLATELETS Thousands/uL 203   NEUTROS PCT % 76*   LYMPHS PCT % 15   MONOS PCT % 8   EOS PCT % 1     Results from last 7 days   Lab Units 19  0454   SODIUM mmol/L 141   POTASSIUM mmol/L 4 0   CHLORIDE mmol/L 102   CO2 mmol/L 39*   BUN mg/dL 3*   CREATININE mg/dL 0 28*   ANION GAP mmol/L 0*   CALCIUM mg/dL 7 8*   ALBUMIN g/dL 2 1*   TOTAL BILIRUBIN mg/dL 0 41   ALK PHOS U/L 57   ALT U/L 36   AST U/L 40   GLUCOSE RANDOM mg/dL 113     Results from last 7 days   Lab Units 19  0543   INR  1 31*     Results from last 7 days   Lab Units 19  1717 19  1201 19  0609 19  0056 07/24/19  1730 07/24/19  1121 07/24/19  0625 07/23/19  2354 07/23/19  1723 07/23/19  1144 07/22/19  1817   POC GLUCOSE mg/dl 103 81 91 96 153* 111 73 89 83 137 74                   * I Have Reviewed All Lab Data Listed Above  * Additional Pertinent Lab Tests Reviewed: All Labs Within Last 24 Hours Reviewed    Recent Cultures (last 7 days):     Results from last 7 days   Lab Units 07/22/19  1818   BLOOD CULTURE  No Growth at 72 hrs  No Growth at 72 hrs  Last 24 Hours Medication List:     Current Facility-Administered Medications:  acetaminophen 975 mg Oral Q8H PRN Radha Armas MD    bisacodyl 10 mg Rectal Daily PRN Radha Armas MD    chlorhexidine 15 mL Swish [employer] Spit Q12H [address] Radha Armas MD    dextrose 5 % and sodium chloride 0 9 % with KCl 20 mEq/L 100 mL/hr Intravenous Continuous KATHIE Pace Last Rate: 100 mL/hr (07/25/19 2215)   docusate sodium 100 mg Oral BID KATHIE Carrizales    folic acid 278 mcg Oral Daily Radha Armas MD    heparin (porcine) 5,000 Units Subcutaneous Q12H [address] KATHIE Diallo    hydrocortisone 25 mg Rectal BID Kingsley Aase, MD    lactulose 20 g Oral TID Kingsley Aase, MD    mirtazapine 7 5 mg Oral HS Radha Armas MD    multivitamin-minerals 1 tablet Oral Daily Radha Armas MD    ondansetron 4 mg Intravenous Q6H PRN Radha Armas MD    potassium chloride 20 mEq Oral TID With Meals Kingsley Aase, MD    potassium-sodium phosphateS 1 tablet Oral TID With Meals Kingsley Aase, MD    pravastatin 40 mg Oral Daily With Anna Mondragon MD    senna 1 tablet Oral HS PRN KATHIE Carrizales    thiamine 500 mg Intravenous Daily Kingsley Aase, MD Last Rate: 500 mg (07/26/19 0848)        Today, Patient Was Seen By: Hailee Ceron MD    ** Please Note: Dictation voice to text software may have been used in the creation of this document   **

## 2019-07-26 NOTE — PROGRESS NOTES
Patient found to be confused beyond her normal forgetfulness  Patient's nephew See Haywood at bedside  Patient states that, "he's relations, but I don't know his name " States that, "we are in her house," and, "I am 35years old " Up until this point, patient has been oriented to place and time, but forgetful of situation  The right pupil is +2 and non reactive and the left is +5 and reactive  Patient is uncooperative during neuro assessment and would not allow for full evaluation of tongue deviation, smile and strength  Speech is clear at this time  BP is hypotensive 90's/40's  Will continue to monitor  Physician notified of findings

## 2019-07-26 NOTE — ASSESSMENT & PLAN NOTE
- patient presenting with fall and altered mentation  - was noted to be more hypercapnic on the floor, necessitating transitioned to BiPAP  - initial pH of 7 28 with pCO2 of 71, improved after trial of BiPAP  - patient was again found to be confused yesterday with a VBG showing 7 39/75 was placed on BiPAP  - doubt her confusion was due to hypercapnia given compensation and normal pH  Her bicarb on most recent BMP is 39, indicating more of a chronic process and compensation of her respiratory acidosis   - possible etiology of this chronic hypercapnia may be due to some mild interstitial disease versus aspiration events given her declining mental status and functional status, or more likely due to muscle wasting, cachexia and subsequent hypoventilation  - would check procalcitonin to rule out infection given hypotension yesterday, although most recent chest x-ray was relatively unremarkable    Sputum cultures pending and blood cultures have remained without growth to date  - would check arterial blood gas today for more accurate measures of hypercapnia  - recommend noctural bipap

## 2019-07-26 NOTE — PLAN OF CARE
Problem: Nutrition/Hydration-ADULT  Goal: Nutrient/Hydration intake appropriate for improving, restoring or maintaining nutritional needs  Description  Monitor and assess patient's nutrition/hydration status for malnutrition (ex- brittle hair, bruises, dry skin, pale skin and conjunctiva, muscle wasting, smooth red tongue, and disorientation)  Collaborate with interdisciplinary team and initiate plan and interventions as ordered  Monitor patient's weight and dietary intake as ordered or per policy  Utilize nutrition screening tool and intervene per policy  Determine patient's food preferences and provide high-protein, high-caloric foods as appropriate       INTERVENTIONS:  - Monitor oral intake, urinary output, labs, and treatment plans  - Assess nutrition and hydration status and recommend course of action  - Evaluate amount of meals eaten  - Assist patient with eating if necessary   - Allow adequate time for meals  - Recommend/ encourage appropriate diets, oral nutritional supplements, and vitamin/mineral supplements  - Order, calculate, and assess calorie counts as needed  - Recommend, monitor, and adjust tube feedings and TPN/PPN based on assessed needs  - Assess need for intravenous fluids  - Provide specific nutrition/hydration education as appropriate  - Include patient/family/caregiver in decisions related to nutrition  Outcome: Progressing     Problem: Prexisting or High Potential for Compromised Skin Integrity  Goal: Skin integrity is maintained or improved  Description  INTERVENTIONS:  - Identify patients at risk for skin breakdown  - Assess and monitor skin integrity  - Assess and monitor nutrition and hydration status  - Monitor labs (i e  albumin)  - Assess for incontinence   - Turn and reposition patient  - Assist with mobility/ambulation  - Relieve pressure over bony prominences  - Avoid friction and shearing  - Provide appropriate hygiene as needed including keeping skin clean and dry  - Evaluate need for skin moisturizer/barrier cream  - Collaborate with interdisciplinary team (i e  Nutrition, Rehabilitation, etc )   - Patient/family teaching  Outcome: Progressing     Problem: Potential for Falls  Goal: Patient will remain free of falls  Description  INTERVENTIONS:  - Assess patient frequently for physical needs  -  Identify cognitive and physical deficits and behaviors that affect risk of falls    -  [place] fall precautions as indicated by assessment   - Educate patient/family on patient safety including physical limitations  - Instruct patient to call for assistance with activity based on assessment  - Modify environment to reduce risk of injury  - Consider OT/PT consult to assist with strengthening/mobility  Outcome: Progressing     Problem: PAIN - ADULT  Goal: Verbalizes/displays adequate comfort level or baseline comfort level  Description  Interventions:  - Encourage patient to monitor pain and request assistance  - Assess pain using appropriate pain scale  - Administer analgesics based on type and severity of pain and evaluate response  - Implement non-pharmacological measures as appropriate and evaluate response  - Consider cultural and social influences on pain and pain management  - Notify physician/advanced practitioner if interventions unsuccessful or patient reports new pain  Outcome: Progressing     Problem: INFECTION - ADULT  Goal: Absence or prevention of progression during hospitalization  Description  INTERVENTIONS:  - Assess and monitor for signs and symptoms of infection  - Monitor lab/diagnostic results  - Monitor all insertion sites, i e  indwelling lines, tubes, and drains  - Monitor endotracheal (as able) and nasal secretions for changes in amount and color  - [place] appropriate cooling/warming therapies per order  - Administer medications as ordered  - Instruct and encourage patient and family to use good hand hygiene technique  - Identify and instruct in appropriate isolation precautions for identified infection/condition  Outcome: Progressing     Problem: SAFETY ADULT  Goal: Patient will remain free of falls  Description  INTERVENTIONS:  - Assess patient frequently for physical needs  -  Identify cognitive and physical deficits and behaviors that affect risk of falls  -  [place] fall precautions as indicated by assessment   - Educate patient/family on patient safety including physical limitations  - Instruct patient to call for assistance with activity based on assessment  - Modify environment to reduce risk of injury  - Consider OT/PT consult to assist with strengthening/mobility  Outcome: Progressing     Problem: DISCHARGE PLANNING  Goal: Discharge to home or other facility with appropriate resources  Description  INTERVENTIONS:  - Identify barriers to discharge w/patient and caregiver  - Arrange for needed discharge resources and transportation as appropriate  - Identify discharge learning needs (meds, wound care, etc )  - Arrange for interpretive services to assist at discharge as needed  - Refer to Case Management Department for coordinating discharge planning if the patient needs post-hospital services based on physician/advanced practitioner order or complex needs related to functional status, cognitive ability, or social support system  Outcome: Progressing     Problem: Knowledge Deficit  Goal: Patient/family/caregiver demonstrates understanding of disease process, treatment plan, medications, and discharge instructions  Description  Complete learning assessment and assess knowledge base    Interventions:  - Provide teaching at level of understanding  - Provide teaching via preferred learning methods  Outcome: Progressing

## 2019-07-26 NOTE — ASSESSMENT & PLAN NOTE
Continues to have elevated CO2  Possibility of interstitial lung disease versus due to malnutrition  Mental status worsening today  Repeat VBG, placed on BiPAP  Chest x-ray unremarkable    Pulmonology evaluation

## 2019-07-26 NOTE — PLAN OF CARE
Problem: OCCUPATIONAL THERAPY ADULT  Goal: Performs self-care activities at highest level of function for planned discharge setting  See evaluation for individualized goals  Description  Treatment Interventions: ADL retraining, Functional transfer training, UE strengthening/ROM, Endurance training, Cognitive reorientation, Patient/family training, Equipment evaluation/education, Compensatory technique education, Energy conservation, Activityengagement  Equipment Recommended: Bedside commode       See flowsheet documentation for full assessment, interventions and recommendations  Note:   Limitation: Decreased ADL status, Decreased UE strength, Decreased cognition, Decreased Safe judgement during ADL, Decreased endurance, Decreased self-care trans, Decreased high-level ADLs  Prognosis: Good  Assessment: Pt is a 66 y o  female who was [facility] to [facility] on 7/21/2019 with Fall   Pt foundf down in kitchen 24 hours; found with multiple pressure ulcerations, including head, sacrum, vulvar mass  Unclear etiology of fall - possibly d/t hypercapnic respiratory failure  Pt also with hypokalemia and dehydration upon presentation to hospital  CT head/c-spine negative for acute changes; X-ray femur negative for acute osseous abnormality  Pt is a poor historian 2' cognitive deficits - information obtained from EMR  At baseline pt was completing ADLs/mobility IND, IADLs with assist  Pt lives alone in a 2 SH with 2nd floor bathroom and 1 CHRISTY  Currently pt requires MOD-MAX A for overall ADLS and MOD A for functional mobility/transfers with RW  Pt also seen for MoCA cognitive assessment - see below; pt's baseline cognition unclear - per EMR, pt with chronic STM loss  Medical team aware of pt's current cognitive status  Pt fixating on wanting to see her dtr throughout session, often re-directing conversation to this topic; RN aware  Pt also with decreased recall of events earlier in the day and week   Pt continuously stating "I'm in a different room than I was, I don't recognize this " Pt currently presents with impairments in the following categories -steps to enter environment, limited home support, difficulty performing ADLS, difficulty performing IADLS  and limited insight into deficits activity tolerance, endurance, standing balance/tolerance, UE strength, FMC, memory, insight, safety , judgement  and attention   These impairments, as well as pt's fatigue, pain, impulsivity, decreased caregiver support, risk for falls and home environment  limit pt's ability to safely engage in all baseline areas of occupation, includinggrooming, bathing, dressing, toileting and functional mobility/transfers From OT standpoint, recommend [address] upon D/C  OT will continue to follow to address the below stated goals        OT Discharge Recommendation: Short Term Rehab  OT - OK to Discharge: Yes(when medically appropriate)

## 2019-07-26 NOTE — OCCUPATIONAL THERAPY NOTE
[address] Evaluation     Patient Name: Alaina Thornton  KTIMM'O Date: 7/26/2019  Problem List  Patient Active Problem List   Diagnosis    Fall    Pressure injury of head, stage 1    Pressure injury of sacral region, stage 3 ([address] )    Scalp hematoma    Severe protein-calorie malnutrition ([address] )    Ambulatory dysfunction    Acute and chronic respiratory failure with hypercapnia (HCC)    Vulvar mass    Hypokalemia    Dehydration     Past Medical History  History reviewed  No pertinent past medical history  Past Surgical History  History reviewed  No pertinent surgical history  07/26/19 0901   Note Type   Note type Eval/Treat   Restrictions/Precautions   Weight Bearing Precautions Per Order No   Other Precautions Cognitive; Chair Alarm; Impulsive;Multiple lines;O2;Fall Risk  ( 5 L NC)   Pain Assessment   Pain Assessment No/denies pain   Pain Score No Pain   Home Living   Type of Home House   Home Layout Two level  (bathroom 2nd floor, 1 CHRISTY)   Home Equipment   (none per pt)   Additional Comments Pt is a poor historian 2' cognitive deficits, information obtained from EMR   Prior Function   Level of [place] Independent with ADLs and functional mobility   Lives With Alone   Receives Help From Family; Neighbor   ADL Assistance Independent   IADLs Needs assistance   Falls in the last 6 months 1 to 4   Lifestyle   Autonomy At baseline pt was completing ADLs/mobility IND, IADLs with assist     Reciprocal Relationships supportive dtr   Service to Others retired   Intrinsic Gratification pt enjoys spending time with dtr   Psychosocial   Psychosocial (WDL) WDL   Subjective   Subjective "I'm not a very good memory person"   ADL   Eating Assistance 4  Minimal Assistance   Grooming Assistance 4  Minimal [address] 3  Moderate Assistance   LB [address] 2  Maximal [address] 3  Moderate [address] 2  Maximal Assistance LB Dressing Deficit Don/doff R sock; Don/doff L sock   Toileting Assistance  2  Maximal Assistance   Toileting Deficit Steadying;Verbal cueing;Supervison/safety; Increased time to complete;Clothing management up;Perineal hygiene  (standing matt care w/ B/L UE support on RW)   Bed Mobility   Additional Comments Pt seated OOB upon arrival   Transfers   Sit to Stand 3  Moderate assistance   Additional items Assist x 1; Armrests; Increased time required;Verbal cues   Stand to Sit 3  Moderate assistance   Additional items Assist x 1; Armrests; Increased time required;Verbal cues   Additional Comments RW - VCs/TCs for safe hand placement   Functional Mobility   Functional Mobility 3  Moderate assistance   Additional Comments short distance w/in room   Additional items Rolling walker   Balance   Static Sitting Good   Dynamic Sitting Fair   Static Standing Poor   Dynamic Standing Poor   Activity Tolerance   Activity Tolerance Patient limited by fatigue   Nurse Made Aware Spoke to RN - pt appropriate to be seen   RUE Assessment   RUE Assessment X  (grossly 3+/5, difficulty following commands)   LUE Assessment   LUE Assessment X  (grossly 3+/5, difficulty following commands)   Hand Function   Gross Motor Coordination Functional   Fine Motor Coordination Impaired   Vision-Basic Assessment   Current Vision Wears glasses for distance only   Cognition   Overall Cognitive Status Impaired   Arousal/Participation Alert; Cooperative   Attention Attends with cues to redirect   Orientation Level Oriented to person;Disoriented to place; Disoriented to time;Disoriented to situation   Memory Decreased short term memory;Decreased recall of recent events;Decreased recall of precautions   Following Commands Follows one step commands with increased time or repetition   Cognition Assessment Tools MOCA   Score 10   Assessment   Limitation Decreased ADL status; Decreased UE strength;Decreased cognition;Decreased Safe judgement during ADL;Decreased endurance;Decreased self-care trans;Decreased high-level ADLs   Prognosis Good   Assessment Pt is a 66 y o  female who was admitted to [facility] on 7/21/2019 with Fall   Pt foundf down in kitchen 24 hours; found with multiple pressure ulcerations, including head, sacrum, vulvar mass  Unclear etiology of fall - possibly d/t hypercapnic respiratory failure  Pt also with hypokalemia and dehydration upon presentation to hospital  CT head/c-spine negative for acute changes; X-ray femur negative for acute osseous abnormality  Pt is a poor historian 2' cognitive deficits - information obtained from EMR  At baseline pt was completing ADLs/mobility IND, IADLs with assist  Pt lives alone in a 2 SH with 2nd floor bathroom and 1 CHRISTY  Currently pt requires MOD-MAX A for overall ADLS and MOD A for functional mobility/transfers with RW  Pt also seen for MoCA cognitive assessment - see below; pt's baseline cognition unclear - per EMR, pt with chronic STM loss  Medical team aware of pt's current cognitive status  Pt fixating on wanting to see her dtr throughout session, often re-directing conversation to this topic; RN aware  Pt also with decreased recall of events earlier in the day and week  Pt continuously stating "I'm in a different room than I was, I don't recognize this " Pt currently presents with impairments in the following categories -steps to enter environment, limited home support, difficulty performing ADLS, difficulty performing IADLS  and limited insight into deficits activity tolerance, endurance, standing balance/tolerance, UE strength, FMC, memory, insight, safety , judgement  and attention    These impairments, as well as pt's fatigue, pain, impulsivity, decreased caregiver support, risk for falls and home environment  limit pt's ability to safely engage in all baseline areas of occupation, includinggrooming, bathing, dressing, toileting and functional mobility/transfers From OT standpoint, recommend SHORT TERM REHAB upon D/C  OT will continue to follow to address the below stated goals  Goals   Patient Goals to see her dtr today & "to get comfortable"   LTG Time Frame 10-14   Long Term Goal #1 see goals below   Plan   Treatment Interventions ADL retraining;Functional transfer training;UE strengthening/ROM; Endurance training;Cognitive reorientation;Patient/family training;Equipment evaluation/education; Compensatory technique education; Energy conservation; Activityengagement   Goal Expiration Date 19   Treatment Day 1   OT Frequency 3-5x/wk   Additional Treatment Session   Start Time 44   End Time 09   Treatment Assessment Pt seen for administration of [place] Cognitive Assessment (MoCA) to further assess cognitive skills/deficits  Pt scored  10/30 indicating MODERATE cognitive impairment for age/education  See below for score breakdown  Recommendation   OT Discharge Recommendation Short Term Rehab   Equipment Recommended Bedside commode   OT - OK to Discharge Yes  (when medically appropriate)   Barthel Index   Feeding 5   Bathing 0   Grooming Score 0   Dressing Score 5   Bladder Score 0   Bowels Score 5   Toilet Use Score 0   Transfers (Bed/Chair) Score 5   Mobility (Level Surface) Score 0   Stairs Score 0   Barthel Index Score 20   Modified [place] Scale   Modified [place] Scale 4       Pt seen for administration of [place] Cognitive Assessment (MoCA) to further assess cognitive skills/deficits  Pt scored   10/30 indicating MODERATE cognitive impairment for age/education  Scores on MoCA as follows:    Visuospatial / Executive Function:    2/5  Pt unable to perform alternating number/letter task, copy cube accurately, and place hands at correct time on clock  Pt able to correctly draw clock contour and place numbers in clock  [place]/3    Attention:   2/6  Pt unable to repeat list of numbers, able to perform 1/5 serial subtractions from 100      Language:  1/3  Able to repeat 1/2 sentences accurately; unable to name 11 words beginning with letter F in 1 minute  Abstraction: 2/2    Delayed Recall:   0/5  Pt able to recall 0/5 words after 5 minutes    Orientation:    1/6   Pt oriented to year; disoriented to date, month, day of week, place, and city  Goals:    MOD IND toileting & clothing management with use of AE as needed  MOD IND UB/LB ADLs with use of AE as needed  MOD IND functional transfers/mobility to all surfaces with Fair+ to Good balance/safety to participate in dynamic ADLs  MOD IND feeding & grooming after set up  Participate in ongoing functional cognitive assessment with Good attention/participation to assist with safe D/C planning  Increase activity tolerance to Good for 40-45 minutes of participation in functional tasks  Pt to attend to 10 minute functional task with no more than 1 VC to redirect  Pt to be A&Ox3 consistently with self-initiated use of external compensatory device as needed  Improve standing balance to Fair+ to Good for 8-10 minutes of participation in functional tasks  Increase B/L UE strength by 1/2 grade MMT to increase independence in UB ADLs and functional transfers          Florin Ordoñez, OT

## 2019-07-26 NOTE — ASSESSMENT & PLAN NOTE
Patient episode of worsening mental status last night where she was disoriented  Lab work and CT head at that time were unremarkable, except for elevated ammonia  Started on p o  Lactulose, awaiting ultrasound of right upper quadrant  Suspect micronutrient deficiency as well, IV thiamine added  Mental status has significantly improved

## 2019-07-26 NOTE — ASSESSMENT & PLAN NOTE
Malnutrition Findings:   Malnutrition type: Chronic illness(in the setting of severe orbital-hollow look, depressions dark [place] reflects body fat loss; temples hollowing, scooping, depression, clavicle protruding prominent bone reflects muscle mass loss; treated with rec for PO diet when deemed safe to swallow )  Degree of Malnutrition: Other severe protein calorie malnutrition    BMI Findings:  BMI Classifications: Underweight < 18 5(BMI = 14 93)     Body mass index is 16 13 kg/m²       Nutrition eval  Diet supplements  Po intake encouraged

## 2019-07-26 NOTE — SOCIAL WORK
CM received consult for hospice services for pt  CM met with pt's dtr Alia Nj to discuss same and to offer hospice agency choice  Pt's dtr requesting referral to -hospice  Referral made via [address] [place] [address] Drive

## 2019-07-27 PROBLEM — E87.6 HYPOKALEMIA: Status: RESOLVED | Noted: 2019-07-24 | Resolved: 2019-07-27

## 2019-07-27 PROBLEM — K92.2 GI BLEEDING: Status: ACTIVE | Noted: 2019-07-27

## 2019-07-27 LAB
ANION GAP SERPL CALCULATED.3IONS-SCNC: 0 MMOL/L (ref 4–13)
BACTERIA BLD CULT: NORMAL
BACTERIA BLD CULT: NORMAL
BASOPHILS # BLD AUTO: 0.03 THOUSANDS/ΜL (ref 0–0.1)
BASOPHILS NFR BLD AUTO: 1 % (ref 0–1)
BUN SERPL-MCNC: 4 MG/DL (ref 5–25)
CALCIUM SERPL-MCNC: 8.6 MG/DL (ref 8.3–10.1)
CHLORIDE SERPL-SCNC: 99 MMOL/L (ref 100–108)
CO2 SERPL-SCNC: 38 MMOL/L (ref 21–32)
CREAT SERPL-MCNC: 0.34 MG/DL (ref 0.6–1.3)
EOSINOPHIL # BLD AUTO: 0.08 THOUSAND/ΜL (ref 0–0.61)
EOSINOPHIL NFR BLD AUTO: 1 % (ref 0–6)
ERYTHROCYTE [DISTWIDTH] IN BLOOD BY AUTOMATED COUNT: 12.8 % (ref 11.6–15.1)
GFR SERPL CREATININE-BSD FRML MDRD: 106 ML/MIN/1.73SQ M
GLUCOSE SERPL-MCNC: 90 MG/DL (ref 65–140)
HCT VFR BLD AUTO: 40.2 % (ref 34.8–46.1)
HGB BLD-MCNC: 12.7 G/DL (ref 11.5–15.4)
IMM GRANULOCYTES # BLD AUTO: 0.03 THOUSAND/UL (ref 0–0.2)
IMM GRANULOCYTES NFR BLD AUTO: 1 % (ref 0–2)
LYMPHOCYTES # BLD AUTO: 0.97 THOUSANDS/ΜL (ref 0.6–4.47)
LYMPHOCYTES NFR BLD AUTO: 17 % (ref 14–44)
MCH RBC QN AUTO: 30.8 PG (ref 26.8–34.3)
MCHC RBC AUTO-ENTMCNC: 31.6 G/DL (ref 31.4–37.4)
MCV RBC AUTO: 98 FL (ref 82–98)
MONOCYTES # BLD AUTO: 0.45 THOUSAND/ΜL (ref 0.17–1.22)
MONOCYTES NFR BLD AUTO: 8 % (ref 4–12)
NEUTROPHILS # BLD AUTO: 4.13 THOUSANDS/ΜL (ref 1.85–7.62)
NEUTS SEG NFR BLD AUTO: 72 % (ref 43–75)
NRBC BLD AUTO-RTO: 0 /100 WBCS
PHOSPHATE SERPL-MCNC: 3.3 MG/DL (ref 2.3–4.1)
PLATELET # BLD AUTO: 248 THOUSANDS/UL (ref 149–390)
PMV BLD AUTO: 10.3 FL (ref 8.9–12.7)
POTASSIUM SERPL-SCNC: 3.9 MMOL/L (ref 3.5–5.3)
RBC # BLD AUTO: 4.12 MILLION/UL (ref 3.81–5.12)
SODIUM SERPL-SCNC: 137 MMOL/L (ref 136–145)
WBC # BLD AUTO: 5.69 THOUSAND/UL (ref 4.31–10.16)

## 2019-07-27 PROCEDURE — 80048 BASIC METABOLIC PNL TOTAL CA: CPT | Performed by: INTERNAL MEDICINE

## 2019-07-27 PROCEDURE — 85025 COMPLETE CBC W/AUTO DIFF WBC: CPT | Performed by: INTERNAL MEDICINE

## 2019-07-27 PROCEDURE — 84100 ASSAY OF PHOSPHORUS: CPT | Performed by: INTERNAL MEDICINE

## 2019-07-27 PROCEDURE — 99232 SBSQ HOSP IP/OBS MODERATE 35: CPT | Performed by: INTERNAL MEDICINE

## 2019-07-27 RX ORDER — HYDROCORTISONE ACETATE 25 MG/1
25 SUPPOSITORY RECTAL 2 TIMES DAILY PRN
Status: DISCONTINUED | OUTPATIENT
Start: 2019-07-27 | End: 2019-08-04 | Stop reason: HOSPADM

## 2019-07-27 RX ADMIN — MORPHINE SULFATE 5 MG: 100 SOLUTION ORAL at 14:45

## 2019-07-27 RX ADMIN — MORPHINE SULFATE 5 MG: 100 SOLUTION ORAL at 23:45

## 2019-07-27 NOTE — PLAN OF CARE
Problem: Nutrition/Hydration-ADULT  Goal: Nutrient/Hydration intake appropriate for improving, restoring or maintaining nutritional needs  Description  Monitor and assess patient's nutrition/hydration status for malnutrition (ex- brittle hair, bruises, dry skin, pale skin and conjunctiva, muscle wasting, smooth red tongue, and disorientation)  Collaborate with interdisciplinary team and initiate plan and interventions as ordered  Monitor patient's weight and dietary intake as ordered or per policy  Utilize nutrition screening tool and intervene per policy  Determine patient's food preferences and provide high-protein, high-caloric foods as appropriate       INTERVENTIONS:  - Monitor oral intake, urinary output, labs, and treatment plans  - Assess nutrition and hydration status and recommend course of action  - Evaluate amount of meals eaten  - Assist patient with eating if necessary   - Allow adequate time for meals  - Recommend/ encourage appropriate diets, oral nutritional supplements, and vitamin/mineral supplements  - Order, calculate, and assess calorie counts as needed  - Recommend, monitor, and adjust tube feedings and TPN/PPN based on assessed needs  - Assess need for intravenous fluids  - Provide specific nutrition/hydration education as appropriate  - Include patient/family/caregiver in decisions related to nutrition  Outcome: Progressing     Problem: Prexisting or High Potential for Compromised Skin Integrity  Goal: Skin integrity is maintained or improved  Description  INTERVENTIONS:  - Identify patients at risk for skin breakdown  - Assess and monitor skin integrity  - Assess and monitor nutrition and hydration status  - Monitor labs (i e  albumin)  - Assess for incontinence   - Turn and reposition patient  - Assist with mobility/ambulation  - Relieve pressure over bony prominences  - Avoid friction and shearing  - Provide appropriate hygiene as needed including keeping skin clean and dry  - Evaluate need for skin moisturizer/barrier cream  - Collaborate with interdisciplinary team (i e  Nutrition, Rehabilitation, etc )   - Patient/family teaching  Outcome: Progressing     Problem: Potential for Falls  Goal: Patient will remain free of falls  Description  INTERVENTIONS:  - Assess patient frequently for physical needs  -  Identify cognitive and physical deficits and behaviors that affect risk of falls    -  [place] fall precautions as indicated by assessment   - Educate patient/family on patient safety including physical limitations  - Instruct patient to call for assistance with activity based on assessment  - Modify environment to reduce risk of injury  - Consider OT/PT consult to assist with strengthening/mobility  Outcome: Progressing     Problem: PAIN - ADULT  Goal: Verbalizes/displays adequate comfort level or baseline comfort level  Description  Interventions:  - Encourage patient to monitor pain and request assistance  - Assess pain using appropriate pain scale  - Administer analgesics based on type and severity of pain and evaluate response  - Implement non-pharmacological measures as appropriate and evaluate response  - Consider cultural and social influences on pain and pain management  - Notify physician/advanced practitioner if interventions unsuccessful or patient reports new pain  Outcome: Progressing     Problem: INFECTION - ADULT  Goal: Absence or prevention of progression during hospitalization  Description  INTERVENTIONS:  - Assess and monitor for signs and symptoms of infection  - Monitor lab/diagnostic results  - Monitor all insertion sites, i e  indwelling lines, tubes, and drains  - Monitor endotracheal (as able) and nasal secretions for changes in amount and color  - Blanca appropriate cooling/warming therapies per order  - Administer medications as ordered  - Instruct and encourage patient and family to use good hand hygiene technique  - Identify and instruct in appropriate isolation precautions for identified infection/condition  Outcome: Progressing     Problem: SAFETY ADULT  Goal: Patient will remain free of falls  Description  INTERVENTIONS:  - Assess patient frequently for physical needs  -  Identify cognitive and physical deficits and behaviors that affect risk of falls  -  [place] fall precautions as indicated by assessment   - Educate patient/family on patient safety including physical limitations  - Instruct patient to call for assistance with activity based on assessment  - Modify environment to reduce risk of injury  - Consider OT/PT consult to assist with strengthening/mobility  Outcome: Progressing     Problem: DISCHARGE PLANNING  Goal: Discharge to home or other facility with appropriate resources  Description  INTERVENTIONS:  - Identify barriers to discharge w/patient and caregiver  - Arrange for needed discharge resources and transportation as appropriate  - Identify discharge learning needs (meds, wound care, etc )  - Arrange for interpretive services to assist at discharge as needed  - Refer to Case Management Department for coordinating discharge planning if the patient needs post-hospital services based on physician/advanced practitioner order or complex needs related to functional status, cognitive ability, or social support system  Outcome: Progressing     Problem: Knowledge Deficit  Goal: Patient/family/caregiver demonstrates understanding of disease process, treatment plan, medications, and discharge instructions  Description  Complete learning assessment and assess knowledge base    Interventions:  - Provide teaching at level of understanding  - Provide teaching via preferred learning methods  Outcome: Progressing

## 2019-07-27 NOTE — PROGRESS NOTES
Progress Note - Luan Ruffin 1940, 66 y o  female MRN: [id number]    Unit/Bed#: [facility] 933-01 Encounter: [id number]    Primary Care Provider: Mckayla Montanze DO   Date and time admitted to hospital: 7/21/2019  2:09 PM        * Fall  Assessment & Plan  Unclear etiology  Possibly due to hypercapnic respiratory failure  Patient has no memory of the event  Was found down in her home with multiple pressure ulcerations  Local wound care, turn q 2 hours    Encephalopathy  Assessment & Plan  Mental status waxes and wanes, currently better this morning  Lab work and CT head at that time were unremarkable, except for elevated ammonia  Was started on p o  Lactulose but is currently refusing medications      Vulvar mass  Assessment & Plan  Status post biopsy by OBGYN  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)  Patient is currently refusing all medical care and requesting hospice    Severe protein-calorie malnutrition ([address] )  Assessment & Plan  Malnutrition Findings:   Malnutrition type: Chronic illness(in the setting of severe orbital-hollow look, depressions dark [place] reflects body fat loss; temples hollowing, scooping, depression, clavicle protruding prominent bone reflects muscle mass loss; treated with rec for PO diet when deemed safe to swallow )  Degree of Malnutrition: Other severe protein calorie malnutrition    BMI Findings:  BMI Classifications: Underweight < 18 5(BMI = 14 93)     Body mass index is 15 48 kg/m²  Currently comfort care  Pleasure feeds only    GI bleeding  Assessment & Plan  Patient had melena yesterday with heme-positive stools  Currently refusing endoscopy  Refusing PPI    Due to comfort care measures will discontinue hemoglobin testing    Dehydration  Assessment & Plan  Refusing IV hydration, will discontinue IV fluids    Acute and chronic respiratory failure with hypercapnia [facility])  Assessment & Plan  Currently comfort care, refusing BiPAP    Ambulatory dysfunction  Assessment & Plan  Discharge planning in progress  Family to meet with hospice liaison this weekend  Suspect discharged to SNF with hospice    Pressure injury of sacral region, stage 3 (HCC)  Assessment & Plan  Local wound care    Pressure injury of head, stage 1  Assessment & Plan  Local wound care      VTE Pharmacologic Prophylaxis:   Pharmacologic: Patient has refused VTE prophylaxis  Mechanical VTE Prophylaxis in Place: No    Patient Centered Rounds: I have performed bedside rounds with nursing staff today  Education and Discussions with Family / Patient:  Patient and daughter, plan of care    Time Spent for Care: 20 minutes  More than 50% of total time spent on counseling and coordination of care as described above  Current Length of Stay: 6 day(s)    Current Patient Status: Inpatient   Certification Statement: The patient will continue to require additional inpatient hospital stay due to Discharge planning    Discharge Plan:  Hospice    Code Status: Level 4 - Comfort Care      Subjective:   Patient denies any discomfort at this time, but is adamant about refusing further medical care  She states that she is content with her life and has a wonderful family but given all her medical problems wishes to be hospice at this time  She denied all her medications and food this morning  Objective:     Vitals:   Temp (24hrs), Av 4 °F (36 9 °C), Min:97 2 °F (36 2 °C), Max:99 °F (37 2 °C)    Temp:  [97 2 °F (36 2 °C)-99 °F (37 2 °C)] 98 6 °F (37 °C)  HR:  [78-96] 85  Resp:  [18-20] 18  BP: (131-137)/(66-72) 137/72  SpO2:  [92 %-98 %] 92 %  Body mass index is 15 48 kg/m²  Input and Output Summary (last 24 hours):        Intake/Output Summary (Last 24 hours) at 2019 1030  Last data filed at 2019 0844  Gross per 24 hour   Intake 1308 33 ml   Output 4150 ml   Net -2841 67 ml       Physical Exam:     Physical Exam   Constitutional:   Cachectic elderly female, lying in bed   HENT:   Bitemporal wasting  Pressure ulcer left maxillary area   Eyes: EOM are normal  No scleral icterus  Neck: Neck supple  Pulmonary/Chest: Effort normal    Musculoskeletal:   Atrophic extremities   Neurological: She is alert  Oriented to person, place, month and year   Skin: Skin is warm and dry  Additional Data:     Labs:    Results from last 7 days   Lab Units 07/27/19  0850   WBC Thousand/uL 5 69   HEMOGLOBIN g/dL 12 7   HEMATOCRIT % 40 2   PLATELETS Thousands/uL 248   NEUTROS PCT % 72   LYMPHS PCT % 17   MONOS PCT % 8   EOS PCT % 1     Results from last 7 days   Lab Units 07/27/19  0850 07/26/19  0454   SODIUM mmol/L 137 141   POTASSIUM mmol/L 3 9 4 0   CHLORIDE mmol/L 99* 102   CO2 mmol/L 38* 39*   BUN mg/dL 4* 3*   CREATININE mg/dL 0 34* 0 28*   ANION GAP mmol/L 0* 0*   CALCIUM mg/dL 8 6 7 8*   ALBUMIN g/dL  --  2 1*   TOTAL BILIRUBIN mg/dL  --  0 41   ALK PHOS U/L  --  57   ALT U/L  --  36   AST U/L  --  40   GLUCOSE RANDOM mg/dL 90 113     Results from last 7 days   Lab Units 07/26/19  0543   INR  1 31*     Results from last 7 days   Lab Units 07/25/19  1717 07/25/19  1201 07/25/19  0609 07/25/19  0056 07/24/19  1730 07/24/19  1121 07/24/19  0625 07/23/19  2354 07/23/19  1723 07/23/19  1144 07/22/19  1817   POC GLUCOSE mg/dl 103 81 91 96 153* 111 73 89 83 137 74                   * I Have Reviewed All Lab Data Listed Above  * Additional Pertinent Lab Tests Reviewed: All Labs Within Last 24 Hours Reviewed      Recent Cultures (last 7 days):     Results from last 7 days   Lab Units 07/22/19  1818   BLOOD CULTURE  No Growth After 4 Days  No Growth After 4 Days         Last 24 Hours Medication List:     Current Facility-Administered Medications:  hydrocortisone 25 mg Rectal BID PRN Jaycee Jules MD   morphine 10 mg Oral Q4H PRN Jaycee Jules MD   morphine 5 mg Oral Q4H PRN Jaycee Jules MD   ondansetron 4 mg Intravenous Q6H PRN Vianey Macias MD        Today, Patient Was Seen By: Billy Horn MD    ** Please Note: Dictation voice to text software may have been used in the creation of this document   **

## 2019-07-27 NOTE — NURSING NOTE
When I picked up the this patient for the evening shift and when in the room to do vitals  The patient and the daughter stated that vitals are not necessary anymore  "I do not want you to do it " I told the nurse who at time was Arias Greco RN and she stated this is okay since the patient doesn't want any heroics done to her  Also adding that per RN in the future if the patient doesn't want to have her vitals taken is okay and to pass it  Along to other aids

## 2019-07-27 NOTE — SOCIAL WORK
CM informed by Ailyn-liaison [facility], pt not appropriate for IPU at this time  Plan will be for d/c to SNF with hospice services  Pt's dtr aware room and board not covered by insurance  Pt's dtr Arlin Smith requesting referrals to [facility] and -  Referrals made via [address] [place] [address]

## 2019-07-27 NOTE — ASSESSMENT & PLAN NOTE
Discharge planning in progress  Family to meet with hospice liaison this weekend    Suspect discharged to SNF with hospice

## 2019-07-27 NOTE — HOSPICE NOTE
Met with pts bartolo Ramirez, discussed hospice options at length and in detail  Discussed pt is appropriate for hospice at home or SNF  Bartolo Ramirez reports that it is not an option for pt to return home, as pt resided alone  Discussed the option of hospice at SNF, bartolo Ramirez receptive to this and has given choice for [facility] and [facility] at this time  Bartolo Ramirez aware that if pt transitions to SNF with hospice that there will be financial responsibility for room and board coverage by the pt  Liaison contact information given to bartolo Ramirez if any additional questions  Above communicated to case management who will make referrals to SNF choices

## 2019-07-27 NOTE — ASSESSMENT & PLAN NOTE
Patient had melena yesterday with heme-positive stools  Currently refusing endoscopy  Refusing PPI    Due to comfort care measures will discontinue hemoglobin testing

## 2019-07-27 NOTE — ASSESSMENT & PLAN NOTE
Malnutrition Findings:   Malnutrition type: Chronic illness(in the setting of severe orbital-hollow look, depressions dark [place] reflects body fat loss; temples hollowing, scooping, depression, clavicle protruding prominent bone reflects muscle mass loss; treated with rec for PO diet when deemed safe to swallow )  Degree of Malnutrition: Other severe protein calorie malnutrition    BMI Findings:  BMI Classifications: Underweight < 18 5(BMI = 14 93)     Body mass index is 15 48 kg/m²       Currently comfort care  Pleasure feeds only

## 2019-07-27 NOTE — ASSESSMENT & PLAN NOTE
Unclear etiology  Possibly due to hypercapnic respiratory failure  Patient has no memory of the event  Was found down in her home with multiple pressure ulcerations    Local wound care, turn q 2 hours

## 2019-07-27 NOTE — ASSESSMENT & PLAN NOTE
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

## 2019-07-27 NOTE — ASSESSMENT & PLAN NOTE
Mental status waxes and wanes, currently better this morning  Lab work and CT head at that time were unremarkable, except for elevated ammonia  Was started on p o   Lactulose but is currently refusing medications

## 2019-07-28 PROCEDURE — 99232 SBSQ HOSP IP/OBS MODERATE 35: CPT | Performed by: INTERNAL MEDICINE

## 2019-07-28 RX ORDER — LORAZEPAM 2 MG/ML
0.5 INJECTION INTRAMUSCULAR EVERY 4 HOURS PRN
Status: DISCONTINUED | OUTPATIENT
Start: 2019-07-28 | End: 2019-07-30

## 2019-07-28 RX ORDER — MORPHINE SULFATE 10 MG/ML
6 INJECTION, SOLUTION INTRAMUSCULAR; INTRAVENOUS EVERY 4 HOURS PRN
Status: DISCONTINUED | OUTPATIENT
Start: 2019-07-28 | End: 2019-08-04 | Stop reason: HOSPADM

## 2019-07-28 RX ADMIN — MORPHINE SULFATE 10 MG: 100 SOLUTION ORAL at 20:52

## 2019-07-28 RX ADMIN — LORAZEPAM 0.5 MG: 2 INJECTION INTRAMUSCULAR; INTRAVENOUS at 06:22

## 2019-07-28 RX ADMIN — LORAZEPAM 0.5 MG: 2 INJECTION INTRAMUSCULAR; INTRAVENOUS at 22:18

## 2019-07-28 RX ADMIN — LORAZEPAM 0.5 MG: 2 INJECTION INTRAMUSCULAR; INTRAVENOUS at 18:18

## 2019-07-28 RX ADMIN — MORPHINE SULFATE 10 MG: 100 SOLUTION ORAL at 09:30

## 2019-07-28 NOTE — ASSESSMENT & PLAN NOTE
Malnutrition Findings:   Malnutrition type: Chronic illness(in the setting of severe orbital-hollow look, depressions dark [place] reflects body fat loss; temples hollowing, scooping, depression, clavicle protruding prominent bone reflects muscle mass loss; treated with rec for PO diet when deemed safe to swallow )  Degree of Malnutrition: Other severe protein calorie malnutrition    BMI Findings:  BMI Classifications: Underweight < 18 5(BMI = 14 93)     Body mass index is 15 44 kg/m²       Currently comfort care  Pleasure feeds only

## 2019-07-28 NOTE — PROGRESS NOTES
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), no invasive malignancy is seen but it is noted that the fragmentation of the tissue precludes optimal evaluation  These results were reviewed with the patient  I discussed that I recommend consultation with [facility] to discuss treatment options moving forward  She is currently opting for hospice and declines treatment options at this time  The patient gave me permission to call her daughter, Abida Gimenez, to discuss the results  I spoke to Abida Gimenez, reviewed the pathologic diagnosis, and again discussed the recommendation for [facility] consultation to discuss treatment options  Abida Gimenez reports that agrees with no treatment, and declines [facility] consultation at this time  She was encouraged to call with any questions/concerns or change in consultation & treatment desires  Dr Spring Cord Dr Ora Saenz aware    Please feel free to call our team with any questions/concerns      Luz Maria Delgado MD   [facility]  7/28/2019  2:33 PM

## 2019-07-28 NOTE — PROGRESS NOTES
Progress Note - Luis Lopez 1940, 66 y o  female MRN: [id number]    Unit/Bed#: [facility] 933-01 Encounter: [id number]    Primary Care Provider: Lyndy Alpers, DO   Date and time admitted to hospital: 7/21/2019  2:09 PM        * Fall  Assessment & Plan  Unclear etiology  Possibly due to hypercapnic respiratory failure  Patient has no memory of the event  Was found down in her home with multiple pressure ulcerations  Local wound care, turn q 2 hours    Encephalopathy  Assessment & Plan  Mental status waxes and wanes, currently better this morning  Lab work and CT head at that time were unremarkable, except for elevated ammonia  Was started on p o  Lactulose but is currently refusing medications      Vulvar mass  Assessment & Plan  Status post biopsy by OBGYN  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)  Patient is currently refusing all medical care and requesting hospice    Severe protein-calorie malnutrition ([facility] [address] )  Assessment & Plan  Malnutrition Findings:   Malnutrition type: Chronic illness(in the setting of severe orbital-hollow look, depressions dark [place] reflects body fat loss; temples hollowing, scooping, depression, clavicle protruding prominent bone reflects muscle mass loss; treated with rec for PO diet when deemed safe to swallow )  Degree of Malnutrition: Other severe protein calorie malnutrition    BMI Findings:  BMI Classifications: Underweight < 18 5(BMI = 14 93)     Body mass index is 15 44 kg/m²  Currently comfort care  Pleasure feeds only    GI bleeding  Assessment & Plan  Patient had melena with heme-positive stools  Currently refusing endoscopy  Refusing PPI  Due to comfort care measures will discontinue hemoglobin testing    Dehydration  Assessment & Plan  Refusing IV fluids and meals        Acute and chronic respiratory failure with hypercapnia [facility])  Assessment & Plan  Currently comfort care, refusing BiPAP    Ambulatory dysfunction  Assessment & Plan  Discharge planning in progress  Discharge to SNF with hospice tomorrow    Pressure injury of sacral region, stage 3 (HCC)  Assessment & Plan  Local wound care      Pressure injury of head, stage 1  Assessment & Plan  Local wound care        VTE Pharmacologic Prophylaxis:   Pharmacologic: Pharmacologic VTE Prophylaxis contraindicated due to hospice  Mechanical VTE Prophylaxis in Place: No    Patient Centered Rounds: I have performed bedside rounds with nursing staff today  Time Spent for Care: 15 minutes  More than 50% of total time spent on counseling and coordination of care as described above  Current Length of Stay: 7 day(s)    Current Patient Status: Inpatient   Certification Statement: awaiting bed at SNF for hospice    Discharge Plan: as above    Code Status: Level 4 - Comfort Care      Subjective:   Limited due to encephalopathy    Objective:     Vitals:   No data recorded  Body mass index is 15 44 kg/m²  Input and Output Summary (last 24 hours): Intake/Output Summary (Last 24 hours) at 7/28/2019 1429  Last data filed at 7/28/2019 1403  Gross per 24 hour   Intake 340 ml   Output 2060 ml   Net -1720 ml       Physical Exam:     Physical Exam   Constitutional:   Cachectic elderly female, lying in bed, confused   HENT:   Facial pressure ulcerations   Eyes: EOM are normal    Neck: Neck supple  Pulmonary/Chest: Effort normal    Neurological: She is alert  oriented to person only  Moves all 4 extremities   Skin: Skin is warm and dry  Capillary refill takes less than 2 seconds         Additional Data:     Labs:    Results from last 7 days   Lab Units 07/27/19  0850   WBC Thousand/uL 5 69   HEMOGLOBIN g/dL 12 7   HEMATOCRIT % 40 2   PLATELETS Thousands/uL 248   NEUTROS PCT % 72   LYMPHS PCT % 17   MONOS PCT % 8   EOS PCT % 1     Results from last 7 days   Lab Units 07/27/19  0850 07/26/19  0454   SODIUM mmol/L 137 141   POTASSIUM mmol/L 3 9 4 0   CHLORIDE mmol/L 99* 102   CO2 mmol/L 38* 39*   BUN mg/dL 4* 3*   CREATININE mg/dL 0 34* 0 28*   ANION GAP mmol/L 0* 0*   CALCIUM mg/dL 8 6 7 8*   ALBUMIN g/dL  --  2 1*   TOTAL BILIRUBIN mg/dL  --  0 41   ALK PHOS U/L  --  57   ALT U/L  --  36   AST U/L  --  40   GLUCOSE RANDOM mg/dL 90 113     Results from last 7 days   Lab Units 07/26/19  0543   INR  1 31*     Results from last 7 days   Lab Units 07/25/19  1717 07/25/19  1201 07/25/19  0609 07/25/19  0056 07/24/19  1730 07/24/19  1121 07/24/19  0625 07/23/19  2354 07/23/19  1723 07/23/19  1144 07/22/19  1817   POC GLUCOSE mg/dl 103 81 91 96 153* 111 73 89 83 137 74                   * I Have Reviewed All Lab Data Listed Above  * Additional Pertinent Lab Tests Reviewed: All Labs Within Last 24 Hours Reviewed    Recent Cultures (last 7 days):     Results from last 7 days   Lab Units 07/22/19  1818   BLOOD CULTURE  No Growth After 5 Days  No Growth After 5 Days  Last 24 Hours Medication List:     Current Facility-Administered Medications:  hydrocortisone 25 mg Rectal BID PRN Xander Castillo MD   LORazepam 0 5 mg Intravenous Q4H PRN Lisa Clark PA-C   morphine injection 6 mg Intravenous Q4H PRN Xander Csatillo MD   morphine 10 mg Oral Q4H PRN Xander Castillo MD   morphine 5 mg Oral Q4H PRN Xander Castillo MD   ondansetron 4 mg Intravenous Q6H PRN Emy Carballo MD        Today, Patient Was Seen By: Abdi Matamoros MD    ** Please Note: Dictation voice to text software may have been used in the creation of this document   **

## 2019-07-28 NOTE — PROGRESS NOTES
Patient observed to be walking in hallway while holding guardrail at this time  Patient is currently confused and AAOX1  Assisted to wheelchair and placed in bed  Bleeding from genitalia noted and neal displaced and laying on floor  Slim on call notified and pt seen at the bedside  New neal placement attempted as per orders post prn ativan administration  Patient tolerated well  Will continue to monitor

## 2019-07-28 NOTE — PLAN OF CARE
Problem: Nutrition/Hydration-ADULT  Goal: Nutrient/Hydration intake appropriate for improving, restoring or maintaining nutritional needs  Description  Monitor and assess patient's nutrition/hydration status for malnutrition (ex- brittle hair, bruises, dry skin, pale skin and conjunctiva, muscle wasting, smooth red tongue, and disorientation)  Collaborate with interdisciplinary team and initiate plan and interventions as ordered  Monitor patient's weight and dietary intake as ordered or per policy  Utilize nutrition screening tool and intervene per policy  Determine patient's food preferences and provide high-protein, high-caloric foods as appropriate       INTERVENTIONS:  - Monitor oral intake, urinary output, labs, and treatment plans  - Assess nutrition and hydration status and recommend course of action  - Evaluate amount of meals eaten  - Assist patient with eating if necessary   - Allow adequate time for meals  - Recommend/ encourage appropriate diets, oral nutritional supplements, and vitamin/mineral supplements  - Order, calculate, and assess calorie counts as needed  - Recommend, monitor, and adjust tube feedings and TPN/PPN based on assessed needs  - Assess need for intravenous fluids  - Provide specific nutrition/hydration education as appropriate  - Include patient/family/caregiver in decisions related to nutrition  Outcome: Progressing     Problem: Prexisting or High Potential for Compromised Skin Integrity  Goal: Skin integrity is maintained or improved  Description  INTERVENTIONS:  - Identify patients at risk for skin breakdown  - Assess and monitor skin integrity  - Assess and monitor nutrition and hydration status  - Monitor labs (i e  albumin)  - Assess for incontinence   - Turn and reposition patient  - Assist with mobility/ambulation  - Relieve pressure over bony prominences  - Avoid friction and shearing  - Provide appropriate hygiene as needed including keeping skin clean and dry  - Evaluate need for skin moisturizer/barrier cream  - Collaborate with interdisciplinary team (i e  Nutrition, Rehabilitation, etc )   - Patient/family teaching  Outcome: Progressing     Problem: Potential for Falls  Goal: Patient will remain free of falls  Description  INTERVENTIONS:  - Assess patient frequently for physical needs  -  Identify cognitive and physical deficits and behaviors that affect risk of falls    -  [place] fall precautions as indicated by assessment   - Educate patient/family on patient safety including physical limitations  - Instruct patient to call for assistance with activity based on assessment  - Modify environment to reduce risk of injury  - Consider OT/PT consult to assist with strengthening/mobility  Outcome: Progressing     Problem: PAIN - ADULT  Goal: Verbalizes/displays adequate comfort level or baseline comfort level  Description  Interventions:  - Encourage patient to monitor pain and request assistance  - Assess pain using appropriate pain scale  - Administer analgesics based on type and severity of pain and evaluate response  - Implement non-pharmacological measures as appropriate and evaluate response  - Consider cultural and social influences on pain and pain management  - Notify physician/advanced practitioner if interventions unsuccessful or patient reports new pain  Outcome: Progressing     Problem: INFECTION - ADULT  Goal: Absence or prevention of progression during hospitalization  Description  INTERVENTIONS:  - Assess and monitor for signs and symptoms of infection  - Monitor lab/diagnostic results  - Monitor all insertion sites, i e  indwelling lines, tubes, and drains  - Monitor endotracheal (as able) and nasal secretions for changes in amount and color  - [place] appropriate cooling/warming therapies per order  - Administer medications as ordered  - Instruct and encourage patient and family to use good hand hygiene technique  - Identify and instruct in appropriate isolation precautions for identified infection/condition  Outcome: Progressing     Problem: SAFETY ADULT  Goal: Patient will remain free of falls  Description  INTERVENTIONS:  - Assess patient frequently for physical needs  -  Identify cognitive and physical deficits and behaviors that affect risk of falls  -  [place] fall precautions as indicated by assessment   - Educate patient/family on patient safety including physical limitations  - Instruct patient to call for assistance with activity based on assessment  - Modify environment to reduce risk of injury  - Consider OT/PT consult to assist with strengthening/mobility  Outcome: Progressing     Problem: DISCHARGE PLANNING  Goal: Discharge to home or other facility with appropriate resources  Description  INTERVENTIONS:  - Identify barriers to discharge w/patient and caregiver  - Arrange for needed discharge resources and transportation as appropriate  - Identify discharge learning needs (meds, wound care, etc )  - Arrange for interpretive services to assist at discharge as needed  - Refer to Case Management Department for coordinating discharge planning if the patient needs post-hospital services based on physician/advanced practitioner order or complex needs related to functional status, cognitive ability, or social support system  Outcome: Progressing     Problem: Knowledge Deficit  Goal: Patient/family/caregiver demonstrates understanding of disease process, treatment plan, medications, and discharge instructions  Description  Complete learning assessment and assess knowledge base    Interventions:  - Provide teaching at level of understanding  - Provide teaching via preferred learning methods  Outcome: Progressing

## 2019-07-28 NOTE — ASSESSMENT & PLAN NOTE
Patient had melena with heme-positive stools  Currently refusing endoscopy  Refusing PPI    Due to comfort care measures will discontinue hemoglobin testing

## 2019-07-29 PROCEDURE — 99232 SBSQ HOSP IP/OBS MODERATE 35: CPT | Performed by: INTERNAL MEDICINE

## 2019-07-29 RX ORDER — MORPHINE SULFATE 100 MG/5ML
15 SOLUTION ORAL EVERY 4 HOURS PRN
Status: DISCONTINUED | OUTPATIENT
Start: 2019-07-29 | End: 2019-08-04 | Stop reason: HOSPADM

## 2019-07-29 RX ORDER — MORPHINE SULFATE 100 MG/5ML
10 SOLUTION ORAL EVERY 4 HOURS PRN
Status: DISCONTINUED | OUTPATIENT
Start: 2019-07-29 | End: 2019-08-04 | Stop reason: HOSPADM

## 2019-07-29 RX ADMIN — LORAZEPAM 0.5 MG: 2 INJECTION INTRAMUSCULAR; INTRAVENOUS at 18:45

## 2019-07-29 RX ADMIN — LORAZEPAM 0.5 MG: 2 INJECTION INTRAMUSCULAR; INTRAVENOUS at 13:57

## 2019-07-29 NOTE — SOCIAL WORK
CM informed by Alli Francisco, anticipate bed availability for pt tomorrow  Divya Peoples to phone pt's dtr Veronica Landa to f/u re: private pay vs MA application with dtr today

## 2019-07-29 NOTE — WOUND OSTOMY CARE
Patient is now comfort care level 4, wound care is signing off, please call ext [phone] or [phone] with questions or concerns        Krishan Clement RN, BSN, [facility]

## 2019-07-29 NOTE — PROGRESS NOTES
07/29/19 1300   Clinical Encounter Type   Visited With Patient and family together   Routine Visit Introduction   Referral From Nurse   Referral To    Family Spiritual Encounters   Family Coping Sadness

## 2019-07-29 NOTE — PLAN OF CARE
Problem: Nutrition/Hydration-ADULT  Goal: Nutrient/Hydration intake appropriate for improving, restoring or maintaining nutritional needs  Description  Monitor and assess patient's nutrition/hydration status for malnutrition (ex- brittle hair, bruises, dry skin, pale skin and conjunctiva, muscle wasting, smooth red tongue, and disorientation)  Collaborate with interdisciplinary team and initiate plan and interventions as ordered  Monitor patient's weight and dietary intake as ordered or per policy  Utilize nutrition screening tool and intervene per policy  Determine patient's food preferences and provide high-protein, high-caloric foods as appropriate       INTERVENTIONS:  - Monitor oral intake, urinary output, labs, and treatment plans  - Assess nutrition and hydration status and recommend course of action  - Evaluate amount of meals eaten  - Assist patient with eating if necessary   - Allow adequate time for meals  - Recommend/ encourage appropriate diets, oral nutritional supplements, and vitamin/mineral supplements  - Order, calculate, and assess calorie counts as needed  - Recommend, monitor, and adjust tube feedings and TPN/PPN based on assessed needs  - Assess need for intravenous fluids  - Provide specific nutrition/hydration education as appropriate  - Include patient/family/caregiver in decisions related to nutrition  Outcome: Progressing     Problem: Prexisting or High Potential for Compromised Skin Integrity  Goal: Skin integrity is maintained or improved  Description  INTERVENTIONS:  - Identify patients at risk for skin breakdown  - Assess and monitor skin integrity  - Assess and monitor nutrition and hydration status  - Monitor labs (i e  albumin)  - Assess for incontinence   - Turn and reposition patient  - Assist with mobility/ambulation  - Relieve pressure over bony prominences  - Avoid friction and shearing  - Provide appropriate hygiene as needed including keeping skin clean and dry  - Evaluate need for skin moisturizer/barrier cream  - Collaborate with interdisciplinary team (i e  Nutrition, Rehabilitation, etc )   - Patient/family teaching  Outcome: Progressing     Problem: Potential for Falls  Goal: Patient will remain free of falls  Description  INTERVENTIONS:  - Assess patient frequently for physical needs  -  Identify cognitive and physical deficits and behaviors that affect risk of falls    -  [place] fall precautions as indicated by assessment   - Educate patient/family on patient safety including physical limitations  - Instruct patient to call for assistance with activity based on assessment  - Modify environment to reduce risk of injury  - Consider OT/PT consult to assist with strengthening/mobility  Outcome: Progressing     Problem: PAIN - ADULT  Goal: Verbalizes/displays adequate comfort level or baseline comfort level  Description  Interventions:  - Encourage patient to monitor pain and request assistance  - Assess pain using appropriate pain scale  - Administer analgesics based on type and severity of pain and evaluate response  - Implement non-pharmacological measures as appropriate and evaluate response  - Consider cultural and social influences on pain and pain management  - Notify physician/advanced practitioner if interventions unsuccessful or patient reports new pain  Outcome: Progressing     Problem: INFECTION - ADULT  Goal: Absence or prevention of progression during hospitalization  Description  INTERVENTIONS:  - Assess and monitor for signs and symptoms of infection  - Monitor lab/diagnostic results  - Monitor all insertion sites, i e  indwelling lines, tubes, and drains  - Monitor endotracheal (as able) and nasal secretions for changes in amount and color  - [place] appropriate cooling/warming therapies per order  - Administer medications as ordered  - Instruct and encourage patient and family to use good hand hygiene technique  - Identify and instruct in appropriate isolation precautions for identified infection/condition  Outcome: Progressing     Problem: SAFETY ADULT  Goal: Patient will remain free of falls  Description  INTERVENTIONS:  - Assess patient frequently for physical needs  -  Identify cognitive and physical deficits and behaviors that affect risk of falls  -  [place] fall precautions as indicated by assessment   - Educate patient/family on patient safety including physical limitations  - Instruct patient to call for assistance with activity based on assessment  - Modify environment to reduce risk of injury  - Consider OT/PT consult to assist with strengthening/mobility  Outcome: Progressing     Problem: DISCHARGE PLANNING  Goal: Discharge to home or other facility with appropriate resources  Description  INTERVENTIONS:  - Identify barriers to discharge w/patient and caregiver  - Arrange for needed discharge resources and transportation as appropriate  - Identify discharge learning needs (meds, wound care, etc )  - Arrange for interpretive services to assist at discharge as needed  - Refer to Case Management Department for coordinating discharge planning if the patient needs post-hospital services based on physician/advanced practitioner order or complex needs related to functional status, cognitive ability, or social support system  Outcome: Progressing     Problem: Knowledge Deficit  Goal: Patient/family/caregiver demonstrates understanding of disease process, treatment plan, medications, and discharge instructions  Description  Complete learning assessment and assess knowledge base    Interventions:  - Provide teaching at level of understanding  - Provide teaching via preferred learning methods  Outcome: Progressing

## 2019-07-29 NOTE — PROGRESS NOTES
07/29/19 1300   Spiritual Beliefs/Perceptions   Support Systems Children;Family members   Stress Factors   Patient Stress Factors Health changes   Family Stress Factors Health changes   Coping Responses   Patient Coping Open/discussion; Other (Comment)   Family Coping Sadness   Plan of Care   Comments Called by nurse to supportive pt, sister was presnce visiting, explored for emotional, and relational needs, pt, had tears in her eyes as well as sisiter  Pt  will be going on hospice, provided a caring, supportive presence     Assessment Completed by: Unit visit

## 2019-07-29 NOTE — PROGRESS NOTES
Progress Note - Mary Book 1940, 66 y o  female MRN: [id number]    Unit/Bed#: [facility] 933-01 Encounter: [id number]    Primary Care Provider: Guilherme Viera DO   Date and time admitted to hospital: 7/21/2019  2:09 PM        * Fall  Assessment & Plan  Unclear etiology  Possibly due to hypercapnic respiratory failure  Patient has no memory of the event  Was found down in her home with multiple pressure ulcerations  Local wound care, turn q 2 hours    Encephalopathy  Assessment & Plan  Mental status waxes and wanes  Lab work and CT head at that time were unremarkable, except for elevated ammonia  Was started on p o  Lactulose but is currently refusing medications      Vulvar mass  Assessment & Plan  Status post biopsy by OBGYN  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)  Patient is currently refusing all medical care and requesting hospice    Severe protein-calorie malnutrition ([facility] [address] )  Assessment & Plan  Malnutrition Findings:   Malnutrition type: Chronic illness(in the setting of severe orbital-hollow look, depressions dark [place] reflects body fat loss; temples hollowing, scooping, depression, clavicle protruding prominent bone reflects muscle mass loss; treated with rec for PO diet when deemed safe to swallow )  Degree of Malnutrition: Other severe protein calorie malnutrition    BMI Findings:  BMI Classifications: Underweight < 18 5(BMI = 14 93)     Body mass index is 15 44 kg/m²  Currently comfort care  Pleasure feeds only    GI bleeding  Assessment & Plan  Patient had melena with heme-positive stools  Currently refusing endoscopy  Refusing PPI  Due to comfort care measures will discontinue hemoglobin testing    Dehydration  Assessment & Plan  Refusing IV fluids and meals        Acute and chronic respiratory failure with hypercapnia [facility])  Assessment & Plan  Currently comfort care, refusing BiPAP    Ambulatory dysfunction  Assessment & Plan  Discharge planning in progress  Discharge to SNF with hospice when a bed is available    Pressure injury of sacral region, stage 3 (HCC)  Assessment & Plan  Local wound care   turn Q2h    Pressure injury of head, stage 1  Assessment & Plan  Local wound care        VTE Pharmacologic Prophylaxis:   Pharmacologic: Pharmacologic VTE Prophylaxis contraindicated due to comfort care  Mechanical VTE Prophylaxis in Place: Yes    Patient Centered Rounds: I have performed bedside rounds with nursing staff today  Education and Discussions with Family / Patient:  Patient, patient's sister, patient's daughter plan of care    Time Spent for Care: 20 minutes  More than 50% of total time spent on counseling and coordination of care as described above  Current Length of Stay: 8 day(s)    Current Patient Status: Inpatient   Certification Statement: The patient will continue to require additional inpatient hospital stay due to Awaiting a bed at  SNF    Discharge Plan:  SNF with hospice    Code Status: Level 4 - Comfort Care      Subjective:    patient reports some intermittent pain but history gathering is limited secondary to dementia  Objective:     Vitals:   No data recorded  Body mass index is 15 44 kg/m²  Input and Output Summary (last 24 hours): Intake/Output Summary (Last 24 hours) at 7/29/2019 1529  Last data filed at 7/29/2019 1318  Gross per 24 hour   Intake 420 ml   Output 625 ml   Net -205 ml       Physical Exam:     Physical Exam   Constitutional:   Cachectic elderly female, lying in bed   HENT:   Pressure ulcerations of the face present   Eyes: EOM are normal  No scleral icterus  Neck: Neck supple  Pulmonary/Chest: Effort normal    Abdominal: Soft  Musculoskeletal: She exhibits no edema  Atrophic musculature bilateral   Neurological: She is alert  Oriented to person only   short-term memory very poor   Skin: Skin is warm and dry           Additional Data:     Labs:    Results from last 7 days   Lab Units 07/27/19  0850   WBC Thousand/uL 5 69   HEMOGLOBIN g/dL 12 7   HEMATOCRIT % 40 2   PLATELETS Thousands/uL 248   NEUTROS PCT % 72   LYMPHS PCT % 17   MONOS PCT % 8   EOS PCT % 1     Results from last 7 days   Lab Units 07/27/19  0850 07/26/19  0454   SODIUM mmol/L 137 141   POTASSIUM mmol/L 3 9 4 0   CHLORIDE mmol/L 99* 102   CO2 mmol/L 38* 39*   BUN mg/dL 4* 3*   CREATININE mg/dL 0 34* 0 28*   ANION GAP mmol/L 0* 0*   CALCIUM mg/dL 8 6 7 8*   ALBUMIN g/dL  --  2 1*   TOTAL BILIRUBIN mg/dL  --  0 41   ALK PHOS U/L  --  57   ALT U/L  --  36   AST U/L  --  40   GLUCOSE RANDOM mg/dL 90 113     Results from last 7 days   Lab Units 07/26/19  0543   INR  1 31*     Results from last 7 days   Lab Units 07/25/19  1717 07/25/19  1201 07/25/19  0609 07/25/19  0056 07/24/19  1730 07/24/19  1121 07/24/19  0625 07/23/19  2354 07/23/19  1723 07/23/19  1144 07/22/19  1817   POC GLUCOSE mg/dl 103 81 91 96 153* 111 73 89 83 137 74                   * I Have Reviewed All Lab Data Listed Above  * Additional Pertinent Lab Tests Reviewed: All Labs Within Last 24 Hours Reviewed    Recent Cultures (last 7 days):     Results from last 7 days   Lab Units 07/22/19  1818   BLOOD CULTURE  No Growth After 5 Days  No Growth After 5 Days  Last 24 Hours Medication List:     Current Facility-Administered Medications:  hydrocortisone 25 mg Rectal BID PRN Ace Seaman MD   LORazepam 0 5 mg Intravenous Q4H PRN Oscar Wells PA-C   morphine injection 6 mg Intravenous Q4H PRN Ace Seaman MD   morphine 10 mg Oral Q4H PRN Ace Seaman MD   morphine 15 mg Oral Q4H PRN Ace Seaman MD   ondansetron 4 mg Intravenous Q6H PRN Carmencita Shafer MD        Today, Patient Was Seen By: Iqra Tate MD    ** Please Note: Dictation voice to text software may have been used in the creation of this document   **

## 2019-07-29 NOTE — ASSESSMENT & PLAN NOTE
Malnutrition Findings:   Malnutrition type: Chronic illness(in the setting of severe orbital-hollow look, depressions dark [place] reflects body fat loss; temples hollowing, scooping, depression, clavicle protruding prominent bone reflects muscle mass loss; treated with rec for PO diet when deemed safe to swallow )  Degree of Malnutrition: Other severe protein calorie malnutrition    BMI Findings:  BMI Classifications: Underweight < 18 5(BMI = 14 93)     Body mass index is 15 44 kg/m²       Currently comfort care  Pleasure feeds only

## 2019-07-29 NOTE — SOCIAL WORK
No bed availability at [facility]  VM left for [facility] to f/u re: status of referral  Pt's dtr Maritza Davenport reviewing SNF list for additional facility choices if [facility] is unable to accept pt

## 2019-07-29 NOTE — ASSESSMENT & PLAN NOTE
Mental status waxes and wanes  Lab work and CT head at that time were unremarkable, except for elevated ammonia  Was started on p o   Lactulose but is currently refusing medications

## 2019-07-30 PROCEDURE — 99232 SBSQ HOSP IP/OBS MODERATE 35: CPT | Performed by: FAMILY MEDICINE

## 2019-07-30 RX ORDER — LORAZEPAM 2 MG/ML
0.5 INJECTION INTRAMUSCULAR EVERY 2 HOUR PRN
Status: DISCONTINUED | OUTPATIENT
Start: 2019-07-30 | End: 2019-07-31

## 2019-07-30 RX ADMIN — LORAZEPAM 0.5 MG: 2 INJECTION INTRAMUSCULAR; INTRAVENOUS at 22:03

## 2019-07-30 RX ADMIN — LORAZEPAM 0.5 MG: 2 INJECTION INTRAMUSCULAR; INTRAVENOUS at 04:53

## 2019-07-30 RX ADMIN — MORPHINE SULFATE 15 MG: 100 SOLUTION ORAL at 06:49

## 2019-07-30 RX ADMIN — LORAZEPAM 0.5 MG: 2 INJECTION INTRAMUSCULAR; INTRAVENOUS at 11:15

## 2019-07-30 RX ADMIN — LORAZEPAM 0.5 MG: 2 INJECTION INTRAMUSCULAR; INTRAVENOUS at 15:41

## 2019-07-30 NOTE — TRANSPORTATION MEDICAL NECESSITY
Section I - General Information    Name of Patient: Olivier Islas                 : 1940    Medicare #: [id number]  Transport Date: 19 (PCS is valid for round trips on this date and for all repetitive trips in the 60-day range as noted below )  Origin: [address] [facility] [address]                                                         Destination: [facility]  Is the pt's stay covered under Medicare Part A (PPS/DRG)   [x]     Closest appropriate facility? If no, why is transport to more distant facility required? Yes  If hospice pt, is this transport related to pt's terminal illness? Yes       Section II - Medical Necessity Questionnaire  Ambulance transportation is medically necessary only if other means of transport are contraindicated or would be potentially harmful to the patient  To meet this requirement, the patient must either be "bed confined" or suffer from a condition such that transport by means other than ambulance is contraindicated by the patient's condition  The following questions must be answered by the medical professional signing below for this form to be valid:    1)  Describe the MEDICAL CONDITION (physical and/or mental) of this patient AT [address] [place] [address] that requires the patient to be transported in an ambulance and why transport by other means is contraindicated by the patient's condition: squamous intraepithelial lesion;Pressure injury of sacral region, stage 3; malnutrition; Ambulatory dysfunction; Encephalopathy     2) Is the patient "bed confined" as defined below? Yes  To be "be confined" the patient must satisfy all three of the following conditions: (1) unable to get up from bed without Assistance; AND (2) unable to ambulate; AND (3) unable to sit in a chair or wheelchair  3) Can this patient safely be transported by car or wheelchair van (i e , seated during transport without a medical attendant or monitoring)?    No    4) In addition to completing questions 1-3 above, please check any of the following conditions that apply*:   *Note: supporting documentation for any boxes checked must be maintained in the patient's medical records  If hosp-hosp transfer, describe services needed at 2nd facility not available at 1st facility? Patient is confused  Medical attendant required   Unable to tolerate seated position for time needed to transport   Unable to sit in a chair or wheelchair due to decubitus ulcers or other wounds   Other(specify) Fall Risk   Assist x2 transfers  Poor endurance/safety awareness      Section III - Signature of Physician or Healthcare Professional  I certify that the above information is true and correct based on my evaluation of this patient, and represent that the patient requires transport by ambulance and that other forms of transport are contraindicated  I understand that this information will be used by the Centers for Medicare and Medicaid Services (CMS) to support the determination of medical necessity for ambulance services, and I represent that I have personal knowledge of the patient's condition at time of transport  [x]  If this box is checked, I also certify that the patient is physically or mentally incapable of signing the ambulance service's claim and that the institution with which I am affiliated has furnished care, services, or assistance to the patient  My signature below is made on behalf of the patient pursuant to 42 CFR §424 36(b)(4)   In accordance with 42 CFR §424 37, the specific reason(s) that the patient is physically or mentally incapable of signing the claim form is as follows:       Signature of Physician* or Healthcare Professional______________________________________________________________  Signature Date 07/30/19 (For scheduled repetitive transports, this form is not valid for transports performed more than 60 days after this date)    Printed Name & Credentials of Physician or Healthcare Professional (MD, DO, RN, etc )_BALWINDER Brown_______________________________  *Form must be signed by patient's attending physician for scheduled, repetitive transports   For non-repetitive, unscheduled ambulance transports, if unable to obtain the signature of the attending physician, any of the following may sign (choose appropriate option below)  [] Physician Assistant []  Clinical Nurse Specialist []  Registered Nurse  []  Nurse Practitioner  [x] Discharge Planner

## 2019-07-30 NOTE — SOCIAL WORK
Received a consult for an escalated team meeting for patient due to family report that patient is not medically stable for discharge  Patient has an accepting facility at [facility], however family prefers [facility] who does not have a bed  This writer spoke with son in law, Mony An ([phone]) to discuss the need for a meeting  Per Mony Yo informed his wife (pt's daughter, Remigio Peterson) that patient would require a few more days in the hospital  Informed Mony An per chart review patient is medically stable to discharge to SNF on hospice  However meeting tomorrow will discuss further with BARBARA (Dr Corey Akbar) to clarify medical status and discharge planning  Meeting is scheduled for 7/31/19 at 3 pm  Remigio Peterson and Mony An will be present in person  Updated Dr Corey Akbar and Gamaliel Corona via TT  MSW TYLER Sutherland updated

## 2019-07-30 NOTE — ASSESSMENT & PLAN NOTE
Malnutrition Findings:   Malnutrition type: Chronic illness(in the setting of severe orbital-hollow look, depressions dark [place] reflects body fat loss; temples hollowing, scooping, depression, clavicle protruding prominent bone reflects muscle mass loss; treated with rec for PO diet when deemed safe to swallow )  Degree of Malnutrition: Other severe protein calorie malnutrition    BMI Findings:  BMI Classifications: Underweight < 18 5(BMI = 14 93)     Body mass index is 15 16 kg/m²       Currently comfort care  Pleasure feeds only

## 2019-07-30 NOTE — PROGRESS NOTES
Progress Note - [place] 1940, 66 y o  female MRN: [id number]    Unit/Bed#: [facility] 933-01 Encounter: [id number]    Primary Care Provider: Robin King DO   Date and time admitted to hospital: 7/21/2019  2:09 PM        GI bleeding  Assessment & Plan  Patient had melena with heme-positive stools  Currently refusing endoscopy  Refusing PPI  Due to comfort care measures will discontinue hemoglobin testing    Encephalopathy  Assessment & Plan  Mental status waxes and wanes  Lab work and CT head at that time were unremarkable, except for elevated ammonia  Was started on p o  Lactulose but is currently refusing medications      Dehydration  Assessment & Plan  Refusing IV fluids and meals  Vulvar mass  Assessment & Plan  Status post biopsy by OBGYN  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)  Patient is currently refusing all medical care and requesting hospice    Acute and chronic respiratory failure with hypercapnia [facility])  Assessment & Plan  Currently comfort care, refusing BiPAP    Ambulatory dysfunction  Assessment & Plan  Discharge planning in progress  Discharge to SNF with hospice when a bed is available    Severe protein-calorie malnutrition ([address] )  Assessment & Plan  Malnutrition Findings:   Malnutrition type: Chronic illness(in the setting of severe orbital-hollow look, depressions dark [place] reflects body fat loss; temples hollowing, scooping, depression, clavicle protruding prominent bone reflects muscle mass loss; treated with rec for PO diet when deemed safe to swallow )  Degree of Malnutrition: Other severe protein calorie malnutrition    BMI Findings:  BMI Classifications: Underweight < 18 5(BMI = 14 93)     Body mass index is 15 16 kg/m²       Currently comfort care  Pleasure feeds only    Pressure injury of sacral region, stage 3 (HCC)  Assessment & Plan  Local wound care   turn Q2h    Pressure injury of head, stage 1  Assessment & Plan  Local wound care    * Fall  Assessment & Plan  Unclear etiology  Possibly due to hypercapnic respiratory failure  Patient has no memory of the event  Was found down in her home with multiple pressure ulcerations  Local wound care, turn q 2 hours          VTE Pharmacologic Prophylaxis:   Pharmacologic: Patient is currently on comfort  Mechanical VTE Prophylaxis in Place: No    Patient Centered Rounds: I have performed bedside rounds with nursing staff today  Discussions with Specialists or Other Care Team Provider:     Education and Discussions with Family / Patient:     Time Spent for Care: 30 minutes  More than 50% of total time spent on counseling and coordination of care as described above  Current Length of Stay: 9 day(s)    Current Patient Status: Inpatient   Certification Statement: The patient will continue to require additional inpatient hospital stay due to Placement    Discharge Plan:     Code Status: Level 4 - Comfort Care      Subjective:   Patient seen and examined  Patient appears to be very anxious  Discussed with nursing plan is to increase the Ativan to q 2 hours  Patient is currently on comfort care and waiting for SNF placement for hospice    Objective:     Vitals:   Temp (24hrs), Av 2 °F (36 8 °C), Min:98 2 °F (36 8 °C), Max:98 2 °F (36 8 °C)    Temp:  [98 2 °F (36 8 °C)] 98 2 °F (36 8 °C)  HR:  [84] 84  Resp:  [16] 16  BP: (102)/(72) 102/72  SpO2:  [94 %] 94 %  Body mass index is 15 16 kg/m²  Input and Output Summary (last 24 hours): Intake/Output Summary (Last 24 hours) at 2019 1752  Last data filed at 2019 1336  Gross per 24 hour   Intake 220 ml   Output 525 ml   Net -305 ml       Physical Exam:     Physical Exam   Constitutional: No distress  HENT:   Head: Normocephalic  Eyes: Pupils are equal, round, and reactive to light  Neck: No JVD present  Cardiovascular: Normal rate  No murmur heard    Pulmonary/Chest: Effort normal    Abdominal: She exhibits no distension  Musculoskeletal: Normal range of motion  She exhibits no edema  Neurological: She is alert  No cranial nerve deficit  Skin: Skin is warm  Psychiatric:   Appears anxious       Additional Data:     Labs:    Results from last 7 days   Lab Units 07/27/19  0850   WBC Thousand/uL 5 69   HEMOGLOBIN g/dL 12 7   HEMATOCRIT % 40 2   PLATELETS Thousands/uL 248   NEUTROS PCT % 72   LYMPHS PCT % 17   MONOS PCT % 8   EOS PCT % 1     Results from last 7 days   Lab Units 07/27/19  0850 07/26/19  0454   POTASSIUM mmol/L 3 9 4 0   CHLORIDE mmol/L 99* 102   CO2 mmol/L 38* 39*   BUN mg/dL 4* 3*   CREATININE mg/dL 0 34* 0 28*   CALCIUM mg/dL 8 6 7 8*   ALK PHOS U/L  --  57   ALT U/L  --  36   AST U/L  --  40     Results from last 7 days   Lab Units 07/26/19  0543   INR  1 31*       * I Have Reviewed All Lab Data Listed Above  * Additional Pertinent [facility] Tests [facility]    Imaging:    Imaging Reports Reviewed Today Include:   Imaging Personally Reviewed by Myself Includes:      Recent Cultures (last 7 days):           Last 24 Hours Medication List:     Current Facility-Administered Medications:  hydrocortisone 25 mg Rectal BID PRN Linus Steen MD   LORazepam 0 5 mg Intravenous Q4H PRN Jin White PA-C   morphine injection 6 mg Intravenous Q4H PRN Linus Steen MD   morphine 10 mg Oral Q4H PRN Linus Steen MD   morphine 15 mg Oral Q4H PRN Linus Steen MD   ondansetron 4 mg Intravenous Q6H PRN Philippe Villaseñor MD        Today, Patient Was Seen By: Scott Rosenthal MD    ** Please Note: Dictation voice to text software may have been used in the creation of this document   **

## 2019-07-30 NOTE — NURSING NOTE
Patient comfortable at this time, appears calm and relaxed  Adjusted room temperature and repositioned with pillows for support  Lowered shade to help with rest   Helped with drinking  Will continue to monitor throughout the night  Bed alarm on for impulsive behavior at times  Patient can have next PRN dose of ativan for anxiety if she needs it

## 2019-07-30 NOTE — SOCIAL WORK
CM received return phone call from pt's son-in-law advising that per his wife Tami's request--he will be assisting with d/c planning  Per JUANA, they are not interested in bed at [facility] and have an appt tomorrow 7/31 at [facility]  Pt's family refusing d/c to any other SNF at this time  CM left message with [facility] admissions to inquire about meeting set up for pt's family and bed availability for pt

## 2019-07-30 NOTE — SOCIAL WORK
CM informed by Eliceo 8, no appt was confirmed with pt's family for tomorrow at facility--family was going to stop by without appt to see facility  Per Mere Mcgovern, no anticipated bed availability for pt--family aware and still plan on visiting facility tomorrow  CM notified Kalyani Elizabeth K -complex SW CM of barriers to d/c  Kalyani Elizabeth to f/u with family to look into possibility of scheduling escalated team meeting if pt's family is not agreeable for d/c to another SNF

## 2019-07-31 PROCEDURE — 99232 SBSQ HOSP IP/OBS MODERATE 35: CPT | Performed by: FAMILY MEDICINE

## 2019-07-31 RX ORDER — LORAZEPAM 0.5 MG/1
0.5 TABLET ORAL 3 TIMES DAILY
Status: DISCONTINUED | OUTPATIENT
Start: 2019-07-31 | End: 2019-07-31

## 2019-07-31 RX ORDER — LORAZEPAM 0.5 MG/1
0.5 TABLET ORAL 4 TIMES DAILY
Status: DISCONTINUED | OUTPATIENT
Start: 2019-07-31 | End: 2019-08-04 | Stop reason: HOSPADM

## 2019-07-31 RX ORDER — LORAZEPAM 2 MG/ML
0.5 INJECTION INTRAMUSCULAR
Status: DISCONTINUED | OUTPATIENT
Start: 2019-07-31 | End: 2019-07-31

## 2019-07-31 RX ORDER — LORAZEPAM 2 MG/ML
0.5 INJECTION INTRAMUSCULAR EVERY 4 HOURS PRN
Status: DISCONTINUED | OUTPATIENT
Start: 2019-07-31 | End: 2019-08-01

## 2019-07-31 RX ADMIN — LORAZEPAM 0.5 MG: 2 INJECTION INTRAMUSCULAR; INTRAVENOUS at 06:24

## 2019-07-31 RX ADMIN — MORPHINE SULFATE 15 MG: 100 SOLUTION ORAL at 14:42

## 2019-07-31 RX ADMIN — LORAZEPAM 0.5 MG: 2 INJECTION INTRAMUSCULAR; INTRAVENOUS at 04:28

## 2019-07-31 RX ADMIN — LORAZEPAM 0.5 MG: 2 INJECTION INTRAMUSCULAR; INTRAVENOUS at 00:28

## 2019-07-31 RX ADMIN — LORAZEPAM 0.5 MG: 2 INJECTION INTRAMUSCULAR; INTRAVENOUS at 12:37

## 2019-07-31 RX ADMIN — LORAZEPAM 0.5 MG: 0.5 TABLET ORAL at 21:30

## 2019-07-31 NOTE — SOCIAL WORK
This writer spoke with Naga Redd at the [facility] ([phone]) per Naga Redd patient would need to pay a 30 day room and board fee if coming to facility on hospice  This would approximately cost $14,000-15,000  Discussed if patient does not have these financial resources patient would need to start the OPTIONS process through the [facility] does not have a bed, [facility] does not have a bed  [facility] may have a bed, they need family to call to discuss financial information

## 2019-07-31 NOTE — ASSESSMENT & PLAN NOTE
Mental status waxes and wanes  Patient appears to be more sleepy today will cut down on the dose of Ativan

## 2019-07-31 NOTE — ASSESSMENT & PLAN NOTE
Refusing IV fluids and meals    Had a discussion with patient's family end of life process discussed

## 2019-07-31 NOTE — SOCIAL WORK
This writer received a voicemail from Peter Doty (son in law) with additional SNF facilities to send referrals to: [facility] and [employer] at [facility]  He also requested referrals to [facility], [facility] and [facility]  This writer spoke with Peter Doty to clarify that [facility]  [facility] and [facility] have Hospice services that go into SNFs and are not facilities  Discussed the [address] [facility] [address] had been previously agreed upon  Discussed that MSW ADDY SCOTTff to make referrals to [facility] and [employer] at [facility] and requested a determination prior to the meeting scheduled for this afternoon  Discussed that goal is to find an accepting facility and discharge later today vs tomorrow dependent on bed availability and accepting facility   Peter Doty responded "understood "

## 2019-07-31 NOTE — ASSESSMENT & PLAN NOTE
Malnutrition Findings:   Malnutrition type: Chronic illness(in the setting of severe orbital-hollow look, depressions dark [place] reflects body fat loss; temples hollowing, scooping, depression, clavicle protruding prominent bone reflects muscle mass loss; treated with rec for PO diet when deemed safe to swallow )  Degree of Malnutrition: Other severe protein calorie malnutrition    BMI Findings:  BMI Classifications: Underweight < 18 5(BMI = 14 93)     Body mass index is 15 16 kg/m²  Currently comfort care  Pleasure feeds only-patient with decreased p o   Intake

## 2019-07-31 NOTE — PROGRESS NOTES
Progress Note - Sonya Reddy 1940, 66 y o  female MRN: [id number]    Unit/Bed#: [facility] 933-01 Encounter: [id number]    Primary Care Provider: Saeed Stewart DO   Date and time admitted to hospital: 7/21/2019  2:09 PM        GI bleeding  Assessment & Plan  Patient had melena with heme-positive stools  Currently refusing endoscopy  Refusing PPI  Due to comfort care measures will discontinue hemoglobin testing    Encephalopathy  Assessment & Plan  Mental status waxes and wanes  Patient appears to be more sleepy today will cut down on the dose of Ativan  Dehydration  Assessment & Plan  Refusing IV fluids and meals  Had a discussion with patient's family end of life process discussed      Vulvar mass  Assessment & Plan  Status post biopsy by OBGYN  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)  Patient is currently refusing all medical care and requesting hospice    Acute and chronic respiratory failure with hypercapnia [facility])  Assessment & Plan  Currently comfort care, refusing BiPAP    Ambulatory dysfunction  Assessment & Plan  Discharge planning in progress    Discharge to SNF with hospice when a bed is available  Had a meeting with family today with with discharge plan  Will ask hospice to re-evaluate to see if the patient meets inpatient criteria-discussed with the family patient still may not meet the inpatient criteria and have to go to the SNF for hospice    Severe protein-calorie malnutrition ([address] )  Assessment & Plan  Malnutrition Findings:   Malnutrition type: Chronic illness(in the setting of severe orbital-hollow look, depressions dark [place] reflects body fat loss; temples hollowing, scooping, depression, clavicle protruding prominent bone reflects muscle mass loss; treated with rec for PO diet when deemed safe to swallow )  Degree of Malnutrition: Other severe protein calorie malnutrition    BMI Findings:  BMI Classifications: Underweight < 18 5(BMI = 14 93) Body mass index is 15 16 kg/m²  Currently comfort care  Pleasure feeds only-patient with decreased p o  Intake    Pressure injury of sacral region, stage 3 (HCC)  Assessment & Plan  Local wound care  Frequent repositioning as able with the comfort care    Pressure injury of head, stage 1  Assessment & Plan  Local wound care    * Fall  Assessment & Plan  Unclear etiology  Possibly due to hypercapnic respiratory failure  Patient has no memory of the event  Was found down in her home with multiple pressure ulcerations  Local wound care, turn q 2 hours        VTE Pharmacologic Prophylaxis:   Pharmacologic: Comfort care  Mechanical VTE Prophylaxis in Place:     Patient Centered Rounds: I have performed bedside rounds with nursing staff today  Discussions with Specialists or Other Care Team Provider:     Education and Discussions with Family / Patient:  Family updated    Time Spent for Care: 30 minutes  More than 50% of total time spent on counseling and coordination of care as described above  Current Length of Stay: 10 day(s)    Current Patient Status: Inpatient   Certification Statement: The patient will continue to require additional inpatient hospital stay due to Pending placement    Discharge Plan:     Code Status: Level 4 - Comfort Care      Subjective:   Patient seen and examined  Patient reported that the patient is very anxious requiring Ativan very frequently  On examination patient appears to be very somnolent  Easily arousable  Objective:     Vitals:   No data recorded  Body mass index is 15 16 kg/m²  Input and Output Summary (last 24 hours): Intake/Output Summary (Last 24 hours) at 7/31/2019 1957  Last data filed at 7/31/2019 1700  Gross per 24 hour   Intake 0 ml   Output 400 ml   Net -400 ml       Physical Exam:     Physical Exam   Constitutional: She appears well-developed  No distress  HENT:   Head: Normocephalic     Eyes: Pupils are equal, round, and reactive to light    Neck: No JVD present  Cardiovascular: Normal rate and regular rhythm  No murmur heard  Pulmonary/Chest: No stridor  No respiratory distress  Decreased breath sounds bilateral   Abdominal: Soft  She exhibits no distension  Musculoskeletal: Normal range of motion  She exhibits no edema  Neurological: She is alert  No cranial nerve deficit  Somnolent       Additional Data:     Labs:    Results from last 7 days   Lab Units 07/27/19  0850   WBC Thousand/uL 5 69   HEMOGLOBIN g/dL 12 7   HEMATOCRIT % 40 2   PLATELETS Thousands/uL 248   NEUTROS PCT % 72   LYMPHS PCT % 17   MONOS PCT % 8   EOS PCT % 1     Results from last 7 days   Lab Units 07/27/19  0850 07/26/19  0454   POTASSIUM mmol/L 3 9 4 0   CHLORIDE mmol/L 99* 102   CO2 mmol/L 38* 39*   BUN mg/dL 4* 3*   CREATININE mg/dL 0 34* 0 28*   CALCIUM mg/dL 8 6 7 8*   ALK PHOS U/L  --  57   ALT U/L  --  36   AST U/L  --  40     Results from last 7 days   Lab Units 07/26/19  0543   INR  1 31*       * I Have Reviewed All Lab Data Listed Above  * Additional Pertinent [facility] Tests [facility]    Imaging:    Imaging Reports Reviewed Today Include:   Imaging Personally Reviewed by Myself Includes:      Recent Cultures (last 7 days):           Last 24 Hours Medication List:     Current Facility-Administered Medications:  hydrocortisone 25 mg Rectal BID PRN Teresa Gonzales MD   LORazepam 0 5 mg Intravenous Q4H PRN Juan Srivastava MD   LORazepam 0 5 mg Oral TID Juan Srivastava MD   morphine injection 6 mg Intravenous Q4H PRN Teresa Gonzales MD   morphine 10 mg Oral Q4H PRN Teresa Gonzales MD   morphine 15 mg Oral Q4H PRN Teresa Gonzales MD   ondansetron 4 mg Intravenous Q6H PRN Valdez Landa MD        Today, Patient Was Seen By: Juan Srivastava MD    ** Please Note: Dictation voice to text software may have been used in the creation of this document   **

## 2019-07-31 NOTE — ASSESSMENT & PLAN NOTE
Discharge planning in progress    Discharge to SNF with hospice when a bed is available  Had a meeting with family today with with discharge plan  Will ask hospice to re-evaluate to see if the patient meets inpatient criteria-discussed with the family patient still may not meet the inpatient criteria and have to go to the SNF for hospice

## 2019-07-31 NOTE — PLAN OF CARE
Problem: Nutrition/Hydration-ADULT  Goal: Nutrient/Hydration intake appropriate for improving, restoring or maintaining nutritional needs  Description  Monitor and assess patient's nutrition/hydration status for malnutrition (ex- brittle hair, bruises, dry skin, pale skin and conjunctiva, muscle wasting, smooth red tongue, and disorientation)  Collaborate with interdisciplinary team and initiate plan and interventions as ordered  Monitor patient's weight and dietary intake as ordered or per policy  Utilize nutrition screening tool and intervene per policy  Determine patient's food preferences and provide high-protein, high-caloric foods as appropriate       INTERVENTIONS:  - Monitor oral intake, urinary output, labs, and treatment plans  - Assess nutrition and hydration status and recommend course of action  - Evaluate amount of meals eaten  - Assist patient with eating if necessary   - Allow adequate time for meals  - Recommend/ encourage appropriate diets, oral nutritional supplements, and vitamin/mineral supplements  - Order, calculate, and assess calorie counts as needed  - Recommend, monitor, and adjust tube feedings and TPN/PPN based on assessed needs  - Assess need for intravenous fluids  - Provide specific nutrition/hydration education as appropriate  - Include patient/family/caregiver in decisions related to nutrition  Outcome: Progressing     Problem: Prexisting or High Potential for Compromised Skin Integrity  Goal: Skin integrity is maintained or improved  Description  INTERVENTIONS:  - Identify patients at risk for skin breakdown  - Assess and monitor skin integrity  - Assess and monitor nutrition and hydration status  - Monitor labs (i e  albumin)  - Assess for incontinence   - Turn and reposition patient  - Assist with mobility/ambulation  - Relieve pressure over bony prominences  - Avoid friction and shearing  - Provide appropriate hygiene as needed including keeping skin clean and dry  - Evaluate need for skin moisturizer/barrier cream  - Collaborate with interdisciplinary team (i e  Nutrition, Rehabilitation, etc )   - Patient/family teaching  Outcome: Progressing     Problem: Potential for Falls  Goal: Patient will remain free of falls  Description  INTERVENTIONS:  - Assess patient frequently for physical needs  -  Identify cognitive and physical deficits and behaviors that affect risk of falls    -  [place] fall precautions as indicated by assessment   - Educate patient/family on patient safety including physical limitations  - Instruct patient to call for assistance with activity based on assessment  - Modify environment to reduce risk of injury  - Consider OT/PT consult to assist with strengthening/mobility  Outcome: Progressing     Problem: PAIN - ADULT  Goal: Verbalizes/displays adequate comfort level or baseline comfort level  Description  Interventions:  - Encourage patient to monitor pain and request assistance  - Assess pain using appropriate pain scale  - Administer analgesics based on type and severity of pain and evaluate response  - Implement non-pharmacological measures as appropriate and evaluate response  - Consider cultural and social influences on pain and pain management  - Notify physician/advanced practitioner if interventions unsuccessful or patient reports new pain  Outcome: Progressing     Problem: INFECTION - ADULT  Goal: Absence or prevention of progression during hospitalization  Description  INTERVENTIONS:  - Assess and monitor for signs and symptoms of infection  - Monitor lab/diagnostic results  - Monitor all insertion sites, i e  indwelling lines, tubes, and drains  - Monitor endotracheal (as able) and nasal secretions for changes in amount and color  - [place] appropriate cooling/warming therapies per order  - Administer medications as ordered  - Instruct and encourage patient and family to use good hand hygiene technique  - Identify and instruct in appropriate isolation precautions for identified infection/condition  Outcome: Progressing     Problem: SAFETY ADULT  Goal: Patient will remain free of falls  Description  INTERVENTIONS:  - Assess patient frequently for physical needs  -  Identify cognitive and physical deficits and behaviors that affect risk of falls  -  [place] fall precautions as indicated by assessment   - Educate patient/family on patient safety including physical limitations  - Instruct patient to call for assistance with activity based on assessment  - Modify environment to reduce risk of injury  - Consider OT/PT consult to assist with strengthening/mobility  Outcome: Progressing     Problem: DISCHARGE PLANNING  Goal: Discharge to home or other facility with appropriate resources  Description  INTERVENTIONS:  - Identify barriers to discharge w/patient and caregiver  - Arrange for needed discharge resources and transportation as appropriate  - Identify discharge learning needs (meds, wound care, etc )  - Arrange for interpretive services to assist at discharge as needed  - Refer to Case Management Department for coordinating discharge planning if the patient needs post-hospital services based on physician/advanced practitioner order or complex needs related to functional status, cognitive ability, or social support system  Outcome: Progressing     Problem: Knowledge Deficit  Goal: Patient/family/caregiver demonstrates understanding of disease process, treatment plan, medications, and discharge instructions  Description  Complete learning assessment and assess knowledge base    Interventions:  - Provide teaching at level of understanding  - Provide teaching via preferred learning methods  Outcome: Progressing

## 2019-08-01 PROCEDURE — 99232 SBSQ HOSP IP/OBS MODERATE 35: CPT | Performed by: FAMILY MEDICINE

## 2019-08-01 RX ORDER — LORAZEPAM 0.5 MG/1
0.5 TABLET ORAL EVERY 6 HOURS PRN
Status: DISCONTINUED | OUTPATIENT
Start: 2019-08-01 | End: 2019-08-02

## 2019-08-01 RX ADMIN — LORAZEPAM 0.5 MG: 0.5 TABLET ORAL at 21:30

## 2019-08-01 RX ADMIN — LORAZEPAM 0.5 MG: 0.5 TABLET ORAL at 08:29

## 2019-08-01 RX ADMIN — LORAZEPAM 0.5 MG: 0.5 TABLET ORAL at 12:18

## 2019-08-01 RX ADMIN — LORAZEPAM 0.5 MG: 0.5 TABLET ORAL at 18:04

## 2019-08-01 NOTE — SOCIAL WORK
This writer spoke with patient's daughter, Dio Farris who reports that she has a meeting at 1 pm with [facility]  Discussed that BALWINDER bordenff to follow up with [facility] to discuss coordination of discharge to [facility] for tomorrow

## 2019-08-01 NOTE — PROGRESS NOTES
08/01/19 1300   Clinical Encounter Type   Visited With Patient   Gnosticist Encounters   Gnosticist Needs Prayer  ( blessing)   Sacramental Encounters   Communion Given Indicator Yes   Sacrament of Sick-Anointing Anointed

## 2019-08-01 NOTE — PROGRESS NOTES
08/01/19 1400   Stress Factors   Patient Stress Factors Health changes; Loss of control   Family Stress Factors Health changes   Coping Responses   Patient Coping Anxiety;Open/discussion   Family Coping Open/discussion; Sadness   Plan of Care   Comments Talked seperately with pt and with pt's daughter  Provided empathic listening  Explored with pt's daughter resources to help her to navigate pt's comfort care  Provided some literature about end of life care and grieving  Helped to normalize experience  Visited with pt  Provided anxiety containment  Faciltiated story telling  Provided scripture to support pt to orient and feel comforted      Assessment Completed by: Unit visit

## 2019-08-01 NOTE — PROGRESS NOTES
Progress Note - Clarke Lynch 1940, 66 y o  female MRN: [id number]    Unit/Bed#: [facility] 933-01 Encounter: [id number]    Primary Care Provider: Chioma Vanessa DO   Date and time admitted to hospital: 7/21/2019  2:09 PM        GI bleeding  Assessment & Plan  Patient had melena with heme-positive stools  Currently refusing endoscopy  Refusing PPI  Due to comfort care measures will discontinue hemoglobin testing    Encephalopathy  Assessment & Plan  Mental status waxes and wanes  Appears to be more awake alert today  Continue with the p r n  Ativan for anxiety      Dehydration  Assessment & Plan  Refusing IV fluids and meals  Plan is to transition to skilled nursing facility with hospice      Vulvar mass  Assessment & Plan  Status post biopsy by OBGYN  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)  Patient is currently refusing all medical care and requesting hospice    Acute and chronic respiratory failure with hypercapnia ([facility] [address] )  Assessment & Plan  Currently comfort care, refusing BiPAP    Severe protein-calorie malnutrition ([facility] [address] )  Assessment & Plan  Malnutrition Findings:   Malnutrition type: Chronic illness(in the setting of severe orbital-hollow look, depressions dark [place] reflects body fat loss; temples hollowing, scooping, depression, clavicle protruding prominent bone reflects muscle mass loss; treated with rec for PO diet when deemed safe to swallow )  Degree of Malnutrition: Other severe protein calorie malnutrition    BMI Findings:  BMI Classifications: Underweight < 18 5(BMI = 14 93)     Body mass index is 15 16 kg/m²  Currently comfort care  Pleasure feeds only-patient with decreased p o  Intake    * Fall  Assessment & Plan  Unclear etiology  Possibly due to hypercapnic respiratory failure  Patient has no memory of the event  Was found down in her home with multiple pressure ulcerations    Local wound care, turn q 2 hours        VTE Pharmacologic Prophylaxis: Pharmacologic: Comfort care  Mechanical VTE Prophylaxis in Place: No    Patient Centered Rounds: I have performed bedside rounds with nursing staff today  Discussions with Specialists or Other Care Team Provider:     Education and Discussions with Family / Patient:  Daughter updated in the room    Time Spent for Care: 30 minutes  More than 50% of total time spent on counseling and coordination of care as described above  Current Length of Stay: 11 day(s)    Current Patient Status: Inpatient   Certification Statement:  Patient is waiting for placement in skilled nursing facility with hospice  Discharge Plan:     Code Status: Level 4 - Comfort Care      Subjective:   Patient seen and examined, patient appears to be more interactive when compared to yesterday  Transition to p o  Ativan    Objective:     Vitals:   No data recorded  SpO2:  [98 %] 98 %  Body mass index is 15 16 kg/m²  Input and Output Summary (last 24 hours): Intake/Output Summary (Last 24 hours) at 8/1/2019 1926  Last data filed at 8/1/2019 1322  Gross per 24 hour   Intake 25 ml   Output 475 ml   Net -450 ml       Physical Exam:     Physical Exam   HENT:   Head: Normocephalic  Eyes: Right eye exhibits no discharge  Neck: No JVD present  Cardiovascular: Normal rate  No murmur heard  Pulmonary/Chest:   Decreased breath sounds bilateral   Abdominal: Soft  She exhibits no mass  Musculoskeletal: Normal range of motion  Neurological: She is alert  No cranial nerve deficit  Skin: Skin is warm         Additional Data:     Labs:    Results from last 7 days   Lab Units 07/27/19  0850   WBC Thousand/uL 5 69   HEMOGLOBIN g/dL 12 7   HEMATOCRIT % 40 2   PLATELETS Thousands/uL 248   NEUTROS PCT % 72   LYMPHS PCT % 17   MONOS PCT % 8   EOS PCT % 1     Results from last 7 days   Lab Units 07/27/19  0850 07/26/19  0454   POTASSIUM mmol/L 3 9 4 0   CHLORIDE mmol/L 99* 102   CO2 mmol/L 38* 39*   BUN mg/dL 4* 3*   CREATININE mg/dL 0 34* 0 28*   CALCIUM mg/dL 8 6 7 8*   ALK PHOS U/L  --  57   ALT U/L  --  36   AST U/L  --  40     Results from last 7 days   Lab Units 07/26/19  0543   INR  1 31*       * I Have Reviewed All Lab Data Listed Above  * Additional Pertinent Lab Tests Reviewed: Ketan 66 Admission Reviewed    Imaging:    Imaging Reports Reviewed Today Include:   Imaging Personally Reviewed by Myself Includes:    Recent Cultures (last 7 days):           Last 24 Hours Medication List:     Current Facility-Administered Medications:  hydrocortisone 25 mg Rectal BID PRN Starla Sidhu MD   LORazepam 0 5 mg Oral 4x Daily Herson Edwards MD   LORazepam 0 5 mg Oral Q6H PRN Herson Edwards MD   morphine injection 6 mg Intravenous Q4H PRN Starla Sidhu MD   morphine 10 mg Oral Q4H PRN Starla Sidhu MD   morphine 15 mg Oral Q4H PRN Starla Sidhu MD   ondansetron 4 mg Intravenous Q6H PRN Lex Thomas MD        Today, Patient Was Seen By: Herson Edwards MD    ** Please Note: Dictation voice to text software may have been used in the creation of this document   **

## 2019-08-01 NOTE — PLAN OF CARE
Problem: Prexisting or High Potential for Compromised Skin Integrity  Goal: Skin integrity is maintained or improved  Description  INTERVENTIONS:  - Identify patients at risk for skin breakdown  - Assess and monitor skin integrity  - Assess and monitor nutrition and hydration status  - Monitor labs (i e  albumin)  - Assess for incontinence   - Turn and reposition patient  - Assist with mobility/ambulation  - Relieve pressure over bony prominences  - Avoid friction and shearing  - Provide appropriate hygiene as needed including keeping skin clean and dry  - Evaluate need for skin moisturizer/barrier cream  - Collaborate with interdisciplinary team (i e  Nutrition, Rehabilitation, etc )   - Patient/family teaching  Outcome: Progressing     Problem: Potential for Falls  Goal: Patient will remain free of falls  Description  INTERVENTIONS:  - Assess patient frequently for physical needs  -  Identify cognitive and physical deficits and behaviors that affect risk of falls    -  [place] fall precautions as indicated by assessment   - Educate patient/family on patient safety including physical limitations  - Instruct patient to call for assistance with activity based on assessment  - Modify environment to reduce risk of injury  - Consider OT/PT consult to assist with strengthening/mobility  Outcome: Progressing     Problem: PAIN - ADULT  Goal: Verbalizes/displays adequate comfort level or baseline comfort level  Description  Interventions:  - Encourage patient to monitor pain and request assistance  - Assess pain using appropriate pain scale  - Administer analgesics based on type and severity of pain and evaluate response  - Implement non-pharmacological measures as appropriate and evaluate response  - Consider cultural and social influences on pain and pain management  - Notify physician/advanced practitioner if interventions unsuccessful or patient reports new pain  Outcome: Progressing     Problem: SAFETY ADULT  Goal: Patient will remain free of falls  Description  INTERVENTIONS:  - Assess patient frequently for physical needs  -  Identify cognitive and physical deficits and behaviors that affect risk of falls  -  [place] fall precautions as indicated by assessment   - Educate patient/family on patient safety including physical limitations  - Instruct patient to call for assistance with activity based on assessment  - Modify environment to reduce risk of injury  - Consider OT/PT consult to assist with strengthening/mobility  Outcome: Progressing     Problem: DISCHARGE PLANNING  Goal: Discharge to home or other facility with appropriate resources  Description  INTERVENTIONS:  - Identify barriers to discharge w/patient and caregiver  - Arrange for needed discharge resources and transportation as appropriate  - Identify discharge learning needs (meds, wound care, etc )  - Arrange for interpretive services to assist at discharge as needed  - Refer to Case Management Department for coordinating discharge planning if the patient needs post-hospital services based on physician/advanced practitioner order or complex needs related to functional status, cognitive ability, or social support system  Outcome: Progressing     Problem: Nutrition/Hydration-ADULT  Goal: Nutrient/Hydration intake appropriate for improving, restoring or maintaining nutritional needs  Description  Monitor and assess patient's nutrition/hydration status for malnutrition (ex- brittle hair, bruises, dry skin, pale skin and conjunctiva, muscle wasting, smooth red tongue, and disorientation)  Collaborate with interdisciplinary team and initiate plan and interventions as ordered  Monitor patient's weight and dietary intake as ordered or per policy  Utilize nutrition screening tool and intervene per policy  Determine patient's food preferences and provide high-protein, high-caloric foods as appropriate       INTERVENTIONS:  - Monitor oral intake, urinary output, labs, and treatment plans  - Assess nutrition and hydration status and recommend course of action  - Evaluate amount of meals eaten  - Assist patient with eating if necessary   - Allow adequate time for meals  - Recommend/ encourage appropriate diets, oral nutritional supplements, and vitamin/mineral supplements  - Order, calculate, and assess calorie counts as needed  - Recommend, monitor, and adjust tube feedings and TPN/PPN based on assessed needs  - Assess need for intravenous fluids  - Provide specific nutrition/hydration education as appropriate  - Include patient/family/caregiver in decisions related to nutrition  Outcome: Completed     Problem: INFECTION - ADULT  Goal: Absence or prevention of progression during hospitalization  Description  INTERVENTIONS:  - Assess and monitor for signs and symptoms of infection  - Monitor lab/diagnostic results  - Monitor all insertion sites, i e  indwelling lines, tubes, and drains  - Monitor endotracheal (as able) and nasal secretions for changes in amount and color  - [place] appropriate cooling/warming therapies per order  - Administer medications as ordered  - Instruct and encourage patient and family to use good hand hygiene technique  - Identify and instruct in appropriate isolation precautions for identified infection/condition  Outcome: Completed     Problem: Knowledge Deficit  Goal: Patient/family/caregiver demonstrates understanding of disease process, treatment plan, medications, and discharge instructions  Description  Complete learning assessment and assess knowledge base    Interventions:  - Provide teaching at level of understanding  - Provide teaching via preferred learning methods  Outcome: Completed

## 2019-08-01 NOTE — SOCIAL WORK
[facility] to re-assess pt today to see if IPU appropriate  [facility] re-evaluating pt to determine if able to offer bed to pt

## 2019-08-01 NOTE — ASSESSMENT & PLAN NOTE
Malnutrition Findings:   Malnutrition type: Chronic illness(in the setting of severe orbital-hollow look, depressions dark [place] reflects body fat loss; temples hollowing, scooping, depression, clavicle protruding prominent bone reflects muscle mass loss; treated with rec for PO diet when deemed safe to swallow )  Degree of Malnutrition: Other severe protein calorie malnutrition    BMI Findings:  BMI Classifications: Underweight < 18 5(BMI = 14 93)     Body mass index is 15 16 kg/m²  Currently comfort care  Pleasure feeds only-patient with decreased p o   Intake

## 2019-08-01 NOTE — ASSESSMENT & PLAN NOTE
Mental status waxes and wanes  Appears to be more awake alert today  Continue with the p r n   Ativan for anxiety

## 2019-08-01 NOTE — SOCIAL WORK
CM informed pt's dtr Michael Lemus has appt at 1pm tomorrow with Marlon edwards to tour facility and discuss potential for admission for pt

## 2019-08-01 NOTE — SOCIAL WORK
Per [facility], pt not appropriate for IPU at this time  Awaiting response from [facility] to determine if able to accept pt

## 2019-08-02 PROCEDURE — 99232 SBSQ HOSP IP/OBS MODERATE 35: CPT | Performed by: FAMILY MEDICINE

## 2019-08-02 RX ORDER — DOCUSATE SODIUM 100 MG/1
100 CAPSULE, LIQUID FILLED ORAL 2 TIMES DAILY
Status: DISCONTINUED | OUTPATIENT
Start: 2019-08-02 | End: 2019-08-04 | Stop reason: HOSPADM

## 2019-08-02 RX ORDER — LORAZEPAM 0.5 MG/1
0.5 TABLET ORAL EVERY 2 HOUR PRN
Status: DISCONTINUED | OUTPATIENT
Start: 2019-08-02 | End: 2019-08-04 | Stop reason: HOSPADM

## 2019-08-02 RX ORDER — HALOPERIDOL 0.5 MG/1
0.5 TABLET ORAL EVERY 6 HOURS PRN
Status: DISCONTINUED | OUTPATIENT
Start: 2019-08-02 | End: 2019-08-04 | Stop reason: HOSPADM

## 2019-08-02 RX ORDER — POLYETHYLENE GLYCOL 3350 17 G/17G
17 POWDER, FOR SOLUTION ORAL DAILY
Status: DISCONTINUED | OUTPATIENT
Start: 2019-08-02 | End: 2019-08-04 | Stop reason: HOSPADM

## 2019-08-02 RX ORDER — HALOPERIDOL 5 MG/ML
1 INJECTION INTRAMUSCULAR ONCE
Status: COMPLETED | OUTPATIENT
Start: 2019-08-02 | End: 2019-08-02

## 2019-08-02 RX ADMIN — LORAZEPAM 0.5 MG: 0.5 TABLET ORAL at 11:29

## 2019-08-02 RX ADMIN — HALOPERIDOL LACTATE 1 MG: 5 INJECTION INTRAMUSCULAR at 21:18

## 2019-08-02 RX ADMIN — POLYETHYLENE GLYCOL 3350 17 G: 17 POWDER, FOR SOLUTION ORAL at 09:01

## 2019-08-02 RX ADMIN — LORAZEPAM 0.5 MG: 0.5 TABLET ORAL at 08:46

## 2019-08-02 RX ADMIN — DOCUSATE SODIUM 100 MG: 100 CAPSULE, LIQUID FILLED ORAL at 09:01

## 2019-08-02 RX ADMIN — LORAZEPAM 0.5 MG: 0.5 TABLET ORAL at 18:06

## 2019-08-02 NOTE — HOSPICE NOTE
SPOKE TO DTR PUMA  REVIEWED HOSPICE SERVICES AND BENEFITS PER HOSPICE MEDICARE GUIDELINES  EMAILED HOSPICE CONSENTS TO  [facility]

## 2019-08-02 NOTE — PROGRESS NOTES
08/02/19 1300   Clinical Encounter Type   Visited With Patient   Yazidism Encounters   Yazidism Needs Prayer  ( blessing)   Sacramental Encounters   Sacrament of Sick-Anointing Anointed

## 2019-08-02 NOTE — NURSING NOTE
Pt pulled out IV  Currently not replacing unless needed due to comfort care status and will continue to pull if a new site is added

## 2019-08-02 NOTE — SOCIAL WORK
CM informed [facility] able to accept pt  Pt's family agreeable and aware pt will be in quad room  Per Zaida Mireles, they will accept pt under comfort care until [facility] is able to get consents signed for pt  [id number] notified of same  Transport arranged for d/c tomorrow 8/3 to [facility] via [facility] at 2pm  Pt's [facility], bedside RN, and [facility] notified of transport time  Chart copy requested  Transfer to facility and CMN forms completed  F/u IMM reviewed with pt's [facility]

## 2019-08-02 NOTE — PROGRESS NOTES
Pt resting quietly in bed  Pt is mildly anxious, inquiring why her daughter left without telling her  RN offered PRN IV Ativan at this time, but pt would like to wait for scheduled PO Ativan  RN assisted pt in ordering dinner  2L O2 on without dyspnea  Chu intact and draining  Pt refuses to be turned and repositioned at this time  No family present in room  Will continue to monitor

## 2019-08-02 NOTE — TRANSPORTATION MEDICAL NECESSITY
Section I - General Information    Name of Patient: Olla Severe                 : 1940    Medicare #: [id number]  Transport Date: 19 (PCS is valid for round trips on this date and for all repetitive trips in the 60-day range as noted below )  Origin: [address] [facility]                                                         Destination: [place]  Is the pt's stay covered under Medicare Part A (PPS/DRG)   [x]     Closest appropriate facility? If no, why is transport to more distant facility required? Yes  If hospice pt, is this transport related to pt's terminal illness? Yes       Section II - Medical Necessity Questionnaire  Ambulance transportation is medically necessary only if other means of transport are contraindicated or would be potentially harmful to the patient  To meet this requirement, the patient must either be "bed confined" or suffer from a condition such that transport by means other than ambulance is contraindicated by the patient's condition  The following questions must be answered by the medical professional signing below for this form to be valid:    1)  Describe the MEDICAL CONDITION (physical and/or mental) of this patient AT [address] [place] [address] that requires the patient to be transported in an ambulance and why transport by other means is contraindicated by the patient's condition: Vulvar mass; Pressure injury of sacral region, stage 3; malnutrition; Ambulatory dysfunction; Encephalopathy    2) Is the patient "bed confined" as defined below? Yes  To be "be confined" the patient must satisfy all three of the following conditions: (1) unable to get up from bed without Assistance; AND (2) unable to ambulate; AND (3) unable to sit in a chair or wheelchair  3) Can this patient safely be transported by car or wheelchair van (i e , seated during transport without a medical attendant or monitoring)?    No    4) In addition to completing questions 1-3 above, please check any of the following conditions that apply*:   *Note: supporting documentation for any boxes checked must be maintained in the patient's medical records  If hosp-hosp transfer, describe services needed at 2nd facility not available at 1st facility? Patient is confused  Medical attendant required   Unable to tolerate seated position for time needed to transport   Unable to sit in a chair or wheelchair due to decubitus ulcers or other wounds   Other(specify) High Fall Risk   Assist x2 transfers  Poor endurance/safety awareness        Section III - Signature of Physician or Healthcare Professional  I certify that the above information is true and correct based on my evaluation of this patient, and represent that the patient requires transport by ambulance and that other forms of transport are contraindicated  I understand that this information will be used by the Centers for Medicare and Medicaid Services (CMS) to support the determination of medical necessity for ambulance services, and I represent that I have personal knowledge of the patient's condition at time of transport  [x]  If this box is checked, I also certify that the patient is physically or mentally incapable of signing the ambulance service's claim and that the institution with which I am affiliated has furnished care, services, or assistance to the patient  My signature below is made on behalf of the patient pursuant to 42 CFR §424 36(b)(4)   In accordance with 42 CFR §424 37, the specific reason(s) that the patient is physically or mentally incapable of signing the claim form is as follows:       Signature of Physician* or Healthcare Professional______________________________________________________________  Signature Date 08/02/19 (For scheduled repetitive transports, this form is not valid for transports performed more than 60 days after this date)    Printed Name & Credentials of Physician or Healthcare Professional (MD, DO, RN, etc )__Amanda Cam, BALWINDER______________________________  *Form must be signed by patient's attending physician for scheduled, repetitive transports   For non-repetitive, unscheduled ambulance transports, if unable to obtain the signature of the attending physician, any of the following may sign (choose appropriate option below)  [] Physician Assistant []  Clinical Nurse Specialist []  Registered Nurse  []  Nurse Practitioner  [x] Discharge Planner

## 2019-08-02 NOTE — PLAN OF CARE
Problem: Prexisting or High Potential for Compromised Skin Integrity  Goal: Skin integrity is maintained or improved  Description  INTERVENTIONS:  - Identify patients at risk for skin breakdown  - Assess and monitor skin integrity  - Assess and monitor nutrition and hydration status  - Monitor labs (i e  albumin)  - Assess for incontinence   - Turn and reposition patient  - Assist with mobility/ambulation  - Relieve pressure over bony prominences  - Avoid friction and shearing  - Provide appropriate hygiene as needed including keeping skin clean and dry  - Evaluate need for skin moisturizer/barrier cream  - Collaborate with interdisciplinary team (i e  Nutrition, Rehabilitation, etc )   - Patient/family teaching  Outcome: Progressing     Problem: Potential for Falls  Goal: Patient will remain free of falls  Description  INTERVENTIONS:  - Assess patient frequently for physical needs  -  Identify cognitive and physical deficits and behaviors that affect risk of falls    -  [place] fall precautions as indicated by assessment   - Educate patient/family on patient safety including physical limitations  - Instruct patient to call for assistance with activity based on assessment  - Modify environment to reduce risk of injury  - Consider OT/PT consult to assist with strengthening/mobility  Outcome: Progressing     Problem: PAIN - ADULT  Goal: Verbalizes/displays adequate comfort level or baseline comfort level  Description  Interventions:  - Encourage patient to monitor pain and request assistance  - Assess pain using appropriate pain scale  - Administer analgesics based on type and severity of pain and evaluate response  - Implement non-pharmacological measures as appropriate and evaluate response  - Consider cultural and social influences on pain and pain management  - Notify physician/advanced practitioner if interventions unsuccessful or patient reports new pain  Outcome: Progressing     Problem: SAFETY ADULT  Goal: Patient will remain free of falls  Description  INTERVENTIONS:  - Assess patient frequently for physical needs  -  Identify cognitive and physical deficits and behaviors that affect risk of falls    -  [place] fall precautions as indicated by assessment   - Educate patient/family on patient safety including physical limitations  - Instruct patient to call for assistance with activity based on assessment  - Modify environment to reduce risk of injury  - Consider OT/PT consult to assist with strengthening/mobility  Outcome: Progressing     Problem: DISCHARGE PLANNING  Goal: Discharge to home or other facility with appropriate resources  Description  INTERVENTIONS:  - Identify barriers to discharge w/patient and caregiver  - Arrange for needed discharge resources and transportation as appropriate  - Identify discharge learning needs (meds, wound care, etc )  - Arrange for interpretive services to assist at discharge as needed  - Refer to Case Management Department for coordinating discharge planning if the patient needs post-hospital services based on physician/advanced practitioner order or complex needs related to functional status, cognitive ability, or social support system  Outcome: Progressing

## 2019-08-03 PROCEDURE — 99232 SBSQ HOSP IP/OBS MODERATE 35: CPT | Performed by: FAMILY MEDICINE

## 2019-08-03 RX ORDER — ONDANSETRON 4 MG/1
4 TABLET, ORALLY DISINTEGRATING ORAL EVERY 6 HOURS PRN
Status: DISCONTINUED | OUTPATIENT
Start: 2019-08-03 | End: 2019-08-04 | Stop reason: HOSPADM

## 2019-08-03 RX ADMIN — HYDROCORTISONE ACETATE 25 MG: 25 SUPPOSITORY RECTAL at 11:03

## 2019-08-03 RX ADMIN — DOCUSATE SODIUM 100 MG: 100 CAPSULE, LIQUID FILLED ORAL at 17:20

## 2019-08-03 RX ADMIN — POLYETHYLENE GLYCOL 3350 17 G: 17 POWDER, FOR SOLUTION ORAL at 10:54

## 2019-08-03 RX ADMIN — ONDANSETRON 4 MG: 4 TABLET, ORALLY DISINTEGRATING ORAL at 10:54

## 2019-08-03 RX ADMIN — LORAZEPAM 0.5 MG: 0.5 TABLET ORAL at 11:01

## 2019-08-03 RX ADMIN — LORAZEPAM 0.5 MG: 0.5 TABLET ORAL at 13:43

## 2019-08-03 RX ADMIN — LORAZEPAM 0.5 MG: 0.5 TABLET ORAL at 17:20

## 2019-08-03 RX ADMIN — LORAZEPAM 0.5 MG: 0.5 TABLET ORAL at 21:41

## 2019-08-03 RX ADMIN — DOCUSATE SODIUM 100 MG: 100 CAPSULE, LIQUID FILLED ORAL at 10:54

## 2019-08-03 NOTE — PROGRESS NOTES
Progress Note - Yokasta Walker 1940, 66 y o  female MRN: [id number]    Unit/Bed#: [facility] 933-01 Encounter: [id number]    Primary Care Provider: Marva Yo DO   Date and time admitted to hospital: 7/21/2019  2:09 PM        GI bleeding  Assessment & Plan  Patient had melena with heme-positive stools  Currently refusing endoscopy  Refusing PPI  Due to comfort care measures will discontinue hemoglobin testing    Encephalopathy  Assessment & Plan  Mental status waxes and wanes  Appears to be more awake alert today  Continue with the p r n  Ativan for anxiety      Dehydration  Assessment & Plan  Refusing IV fluids and meals  Plan is to transition to skilled nursing facility with hospice      Vulvar mass  Assessment & Plan  Status post biopsy by OBGYN  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)  Patient is currently refusing all medical care and requesting hospice    Acute and chronic respiratory failure with hypercapnia [facility])  Assessment & Plan  Currently comfort care, refusing BiPAP    * Fall  Assessment & Plan  Unclear etiology  Possibly due to hypercapnic respiratory failure  Patient has no memory of the event  Was found down in her home with multiple pressure ulcerations  Local wound care, turn q 2 hours        VTE Pharmacologic Prophylaxis:   Pharmacologic: Comfort care  Mechanical VTE Prophylaxis in Place: Yes    Patient Centered Rounds: I have performed bedside rounds with nursing staff today  Discussions with Specialists or Other Care Team Provider: 6    Education and Discussions with Family / Patient:  Patient  Time Spent for Care: 30 minutes  More than 50% of total time spent on counseling and coordination of care as described above      Current Length of Stay: 12 day(s)    Current Patient Status: Inpatient   Certification Statement: The patient will continue to require additional inpatient hospital stay due to pendingb rehab placement    Discharge Plan: Code Status: Level 4 - Comfort Care      Subjective:   Patient seen and examined  Patient reported that he is not feeling too bad  No specific complaints offered  Denies any pain  Case management reported that patient will will be able to go to [facility] by tomorrow    Objective:    Vitals:   No data recorded  Body mass index is 15 16 kg/m²  Input and Output Summary (last 24 hours): Intake/Output Summary (Last 24 hours) at 8/2/2019 2012  Last data filed at 8/2/2019 1341  Gross per 24 hour   Intake 545 ml   Output 700 ml   Net -155 ml       Physical Exam:     Physical Exam   Constitutional: No distress  HENT:   Head: Normocephalic  Eyes: Pupils are equal, round, and reactive to light  Neck: No JVD present  Cardiovascular: Normal rate  No murmur heard  Pulmonary/Chest: Effort normal  No stridor  Abdominal: Soft  She exhibits no distension  Musculoskeletal: Normal range of motion  She exhibits no edema  Neurological: She is alert  Coordination normal    Skin: Skin is warm  Additional Data:     Labs:    Results from last 7 days   Lab Units 07/27/19  0850   WBC Thousand/uL 5 69   HEMOGLOBIN g/dL 12 7   HEMATOCRIT % 40 2   PLATELETS Thousands/uL 248   NEUTROS PCT % 72   LYMPHS PCT % 17   MONOS PCT % 8   EOS PCT % 1     Results from last 7 days   Lab Units 07/27/19  0850   POTASSIUM mmol/L 3 9   CHLORIDE mmol/L 99*   CO2 mmol/L 38*   BUN mg/dL 4*   CREATININE mg/dL 0 34*   CALCIUM mg/dL 8 6           * I Have Reviewed All Lab Data Listed Above  * Additional Pertinent Lab Tests Reviewed:  [facility]    Imaging:    Imaging Reports Reviewed Today Include:   Imaging Personally Reviewed by Myself Includes:      Recent Cultures (last 7 days):           Last 24 Hours Medication List:     Current Facility-Administered Medications:  docusate sodium 100 mg Oral BID Aracelis Potter MD   haloperidol 0 5 mg Oral Q6H PRN Aracelis Potter MD   hydrocortisone 25 mg Rectal BID PRN Estuardo Batista MD   LORazepam 0 5 mg Oral 4x Daily Therese Ocampo MD   LORazepam 0 5 mg Oral Q2H PRN Therese Ocampo MD   morphine injection 6 mg Intravenous Q4H PRN Estuardo Batista MD   morphine 10 mg Oral Q4H PRN Estuardo Batista MD   morphine 15 mg Oral Q4H PRN Estuardo Batista MD   ondansetron 4 mg Intravenous Q6H PRN Griselda Masters MD   polyethylene glycol 17 g Oral Daily Therese Ocampo MD        Today, Patient Was Seen By: Therese Ocampo MD    ** Please Note: Dictation voice to text software may have been used in the creation of this document   **

## 2019-08-03 NOTE — PROGRESS NOTES
Nursing reported that patient is very agitated  Patient is asking for her family and he is afraid that she is going to die on on    Will order one-to-one for safety

## 2019-08-03 NOTE — SOCIAL WORK
CM notified [facility] [place] Nursing Supervisor Lesley of the Pt being on a 1:1  [place] reported not being being able to accept the Pt for an admission until she has been off the 1:1 for 24hrs  CM called Bobbi and cancelled the Pt's 3:30pm bls transportation, and rescheduled it for tomorrow 8/4 with Bobbi at 12pm  CM notified Pt's daughter/POA Richard Devries of the changes to the d/c arrangements

## 2019-08-03 NOTE — ASSESSMENT & PLAN NOTE
Malnutrition Findings:   Malnutrition type: Chronic illness(in the setting of severe orbital-hollow look, depressions dark [place] reflects body fat loss; temples hollowing, scooping, depression, clavicle protruding prominent bone reflects muscle mass loss; treated with rec for PO diet when deemed safe to swallow )  Degree of Malnutrition: Other severe protein calorie malnutrition    BMI Findings:  BMI Classifications: Underweight < 18 5(BMI = 14 93)     Body mass index is 15 16 kg/m²  Currently comfort care  Pleasure feeds only-patient with decreased p o   Intake

## 2019-08-03 NOTE — PROGRESS NOTES
Progress Note - Veronica Lim 1940, 66 y o  female MRN: [id number]    Unit/Bed#: [facility] 933-01 Encounter: [id number]    Primary Care Provider: Rosemarie Winston DO   Date and time admitted to hospital: 7/21/2019  2:09 PM        GI bleeding  Assessment & Plan  Patient had melena with heme-positive stools  Currently refusing endoscopy  Refusing PPI  Due to comfort care measures will discontinue hemoglobin testing    Encephalopathy  Assessment & Plan  Mental status waxes and wanes  Appears to be more awake alert today  Continue with the p r n  Ativan for anxiety      Dehydration  Assessment & Plan  Refusing IV fluids and meals  Plan is to transition to skilled nursing facility with hospice      Vulvar mass  Assessment & Plan  Status post biopsy by OBGYN  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)  Patient is currently refusing all medical care and requesting hospice    Acute and chronic respiratory failure with hypercapnia [facility])  Assessment & Plan  Currently comfort care, refusing BiPAP    Ambulatory dysfunction  Assessment & Plan  Discharge planning in progress    Discharge to SNF with hospice when a bed is available  Had a meeting with family today with with discharge plan  Will ask hospice to re-evaluate to see if the patient meets inpatient criteria-discussed with the family patient still may not meet the inpatient criteria and have to go to the SNF for hospice    Severe protein-calorie malnutrition ([address] )  Assessment & Plan  Malnutrition Findings:   Malnutrition type: Chronic illness(in the setting of severe orbital-hollow look, depressions dark [place] reflects body fat loss; temples hollowing, scooping, depression, clavicle protruding prominent bone reflects muscle mass loss; treated with rec for PO diet when deemed safe to swallow )  Degree of Malnutrition: Other severe protein calorie malnutrition    BMI Findings:  BMI Classifications: Underweight < 18 5(BMI = 14 93) Body mass index is 15 16 kg/m²  Currently comfort care  Pleasure feeds only-patient with decreased p o  Intake    * Fall  Assessment & Plan  Unclear etiology  Possibly due to hypercapnic respiratory failure  Patient has no memory of the event  Was found down in her home with multiple pressure ulcerations  Local wound care, turn q 2 hours        VTE Pharmacologic Prophylaxis:   Pharmacologic: Comfort care  Mechanical VTE Prophylaxis in Place: Yes    Patient Centered Rounds: I have performed bedside rounds with nursing staff today  Discussions with Specialists or Other Care Team Provider:     Education and Discussions with Family / Patient:  Daughter     Time Spent for Care: 30 minutes  More than 50% of total time spent on counseling and coordination of care as described above  Current Length of Stay: 13 day(s)    Current Patient Status: Inpatient   Certification Statement: The patient will continue to require additional inpatient hospital stay due to Pending SNF placement for hospice    Discharge Plan:    Code Status: Level 4 - Comfort Care      Subjective:   Patient was very agitated overnight  Has to initiated on one-to-one  Currently appears to be better  Discussed with the daughter who is currently in the room  Plan is to discharge to SNF tomorrow if remained off of 1 to1    Objective:     Vitals:   Temp (24hrs), Av 1 °F (36 7 °C), Min:98 1 °F (36 7 °C), Max:98 1 °F (36 7 °C)    Temp:  [98 1 °F (36 7 °C)] 98 1 °F (36 7 °C)  HR:  [85] 85  Resp:  [16] 16  BP: (115)/(73) 115/73  SpO2:  [95 %] 95 %  Body mass index is 15 16 kg/m²  Input and Output Summary (last 24 hours): Intake/Output Summary (Last 24 hours) at 8/3/2019 1619  Last data filed at 8/3/2019 1542  Gross per 24 hour   Intake 130 ml   Output 450 ml   Net -320 ml       Physical Exam:     Physical Exam   Constitutional: No distress  HENT:   Head: Normocephalic  Neck: No JVD present  Cardiovascular: Normal rate  Pulmonary/Chest: No respiratory distress  Abdominal: Soft  Musculoskeletal: Normal range of motion  Additional Data:     Labs: Invalid input(s): LABALBU        * I Have Reviewed All Lab Data Listed Above  * Additional Pertinent Lab Tests Reviewed: [facility] Admission Reviewed    Imaging:    Imaging Reports Reviewed Today Include:   Imaging Personally Reviewed by Myself Includes:     Recent Cultures (last 7 days):           Last 24 Hours Medication List:     Current Facility-Administered Medications:  docusate sodium 100 mg Oral BID Ramez Sosa MD   haloperidol 0 5 mg Oral Q6H PRN Ramez Sosa MD   hydrocortisone 25 mg Rectal BID PRN Kingsley Aase, MD   LORazepam 0 5 mg Oral 4x Daily Ramez Sosa MD   LORazepam 0 5 mg Oral Q2H PRN Ramez Sosa MD   morphine injection 6 mg Intravenous Q4H PRN Kingsley Aase, MD   morphine 10 mg Oral Q4H PRN Kingsley Aase, MD   morphine 15 mg Oral Q4H PRN Kingsley Aase, MD   ondansetron 4 mg Oral Q6H PRN Ramez Sosa MD   polyethylene glycol 17 g Oral Daily Ramez Sosa MD        Today, Patient Was Seen By: Ramez Sosa MD    ** Please Note: Dictation voice to text software may have been used in the creation of this document   **

## 2019-08-03 NOTE — PROGRESS NOTES
Pt becoming increasingly agitated throughout the evening  Saying things like "I wish I would just die now " Pt expressing that she does not want to go to this new place tomorrow and have to die there alone  Sat with patient and offered therapeutic communication  Pt later starting slamming call bell on the table and throwing things  Called pts daughter in attempt to calm pt however did not answer and left a message  Pt ripped out neal catheter  Pt attempting to get out of bed  Pt later found holding pillow and pad over her face  Dr Radha Spencer aware, placed on continual observation  Lorazepam frequency increased, haldol added

## 2019-08-04 ENCOUNTER — TELEPHONE (OUTPATIENT)
Dept: OTHER | Facility: OTHER | Age: 79
End: 2019-08-04

## 2019-08-04 VITALS
HEIGHT: 58 IN | WEIGHT: 72.53 LBS | BODY MASS INDEX: 15.23 KG/M2 | SYSTOLIC BLOOD PRESSURE: 114 MMHG | DIASTOLIC BLOOD PRESSURE: 72 MMHG | OXYGEN SATURATION: 93 % | TEMPERATURE: 98.8 F | HEART RATE: 90 BPM | RESPIRATION RATE: 16 BRPM

## 2019-08-04 PROCEDURE — 99239 HOSP IP/OBS DSCHRG MGMT >30: CPT | Performed by: INTERNAL MEDICINE

## 2019-08-04 RX ORDER — ONDANSETRON 4 MG/1
4 TABLET, ORALLY DISINTEGRATING ORAL EVERY 6 HOURS PRN
Qty: 20 TABLET | Refills: 0 | Status: SHIPPED | OUTPATIENT
Start: 2019-08-04

## 2019-08-04 RX ORDER — HALOPERIDOL 0.5 MG/1
0.5 TABLET ORAL EVERY 6 HOURS PRN
Qty: 20 TABLET | Refills: 0 | Status: SHIPPED | OUTPATIENT
Start: 2019-08-04 | End: 2021-04-09

## 2019-08-04 RX ORDER — LORAZEPAM 0.5 MG/1
TABLET ORAL
Qty: 20 TABLET | Refills: 0 | Status: SHIPPED | OUTPATIENT
Start: 2019-08-04 | End: 2020-05-01 | Stop reason: DRUGHIGH

## 2019-08-04 RX ORDER — MORPHINE SULFATE 100 MG/5ML
SOLUTION ORAL
Qty: 15 ML | Refills: 0 | Status: SHIPPED | OUTPATIENT
Start: 2019-08-04 | End: 2021-04-09

## 2019-08-04 RX ORDER — HYDROCORTISONE ACETATE 25 MG/1
25 SUPPOSITORY RECTAL 2 TIMES DAILY PRN
Qty: 12 SUPPOSITORY | Refills: 0 | Status: SHIPPED | OUTPATIENT
Start: 2019-08-04 | End: 2021-04-09

## 2019-08-04 RX ORDER — DOCUSATE SODIUM 100 MG/1
100 CAPSULE, LIQUID FILLED ORAL 2 TIMES DAILY
Qty: 10 CAPSULE | Refills: 0 | Status: SHIPPED | OUTPATIENT
Start: 2019-08-04

## 2019-08-04 RX ORDER — POLYETHYLENE GLYCOL 3350 17 G/17G
17 POWDER, FOR SOLUTION ORAL DAILY
Qty: 14 EACH | Refills: 0 | Status: SHIPPED | OUTPATIENT
Start: 2019-08-05

## 2019-08-04 RX ADMIN — DOCUSATE SODIUM 100 MG: 100 CAPSULE, LIQUID FILLED ORAL at 10:17

## 2019-08-04 RX ADMIN — LORAZEPAM 0.5 MG: 0.5 TABLET ORAL at 10:17

## 2019-08-04 RX ADMIN — POLYETHYLENE GLYCOL 3350 17 G: 17 POWDER, FOR SOLUTION ORAL at 10:17

## 2019-08-04 RX ADMIN — LORAZEPAM 0.5 MG: 0.5 TABLET ORAL at 12:21

## 2019-08-04 RX ADMIN — LORAZEPAM 0.5 MG: 0.5 TABLET ORAL at 04:41

## 2019-08-04 NOTE — DISCHARGE SUMMARY
Discharge- Wilmore Severe 1940, 66 y o  female MRN: [id number]    [facility] Encounter: [id number]    Primary Care Provider: Robin King DO   Date and time admitted to hospital: 7/21/2019  2:09 PM        * Fall  Assessment & Plan  Unclear etiology  Possibly due to hypercapnic respiratory failure  Patient has no memory of the event  Was found down in her home with multiple pressure ulcerations  Local wound care, turn q 2 hours    Encephalopathy  Assessment & Plan  Mental status waxes and wanes  Appears to be more awake alert today  Continue with the p r n  Ativan for anxiety      GI bleeding  Assessment & Plan  Patient had melena with heme-positive stools  Currently refusing endoscopy  Refusing PPI  Pt to go for hospice at SNF today    Dehydration  Assessment & Plan  Refusing IV fluids and meals  Plan is to transition to skilled nursing facility with hospice      Vulvar mass  Assessment & Plan  Status post biopsy by OBGYN  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)  Patient is currently refusing all medical care and requesting hospice    Acute and chronic respiratory failure with hypercapnia [facility])  Assessment & Plan  Currently comfort care  resp status stable  Ambulatory dysfunction  Assessment & Plan  Discharge planning in progress  Discharge to SNF with hospice when a bed is available    Severe protein-calorie malnutrition ([address] )  Assessment & Plan  Malnutrition Findings:   Malnutrition type: Chronic illness(in the setting of severe orbital-hollow look, depressions dark [place] reflects body fat loss; temples hollowing, scooping, depression, clavicle protruding prominent bone reflects muscle mass loss; treated with rec for PO diet when deemed safe to swallow )  Degree of Malnutrition: Other severe protein calorie malnutrition    BMI Findings:  BMI Classifications: Underweight < 18 5(BMI = 14 93)     Body mass index is 15 16 kg/m²       Currently comfort care  Pleasure feeds only-patient with decreased p o  Intake    Pressure injury of sacral region, stage 3 (HCC)  Assessment & Plan  Local wound care  Frequent repositioning as able with the comfort care    Pressure injury of head, stage 1  Assessment & Plan  Local wound care          Discharging Physician / Practitioner: Nadira Samuels MD  PCP: Rosemarie Winston DO  Admission Date:   Admission Orders (From admission, onward)     Ordered        07/21/19 1427  Inpatient Admission  Once                   Discharge Date: 08/04/19    Resolved Problems  Date Reviewed: 8/4/2019          Resolved    Hypokalemia 7/27/2019     Resolved by  Lety Conley MD          Consultations During Hospital Stay:  · Ob Gyn, GI, Geriatrics,     Significant Findings / Test Results:     US right upper quadrant   Final Result by Blanca Beatty MD (07/26 1056)      Contracted gallbladder without definite sonographic findings of acute cholecystitis or biliary ductal dilatation  Small volume of perihepatic ascites  Small right pleural effusion  Workstation performed: [id number]         CT head wo contrast   Final Result by Heron Meneses MD (07/25 1945)      No acute intracranial hemorrhage seen   Mild periventricular and white matter hypodensity seen related to chronic small vessel ischemic changes                  Workstation performed: [id number]         XR chest portable ICU   Final Result by Jacob Hale MD (07/23 0809)      No acute cardiopulmonary disease  Workstation performed: [id number]         XR chest portable ICU   Final Result by Kami Mcpherson MD (07/22 1114)      No radiographic evidence of aspiration or other acute cardiopulmonary abnormality  Workstation performed: [id number]         XR pelvis ap only 1 or 2 vw   Final Result by Daniel Gonzales MD (07/21 1914)      No acute osseous abnormality           Workstation performed: [id number]         XR femur 2 vw right   Final Result by Louis Mac MD (07/21 1913)      No acute osseous abnormality  Workstation performed: [id number]         CT spine cervical without contrast   Final Result by Clementine Gaspar MD (07/21 1453)      No acute fracture or dislocation  Workstation performed: [id number]         CT chest abdomen pelvis wo contrast   Final Result by Clementine Gaspar MD (07/21 1450)      Limited exam without IV or oral contrast and motion artifact  No acute posttraumatic CT noncontrast findings  Lung nodule for which nonemergent outpatient unenhanced chest CT is recommended in 3 months, I cannot exclude neoplasm  Workstation performed: [id number]         CT head wo contrast   Final Result by Clementine Gaspar MD (07/21 6943)      No acute intracranial abnormality  Left scalp soft tissue swelling /left preseptal/left forehead soft tissue swelling  Workstation performed: [id number]         XR trauma multiple   Final Result by Mony Munguia MD (07/21 1518)      1  Upper lobe predominant interstitial changes  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, favor sarcoidosis  2   No acute cardiopulmonary findings  3   No acute osseous findings at the pelvis  Workstation performed: [id number]         XR chest 1 view   Final Result by Mony Munguia MD (07/22 1041)      1  Upper lobe predominant interstitial changes  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, favor sarcoidosis  2   No acute cardiopulmonary findings  3   No acute osseous findings at the pelvis  Workstation performed: [id number]         XR pelvis ap only 1 or 2 vw   Final Result by Mony Munguia MD (07/22 1041)      1  Upper lobe predominant interstitial changes  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, favor sarcoidosis  2   No acute cardiopulmonary findings  3   No acute osseous findings at the pelvis                 Workstation performed: [id number]              Complications:  none    Reason for Admission: fall - found down by a family member    Hospital Course:     Ghazala Butler is a 66 y o  female patient who originally presented to the hospital on 7/21/2019 she was unresponsive with a  she was she admitted to trauma service but then later was transferred to medical critical care because hypoxic and hypercapnic respiratory failure  Patient was placed on BiPAP transiently that improved her CO2 levels and mentation  She was transferred out of ICU  The respiratory failure was thought to be possibly from aspiration pneumonitis  Patient also had few episodes of melena with heme-positive stools  She refused any endoscopic intervention is also refused PPI  Goals of care discussion was held with the patient the family  Patient opted for comfort directed care  She did not want any interventions or further testing  So patient will be discharged to nursing home today with hospice  Please see above list of diagnoses and related plan for additional information  Condition at Discharge: good     Discharge Day Visit / Exam:     Subjective:  Pt seen and examined by me this morning  She was awake and alert  Denied any specific complaints  Said that she was comfortable  Vitals: Blood Pressure: 114/72 (08/04/19 0729)  Pulse: 90 (08/04/19 0729)  Temperature: 98 8 °F (37 1 °C) (08/04/19 0729)  Temp Source: Oral (08/04/19 0729)  Respirations: 16 (08/04/19 0729)  Height: 4' 10" (147 3 cm) (07/22/19 1729)  Weight - Scale: 32 9 kg (72 lb 8 5 oz) (07/31/19 0536)  SpO2: 93 % (08/04/19 0729)     Exam:   Physical Exam    Constitutional: Pt appears well-developed and poorly nourished  Not in any acute distress  Cardiovascular: tachycardia, regular rhythm, normal heart sounds  Exam reveals no gallop and no friction rub  No murmur heard  Pulmonary/Chest: Effort normal and breath sounds normal  No respiratory distress  Pt has no wheezes or rales  Abdominal: Soft  Non-distended, Non-tender   Bowel sounds are normal  Musculoskeletal: Normal range of motion  Neurological:  Awake and alert  Moving extremities spontaneously  Followed commands  Psychiatric: normal mood and affect  Discussion with Family: CM informed daughter of DC to SNF with hospice today    Discharge instructions/Information to patient and family:   See after visit summary for information provided to patient and family  Provisions for Follow-Up Care:  See after visit summary for information related to follow-up care and any pertinent home health orders  Disposition:     Other: [facility] with Hospice    Planned Readmission: no     Discharge Statement:  I spent 40 minutes discharging the patient  This time was spent on the day of discharge  I had direct contact with the patient on the day of discharge  Greater than 50% of the total time was spent examining patient, answering all patient questions, arranging and discussing plan of care with patient as well as directly providing post-discharge instructions  Additional time then spent on discharge activities  Discharge Medications:  See after visit summary for reconciled discharge medications provided to patient and family        ** Please Note: This note has been constructed using a voice recognition system **

## 2019-08-04 NOTE — SOCIAL WORK
Pt for d/c today to [facility], to be transported by [facility] at 12pm via bls  CM notified Pt's daughter/POA Ewa Reed, RN Prakash Valera, [address] [place] [address] Nursing Supervisor Monica Barcenas of d/c arrangements  CM made Monica Barcenas aware of the Pt's previous 1:1 status

## 2019-08-04 NOTE — PROGRESS NOTES
Patient pulled off hydrocolloid off left jaw line  Educated patient on the importance of having it on, patient refused

## 2019-08-04 NOTE — ASSESSMENT & PLAN NOTE
Malnutrition Findings:   Malnutrition type: Chronic illness(in the setting of severe orbital-hollow look, depressions dark [place] reflects body fat loss; temples hollowing, scooping, depression, clavicle protruding prominent bone reflects muscle mass loss; treated with rec for PO diet when deemed safe to swallow )  Degree of Malnutrition: Other severe protein calorie malnutrition    BMI Findings:  BMI Classifications: Underweight < 18 5(BMI = 14 93)     Body mass index is 15 16 kg/m²  Currently comfort care  Pleasure feeds only-patient with decreased p o   Intake

## 2019-08-04 NOTE — ASSESSMENT & PLAN NOTE
Patient had melena with heme-positive stools  Currently refusing endoscopy  Refusing PPI    Pt to go for hospice at SNF today

## 2019-08-05 ENCOUNTER — NURSING HOME VISIT (OUTPATIENT)
Dept: GERIATRICS | Facility: OTHER | Age: 79
End: 2019-08-05
Payer: MEDICARE

## 2019-08-05 DIAGNOSIS — L89.153 PRESSURE INJURY OF SACRAL REGION, STAGE 3 (HCC): ICD-10-CM

## 2019-08-05 DIAGNOSIS — R26.2 AMBULATORY DYSFUNCTION: ICD-10-CM

## 2019-08-05 DIAGNOSIS — N90.89 VULVAR MASS: ICD-10-CM

## 2019-08-05 DIAGNOSIS — K92.1 GASTROINTESTINAL HEMORRHAGE WITH MELENA: Primary | ICD-10-CM

## 2019-08-05 DIAGNOSIS — G93.40 ENCEPHALOPATHY: ICD-10-CM

## 2019-08-05 DIAGNOSIS — E43 SEVERE PROTEIN-CALORIE MALNUTRITION (GOMEZ: LESS THAN 60% OF STANDARD WEIGHT) (HCC): ICD-10-CM

## 2019-08-05 DIAGNOSIS — F41.9 ANXIETY: ICD-10-CM

## 2019-08-05 PROCEDURE — 99305 1ST NF CARE MODERATE MDM 35: CPT | Performed by: FAMILY MEDICINE

## 2019-08-05 NOTE — PROGRESS NOTES
[address]  [address]  Facility: [facility]/  NAME: Mary Muller  AGE: 66 y o  SEX: female    DATE OF ENCOUNTER: 8/5/2019    Code status:  No CPR    Assessment and Plan   Gastrointestinal bleeding  Pt had refused, endoscopy or any further eval, needs NH care with comfort care as the goal      Ambulatory dysfunction  Multifactorial, will need NH care    Severe protein-calorie malnutrition Musa Jiang: less than 60% of standard weight) ([facility] )  Needs NH care, also on hospice care  Will DC haldol, will continue with comfort package of morphine  Anxiety  On schedule and prn Ativan, RX for 180  Tabs written  Pressure injury of sacral region, stage 3 ([facility] [address] )  Will continue with [facility] order and monitoring  Encephalopathy  Multifactorial with residual confusion  Vulvar mass  No further w/u, goal is comfort care  All medications and routine orders were reviewed and updated as needed  Plan discussed with: Nurse    Chief Complaint     Seen for admission at [address]    History of Present Illness   Pt with PMSF and medical problem as this note who was admitted to [facility] [place] on 07/21/19 after a fall, now is at [facility] since yesterday for continuing care and is on hospice care, pt is confused and is unable to provide info, answers to the questions not appropriately, pt denies any pain  Per staff pt has sacral wound, not eating much and is confused  BP has been stable     HISTORY:  Past Medical History:   Diagnosis Date    Ambulatory dysfunction     Encephalopathy     Gastrointestinal bleeding     Respiratory failure (HCC)     Severe protein-calorie malnutrition (Dallin Neal: less than 60% of standard weight) (HCC)     Vulvar mass      Family History   Problem Relation Age of Onset    No Known Problems Mother     No Known Problems Father      Social History     Socioeconomic History    Marital status: Single     Spouse name: None    Number of children: None    Years of education: None    Highest education level: None   Occupational History    None   Social Needs    Financial resource strain: None    Food insecurity:     Worry: None     Inability: None    Transportation needs:     Medical: None     Non-medical: None   Tobacco Use    Smoking status: Never Smoker    Smokeless tobacco: Never Used   Substance and Sexual Activity    Alcohol use: Not Currently     Frequency: Monthly or less     Drinks per session: 1 or 2     Binge frequency: Less than monthly     Comment: only socially in the past    Drug use: Never    Sexual activity: Not Currently     Partners: Male     Birth control/protection: None   Lifestyle    Physical activity:     Days per week: None     Minutes per session: None    Stress: None   Relationships    Social connections:     Talks on phone: None     Gets together: None     Attends Sabianism service: None     Active member of club or organization: None     Attends meetings of clubs or organizations: None     Relationship status: None    Intimate partner violence:     Fear of current or ex partner: None     Emotionally abused: None     Physically abused: None     Forced sexual activity: None   Other Topics Concern    None   Social History Narrative    None       Allergies:  No Known Allergies    Review of Systems     Review of Systems   Reason unable to perform ROS: pt is confused, denies any pain  Medications and orders     All medications reviewed and updated in Nursing Home EMR  Objective     Vitals: Wt:67 5Ibs  BP:123/62   Temp:98 5  MA:94  RR:20    Physical Exam   Constitutional: No distress  HENT:   Head: Normocephalic and atraumatic  Right Ear: External ear normal    Left Ear: External ear normal    Nose: Nose normal    Mouth/Throat: Oropharynx is clear and moist  No oropharyngeal exudate  Eyes: Pupils are equal, round, and reactive to light   Conjunctivae and EOM are normal  Right eye exhibits no discharge  Left eye exhibits no discharge  No scleral icterus  Orbital hollow look   Neck: Normal range of motion  Neck supple  Cardiovascular: Normal rate, regular rhythm, normal heart sounds and intact distal pulses  Exam reveals no gallop and no friction rub  No murmur heard  Pulmonary/Chest: Effort normal and breath sounds normal  No stridor  No respiratory distress  She has no wheezes  She has no rales  She exhibits no tenderness  Abdominal: Soft  Bowel sounds are normal  She exhibits no distension and no mass  There is no tenderness  There is no rebound and no guarding  No hernia  Genitourinary:   Genitourinary Comments: Deferred  Musculoskeletal: Normal range of motion  She exhibits no edema  Clavicle protruding prominnet bone, muscle mass  Very limited ROM of UE and LEs  Lymphadenopathy:     She has no cervical adenopathy  Neurological:   Confused, not oriented  Skin: Skin is warm  She is diaphoretic  Mid lower abdomen large, thick raised skin change, no drainage, B? L hip has superficial circular skin change  I didn't examine sacral area  L jaw angle, scabbed raise area, no drainage  Psychiatric: She has a normal mood and affect  Pertinent Laboratory/Diagnostic Studies: The following labs/studies were reviewed please see chart or hospital paperwork for details  CBC, BMP  CT head  CXR  Pelvic and  R Femur xray  CT spine  CT C/A/P: pulmonary nodule           Sarah Coleman MD  2019 1:08 PM      Name: Mateo Tate  : 1940  MRN: [id number]  DOS: 2019  BILLING CODE: [id number]  Diagnoses:   Diagnosis ICD-10-CM Associated Orders   1  Gastrointestinal hemorrhage with melena K92 1    2  Ambulatory dysfunction R26 2    3  Severe protein-calorie malnutrition Sameer Pack: less than 60% of standard weight) ([address] ) E43    4  Anxiety F41 9    5  Pressure injury of sacral region, stage 3 ([address] ) L89 153    6  Encephalopathy G93 40    7   Vulvar mass N90 9

## 2019-08-05 NOTE — ASSESSMENT & PLAN NOTE
Needs NH care, also on hospice care  Will DC haldol, will continue with comfort package of morphine

## 2019-08-25 ENCOUNTER — TELEPHONE (OUTPATIENT)
Dept: OTHER | Facility: OTHER | Age: 79
End: 2019-08-25

## 2019-08-25 NOTE — TELEPHONE ENCOUNTER
Miriam called from [facility] [place]/[phone]/PT: Patricia Dave/:1940/PT  bumped her head

## 2019-09-04 ENCOUNTER — NURSING HOME VISIT (OUTPATIENT)
Dept: GERIATRICS | Facility: OTHER | Age: 79
End: 2019-09-04
Payer: MEDICARE

## 2019-09-04 DIAGNOSIS — K59.09 OTHER CONSTIPATION: ICD-10-CM

## 2019-09-04 DIAGNOSIS — F41.9 ANXIETY: Primary | ICD-10-CM

## 2019-09-04 DIAGNOSIS — E43 SEVERE PROTEIN-CALORIE MALNUTRITION (GOMEZ: LESS THAN 60% OF STANDARD WEIGHT) (HCC): ICD-10-CM

## 2019-09-04 DIAGNOSIS — L89.890 PRESSURE INJURY OF OTHER SITE, UNSTAGEABLE (HCC): ICD-10-CM

## 2019-09-04 PROBLEM — L89.95 UNSTAGEABLE PRESSURE ULCER (HCC): Status: ACTIVE | Noted: 2019-09-04

## 2019-09-04 PROCEDURE — 99309 SBSQ NF CARE MODERATE MDM 30: CPT | Performed by: FAMILY MEDICINE

## 2019-09-04 NOTE — PROGRESS NOTES
[facility]  [address]  [phone]  Facility: [place] [address]          NAME: Yokasta Walker  AGE: 66 y o  SEX: female    DATE OF ENCOUNTER: 9/4/2019    Chief Complaint     Pt denies any pain  History of Present Illness     HPI    The following portions of the patient's history were reviewed and updated as appropriate (from facility chart and hospital records): allergies, current medications, past family history, past medical history, past social history, past surgical history and problem list   Pt was seen and examined for f/u on wt loss, anxiety disorder, protein-calorie malnutrition,constipation, ambulatory dysfunction, SP area unstegeable wound  Continues with NH care and hospice care, some wt gain since admission in August, WC bound, meal completion 50% in average  Pt has no complaint  Pt's suprapubic ulcer is stable, wound team on board  Review of Systems     Review of Systems   Reason unable to perform ROS: limited with pt's confusion, pt repeats herself several times  Constitutional: Negative  Overall denies any pain or discomfort  HENT: Negative  Eyes: Negative  Respiratory: Negative  Cardiovascular: Negative  Gastrointestinal: Negative  Endocrine: Negative  Genitourinary: Negative  Musculoskeletal: Negative  Skin: Negative  Allergic/Immunologic: Negative  Neurological: Negative  Hematological: Negative  Psychiatric/Behavioral: Negative  All other systems reviewed and are negative        Active Problem List     Patient Active Problem List   Diagnosis    Fall    Pressure injury of head, stage 1    Pressure injury of sacral region, stage 3 ([address] )    Scalp hematoma    Severe protein-calorie malnutrition ([address] )    Ambulatory dysfunction    Acute and chronic respiratory failure with hypercapnia (HCC)    Vulvar mass    Dehydration    Encephalopathy    GI bleeding    Severe protein-calorie malnutrition Franklin Ax: less than 60% of standard weight) (HCC)    Respiratory failure (HCC)    Gastrointestinal bleeding    Anxiety    Other constipation    Unstageable pressure ulcer (HCC)       Objective     Vitals: WT:70 2  BP:148/80     Physical Exam   Constitutional: She appears well-developed and well-nourished  No distress  HENT:   Head: Normocephalic and atraumatic  Right Ear: External ear normal    Left Ear: External ear normal    Nose: Nose normal    Mouth/Throat: Oropharynx is clear and moist  No oropharyngeal exudate  Eyes: Pupils are equal, round, and reactive to light  Conjunctivae and EOM are normal  Right eye exhibits no discharge  Left eye exhibits no discharge  No scleral icterus  Neck: Normal range of motion  Neck supple  Cardiovascular: Normal rate, regular rhythm and normal heart sounds  Exam reveals no gallop and no friction rub  No murmur heard  Pulmonary/Chest: Effort normal and breath sounds normal  No stridor  No respiratory distress  She has no wheezes  She has no rales  She exhibits no tenderness  Abdominal: Soft  Bowel sounds are normal  She exhibits no distension and no mass  There is no tenderness  There is no rebound and no guarding  No hernia  Genitourinary:   Genitourinary Comments: Deferred  Musculoskeletal: Normal range of motion  She exhibits no edema, tenderness or deformity  In bed limited ROM of UEs, LEs, in bed, sitting  Lymphadenopathy:     She has no cervical adenopathy  Neurological: She is alert  A cranial nerve deficit is present  Confused,not oriented, follows simple commands  Skin: Skin is warm and dry  No rash noted  She is not diaphoretic  No erythema  No pallor  Long toe nails  Suprapubic area thick black area, loosely connected to the skin  Psychiatric: She has a normal mood and affect  Repeats herself  Nursing note and vitals reviewed        Pertinent Laboratory/Diagnostic Studies:  No labs    Current Medications   Medication list in facility chart was reviewed and no changes made  Assessment and Plan   Anxiety  On Scheduled and Prn Ativan, and Mirtazapine, stable wt at her base  Other constipation  Stable with current BM protocol and meds, stable overall, will continue with same meds  Severe protein-calorie malnutrition Jesus Breech: less than 60% of standard weight) ([facility] )  Eating better, some wt gain, will continue with hospice and NH care  Unstageable pressure ulcer (HCC)  Of Supra pubic area, stable, will continue monitoring  Name: Makayla Chavez  : 1940  MRN: [id number]  DOS: 2019  BILLING CODE: [id number]  Diagnoses:   Diagnosis ICD-10-CM Associated Orders   1  Anxiety F41 9    2  Other constipation K59 09    3  Severe protein-calorie malnutrition Jesus Breech: less than 60% of standard weight) ([facility] ) E43    4   Pressure injury of other site, unstageable [facility]) L89 890          Say Ford MD  20193:00 PM

## 2019-10-04 ENCOUNTER — NURSING HOME VISIT (OUTPATIENT)
Dept: GERIATRICS | Facility: OTHER | Age: 79
End: 2019-10-04
Payer: MEDICARE

## 2019-10-04 DIAGNOSIS — L89.890 PRESSURE INJURY OF OTHER SITE, UNSTAGEABLE (HCC): ICD-10-CM

## 2019-10-04 DIAGNOSIS — K59.09 OTHER CONSTIPATION: Primary | ICD-10-CM

## 2019-10-04 DIAGNOSIS — E43 SEVERE PROTEIN-CALORIE MALNUTRITION (HCC): ICD-10-CM

## 2019-10-04 DIAGNOSIS — F41.9 ANXIETY: ICD-10-CM

## 2019-10-04 PROCEDURE — 99309 SBSQ NF CARE MODERATE MDM 30: CPT | Performed by: FAMILY MEDICINE

## 2019-10-04 NOTE — PROGRESS NOTES
[address] [facility]  [address]  [phone]  Facility: [facility]          NAME: Arianna Hopper  AGE: 78 y o  SEX: female    DATE OF ENCOUNTER: 10/4/2019    Chief Complaint     Pt has no complaint except some constipation    History of Present Illness     HPI    The following portions of the patient's history were reviewed and updated as appropriate (from facility chart and hospital records): allergies, current medications, past family history, past medical history, past social history, past surgical history and problem list   Pt was seen and examined for f/u on PCM, anxiety, constipation, pt's sacral, and Suprapubic wound has healed, per staff pt gets anxious sometimes, continues with NH and hospice care, is gaining wt, meal completion is 50%, pt reports some constipation but BM record shows BM daily  Continues with scheduled Ativan, no other issues or concerns  Review of Systems     Review of Systems   Reason unable to perform ROS: limited with pt's confusion, overall no complaint except BM  Constitutional: Negative  HENT: Negative  Eyes: Negative  Respiratory: Negative  Cardiovascular: Negative  Gastrointestinal: Negative  Endocrine: Negative  Genitourinary: Negative  Musculoskeletal: Negative  Skin: Negative  Allergic/Immunologic: Negative  Neurological: Negative  Hematological: Negative  Psychiatric/Behavioral: Negative  All other systems reviewed and are negative        Active Problem List     Patient Active Problem List   Diagnosis    Fall    Pressure injury of head, stage 1    Pressure injury of sacral region, stage 3 ([address] )    Scalp hematoma    Severe protein-calorie malnutrition ([address] )    Ambulatory dysfunction    Acute and chronic respiratory failure with hypercapnia (HCC)    Vulvar mass    Dehydration    Encephalopathy    GI bleeding    Severe protein-calorie malnutrition Cindra Guilhermeberger: less than 60% of standard weight) ([facility] )    Respiratory failure ([facility] )    Gastrointestinal bleeding    Anxiety    Other constipation    Unstageable pressure ulcer (HCC)       Objective     Vitals: Wt:72 5Ibs  BP: 139/77      Physical Exam   Constitutional: She appears well-developed and well-nourished  No distress  HENT:   Head: Normocephalic and atraumatic  Right Ear: External ear normal    Left Ear: External ear normal    Nose: Nose normal    Mouth/Throat: Oropharynx is clear and moist  No oropharyngeal exudate  Eyes: Pupils are equal, round, and reactive to light  Conjunctivae and EOM are normal  Right eye exhibits no discharge  Left eye exhibits no discharge  No scleral icterus  Neck: Normal range of motion  Neck supple  Cardiovascular: Normal rate, regular rhythm and normal heart sounds  Exam reveals no gallop and no friction rub  No murmur heard  Pulmonary/Chest: Effort normal and breath sounds normal  No stridor  No respiratory distress  She has no wheezes  She has no rales  She exhibits no tenderness  Abdominal: Soft  Bowel sounds are normal  She exhibits no distension and no mass  There is no tenderness  There is no rebound and no guarding  No hernia  Genitourinary:   Genitourinary Comments: Deferred  Musculoskeletal: She exhibits no edema, tenderness or deformity  In WC sitting, no asymmetry, moves UEs, LEs  Lymphadenopathy:     She has no cervical adenopathy  Neurological: A cranial nerve deficit is present  No oriented, forgetful, follows simple commands  Skin: Skin is warm and dry  No rash noted  She is not diaphoretic  There is erythema  No pallor  Didn't examine sacral area  Psychiatric: She has a normal mood and affect  Her behavior is normal    Nursing note and vitals reviewed  Pertinent Laboratory/Diagnostic Studies:  No labs, on hospice care  Current Medications   Medication list in facility chart was reviewed and no changes made         Assessment and Plan     Other constipation  More subjective report, report shows BM on daily bases, on Colace, will continue with monitoring  Anxiety  Requires Ativan, needs NH care  Severe protein-calorie malnutrition ([address] )      Gaining some wt, continues with NH and hospice care  Unstageable pressure ulcer (HCC)  Healed  Name: Dione Narayan  : 1940  MRN: [id number]  DOS: 10/4/2019  BILLING CODE: [id number]  Diagnoses:   Diagnosis ICD-10-CM Associated Orders   1  Other constipation K59 09    2  Anxiety F41 9    3  Severe protein-calorie malnutrition ([address] ) E43    4   Pressure injury of other site, unstageable ([facility] ) [id number]          Ketan Baker MD  10/4/98237:51 PM

## 2019-11-06 ENCOUNTER — NURSING HOME VISIT (OUTPATIENT)
Dept: GERIATRICS | Facility: OTHER | Age: 79
End: 2019-11-06
Payer: MEDICARE

## 2019-11-06 DIAGNOSIS — F41.9 ANXIETY: ICD-10-CM

## 2019-11-06 DIAGNOSIS — E43 SEVERE PROTEIN-CALORIE MALNUTRITION (GOMEZ: LESS THAN 60% OF STANDARD WEIGHT) (HCC): ICD-10-CM

## 2019-11-06 DIAGNOSIS — R03.0 ELEVATED BLOOD-PRESSURE READING, WITHOUT DIAGNOSIS OF HYPERTENSION: ICD-10-CM

## 2019-11-06 DIAGNOSIS — K59.09 OTHER CONSTIPATION: Primary | ICD-10-CM

## 2019-11-06 PROCEDURE — 99309 SBSQ NF CARE MODERATE MDM 30: CPT | Performed by: FAMILY MEDICINE

## 2019-11-06 NOTE — ASSESSMENT & PLAN NOTE
Stable, has gained some wt, with wt stable for last 2 months, on protein supplement  Continues with NH and hospice care

## 2019-12-09 ENCOUNTER — NURSING HOME VISIT (OUTPATIENT)
Dept: GERIATRICS | Facility: OTHER | Age: 79
End: 2019-12-09
Payer: MEDICARE

## 2019-12-09 DIAGNOSIS — F41.9 ANXIETY: ICD-10-CM

## 2019-12-09 DIAGNOSIS — E43 SEVERE PROTEIN-CALORIE MALNUTRITION (GOMEZ: LESS THAN 60% OF STANDARD WEIGHT) (HCC): ICD-10-CM

## 2019-12-09 DIAGNOSIS — K59.09 OTHER CONSTIPATION: ICD-10-CM

## 2019-12-09 DIAGNOSIS — R03.0 ELEVATED BLOOD-PRESSURE READING, WITHOUT DIAGNOSIS OF HYPERTENSION: Primary | ICD-10-CM

## 2019-12-09 PROBLEM — L89.811: Status: RESOLVED | Noted: 2019-07-21 | Resolved: 2019-12-09

## 2019-12-09 PROBLEM — J96.22 ACUTE AND CHRONIC RESPIRATORY FAILURE WITH HYPERCAPNIA (HCC): Status: RESOLVED | Noted: 2019-07-22 | Resolved: 2019-12-09

## 2019-12-09 PROBLEM — L89.153 PRESSURE INJURY OF SACRAL REGION, STAGE 3 (HCC): Status: RESOLVED | Noted: 2019-07-21 | Resolved: 2019-12-09

## 2019-12-09 PROBLEM — L89.95 UNSTAGEABLE PRESSURE ULCER (HCC): Status: RESOLVED | Noted: 2019-09-04 | Resolved: 2019-12-09

## 2019-12-09 PROBLEM — G93.40 ENCEPHALOPATHY: Status: RESOLVED | Noted: 2019-07-26 | Resolved: 2019-12-09

## 2019-12-09 PROCEDURE — 99309 SBSQ NF CARE MODERATE MDM 30: CPT | Performed by: FAMILY MEDICINE

## 2019-12-09 NOTE — PROGRESS NOTES
[facility]  [address]  [phone]  Facility: [place] [address]          NAME: Juan Diego Wheeler  AGE: 78 y o  SEX: female    DATE OF ENCOUNTER: 12/9/2019    Chief Complaint     Pt has no complaint    History of Present Illness     HPI    The following portions of the patient's history were reviewed and updated as appropriate (from facility chart and hospital records): allergies, current medications, past family history, past medical history, past social history, past surgical history and problem list   Pt was seen and examined for f/u on wt loss, Constipation, severe protein calorie malnutirtion,anxiety, pt continues with NH care, and hospice care, gaining wt  Pt's BP has been at high side, repeat today at acceptable levels  Eats well, BM in average every 1-2 days  Still needs scheduled Ativan  Review of Systems     Review of Systems   Unable to perform ROS: Other (cognitive impairment)   All other systems reviewed and are negative  Active Problem List     Patient Active Problem List   Diagnosis    Fall    Pressure injury of head, stage 1    Pressure injury of sacral region, stage 3 ([address] )    Scalp hematoma    Severe protein-calorie malnutrition ([address] )    Ambulatory dysfunction    Acute and chronic respiratory failure with hypercapnia (HCC)    Vulvar mass    Dehydration    Encephalopathy    GI bleeding    Severe protein-calorie malnutrition Edilia Page: less than 60% of standard weight) (HCC)    Respiratory failure (HCC)    Gastrointestinal bleeding    Anxiety    Other constipation    Unstageable pressure ulcer (HCC)    Elevated blood-pressure reading, without diagnosis of hypertension       Objective     Vitals: Wt:79 5 Ibs    BP;148/72   Afebrile  Physical Exam   Constitutional: She appears well-developed and well-nourished  No distress  HENT:   Head: Normocephalic and atraumatic     Right Ear: External ear normal    Left Ear: External ear normal    Nose: Nose normal    Mouth/Throat: Oropharynx is clear and moist  No oropharyngeal exudate  [place]   Eyes: Pupils are equal, round, and reactive to light  Conjunctivae and EOM are normal  Right eye exhibits no discharge  Left eye exhibits no discharge  No scleral icterus  Neck: Normal range of motion  Neck supple  Cardiovascular: Normal rate, regular rhythm and normal heart sounds  Exam reveals no gallop and no friction rub  No murmur heard  Pulmonary/Chest: Effort normal and breath sounds normal  No stridor  No respiratory distress  She has no wheezes  She has no rales  She exhibits no tenderness  Abdominal: Soft  Bowel sounds are normal  She exhibits no distension and no mass  There is no tenderness  There is no rebound and no guarding  No hernia  Genitourinary:   Genitourinary Comments: Deferred  Musculoskeletal: Normal range of motion  She exhibits no edema, tenderness or deformity  Lymphadenopathy:     She has no cervical adenopathy  Neurological: A cranial nerve deficit is present  Confused, not oriented  Forgetful  Skin: Skin is warm and dry  No rash noted  She is not diaphoretic  There is erythema  No pallor  Didn't examine sacral area  Psychiatric: She has a normal mood and affect  Her behavior is normal    Nursing note and vitals reviewed  Pertinent Laboratory/Diagnostic Studies:  No labs  Current Medications   Medication list in facility chart was reviewed and no changes made  Assessment and Plan   Elevated blood-pressure reading, without diagnosis of hypertension  Will check BP manually for next 7 days, if still elevated will consider adding low dose of Norvasc  Anxiety  On Remeron, and scheduled Ativan, needs to continue with meds  Severe protein-calorie malnutrition Muriel Sensor: less than 60% of standard weight) ([address] )  Wt has been stable, will continue with monitoring  Other constipation  BM is stable  Will continue with same management             Name: Mary Muniz  : 1940  MRN: [id number]  DOS: 2019  BILLING CODE: [id number]  Diagnoses:   Diagnosis ICD-10-CM Associated Orders   1  Elevated blood-pressure reading, without diagnosis of hypertension R03 0    2  Anxiety F41 9    3  Severe protein-calorie malnutrition Graciela Micheal: less than 60% of standard weight) ([facility] ) E43    4   Other constipation K59 09          Ladi Brenner MD  20191:09 PM

## 2019-12-11 NOTE — PHYSICAL THERAPY NOTE
Physical Therapy Tx Session:       07/25/19 1220   Pain Assessment   Pain Assessment 0-10   Pain Score 6   Pain Type Acute pain   Pain Location Abdomen   Pain Orientation Right; Lower;Mid   Hospital Pain Intervention(s) Repositioned; Ambulation/increased activity; Elevated; Emotional support; Rest   Restrictions/Precautions   Other Precautions Cognitive; Chair Alarm;Multiple lines;Telemetry;O2;Fall Risk;Pain  (2 L NC O2;SpO2:95% throughout mobility and at rest)   General   Chart Reviewed Yes   Family/Caregiver Present Yes  (sister and brother in law)   Cognition   Overall Cognitive Status Impaired   Arousal/Participation Responsive   Attention Difficulty attending to directions   Orientation Level Oriented to person;Oriented to place; Disoriented to time;Disoriented to situation   Following Commands Follows one step commands with increased time or repetition  (2* dec cognition,slow mobility,pain)   Subjective   Subjective pt sitting upright in chair resting comfortably;pt willing and agreeable to work with PT and to participate in therapy intervention;"I can try, let's go  But I have to try and use the BR, everything feels tight in here"   Bed Mobility   Supine to Sit Unable to assess  (pt sitting in chair pre and post mobility)   Transfers   Sit to Stand 3  Moderate assistance   Additional items Assist x 1; Armrests; Increased time required;Verbal cues   Stand to Sit 3  Moderate assistance   Additional items Assist x 1; Armrests; Increased time required;Verbal cues  (for safety,education and control descent)   Ambulation/Elevation   Gait pattern Poor UE support; Antalgic;Narrow NADJA; Forward Flexion; Inconsistent jasmeet; Foward flexed; Short stride; Ataxia   Gait Assistance 4  Minimal assist   Additional items Assist x 1;Verbal cues; Tactile cues  (with second person A for chair follow)   Assistive Device Rolling walker   Distance 100 feet with use of RW on tile and hardwood va and chair follow;inc L lateral lean and · Codman Hakim shunt placed 07/01/2019  · dialed from [address] to [id number] in October when patient had a subdural hematoma  · See plan above steering of RW towards L side;difficulty avoiding obstacles on L side;needs constant max instruction and cueing for RW management and mobility,gait sequence   Balance   Static Sitting Good  (in chair with chair alarm intact)   Dynamic Sitting Poor   Static Standing Poor   Dynamic Standing Poor +   Ambulatory Poor +   Endurance Deficit   Endurance Deficit Yes   Endurance Deficit Description use of 2 L NC O2,fatigue,weakness,pain   Activity Tolerance   Activity Tolerance Patient limited by fatigue;Patient limited by pain  (fair)   Nurse Made Aware yes   Exercises   Hip Flexion Sitting;10 reps;AROM; Bilateral   Hip Abduction Sitting;10 reps;AROM; Bilateral   Hip Adduction Sitting;10 reps;AROM; Bilateral   Knee AROM Long Arc Quad Sitting;10 reps;AROM; Bilateral   Ankle Pumps Sitting;20 reps;AROM; Bilateral   Assessment   Prognosis Fair   Problem List Decreased strength;Decreased endurance; Impaired balance;Decreased mobility; Decreased cognition; Impaired judgement;Decreased safety awareness;Decreased skin integrity;Pain   Assessment pt able to inc ambulation distance to 100 feet with use of RW on various surfaces needing minAx1 and chair follow  Inc L lateral lean and L steering of RW during upright mobility  Pt needs constant max verbal instruction and cueing for RW management,manuvering and gait sequence during upright mobility  Pt able to perform sit to stand transfers modAx1  Pt fatigues quickly and reports inc ABD pain during mobility and at rest  Pt able to perform and complete BLE ther ex HEP in sitting AROM  Pt would cont to benefit from skilled inpt PT services to maximize functional independence  Barriers to Discharge Inaccessible home environment   Goals   Patient Goals to try and "go"   STG Expiration Date 08/02/19   Treatment Day 1   Plan   Treatment/Interventions Functional transfer training;LE strengthening/ROM; Therapeutic exercise; Endurance training;Patient/family training;Equipment eval/education; Bed mobility;Gait training;Spoke to MD;Spoke to nursing;Spoke to case management; Family   Progress Slow progress, decreased activity tolerance   PT Frequency Other (Comment)  (3-5x/week)   Recommendation   Recommendation Short-term skilled PT   Equipment Recommended Walker  (current use of RW for mobility)   Skilled PT recommended while in hospital and upon DC to progress pt toward treatment goals

## 2020-01-20 ENCOUNTER — NURSING HOME VISIT (OUTPATIENT)
Dept: GERIATRICS | Facility: OTHER | Age: 80
End: 2020-01-20
Payer: MEDICARE

## 2020-01-20 DIAGNOSIS — E43 SEVERE PROTEIN-CALORIE MALNUTRITION (GOMEZ: LESS THAN 60% OF STANDARD WEIGHT) (HCC): Primary | ICD-10-CM

## 2020-01-20 DIAGNOSIS — R26.2 AMBULATORY DYSFUNCTION: ICD-10-CM

## 2020-01-20 DIAGNOSIS — F41.9 ANXIETY: ICD-10-CM

## 2020-01-20 DIAGNOSIS — K59.09 OTHER CONSTIPATION: ICD-10-CM

## 2020-01-20 PROBLEM — R51.9 HEADACHE: Status: ACTIVE | Noted: 2020-01-20

## 2020-01-20 PROCEDURE — 99309 SBSQ NF CARE MODERATE MDM 30: CPT | Performed by: FAMILY MEDICINE

## 2020-01-20 NOTE — ASSESSMENT & PLAN NOTE
Pt reports hx of HA/migraines, will check BP closely with hx of some elevated BP, and will check labs, with close monitoring  Continue with PRN Tylenol

## 2020-01-20 NOTE — PROGRESS NOTES
exudate  [place]   Eyes: Pupils are equal, round, and reactive to light  Conjunctivae and EOM are normal  Right eye exhibits no discharge  Left eye exhibits no discharge  No scleral icterus  Neck: Normal range of motion  Neck supple  Cardiovascular: Normal rate, regular rhythm and normal heart sounds  Exam reveals no gallop and no friction rub  No murmur heard  Pulmonary/Chest: Effort normal and breath sounds normal  No stridor  No respiratory distress  She has no wheezes  She has no rales  She exhibits no tenderness  Abdominal: Soft  Bowel sounds are normal  She exhibits no distension and no mass  There is no tenderness  There is no rebound and no guarding  No hernia  Genitourinary:   Genitourinary Comments: Deferred  Musculoskeletal: Normal range of motion  She exhibits no edema, tenderness or deformity  Lymphadenopathy:     She has no cervical adenopathy  Neurological: A cranial nerve deficit is present  [place], follows simple commands  No oriented    Skin: Skin is warm and dry  No rash noted  She is not diaphoretic  There is erythema  No pallor  Didn't examine sacral area  Psychiatric: She has a normal mood and affect  Her behavior is normal    Nursing note and vitals reviewed  Pertinent Laboratory/Diagnostic Studies:  No labs     Current Medications   Medication list in facility chart was reviewed and no changes made  Assessment and Plan     Severe protein-calorie malnutrition Kings Lack: less than 60% of standard weight) ([facility] )  Off hospice as of today, improving , will continue with monitoring  Anxiety  Still requires Ativan, stable at her base  Other constipation  Stable with current meds  Headache  Pt reports hx of HA/migraines, will check BP closely with hx of some elevated BP, and will check labs, with close monitoring  Continue with PRN Tylenol  Ambulatory dysfunction  Needs NH care, on restorative care             Name: Thelma Carpenter  : 1940  MRN: [id number]  DOS: 1/20/2020  BILLING CODE: [id number]  Diagnoses:   Diagnosis ICD-10-CM Associated Orders   1  Severe protein-calorie malnutrition Coral Quest: less than 60% of standard weight) ([facility] ) E43    2  Anxiety F41 9    3  Other constipation K59 09    4   Ambulatory dysfunction R26 2          Aleksandra Burton MD  3/21/051168:24 PM

## 2020-02-24 ENCOUNTER — NURSING HOME VISIT (OUTPATIENT)
Dept: GERIATRICS | Facility: OTHER | Age: 80
End: 2020-02-24
Payer: MEDICARE

## 2020-02-24 DIAGNOSIS — K59.09 OTHER CONSTIPATION: ICD-10-CM

## 2020-02-24 DIAGNOSIS — F41.9 ANXIETY: Primary | ICD-10-CM

## 2020-02-24 DIAGNOSIS — R26.2 AMBULATORY DYSFUNCTION: ICD-10-CM

## 2020-02-24 DIAGNOSIS — E43 SEVERE PROTEIN-CALORIE MALNUTRITION (HCC): ICD-10-CM

## 2020-02-24 PROCEDURE — 99309 SBSQ NF CARE MODERATE MDM 30: CPT | Performed by: FAMILY MEDICINE

## 2020-02-24 NOTE — PROGRESS NOTES
[facility]  [address]  [phone]  Facility: [place] [address]          NAME: Silvana Alford  AGE: 78 y o  SEX: female    DATE OF ENCOUNTER: 2/24/2020    Chief Complaint     Pt has no complaint     History of Present Illness     HPI    The following portions of the patient's history were reviewed and updated as appropriate (from facility chart and hospital records): allergies, current medications, past family history, past medical history, past social history, past surgical history and problem list   Pt was seen and examined for f/u on anxiety disorder, constipation, PCM, ambulatory dysfunction  Walks with saff/ on restorative care, using a walker otherwise moves using her WC  Per staff pt gets worked out with changes, and gets anxious,still requires Ativan  Pt has no complaint  BM is stable  No specific issues or concerns  Review of Systems     Review of Systems   Constitutional: Negative  Overall pt has no complaint    HENT: Negative  Eyes: Negative  Respiratory: Negative  Cardiovascular: Negative  Gastrointestinal: Negative  Endocrine: Negative  Genitourinary: Negative  Musculoskeletal: Negative  Skin: Negative  Allergic/Immunologic: Negative  Neurological: Negative  Hematological: Negative  Psychiatric/Behavioral: Negative  All other systems reviewed and are negative        Active Problem List     Patient Active Problem List   Diagnosis    Fall    Scalp hematoma    Severe protein-calorie malnutrition ([address] )    Ambulatory dysfunction    Vulvar mass    Dehydration    GI bleeding    Severe protein-calorie malnutrition Roland Lulaurie: less than 60% of standard weight) ([facility])    Gastrointestinal bleeding    Anxiety    Other constipation    Elevated blood-pressure reading, without diagnosis of hypertension    Headache       Objective     Vitals: wt:86 1Ibs   Temp:98 6   BP:122/72   MA:69    Physical Exam   Constitutional: She appears well-developed and well-nourished  No distress  HENT:   Head: Normocephalic and atraumatic  Right Ear: External ear normal    Left Ear: External ear normal    Nose: Nose normal    Mouth/Throat: Oropharynx is clear and moist  No oropharyngeal exudate  [place]   Eyes: Pupils are equal, round, and reactive to light  Conjunctivae and EOM are normal  Right eye exhibits no discharge  Left eye exhibits no discharge  No scleral icterus  Neck: Normal range of motion  Neck supple  Cardiovascular: Normal rate, regular rhythm and normal heart sounds  Exam reveals no gallop and no friction rub  No murmur heard  Pulmonary/Chest: Effort normal and breath sounds normal  No stridor  No respiratory distress  She has no wheezes  She has no rales  She exhibits no tenderness  Abdominal: Soft  Bowel sounds are normal  She exhibits no distension and no mass  There is no tenderness  There is no rebound and no guarding  No hernia  Genitourinary:   Genitourinary Comments: Deferred  Musculoskeletal: She exhibits no edema, tenderness or deformity  In WC, limited ROM , sitting  No asymmetry  Lymphadenopathy:     She has no cervical adenopathy  Neurological: She is alert  A cranial nerve deficit ([place]) is present  Not oriented  Skin: Skin is warm and dry  No rash noted  She is not diaphoretic  There is erythema  No pallor  Didn't examine sacral area  Psychiatric: She has a normal mood and affect  Her behavior is normal    Nursing note and vitals reviewed  Pertinent Laboratory/Diagnostic Studies:  CBC, CMP, TSH, Mg on 1/22/2020    Current Medications   Medication list in facility chart was reviewed and no changes made  Assessment and Plan     Anxiety  On scheduled Ativan, still get anxious at times  Severe protein-calorie malnutrition ([address] )   Gaining wt, stable  Will continue monitoring       Ambulatory dysfunction  Needs NH care, on restorative care, walks with her walker with staff, otherwise moves using her WC  Other constipation  No specific report, stable with current of bowel protocol  Name: Silvana Alford  : 1940  MRN: [id number]  DOS: 2020  BILLING CODE: [id number]  Diagnoses:   Diagnosis ICD-10-CM Associated Orders   1  Anxiety F41 9    2  Severe protein-calorie malnutrition ([facility] ) E43    3  Ambulatory dysfunction R26 2    4   Other constipation K59 09          Sabrina Byrne MD  [id number]:61 AM

## 2020-04-13 ENCOUNTER — NURSING HOME VISIT (OUTPATIENT)
Dept: GERIATRICS | Facility: OTHER | Age: 80
End: 2020-04-13
Payer: MEDICARE

## 2020-04-13 DIAGNOSIS — E43 SEVERE PROTEIN-CALORIE MALNUTRITION (GOMEZ: LESS THAN 60% OF STANDARD WEIGHT) (HCC): Primary | ICD-10-CM

## 2020-04-13 DIAGNOSIS — K59.09 OTHER CONSTIPATION: ICD-10-CM

## 2020-04-13 DIAGNOSIS — F41.9 ANXIETY: ICD-10-CM

## 2020-04-13 DIAGNOSIS — R03.0 ELEVATED BLOOD-PRESSURE READING, WITHOUT DIAGNOSIS OF HYPERTENSION: ICD-10-CM

## 2020-04-13 PROCEDURE — 99309 SBSQ NF CARE MODERATE MDM 30: CPT | Performed by: FAMILY MEDICINE

## 2020-05-13 ENCOUNTER — NURSING HOME VISIT (OUTPATIENT)
Dept: GERIATRICS | Facility: OTHER | Age: 80
End: 2020-05-13
Payer: MEDICARE

## 2020-05-13 DIAGNOSIS — R51.9 NONINTRACTABLE HEADACHE, UNSPECIFIED CHRONICITY PATTERN, UNSPECIFIED HEADACHE TYPE: ICD-10-CM

## 2020-05-13 DIAGNOSIS — E43 SEVERE PROTEIN-CALORIE MALNUTRITION (HCC): ICD-10-CM

## 2020-05-13 DIAGNOSIS — K59.09 OTHER CONSTIPATION: ICD-10-CM

## 2020-05-13 DIAGNOSIS — R03.0 ELEVATED BLOOD-PRESSURE READING, WITHOUT DIAGNOSIS OF HYPERTENSION: ICD-10-CM

## 2020-05-13 DIAGNOSIS — F41.9 ANXIETY: Primary | ICD-10-CM

## 2020-05-13 PROBLEM — E86.0 DEHYDRATION: Status: RESOLVED | Noted: 2019-07-24 | Resolved: 2020-05-13

## 2020-05-13 PROBLEM — K92.2 GI BLEEDING: Status: RESOLVED | Noted: 2019-07-27 | Resolved: 2020-05-13

## 2020-05-13 PROCEDURE — 99309 SBSQ NF CARE MODERATE MDM 30: CPT | Performed by: FAMILY MEDICINE

## 2020-05-20 ENCOUNTER — NURSING HOME VISIT (OUTPATIENT)
Dept: GERIATRICS | Facility: OTHER | Age: 80
End: 2020-05-20
Payer: MEDICARE

## 2020-05-20 DIAGNOSIS — I10 ESSENTIAL HYPERTENSION: Primary | ICD-10-CM

## 2020-05-20 PROCEDURE — 99308 SBSQ NF CARE LOW MDM 20: CPT | Performed by: FAMILY MEDICINE

## 2020-06-15 ENCOUNTER — NURSING HOME VISIT (OUTPATIENT)
Dept: GERIATRICS | Facility: OTHER | Age: 80
End: 2020-06-15
Payer: MEDICARE

## 2020-06-15 DIAGNOSIS — K59.09 OTHER CONSTIPATION: ICD-10-CM

## 2020-06-15 DIAGNOSIS — E43 SEVERE PROTEIN-CALORIE MALNUTRITION (HCC): ICD-10-CM

## 2020-06-15 DIAGNOSIS — F41.9 ANXIETY: ICD-10-CM

## 2020-06-15 DIAGNOSIS — R26.2 AMBULATORY DYSFUNCTION: ICD-10-CM

## 2020-06-15 DIAGNOSIS — R51.9 NONINTRACTABLE HEADACHE, UNSPECIFIED CHRONICITY PATTERN, UNSPECIFIED HEADACHE TYPE: ICD-10-CM

## 2020-06-15 DIAGNOSIS — I10 ESSENTIAL HYPERTENSION: Primary | ICD-10-CM

## 2020-06-15 PROCEDURE — 99309 SBSQ NF CARE MODERATE MDM 30: CPT | Performed by: FAMILY MEDICINE

## 2020-08-05 ENCOUNTER — NURSING HOME VISIT (OUTPATIENT)
Dept: GERIATRICS | Facility: OTHER | Age: 80
End: 2020-08-05
Payer: MEDICARE

## 2020-08-05 DIAGNOSIS — K59.09 OTHER CONSTIPATION: ICD-10-CM

## 2020-08-05 DIAGNOSIS — F41.9 ANXIETY: ICD-10-CM

## 2020-08-05 DIAGNOSIS — E43 SEVERE PROTEIN-CALORIE MALNUTRITION (HCC): Primary | ICD-10-CM

## 2020-08-05 DIAGNOSIS — R51.9 NONINTRACTABLE HEADACHE, UNSPECIFIED CHRONICITY PATTERN, UNSPECIFIED HEADACHE TYPE: ICD-10-CM

## 2020-08-05 DIAGNOSIS — I10 ESSENTIAL HYPERTENSION: ICD-10-CM

## 2020-08-05 PROBLEM — S00.03XA SCALP HEMATOMA: Status: RESOLVED | Noted: 2019-07-21 | Resolved: 2020-08-05

## 2020-08-05 PROCEDURE — 99309 SBSQ NF CARE MODERATE MDM 30: CPT | Performed by: FAMILY MEDICINE

## 2020-08-05 NOTE — PROGRESS NOTES
[facility]  [address]  [phone]  Facility: [place] [address]          NAME: Marino Wood  AGE: 78 y o  SEX: female    DATE OF ENCOUNTER: 8/5/2020    Chief Complaint     Pt has no complaint     History of Present Illness     HPI    The following portions of the patient's history were reviewed and updated as appropriate (from facility chart and hospital records): allergies, current medications, past family history, past medical history, past social history, past surgical history and problem list   Pt was seen and examined for f/u on PCM, wt loss, headache, HTN,anxiety  Pt's headache is resolved with being on Amlodipine  BP has been stable  Continues with NH care  Anxiety stable  Pt's meal completion has been variable  Some wt gain and some wt loss, but wt has been stable overall  Review of Systems     Review of Systems   Constitutional: Negative  "I used to get headache not anymore"   HENT: Negative  Eyes: Negative  Respiratory: Negative  Cardiovascular: Negative  Gastrointestinal: Negative  Endocrine: Negative  Genitourinary: Negative  Musculoskeletal: Negative  Skin: Negative  Allergic/Immunologic: Negative  Neurological: Negative  Hematological: Negative  Psychiatric/Behavioral: Negative  All other systems reviewed and are negative  Active Problem List     Patient Active Problem List   Diagnosis    Fall    Severe protein-calorie malnutrition ([address] )    Ambulatory dysfunction    Vulvar mass    Severe protein-calorie malnutrition ([place]ewski: less than 60% of standard weight) ([facility])    Anxiety    Other constipation    Elevated blood-pressure reading, without diagnosis of hypertension    Headache    Essential hypertension       Objective     Vitals: wt:82 9Ibs               BP:138/70         Afebrile     Physical Exam   Constitutional: She appears well-developed  She does not appear ill  No distress     HENT:   Head: Normocephalic and atraumatic  Right Ear: External ear normal    Left Ear: External ear normal    Nose: Nose normal  No rhinorrhea or congestion  Mouth/Throat: No oropharyngeal exudate  Eyes: Pupils are equal, round, and reactive to light  Conjunctivae are normal  Right eye exhibits no discharge  Left eye exhibits no discharge  No scleral icterus  Neck: Normal range of motion  Neck supple  No muscular tenderness present  Cardiovascular: Normal rate, regular rhythm and normal heart sounds  Exam reveals no gallop and no friction rub  No murmur heard  Pulmonary/Chest: Effort normal and breath sounds normal  No stridor  No respiratory distress  She has no wheezes  She has no rhonchi  She has no rales  She exhibits no tenderness  Abdominal: Soft  Normal appearance and bowel sounds are normal  She exhibits no distension and no mass  There is no abdominal tenderness  There is no rebound and no guarding  No hernia  Genitourinary:    Genitourinary Comments: Deferred  Musculoskeletal: Normal range of motion  General: No swelling, tenderness or deformity  Right lower leg: No edema  Left lower leg: No edema  Lymphadenopathy:     She has no cervical adenopathy  Neurological: She is alert  A cranial nerve deficit is present  Follows commands, not oriented, forgetful  Skin: Skin is warm and dry  No bruising and no rash noted  She is not diaphoretic  No erythema  No pallor  Didn't examine sacral area  Psychiatric: Her behavior is normal    Nursing note and vitals reviewed  Pertinent Laboratory/Diagnostic Studies:  Mg, TSH, CBC, BMP in May    Current Medications   Medication list in facility chart was reviewed and no changes made  Assessment and Plan     Severe protein-calorie malnutrition ([address] )  On protein supplement, some wt gain and wt loss, but overall stable  Will continue with monitoring          Headache  Appears that has been resolved, with pt's BP now controlled, will continue with prn Tylenol, and monitoring  Essential hypertension  BP is controlled now with being on Amlodipine, will continue with monitoring  Anxiety  On Mirtazapine, and Ativan, will continue with monitoring  Other constipation  BM is stable with bowel protocol         Lizzette Mas MD

## 2020-08-05 NOTE — ASSESSMENT & PLAN NOTE
Appears that has been resolved, with pt's BP now controlled, will continue with prn Tylenol, and monitoring

## 2020-09-09 ENCOUNTER — NURSING HOME VISIT (OUTPATIENT)
Dept: GERIATRICS | Facility: OTHER | Age: 80
End: 2020-09-09
Payer: MEDICARE

## 2020-09-09 DIAGNOSIS — F41.9 ANXIETY: ICD-10-CM

## 2020-09-09 DIAGNOSIS — I10 ESSENTIAL HYPERTENSION: Primary | ICD-10-CM

## 2020-09-09 DIAGNOSIS — E44.0 MODERATE PROTEIN-CALORIE MALNUTRITION (HCC): ICD-10-CM

## 2020-09-09 DIAGNOSIS — K59.09 OTHER CONSTIPATION: ICD-10-CM

## 2020-09-09 DIAGNOSIS — R26.2 AMBULATORY DYSFUNCTION: ICD-10-CM

## 2020-09-09 PROCEDURE — 99309 SBSQ NF CARE MODERATE MDM 30: CPT | Performed by: FAMILY MEDICINE

## 2020-09-09 NOTE — ASSESSMENT & PLAN NOTE
BP is overall stable and within acceptable levels with couple of episodes of elevated levels  Will continue with monitoring

## 2020-09-09 NOTE — PROGRESS NOTES
[facility]  [address]  [phone]  Facility: [place] [address]          NAME: Chiquita Cage  AGE: [de-identified] y o  SEX: female    DATE OF ENCOUNTER: 9/9/2020    Chief Complaint     Pt has no specific complaint     History of Present Illness     HPI    The following portions of the patient's history were reviewed and updated as appropriate (from facility chart and hospital records): allergies, current medications, past family history, past medical history, past social history, past surgical history and problem list  Pt was seen and examined for f/u on HTN, anxiety, constipation, PCM, ambulatory dysfunction  Pt's wt has been stable  Continues with NH care  BP mostly stable and acceptable, with couple of times elevated levels  Pt has no specific complains  Moves using her WC  No other specific issues or concerns  Anxiety has been stable with po meds  Per staff pt has been refusing her protein shakes recently when asked pt she reported that she was getting it for a long time and she thought it might have been enough but agreed to take it again  Review of Systems     Review of Systems   Constitutional: Negative  HENT: Negative  Eyes: Negative  Respiratory: Negative  Cardiovascular: Negative  Gastrointestinal: Negative  Endocrine: Negative  Genitourinary: Negative  Musculoskeletal: Negative  Skin: Negative  Allergic/Immunologic: Negative  Neurological: Negative  Hematological: Negative  Psychiatric/Behavioral: Negative  All other systems reviewed and are negative        Active Problem List     Patient Active Problem List   Diagnosis    Fall    Severe protein-calorie malnutrition ([address] )    Ambulatory dysfunction    Vulvar mass    Moderate protein-calorie malnutrition (HCC)    Anxiety    Other constipation    Headache    Essential hypertension       Objective     Vitals: wt:85 3Ibs             BP:140/70   Afebrile     Physical Exam  Vitals signs and nursing note reviewed  Constitutional:       General: She is not in acute distress  Appearance: Normal appearance  She is well-developed  She is not ill-appearing, toxic-appearing or diaphoretic  HENT:      Head: Normocephalic and atraumatic  Right Ear: External ear normal       Left Ear: External ear normal       Ears:      Comments: [place]     Nose: Nose normal       Mouth/Throat:      Mouth: Mucous membranes are moist       Pharynx: No oropharyngeal exudate or posterior oropharyngeal erythema  Eyes:      General: No scleral icterus  Right eye: No discharge  Left eye: No discharge  Conjunctiva/sclera: Conjunctivae normal       Pupils: Pupils are equal, round, and reactive to light  Neck:      Musculoskeletal: Normal range of motion and neck supple  No neck rigidity or muscular tenderness  Cardiovascular:      Rate and Rhythm: Normal rate and regular rhythm  Heart sounds: Normal heart sounds  No murmur  No friction rub  No gallop  Pulmonary:      Effort: Pulmonary effort is normal  No respiratory distress  Breath sounds: Normal breath sounds  No stridor  No wheezing, rhonchi or rales  Chest:      Chest wall: No tenderness  Abdominal:      General: Bowel sounds are normal  There is no distension  Palpations: Abdomen is soft  There is no mass  Tenderness: There is no abdominal tenderness  There is no guarding or rebound  Hernia: No hernia is present  Genitourinary:     Comments: Deferred  Musculoskeletal: Normal range of motion  General: No swelling, tenderness, deformity or signs of injury  Right lower leg: No edema  Left lower leg: No edema  Comments: In [place], sitting, limited ROM of UEs and LEs  Lymphadenopathy:      Cervical: No cervical adenopathy  Skin:     General: Skin is warm and dry  Coloration: Skin is not jaundiced or pale  Findings: Erythema present  No bruising, lesion or rash  Comments: Didn't examine sacral area  Neurological:      Mental Status: She is alert  Cranial Nerves: Cranial nerve deficit ([place]) present  Comments: Forgetful  Psychiatric:         Behavior: Behavior normal            Pertinent Laboratory/Diagnostic Studies:  Last lab done in May     Current Medications   Medication list in facility chart was reviewed and no changes made  Assessment and Plan   Essential hypertension  BP is overall stable and within acceptable levels with couple of episodes of elevated levels  Will continue with monitoring  Ambulatory dysfunction  On restorative care, moves using her WC  Needs NH care  Anxiety  Stable with Mirtazapine and ativan, will continue with monitoring  Moderate protein-calorie malnutrition ([address] )      Wt has been stable, pt agreed to take her  protein supplement  Will continue with monitoring  Other constipation  No issues reported  Will continue with monitoring         Seamus Borges MD

## 2020-10-07 ENCOUNTER — NURSING HOME VISIT (OUTPATIENT)
Dept: GERIATRICS | Facility: OTHER | Age: 80
End: 2020-10-07
Payer: MEDICARE

## 2020-10-07 DIAGNOSIS — E43 SEVERE PROTEIN-CALORIE MALNUTRITION (HCC): ICD-10-CM

## 2020-10-07 DIAGNOSIS — F41.9 ANXIETY: Primary | ICD-10-CM

## 2020-10-07 DIAGNOSIS — R26.2 AMBULATORY DYSFUNCTION: ICD-10-CM

## 2020-10-07 DIAGNOSIS — K59.09 OTHER CONSTIPATION: ICD-10-CM

## 2020-10-07 DIAGNOSIS — I10 ESSENTIAL HYPERTENSION: ICD-10-CM

## 2020-10-07 PROCEDURE — 99309 SBSQ NF CARE MODERATE MDM 30: CPT | Performed by: FAMILY MEDICINE

## 2020-11-09 ENCOUNTER — NURSING HOME VISIT (OUTPATIENT)
Dept: GERIATRICS | Facility: OTHER | Age: 80
End: 2020-11-09
Payer: MEDICARE

## 2020-11-09 DIAGNOSIS — F41.9 ANXIETY: ICD-10-CM

## 2020-11-09 DIAGNOSIS — K59.09 OTHER CONSTIPATION: ICD-10-CM

## 2020-11-09 DIAGNOSIS — R26.2 AMBULATORY DYSFUNCTION: ICD-10-CM

## 2020-11-09 DIAGNOSIS — I10 ESSENTIAL HYPERTENSION: Primary | ICD-10-CM

## 2020-11-09 DIAGNOSIS — E44.0 MODERATE PROTEIN-CALORIE MALNUTRITION (HCC): ICD-10-CM

## 2020-11-09 PROCEDURE — 99309 SBSQ NF CARE MODERATE MDM 30: CPT | Performed by: FAMILY MEDICINE

## 2020-11-13 ENCOUNTER — NURSING HOME VISIT (OUTPATIENT)
Dept: GERIATRICS | Facility: OTHER | Age: 80
End: 2020-11-13
Payer: MEDICARE

## 2020-11-13 DIAGNOSIS — R21 SKIN RASH: Primary | ICD-10-CM

## 2020-11-13 PROCEDURE — 99308 SBSQ NF CARE LOW MDM 20: CPT | Performed by: FAMILY MEDICINE

## 2020-11-23 RX ORDER — ACETAMINOPHEN 325 MG/1
650 TABLET ORAL EVERY 4 HOURS PRN
COMMUNITY

## 2020-11-23 RX ORDER — ACETAMINOPHEN 650 MG/1
650 SUPPOSITORY RECTAL EVERY 4 HOURS PRN
COMMUNITY

## 2020-11-23 RX ORDER — BISACODYL 10 MG
10 SUPPOSITORY, RECTAL RECTAL AS NEEDED
COMMUNITY

## 2020-11-23 RX ORDER — SALINE 7; 19 G/118ML; G/118ML
1 ENEMA RECTAL AS NEEDED
COMMUNITY

## 2020-11-23 RX ORDER — SORBITOL SOLUTION 70 %
30 SOLUTION, ORAL MISCELLANEOUS DAILY PRN
COMMUNITY

## 2020-11-23 RX ORDER — HYDROCORTISONE 25 MG/G
1 CREAM TOPICAL 2 TIMES DAILY
COMMUNITY

## 2020-11-23 RX ORDER — AMLODIPINE BESYLATE 5 MG/1
5 TABLET ORAL DAILY
COMMUNITY
End: 2022-05-15

## 2020-12-14 ENCOUNTER — NURSING HOME VISIT (OUTPATIENT)
Dept: GERIATRICS | Facility: OTHER | Age: 80
End: 2020-12-14
Payer: MEDICARE

## 2020-12-14 DIAGNOSIS — E44.0 MODERATE PROTEIN-CALORIE MALNUTRITION (HCC): ICD-10-CM

## 2020-12-14 DIAGNOSIS — F41.9 ANXIETY: Primary | ICD-10-CM

## 2020-12-14 DIAGNOSIS — I10 ESSENTIAL HYPERTENSION: ICD-10-CM

## 2020-12-14 DIAGNOSIS — K59.09 OTHER CONSTIPATION: ICD-10-CM

## 2020-12-14 PROBLEM — E43 SEVERE PROTEIN-CALORIE MALNUTRITION (HCC): Status: RESOLVED | Noted: 2019-07-21 | Resolved: 2020-12-14

## 2020-12-14 PROCEDURE — 99309 SBSQ NF CARE MODERATE MDM 30: CPT | Performed by: FAMILY MEDICINE

## 2020-12-16 DIAGNOSIS — F41.1 GAD (GENERALIZED ANXIETY DISORDER): Primary | ICD-10-CM

## 2020-12-16 RX ORDER — LORAZEPAM 0.5 MG/1
0.5 TABLET ORAL 4 TIMES DAILY
Qty: 120 TABLET | Refills: 0 | Status: SHIPPED | OUTPATIENT
Start: 2020-12-16 | End: 2021-04-09

## 2020-12-23 NOTE — CONSULTS
Consultation - GI   Danni Acosta 66 y o  female MRN: [id number]  Unit/Bed#: [facility] 933-01 Encounter: [id number]      Assessment/Plan     Assessment:  1  Melena- 70-year-old female with FOBT positive x2, had a bowel movement last night suspected to contain blood  Patient passing melenic stool during attempted rectal exam this morning  Last reported hemoglobin of 11 1 and hematocrit of 35 8  Vitals remained stable with pulse of 91 and blood pressure 114/64, no signs of orthostasis or focal abdominal tenderness  Per patient's daughter, the patient had a duodenal ulcer about 35 years ago  Patient unsure of when her last EGD/colonoscopy was performed  Patient currently on clear liquid diet  Melena likely due to either right-sided GI bleed or past history of upper GI bleed  · Continue clear liquids  · Monitor orthostatic blood pressure  · Continue to monitor labs (H&H, platelets, BUN, creatinine)  · Patient preference for comfort care route  · Continue current medical management per primary team      History of Present Illness   Physician Requesting Consult: Tania Waite MD  Reason for Consult / Principal Problem:  Lower GI bleed  Hx and PE limited by:   HPI: Danni Acosta is a 66y o  year old female who presents with baseline dementia, hypertension status post fall, found down for approximately 24 hours  The patient was found have multiple pressure wounds been no osseous trauma per CT  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  GI was consulted for positive fecal occult blood test x2  Per nursing she had an episode loose stool last night that appeared bloody in consistency  This morning a rectal exam was attempted, however the patient began to pass a good amount of melenic stool  The patient's vitals remain stable  The patient denies additional fatigue, nausea, vomiting, or focal abdominal tenderness                Inpatient consult to gastroenterology     Date/Time 7/26/2019 10:49 AM     Performed by  Vitor Valerio DO     Authorized by KATHIE Barber              Review of Systems   Constitutional: Positive for fatigue and unexpected weight change  Negative for activity change, appetite change, chills, diaphoresis and fever  HENT: Positive for trouble swallowing  Negative for congestion, sore throat and voice change  Respiratory: Negative for apnea and shortness of breath  Cardiovascular: Negative for chest pain  Gastrointestinal: Positive for anal bleeding, blood in stool and constipation  Negative for abdominal distention, abdominal pain, nausea, rectal pain and vomiting  Historical Information   History reviewed  No pertinent past medical history  History reviewed  No pertinent surgical history  Social History   Social History     Substance and Sexual Activity   Alcohol Use Not Currently    Frequency: Monthly or less    Drinks per session: 1 or 2    Binge frequency: Less than monthly    Comment: only socially in the past     Social History     Substance and Sexual Activity   Drug Use Never     Social History     Tobacco Use   Smoking Status Never Smoker   Smokeless Tobacco Never Used     Family History: History reviewed  No pertinent family history  Meds/Allergies   all current active meds have been reviewed    No Known Allergies    Objective       Intake/Output Summary (Last 24 hours) at 7/26/2019 1049  Last data filed at 7/26/2019 1000  Gross per 24 hour   Intake 2056 67 ml   Output 3425 ml   Net -1368 33 ml       Invasive Devices:   Peripheral IV 07/23/19 Right;Ventral (anterior) Forearm (Active)   Site Assessment Clean;Dry; Intact 7/25/2019  7:15 PM   Dressing Type Transparent 7/25/2019  7:15 PM   Line Status Infusing 7/25/2019  7:15 PM   Dressing Status Clean;Dry; Intact 7/25/2019  7:15 PM   Dressing Intervention Dressing reinforced 7/25/2019  7:15 PM   Dressing Change Due 07/27/19 7/25/2019  7:15 PM   Reason Not Rotated Not due 7/25/2019  7:15 PM       Peripheral IV 07/25/19 Left Forearm (Active)   Site Assessment Clean;Dry; Intact 7/25/2019 10:06 PM   Dressing Type Transparent 7/25/2019 10:06 PM   Line Status Flushed;Blood return noted; Saline locked 7/25/2019 10:06 PM   Dressing Status Clean;Dry; Intact 7/25/2019 10:06 PM   Dressing Change Due 07/29/19 7/25/2019 10:06 PM   Reason Not Rotated Not due 7/25/2019 10:06 PM       Urethral Catheter Non-latex 16 Fr  (Active)   Amt returned on insertion(mL) 200 mL 7/24/2019  1:09 AM   Site Assessment Clean;Skin intact 7/26/2019  8:01 AM   Collection Container Standard drainage bag 7/26/2019  8:01 AM   Securement Method Securing device (Describe) 7/26/2019  8:01 AM   Output (mL) 525 mL 7/26/2019  8:01 AM       Physical Exam   Constitutional: She appears distressed  HENT:   Head: Normocephalic and atraumatic  Nose: Nose normal    Eyes: Conjunctivae and EOM are normal  Right eye exhibits no discharge  Left eye exhibits no discharge  No scleral icterus  Neck: Normal range of motion  Abdominal: She exhibits no distension and no mass  There is no tenderness  There is no guarding  Faint bowel sounds appreciated in right lower quadrant, nondistended, nontender   Neurological: She is alert  Skin: Skin is warm and dry  She is not diaphoretic  Psychiatric: She has a normal mood and affect  Nursing note and vitals reviewed  Lab Results: I have personally reviewed pertinent reports  Imaging Studies: I have personally reviewed pertinent reports  EKG, Pathology, and Other Studies: I have personally reviewed pertinent reports  VTE Prophylaxis: Heparin    Counseling / Coordination of Care  Total floor / unit time spent today 30 minutes  Greater than 50% of total time was spent with the patient and / or family counseling and / or coordination of care   A description of the counseling / coordination of care: 30 DARIUS...

## 2021-04-09 ENCOUNTER — NURSING HOME VISIT (OUTPATIENT)
Dept: GERIATRICS | Facility: OTHER | Age: 81
End: 2021-04-09
Payer: MEDICARE

## 2021-04-09 DIAGNOSIS — R51.9 NONINTRACTABLE HEADACHE, UNSPECIFIED CHRONICITY PATTERN, UNSPECIFIED HEADACHE TYPE: ICD-10-CM

## 2021-04-09 DIAGNOSIS — K59.09 OTHER CONSTIPATION: ICD-10-CM

## 2021-04-09 DIAGNOSIS — E44.0 MODERATE PROTEIN-CALORIE MALNUTRITION (HCC): ICD-10-CM

## 2021-04-09 DIAGNOSIS — F41.9 ANXIETY: ICD-10-CM

## 2021-04-09 DIAGNOSIS — I10 ESSENTIAL HYPERTENSION: Primary | ICD-10-CM

## 2021-04-09 PROBLEM — N90.89 VULVAR MASS: Status: RESOLVED | Noted: 2019-07-24 | Resolved: 2021-04-09

## 2021-04-09 PROCEDURE — 99309 SBSQ NF CARE MODERATE MDM 30: CPT | Performed by: FAMILY MEDICINE

## 2021-04-09 RX ORDER — LORAZEPAM 0.5 MG/1
0.5 TABLET ORAL 3 TIMES DAILY
COMMUNITY
End: 2022-05-06 | Stop reason: SDUPTHER

## 2021-04-09 RX ORDER — MIRTAZAPINE 7.5 MG/1
7.5 TABLET, FILM COATED ORAL
COMMUNITY
End: 2022-05-15

## 2021-04-09 NOTE — PROGRESS NOTES
Patient scheduled her appointment for 6/4/18 [facility]  [address]  [phone]  Facility: [place] [address]          NAME: Dawood Blair  AGE: [de-identified] y o  SEX: female    DATE OF ENCOUNTER: 4/9/2021    Chief Complaint     Pt has no complaint     History of Present Illness     HPI    The following portions of the patient's history were reviewed and updated as appropriate (from facility chart and hospital records): allergies, current medications, past family history, past medical history, past social history, past surgical history and problem list   Pt was seen and examined for f/u on HTN, Anxiety, PCM, and constipation  Pt is getting more forgetful  BP stable  BM ok  Wt has been stable, meal completion is variable  Pt walks independently in her room  Still has some episodes of increased anxiety  Overall stable  Continues with NH care  Review of Systems     Review of Systems   Constitutional: Negative  "I used to get migraine headache but I don't get them anymore"   HENT: Negative  Eyes: Negative  Respiratory: Negative  Cardiovascular: Negative  Gastrointestinal: Negative  Endocrine: Negative  Genitourinary: Negative  Musculoskeletal: Negative  Skin: Negative  Allergic/Immunologic: Negative  Neurological: Negative  Hematological: Negative  Psychiatric/Behavioral: Negative  All other systems reviewed and are negative  Active Problem List     Patient Active Problem List   Diagnosis    Fall    Ambulatory dysfunction    Moderate protein-calorie malnutrition (HCC)    Anxiety    Other constipation    Headache    Essential hypertension    Skin rash       Objective     Vitals: wt:86 1Ibs       BP:130/70     NJ:98  RR:18      Afebrile     Physical Exam  Vitals signs and nursing note reviewed  Constitutional:       General: She is not in acute distress  Appearance: Normal appearance  She is well-developed  She is not ill-appearing, toxic-appearing or diaphoretic  HENT:      Head: Normocephalic and atraumatic  Right Ear: External ear normal       Left Ear: External ear normal       Nose: Nose normal  No congestion or rhinorrhea  Mouth/Throat:      Mouth: Mucous membranes are moist    Eyes:      General: No scleral icterus  Right eye: No discharge  Left eye: No discharge  Conjunctiva/sclera: Conjunctivae normal       Pupils: Pupils are equal, round, and reactive to light  Neck:      Musculoskeletal: Normal range of motion and neck supple  No neck rigidity or muscular tenderness  Cardiovascular:      Rate and Rhythm: Normal rate and regular rhythm  Heart sounds: Normal heart sounds  No murmur  No friction rub  No gallop  Pulmonary:      Effort: Pulmonary effort is normal  No respiratory distress  Breath sounds: Normal breath sounds  No stridor  No wheezing, rhonchi or rales  Chest:      Chest wall: No tenderness  Abdominal:      General: Abdomen is flat  Bowel sounds are normal  There is no distension  Palpations: Abdomen is soft  There is no mass  Tenderness: There is no abdominal tenderness  There is no guarding or rebound  Hernia: No hernia is present  Genitourinary:     Comments: Deferred  Musculoskeletal: Normal range of motion  General: No swelling, tenderness, deformity or signs of injury  Right lower leg: No edema  Left lower leg: No edema  Lymphadenopathy:      Cervical: No cervical adenopathy  Skin:     General: Skin is warm and dry  Coloration: Skin is not jaundiced or pale  Findings: No bruising, erythema, lesion or rash  Comments: Didn't examine sacral area  Neurological:      Mental Status: She is alert  Cranial Nerves: Cranial nerve deficit (slow processing ) present  Comments: Forgetful  Not oriented  Follows commands      Psychiatric:         Behavior: Behavior normal          Pertinent Laboratory/Diagnostic Studies:  10/14:CBC, CMP    Current Medications   Medication list in facility chart was reviewed and necessary changes made  Assessment and Plan     Essential hypertension  BP stable with Amlodipine  Anxiety  On decreased dose of Lorazepam since January  Stable  Will continue with Mirtazapine  Moderate protein-calorie malnutrition ([address] )  Variable meal completion, but stable wt   Will continue with monitoring  Other constipation  BM stable with current bowel protocol  Headache  No more headaches since pt's BP has been controlled       Nancy Casillas MD  0/7/66610:01 PM

## 2021-05-17 ENCOUNTER — NURSING HOME VISIT (OUTPATIENT)
Dept: GERIATRICS | Facility: OTHER | Age: 81
End: 2021-05-17
Payer: MEDICARE

## 2021-05-17 DIAGNOSIS — E44.0 MODERATE PROTEIN-CALORIE MALNUTRITION (HCC): ICD-10-CM

## 2021-05-17 DIAGNOSIS — I10 ESSENTIAL HYPERTENSION: Primary | ICD-10-CM

## 2021-05-17 DIAGNOSIS — K59.09 OTHER CONSTIPATION: ICD-10-CM

## 2021-05-17 DIAGNOSIS — F41.9 ANXIETY: ICD-10-CM

## 2021-05-17 PROCEDURE — 99309 SBSQ NF CARE MODERATE MDM 30: CPT | Performed by: FAMILY MEDICINE

## 2021-05-17 NOTE — PROGRESS NOTES
[facility]  [address]  [phone]  Facility: [place] [address]    NAME: Steph Van  AGE: [de-identified] y o  SEX: female    DATE OF ENCOUNTER: 5/17/2021    Chief Complaint     Pt has no specific complaint     History of Present Illness     HPI    The following portions of the patient's history were reviewed and updated as appropriate (from facility chart and hospital records): allergies, current medications, past family history, past medical history, past social history, past surgical history and problem list   Pt was seen and examined for f/u on Anxiety, HTN, PCM, constipation, HA  Pt has been stable  Walks around her bed  No reports of any problem of bowel movements  Anxiety is controlled with Ativan  Wt has been stable  Overall no specific issues or concerns  Last lab done in February  Review of Systems     Review of Systems   Constitutional: Negative  "I don't get any headache anymore"   HENT: Negative  Eyes: Negative  Respiratory: Negative  Cardiovascular: Negative  Gastrointestinal: Negative  Endocrine: Negative  Genitourinary: Negative  Musculoskeletal: Negative  Skin: Negative  Allergic/Immunologic: Negative  Neurological: Negative  Hematological: Negative  Psychiatric/Behavioral: Negative  All other systems reviewed and are negative  Active Problem List     Patient Active Problem List   Diagnosis    Fall    Ambulatory dysfunction    Moderate protein-calorie malnutrition (HCC)    Anxiety    Other constipation    Headache    Essential hypertension    Skin rash       Objective     Vitals: wt:85 6 IBs          BP:122/74       Afebrile     Physical Exam  Vitals signs and nursing note reviewed  Constitutional:       General: She is not in acute distress  Appearance: Normal appearance  She is well-developed  She is ill-appearing  She is not toxic-appearing or diaphoretic     HENT:      Head: Normocephalic and atraumatic  Right Ear: External ear normal       Left Ear: External ear normal       Ears:      Comments: [place]     Nose: Nose normal  No congestion or rhinorrhea  Mouth/Throat:      Pharynx: No oropharyngeal exudate or posterior oropharyngeal erythema  Comments: Missing teeth   Eyes:      General: No scleral icterus  Right eye: No discharge  Left eye: No discharge  Conjunctiva/sclera: Conjunctivae normal       Pupils: Pupils are equal, round, and reactive to light  Neck:      Musculoskeletal: Normal range of motion and neck supple  No neck rigidity or muscular tenderness  Cardiovascular:      Rate and Rhythm: Normal rate and regular rhythm  Heart sounds: Normal heart sounds  No murmur  No friction rub  No gallop  Pulmonary:      Effort: Pulmonary effort is normal  No respiratory distress  Breath sounds: Normal breath sounds  No stridor  No wheezing, rhonchi or rales  Chest:      Chest wall: No tenderness  Abdominal:      General: Bowel sounds are normal  There is no distension  Palpations: Abdomen is soft  There is no mass  Tenderness: There is no abdominal tenderness  There is no guarding or rebound  Hernia: No hernia is present  Genitourinary:     Comments: Deferred  Musculoskeletal: Normal range of motion  General: No swelling, tenderness, deformity or signs of injury  Right lower leg: No edema  Left lower leg: No edema  Comments: Sitting in bed, moves all extremities  Legs down  Lymphadenopathy:      Cervical: No cervical adenopathy  Skin:     General: Skin is warm and dry  Coloration: Skin is not jaundiced or pale  Findings: No bruising, erythema, lesion or rash  Comments: Didn't examine sacral area  Neurological:      Mental Status: She is alert  Cranial Nerves: Cranial nerve deficit ([place]) present        Comments: " I don't know the date!"   Psychiatric:         Behavior: Behavior normal  Pertinent Laboratory/Diagnostic Studies:  4/21: CBC, CMP    Current Medications   Medication list in facility chart was reviewed and no changes made  Assessment and Plan   Essential hypertension  BP stable with Amlodipine  Last lab done in February  Will continue with monitoring  Anxiety  Stable with Lorazepam, will continue with monitoring  Moderate protein-calorie malnutrition ([address] )  Stable with protein supplement  Wt stable  Will continue with monitoring  Other constipation  No issues with current bowel protocol  Will continue with monitoring       Romana Zaldivar MD  4/85/52956:14 PM

## 2021-06-16 ENCOUNTER — NURSING HOME VISIT (OUTPATIENT)
Dept: GERIATRICS | Facility: OTHER | Age: 81
End: 2021-06-16
Payer: MEDICARE

## 2021-06-16 DIAGNOSIS — I10 ESSENTIAL HYPERTENSION: ICD-10-CM

## 2021-06-16 DIAGNOSIS — F41.9 ANXIETY: Primary | ICD-10-CM

## 2021-06-16 DIAGNOSIS — E44.0 MODERATE PROTEIN-CALORIE MALNUTRITION (HCC): ICD-10-CM

## 2021-06-16 DIAGNOSIS — K59.09 OTHER CONSTIPATION: ICD-10-CM

## 2021-06-16 PROCEDURE — 99309 SBSQ NF CARE MODERATE MDM 30: CPT | Performed by: FAMILY MEDICINE

## 2021-06-17 NOTE — ASSESSMENT & PLAN NOTE
Stable with Ativan  Will consider to decrease the dose to 1/2 tab to 0 5mg in next visit if she stays stable

## 2021-06-17 NOTE — PROGRESS NOTES
[facility]  [address]  [phone]  Facility:[place] [address]          NAME: Clair Del Cid  AGE: [de-identified] y o  SEX: female    DATE OF ENCOUNTER: 6/16/2021    Chief Complaint     Pt has no specific complaint     History of Present Illness     HPI    The following portions of the patient's history were reviewed and updated as appropriate (from facility chart and hospital records): allergies, current medications, past family history, past medical history, past social history, past surgical history and problem list   Pt was seen and examined for f/u on constipation, anxiety, HTN, CPM, constipation  Pt has been stable  Continues with NH care  Walks in her room  BP overall stable  Last lab done in April BM stable  Wt has been stable  Anxiety is stable and controlled  No specific issues or concerns  Review of Systems     Review of Systems   Constitutional: Negative  HENT: Negative  Eyes: Negative  Respiratory: Negative  Cardiovascular: Negative  Gastrointestinal: Negative  Endocrine: Negative  Genitourinary: Negative  Musculoskeletal: Negative  Skin: Negative  Allergic/Immunologic: Negative  Neurological: Negative  "My memory is not good!"   Hematological: Negative  Psychiatric/Behavioral: Negative  All other systems reviewed and are negative  Active Problem List     Patient Active Problem List   Diagnosis    Fall    Ambulatory dysfunction    Moderate protein-calorie malnutrition (HCC)    Anxiety    Other constipation    Headache    Essential hypertension    Skin rash       Objective     Vitals: wt:86 8Ibs           BP:120/64          Afebrile            Physical Exam  Vitals and nursing note reviewed  Constitutional:       General: She is not in acute distress  Appearance: Normal appearance  She is well-developed  She is not ill-appearing, toxic-appearing or diaphoretic  HENT:      Head: Normocephalic and atraumatic  Right Ear: External ear normal       Left Ear: External ear normal       Nose: Nose normal  No congestion or rhinorrhea  Mouth/Throat:      Comments: Missing teeth   Eyes:      General: No scleral icterus  Right eye: No discharge  Left eye: No discharge  Conjunctiva/sclera: Conjunctivae normal       Pupils: Pupils are equal, round, and reactive to light  Cardiovascular:      Rate and Rhythm: Normal rate and regular rhythm  Heart sounds: Normal heart sounds  No murmur heard  No friction rub  No gallop  Pulmonary:      Effort: Pulmonary effort is normal  No respiratory distress  Breath sounds: Normal breath sounds  No stridor  No wheezing, rhonchi or rales  Chest:      Chest wall: No tenderness  Abdominal:      General: Bowel sounds are normal  There is no distension  Palpations: Abdomen is soft  There is no mass  Tenderness: There is no abdominal tenderness  There is no guarding or rebound  Hernia: No hernia is present  Genitourinary:     Comments: Deferred  Musculoskeletal:         General: No swelling, tenderness, deformity or signs of injury  Normal range of motion  Cervical back: Normal range of motion and neck supple  Right lower leg: No edema  Left lower leg: No edema  Comments: Sitting, moves all extremities  Lymphadenopathy:      Cervical: No cervical adenopathy  Skin:     General: Skin is warm and dry  Coloration: Skin is not jaundiced or pale  Findings: Erythema present  No bruising, lesion or rash  Comments: Didn't examine sacral area  Neurological:      Mental Status: She is alert  Cranial Nerves: Cranial nerve deficit present  Comments: "my memory is not good!"   Psychiatric:         Behavior: Behavior normal          Pertinent Laboratory/Diagnostic Studies:  CBC, CMP done in April     Current Medications   Medication list in facility chart was reviewed and no changes made  Assessment and Plan   Anxiety  Stable with Ativan  Will consider to decrease the dose to 1/2 tab to 0 5mg in next visit if she stays stable  Essential hypertension  BP stable with Amlodipine  Will monitor closely  Other constipation  No issues with current meds and management  Moderate protein-calorie malnutrition ([facility] [address] )  Wt has been stable       Sandor Galindo MD  6/74/23146:51 PM

## 2021-07-18 NOTE — PROGRESS NOTES
[facility]  [address]  [phone]  Facility: [place] [address]          NAME: Ghazala Butler  AGE: [de-identified] y o  SEX: female    DATE OF ENCOUNTER: 7/19/2021    Chief Complaint     Pt has no complaint     History of Present Illness     HPI    The following portions of the patient's history were reviewed and updated as appropriate (from facility chart and hospital records): allergies, current medications, past family history, past medical history, past social history, past surgical history and problem list  Pt was seen and examined for f/u on anxiety, constipation, HTN, PCM  Pt has been stable  Continues with gaining wt  Anxiety under control  No specific issues with BM  Meal completion is good  Walks using her walker  BP stable  No other specific issues or concerns  Last lab done in April  Review of Systems     Review of Systems   Constitutional: Negative  'I don't get headache anymore  HENT: Negative  Eyes: Negative  Respiratory: Negative  Cardiovascular: Negative  Gastrointestinal: Negative  Endocrine: Negative  Genitourinary: Negative  Musculoskeletal: Negative  Skin: Negative  Allergic/Immunologic: Negative  Neurological: Negative  Hematological: Negative  Psychiatric/Behavioral: Negative  All other systems reviewed and are negative  Active Problem List     Patient Active Problem List   Diagnosis    Fall    Ambulatory dysfunction    Moderate protein-calorie malnutrition (HCC)    Anxiety    Other constipation    Headache    Essential hypertension    Skin rash       Objective     Vitals: wt:88 6Ibs       BP log reviewed   Afebrile     Physical Exam  Vitals and nursing note reviewed  Constitutional:       General: She is not in acute distress  Appearance: Normal appearance  She is well-developed  She is not ill-appearing, toxic-appearing or diaphoretic  HENT:      Head: Normocephalic and atraumatic        Right Ear: External ear normal       Left Ear: External ear normal       Nose: Nose normal  No congestion or rhinorrhea  Mouth/Throat:      Mouth: Mucous membranes are moist       Comments: Missing teeth   Eyes:      General: No scleral icterus  Right eye: No discharge  Left eye: No discharge  Conjunctiva/sclera: Conjunctivae normal       Pupils: Pupils are equal, round, and reactive to light  Cardiovascular:      Rate and Rhythm: Normal rate and regular rhythm  Heart sounds: Normal heart sounds  No murmur heard  No friction rub  No gallop  Pulmonary:      Effort: Pulmonary effort is normal  No respiratory distress  Breath sounds: No stridor  No wheezing, rhonchi or rales  Chest:      Chest wall: No tenderness  Abdominal:      General: Bowel sounds are normal  There is no distension  Palpations: Abdomen is soft  There is no mass  Tenderness: There is no abdominal tenderness  There is no guarding or rebound  Hernia: No hernia is present  Genitourinary:     Comments: Deferred  Musculoskeletal:         General: No swelling, tenderness, deformity or signs of injury  Cervical back: Normal range of motion and neck supple  No rigidity or tenderness  Right lower leg: No edema  Left lower leg: No edema  Comments: Moves all extremities    Lymphadenopathy:      Cervical: No cervical adenopathy  Skin:     General: Skin is warm and dry  Coloration: Skin is not jaundiced or pale  Findings: Erythema present  No bruising, lesion or rash  Comments: Didn't examine sacral area  Neurological:      Mental Status: She is alert  Cranial Nerves: Cranial nerve deficit ([place]) present  Comments: Forgetful, follows simple commands      Psychiatric:         Mood and Affect: Mood normal          Behavior: Behavior normal          Pertinent Laboratory/Diagnostic Studies:  Last lab done in April     Current Medications   Medication list in facility chart was reviewed and no changes made  Assessment and Plan     Essential hypertension  BP stable with amlodipine, last lab done in April  Will recheck labs in September  Moderate protein-calorie malnutrition (HCC)  Stale, eating well  Will continue with monitoring  Anxiety  Will decrease Ativan to 1/2 tab in am and pm and will keep full tab at night  Close monitoring  Other constipation  No issues  Stable with current protocol and management       Clayton Walden MD  8/12/02208:90 PM

## 2021-07-19 ENCOUNTER — NURSING HOME VISIT (OUTPATIENT)
Dept: GERIATRICS | Facility: OTHER | Age: 81
End: 2021-07-19
Payer: MEDICARE

## 2021-07-19 DIAGNOSIS — E44.0 MODERATE PROTEIN-CALORIE MALNUTRITION (HCC): ICD-10-CM

## 2021-07-19 DIAGNOSIS — F41.9 ANXIETY: ICD-10-CM

## 2021-07-19 DIAGNOSIS — K59.09 OTHER CONSTIPATION: ICD-10-CM

## 2021-07-19 DIAGNOSIS — I10 ESSENTIAL HYPERTENSION: Primary | ICD-10-CM

## 2021-07-19 PROCEDURE — 99309 SBSQ NF CARE MODERATE MDM 30: CPT | Performed by: FAMILY MEDICINE

## 2021-08-20 ENCOUNTER — NURSING HOME VISIT (OUTPATIENT)
Dept: GERIATRICS | Facility: OTHER | Age: 81
End: 2021-08-20
Payer: MEDICARE

## 2021-08-20 DIAGNOSIS — F41.9 ANXIETY: Primary | ICD-10-CM

## 2021-08-20 DIAGNOSIS — K59.09 OTHER CONSTIPATION: ICD-10-CM

## 2021-08-20 DIAGNOSIS — I10 ESSENTIAL HYPERTENSION: ICD-10-CM

## 2021-08-20 DIAGNOSIS — E44.0 MODERATE PROTEIN-CALORIE MALNUTRITION (HCC): ICD-10-CM

## 2021-08-20 PROCEDURE — 99309 SBSQ NF CARE MODERATE MDM 30: CPT | Performed by: FAMILY MEDICINE

## 2021-08-20 RX ORDER — MIRTAZAPINE 15 MG/1
15 TABLET, FILM COATED ORAL
COMMUNITY

## 2021-08-20 NOTE — PROGRESS NOTES
[facility]  [address]  [phone]  Facility: [place] [address]          NAME: Memo Severe  AGE: [de-identified] y o  SEX: female    DATE OF ENCOUNTER: 8/20/2021    Chief Complaint     Pt has no specific complaint     History of Present Illness     HPI    The following portions of the patient's history were reviewed and updated as appropriate (from facility chart and hospital records): allergies, current medications, past family history, past medical history, past social history, past surgical history and problem list  Pt was seen and examined for f/u on HTN, PCM, anxiety, and constipation  Pt has been having more sundowning and getting tearful  Pt has been on lower dose of Ativan since last month  There was some changes with some maintenance issues in her current unit and all the residents were moved to another unit and its when pt started having new behaviors and mood changes and as she was getting used to the new unit the problem in her home unit was resolved and now all the residents are back to C2 unit  Pt acknowledge that she is "worried" person but she says its ok and she is getting better  Wt has been stable  BP stable  No HA  No specific report of any BM issues  On increased dose of Remeron  Review of Systems     Review of Systems   Constitutional: Negative  HENT: Negative  Eyes: Negative  Respiratory: Negative  Cardiovascular: Negative  Gastrointestinal: Negative  Endocrine: Negative  Genitourinary: Negative  Musculoskeletal: Negative  Skin: Negative  Allergic/Immunologic: Negative  Neurological: Negative  Hematological: Negative  Psychiatric/Behavioral: Negative  "I'm a anxious person, but I am OK!"   All other systems reviewed and are negative        Active Problem List     Patient Active Problem List   Diagnosis    Fall    Ambulatory dysfunction    Moderate protein-calorie malnutrition (HCC)    Anxiety    Other constipation  Headache    Essential hypertension    Skin rash       Objective     Vitals:wt:86 6Ibs         BP log reviewed   Afebrile     Physical Exam  Vitals and nursing note reviewed  Constitutional:       General: She is not in acute distress  Appearance: Normal appearance  She is well-developed  She is not ill-appearing, toxic-appearing or diaphoretic  HENT:      Head: Normocephalic and atraumatic  Right Ear: External ear normal       Left Ear: External ear normal       Nose: Nose normal  No congestion or rhinorrhea  Mouth/Throat:      Comments: Missing teeth   Eyes:      General: No scleral icterus  Right eye: No discharge  Left eye: No discharge  Conjunctiva/sclera: Conjunctivae normal       Pupils: Pupils are equal, round, and reactive to light  Cardiovascular:      Rate and Rhythm: Normal rate and regular rhythm  Heart sounds: Normal heart sounds  No murmur heard  No friction rub  No gallop  Pulmonary:      Effort: Pulmonary effort is normal  No respiratory distress  Breath sounds: Normal breath sounds  No stridor  No wheezing, rhonchi or rales  Chest:      Chest wall: No tenderness  Abdominal:      General: Bowel sounds are normal  There is no distension  Palpations: Abdomen is soft  There is no mass  Tenderness: There is no abdominal tenderness  There is no guarding or rebound  Hernia: No hernia is present  Musculoskeletal:         General: Deformity (slight kyphosis) present  No swelling, tenderness or signs of injury  Normal range of motion  Cervical back: Normal range of motion and neck supple  No rigidity or tenderness  Right lower leg: No edema  Left lower leg: No edema  Lymphadenopathy:      Cervical: No cervical adenopathy  Skin:     General: Skin is warm and dry  Coloration: Skin is not jaundiced or pale  Findings: No bruising, erythema, lesion or rash     Neurological:      Mental Status: She is alert       Cranial Nerves: Cranial nerve deficit ([place]) present  Comments: Follows commands  Forgetful          Pertinent Laboratory/Diagnostic Studies:  4/21: CMP, CBC    Current Medications   Medication list in facility chart was reviewed and no changes made  Assessment and Plan     Anxiety  With some increased symptoms, mostly because of changes in the unit and room changed, now back in her room, will check lab, close monitoring  On Remeron  Will check labs  Essential hypertension  BP stable with Amlodipine  Will continue with monitoring  Moderate protein-calorie malnutrition ([address] )  Wt has been stable  Will continue with monitoring  Other constipation  BM stable with current management           Sarah Coleman MD  1/32/91351:96 PM

## 2021-08-20 NOTE — ASSESSMENT & PLAN NOTE
With some increased symptoms, mostly because of changes in the unit and room changed, now back in her room, will check lab, close monitoring  On Remeron  Will check labs

## 2021-09-20 ENCOUNTER — NURSING HOME VISIT (OUTPATIENT)
Dept: GERIATRICS | Facility: OTHER | Age: 81
End: 2021-09-20
Payer: MEDICARE

## 2021-09-20 DIAGNOSIS — E44.0 MODERATE PROTEIN-CALORIE MALNUTRITION (HCC): ICD-10-CM

## 2021-09-20 DIAGNOSIS — F41.9 ANXIETY: ICD-10-CM

## 2021-09-20 DIAGNOSIS — I10 ESSENTIAL HYPERTENSION: Primary | ICD-10-CM

## 2021-09-20 DIAGNOSIS — K59.09 OTHER CONSTIPATION: ICD-10-CM

## 2021-09-20 PROCEDURE — 99309 SBSQ NF CARE MODERATE MDM 30: CPT | Performed by: FAMILY MEDICINE

## 2021-09-20 NOTE — PROGRESS NOTES
[facility]  [address]  [phone]  Facility: [place] [address]          NAME: Zay Ventura  AGE: 80 y o  SEX: female    DATE OF ENCOUNTER: 9/20/2021    Chief Complaint     Pt has no specific complaint     History of Present Illness     HPI    The following portions of the patient's history were reviewed and updated as appropriate (from facility chart and hospital records): allergies, current medications, past family history, past medical history, past social history, past surgical history and problem list  Pt was seen and examined for f/u on anxiety, HTN, PCM  Pt 's wt has been stable  BP is stable  Pt still has some anxiety but overall better now that she is back to her unit/bed/room  Pt has no specific complaint and there is no concern or issues per staff  Continues with NH care  Walks using her walker  BM stable  Review of Systems     Review of Systems   Constitutional: Negative  HENT: Negative  Eyes: Negative  Respiratory: Negative  Cardiovascular: Negative  Gastrointestinal: Negative  Endocrine: Negative  Genitourinary: Negative  Musculoskeletal: Negative  Skin: Negative  Allergic/Immunologic: Negative  Neurological: Negative  Hematological: Negative  Psychiatric/Behavioral: Negative  All other systems reviewed and are negative  Active Problem List     Patient Active Problem List   Diagnosis    Fall    Ambulatory dysfunction    Moderate protein-calorie malnutrition (HCC)    Anxiety    Other constipation    Headache    Essential hypertension    Skin rash       Objective     Vitals: wt:86 6Ibs       BP:122/70       Afebrile     Physical Exam  Vitals and nursing note reviewed  Constitutional:       General: She is not in acute distress  Appearance: Normal appearance  She is well-developed  She is not ill-appearing, toxic-appearing or diaphoretic  HENT:      Head: Normocephalic and atraumatic        Right Ear: External ear normal       Left Ear: External ear normal       Nose: Nose normal  No congestion or rhinorrhea  Mouth/Throat:      Mouth: Mucous membranes are moist       Comments: Missing teeth   Eyes:      General: No scleral icterus  Right eye: No discharge  Left eye: No discharge  Conjunctiva/sclera: Conjunctivae normal       Pupils: Pupils are equal, round, and reactive to light  Cardiovascular:      Rate and Rhythm: Normal rate and regular rhythm  Heart sounds: Normal heart sounds  No murmur heard  No friction rub  No gallop  Pulmonary:      Effort: Pulmonary effort is normal  No respiratory distress  Breath sounds: Normal breath sounds  No stridor  No wheezing, rhonchi or rales  Chest:      Chest wall: No tenderness  Abdominal:      General: Bowel sounds are normal  There is no distension  Palpations: Abdomen is soft  There is no mass  Tenderness: There is no abdominal tenderness  There is no guarding or rebound  Hernia: No hernia is present  Genitourinary:     Comments: Deferred  Musculoskeletal:         General: No swelling, tenderness, deformity (kyphosis ) or signs of injury  Cervical back: Normal range of motion and neck supple  No rigidity or tenderness  Right lower leg: No edema  Left lower leg: No edema  Lymphadenopathy:      Cervical: No cervical adenopathy  Skin:     General: Skin is warm and dry  Coloration: Skin is not jaundiced or pale  Findings: No bruising, erythema, lesion or rash  Comments: Didn't examine sacral area  Neurological:      Mental Status: She is alert  Cranial Nerves: Cranial nerve deficit ([place]) present  Comments: Forgetful, not following commands  Psychiatric:         Behavior: Behavior normal          Pertinent Laboratory/Diagnostic Studies:  9/8: CBC, CMP    Current Medications   Medication list in facility chart was reviewed and necessary changes made  Assessment and Plan   Essential hypertension  BP stable with Amlodipine  Last lab on 9/8 and stable  Anxiety  Still with some anxiety but overall more stable  On Ativan and Remeron  Will continue with monitoring  Moderate protein-calorie malnutrition ([address] )  Has been stable  Will continue with monitoring  Other constipation  BM stable with current management       Shanika Israel MD  1/72/53379:63 PM

## 2021-09-20 NOTE — ASSESSMENT & PLAN NOTE
Still with some anxiety but overall more stable  On Ativan and Remeron  Will continue with monitoring

## 2021-10-20 ENCOUNTER — NURSING HOME VISIT (OUTPATIENT)
Dept: GERIATRICS | Facility: OTHER | Age: 81
End: 2021-10-20
Payer: MEDICARE

## 2021-10-20 DIAGNOSIS — I10 ESSENTIAL HYPERTENSION: ICD-10-CM

## 2021-10-20 DIAGNOSIS — K59.09 OTHER CONSTIPATION: Primary | ICD-10-CM

## 2021-10-20 DIAGNOSIS — E44.0 MODERATE PROTEIN-CALORIE MALNUTRITION (HCC): ICD-10-CM

## 2021-10-20 DIAGNOSIS — F41.9 ANXIETY: ICD-10-CM

## 2021-10-20 PROCEDURE — 99309 SBSQ NF CARE MODERATE MDM 30: CPT | Performed by: FAMILY MEDICINE

## 2021-11-19 ENCOUNTER — NURSING HOME VISIT (OUTPATIENT)
Dept: GERIATRICS | Facility: OTHER | Age: 81
End: 2021-11-19
Payer: MEDICARE

## 2021-11-19 DIAGNOSIS — E44.0 MODERATE PROTEIN-CALORIE MALNUTRITION (HCC): ICD-10-CM

## 2021-11-19 DIAGNOSIS — F41.9 ANXIETY: ICD-10-CM

## 2021-11-19 DIAGNOSIS — K59.09 OTHER CONSTIPATION: ICD-10-CM

## 2021-11-19 DIAGNOSIS — I10 ESSENTIAL HYPERTENSION: Primary | ICD-10-CM

## 2021-11-19 PROBLEM — R21 SKIN RASH: Status: RESOLVED | Noted: 2020-11-13 | Resolved: 2021-11-19

## 2021-11-19 PROCEDURE — 99309 SBSQ NF CARE MODERATE MDM 30: CPT | Performed by: FAMILY MEDICINE

## 2021-12-06 ENCOUNTER — NURSING HOME VISIT (OUTPATIENT)
Dept: GERIATRICS | Facility: OTHER | Age: 81
End: 2021-12-06
Payer: MEDICARE

## 2021-12-06 DIAGNOSIS — R26.2 AMBULATORY DYSFUNCTION: Primary | ICD-10-CM

## 2021-12-06 PROCEDURE — 99308 SBSQ NF CARE LOW MDM 20: CPT | Performed by: FAMILY MEDICINE

## 2021-12-29 ENCOUNTER — NURSING HOME VISIT (OUTPATIENT)
Dept: GERIATRICS | Facility: OTHER | Age: 81
End: 2021-12-29
Payer: MEDICARE

## 2021-12-29 DIAGNOSIS — R50.9 ELEVATED TEMPERATURE: Primary | ICD-10-CM

## 2021-12-29 PROCEDURE — 99308 SBSQ NF CARE LOW MDM 20: CPT | Performed by: FAMILY MEDICINE

## 2022-01-07 ENCOUNTER — NURSING HOME VISIT (OUTPATIENT)
Dept: GERIATRICS | Facility: OTHER | Age: 82
End: 2022-01-07
Payer: MEDICARE

## 2022-01-07 DIAGNOSIS — I10 ESSENTIAL HYPERTENSION: ICD-10-CM

## 2022-01-07 DIAGNOSIS — R26.2 AMBULATORY DYSFUNCTION: ICD-10-CM

## 2022-01-07 DIAGNOSIS — F41.9 ANXIETY: Primary | ICD-10-CM

## 2022-01-07 DIAGNOSIS — E44.0 MODERATE PROTEIN-CALORIE MALNUTRITION (HCC): ICD-10-CM

## 2022-01-07 DIAGNOSIS — K59.09 OTHER CONSTIPATION: ICD-10-CM

## 2022-01-07 PROBLEM — R50.9 ELEVATED TEMPERATURE: Status: RESOLVED | Noted: 2021-12-29 | Resolved: 2022-01-07

## 2022-01-07 PROCEDURE — 99309 SBSQ NF CARE MODERATE MDM 30: CPT | Performed by: FAMILY MEDICINE

## 2022-01-07 NOTE — ASSESSMENT & PLAN NOTE
Last lab on January 3rd stable, blood pressure stable with amlodipine  Will continue with monitoring

## 2022-01-07 NOTE — PROGRESS NOTES
[facility]  [address]  [phone]  Facility:  [facility] [place]/          NAME: Cornelia Mariscal  AGE: 80 y o  SEX: female    DATE OF ENCOUNTER: 1/7/2022    Chief Complaint     Patient has no specific complaint    History of Present Illness     HPI    The following portions of the patient's history were reviewed and updated as appropriate (from facility chart and hospital records): allergies, current medications, past family history, past medical history, past social history, past surgical history and problem list   Patient was seen and examined for follow-up on hypertension, constipation, anxiety, and weight loss  Patient continues with nursing home care  Has had few falls, with no injuries, however safety awareness is poor  She has had some weight gain and then weight loss but overall weight has been stable  Bowel movement stable  She denies having any headaches  Blood pressure has been stable  No other specific concerns or issues  Patient walks using her walker in the unit  Last lab done recently on January 3rd, and stable  Still with on and off increased anxiety, but overall stable  Review of Systems     Review of Systems   Constitutional: Negative  HENT: Negative  Eyes: Negative  Respiratory: Negative  Cardiovascular: Negative  Gastrointestinal: Negative  Endocrine: Negative  Genitourinary: Negative  Musculoskeletal: Negative  Skin: Negative  Allergic/Immunologic: Negative  Neurological: Negative  Hematological: Negative  Psychiatric/Behavioral: Negative  All other systems reviewed and are negative  Active Problem List     Patient Active Problem List   Diagnosis    Fall    Ambulatory dysfunction    Moderate protein-calorie malnutrition (HCC)    Anxiety    Other constipation    Headache    Essential hypertension       Objective     Vitals:  Reviewed    Physical Exam  Vitals and nursing note reviewed  Constitutional:       General: She is not in acute distress  Appearance: Normal appearance  She is well-developed  She is not ill-appearing, toxic-appearing or diaphoretic  HENT:      Head: Normocephalic and atraumatic  Right Ear: External ear normal       Left Ear: External ear normal       Ears:      Comments: Hard of hearing     Nose: Nose normal  No congestion or rhinorrhea  Mouth/Throat:      Comments: Missing teeth  Eyes:      General: No scleral icterus  Right eye: No discharge  Left eye: No discharge  Extraocular Movements: Extraocular movements intact  Conjunctiva/sclera: Conjunctivae normal       Pupils: Pupils are equal, round, and reactive to light  Cardiovascular:      Rate and Rhythm: Normal rate and regular rhythm  Heart sounds: Normal heart sounds  No murmur heard  No friction rub  No gallop  Pulmonary:      Effort: Pulmonary effort is normal  No respiratory distress  Breath sounds: Normal breath sounds  No stridor  No wheezing, rhonchi or rales  Chest:      Chest wall: No tenderness  Abdominal:      General: Bowel sounds are normal  There is no distension  Palpations: Abdomen is soft  There is no mass  Tenderness: There is no abdominal tenderness  There is no guarding or rebound  Hernia: No hernia is present  Genitourinary:     Comments: Deferred  Musculoskeletal:         General: Deformity (Mild kyphosis) present  No swelling, tenderness or signs of injury  Cervical back: Normal range of motion and neck supple  No rigidity or tenderness  Right lower leg: No edema  Left lower leg: No edema  Lymphadenopathy:      Cervical: No cervical adenopathy  Skin:     General: Skin is warm and dry  Coloration: Skin is not jaundiced or pale  Findings: No bruising, erythema, lesion or rash  Comments: Didn't examine sacral area  Neurological:      Mental Status: She is alert        Cranial Nerves: Cranial nerve deficit present  Comments: Follows commands, forgetful  Psychiatric:         Behavior: Behavior normal          Pertinent Laboratory/Diagnostic Studies:  1/3: CBC, CMP    Current Medications   Medication list in facility chart was reviewed and no changes made  Assessment and Plan   Anxiety  On Zoloft, continues to require Ativan due to on and off anxiety  Will continue with monitoring  Essential hypertension  Last lab on January 3rd stable, blood pressure stable with amlodipine  Will continue with monitoring  Other constipation  Stable with current bowel protocol, and medications  Moderate protein-calorie malnutrition (HCC)    Weight is stable  Continues with protein supplements  Will continue with monitoring  Ambulatory dysfunction  Recent falls, no injury, poor safety awareness  Will continue with monitoring      Faith Hall MD  1/7/20222:30 PM

## 2022-02-11 ENCOUNTER — NURSING HOME VISIT (OUTPATIENT)
Dept: GERIATRICS | Facility: OTHER | Age: 82
End: 2022-02-11
Payer: MEDICARE

## 2022-02-11 DIAGNOSIS — K59.09 OTHER CONSTIPATION: ICD-10-CM

## 2022-02-11 DIAGNOSIS — I10 ESSENTIAL HYPERTENSION: ICD-10-CM

## 2022-02-11 DIAGNOSIS — E44.0 MODERATE PROTEIN-CALORIE MALNUTRITION (HCC): ICD-10-CM

## 2022-02-11 DIAGNOSIS — R26.2 AMBULATORY DYSFUNCTION: Primary | ICD-10-CM

## 2022-02-11 PROCEDURE — 99309 SBSQ NF CARE MODERATE MDM 30: CPT | Performed by: FAMILY MEDICINE

## 2022-02-11 NOTE — PROGRESS NOTES
[address]  [phone]  Facility: [facility]/        NAME: Marino Wood  AGE: 80 y o  SEX: female    DATE OF ENCOUNTER: 2/11/2022    Chief Complaint   Patient has no specific complaint      History of Present Illness     HPI    The following portions of the patient's history were reviewed and updated as appropriate (from facility chart and hospital records): allergies, current medications, past family history, past medical history, past social history, past surgical history and problem list   Patient was seen and examined for follow-up on anxiety, hypertension, constipation, protein calorie malnutrition  Patient drinks her protein supplements well  Weight has been stable  Blood pressure acceptable  Anxiety on and off, per staff patient could be impulsive with her mobility, and forgetting to use her walker  Continues with nursing home care  Walks in the unit  Bowel movement stable, no specific complaints or concerns  Review of Systems     Review of Systems   Constitutional: Negative  HENT: Negative  Eyes: Negative  Respiratory: Negative  Cardiovascular: Negative  Gastrointestinal: Negative  Endocrine: Negative  Genitourinary: Negative  Musculoskeletal: Negative  Skin: Negative  Allergic/Immunologic: Negative  Neurological: Negative  Hematological: Negative  Psychiatric/Behavioral: Negative  All other systems reviewed and are negative  Active Problem List     Patient Active Problem List   Diagnosis    Fall    Ambulatory dysfunction    Moderate protein-calorie malnutrition (HCC)    Anxiety    Other constipation    Headache    Essential hypertension       Objective     Vitals:  Reviewed    Physical Exam  Vitals and nursing note reviewed  Constitutional:       General: She is not in acute distress  Appearance: Normal appearance  She is well-developed   She is not ill-appearing, toxic-appearing or diaphoretic  HENT:      Head: Normocephalic and atraumatic  Right Ear: External ear normal       Left Ear: External ear normal       Ears:      Comments: Hard of hearing     Nose: Nose normal  No congestion or rhinorrhea  Mouth/Throat:      Mouth: Mucous membranes are moist    Eyes:      General: No scleral icterus  Right eye: No discharge  Left eye: No discharge  Conjunctiva/sclera: Conjunctivae normal       Pupils: Pupils are equal, round, and reactive to light  Neck:      Comments: Neck slightly positionally tilted to right side  Cardiovascular:      Rate and Rhythm: Normal rate and regular rhythm  Heart sounds: Normal heart sounds  No murmur heard  No friction rub  No gallop  Pulmonary:      Effort: Pulmonary effort is normal  No respiratory distress  Breath sounds: Normal breath sounds  No stridor  No wheezing, rhonchi or rales  Chest:      Chest wall: No tenderness  Abdominal:      General: Bowel sounds are normal  There is no distension  Palpations: Abdomen is soft  There is no mass  Tenderness: There is no abdominal tenderness  There is no guarding or rebound  Hernia: No hernia is present  Genitourinary:     Comments: Deferred  Musculoskeletal:         General: Deformity (Kyphosis) present  No swelling, tenderness or signs of injury  Normal range of motion  Cervical back: Neck supple  Right lower leg: No edema  Left lower leg: No edema  Lymphadenopathy:      Cervical: No cervical adenopathy  Skin:     General: Skin is warm and dry  Coloration: Skin is not jaundiced or pale  Findings: No bruising, erythema, lesion or rash  Comments: Didn't examine sacral area  Neurological:      Mental Status: She is alert  Cranial Nerves: Cranial nerve deficit (Hard of hearing) present  Comments: Not oriented, follows commands, pleasant     Psychiatric:         Behavior: Behavior normal          Pertinent Laboratory/Diagnostic Studies:  1/3: BMP, and CBC    Current Medications   Medication list in facility chart was reviewed and no changes made  Assessment and Plan     Ambulatory dysfunction  At times impulsive with forgetting to use her walker  Needs nursing home care  Essential hypertension  Blood pressure stable with amlodipine, last lab in January stable  Will continue with monitoring  Moderate protein-calorie malnutrition ([address] )  Drinks her protein supplement without any problem, weight has been stable  Will continue with monitoring  Other constipation  Stable with current management and medication  Will continue with monitoring      Bud Ge MD  6/12/90404:84 PM

## 2022-02-11 NOTE — ASSESSMENT & PLAN NOTE
Drinks her protein supplement without any problem, weight has been stable  Will continue with monitoring

## 2022-02-20 ENCOUNTER — TELEPHONE (OUTPATIENT)
Dept: OTHER | Facility: OTHER | Age: 82
End: 2022-02-20

## 2022-03-11 ENCOUNTER — NURSING HOME VISIT (OUTPATIENT)
Dept: GERIATRICS | Facility: OTHER | Age: 82
End: 2022-03-11
Payer: MEDICARE

## 2022-03-11 DIAGNOSIS — F41.9 ANXIETY: ICD-10-CM

## 2022-03-11 DIAGNOSIS — I10 ESSENTIAL HYPERTENSION: Primary | ICD-10-CM

## 2022-03-11 DIAGNOSIS — R26.2 AMBULATORY DYSFUNCTION: ICD-10-CM

## 2022-03-11 DIAGNOSIS — K59.09 OTHER CONSTIPATION: ICD-10-CM

## 2022-03-11 DIAGNOSIS — E44.0 MODERATE PROTEIN-CALORIE MALNUTRITION (HCC): ICD-10-CM

## 2022-03-11 PROCEDURE — 99309 SBSQ NF CARE MODERATE MDM 30: CPT | Performed by: FAMILY MEDICINE

## 2022-03-11 NOTE — ASSESSMENT & PLAN NOTE
On Zoloft, and chronic use of Ativan,no GDR since pt at times gets very anxious, and GDR of Ativan would not be safe for pt, and her overall health

## 2022-03-11 NOTE — PROGRESS NOTES
[facility]  [address]  [phone]  Facility: [place] [address]          NAME: Grisel Seen  AGE: 80 y o  SEX: female    DATE OF ENCOUNTER: 3/11/2022    Chief Complaint     Pt has no specific complaints     History of Present Illness     HPI    The following portions of the patient's history were reviewed and updated as appropriate (from facility chart and hospital records): allergies, current medications, past family history, past medical history, past social history, past surgical history and problem list  Pt was seen and examined for f/u on HTN, PCM, HTN, anxiety and constipation  BM stable  Anxiety is under control for most part but sometimes has increased anxiety with some triggers such as her BM  BP overall stable  Last lab done on 1/3  Wt overall stable in last one year  Walks using her walker however per staff report she is impulsive with not using her walker around her bed  Continues with NH care  Review of Systems     Review of Systems   Constitutional: Negative  HENT: Negative  Eyes: Negative  Respiratory: Negative  Cardiovascular: Negative  Gastrointestinal: Negative  Endocrine: Negative  Genitourinary: Negative  Musculoskeletal: Negative  Skin: Negative  Allergic/Immunologic: Negative  Neurological: Negative  Hematological: Negative  Psychiatric/Behavioral: Negative  All other systems reviewed and are negative  Active Problem List     Patient Active Problem List   Diagnosis    Fall    Ambulatory dysfunction    Moderate protein-calorie malnutrition (HCC)    Anxiety    Other constipation    Headache    Essential hypertension       Objective     Vitals: Reviewed     Physical Exam  Vitals and nursing note reviewed  Constitutional:       General: She is not in acute distress  Appearance: Normal appearance  She is well-developed  She is not ill-appearing, toxic-appearing or diaphoretic     HENT:      Head: Normocephalic and atraumatic  Right Ear: External ear normal       Left Ear: External ear normal       Ears:      Comments: [place]     Nose: Nose normal  No congestion or rhinorrhea  Mouth/Throat:      Mouth: Mucous membranes are moist    Eyes:      General: No scleral icterus  Right eye: No discharge  Left eye: No discharge  Conjunctiva/sclera: Conjunctivae normal       Pupils: Pupils are equal, round, and reactive to light  Cardiovascular:      Rate and Rhythm: Normal rate and regular rhythm  Heart sounds: Normal heart sounds  No murmur heard  No friction rub  No gallop  Pulmonary:      Effort: Pulmonary effort is normal  No respiratory distress  Breath sounds: Normal breath sounds  No stridor  No wheezing, rhonchi or rales  Chest:      Chest wall: No tenderness  Abdominal:      General: Bowel sounds are normal  There is no distension  Palpations: Abdomen is soft  There is no mass  Tenderness: There is no abdominal tenderness  There is no guarding or rebound  Hernia: No hernia is present  Genitourinary:     Comments: Deferred  Musculoskeletal:         General: No swelling, tenderness, deformity or signs of injury  Normal range of motion  Cervical back: Normal range of motion and neck supple  No rigidity or tenderness  Right lower leg: No edema  Left lower leg: No edema  Comments: Moves all extremities    Lymphadenopathy:      Cervical: No cervical adenopathy  Skin:     General: Skin is warm and dry  Coloration: Skin is not jaundiced or pale  Findings: No bruising, erythema, lesion or rash  Comments: Didn't examine sacral area  Neurological:      Mental Status: She is alert  Cranial Nerves: Cranial nerve deficit ([place]) present        Comments: Not oriented    Psychiatric:         Behavior: Behavior normal          Pertinent Laboratory/Diagnostic Studies:  1/3: CBC, CMP    Current Medications   Medication list in facility chart was reviewed and no changes made  Assessment and Plan     Essential hypertension  BP overall stable with Amlodipine, last lab done in January  Will continue with monitoring  Anxiety  On Zoloft, and chronic use of Ativan,no GDR since pt at times gets very anxious, and GDR of Ativan would not be safe for pt, and her overall health  Moderate protein-calorie malnutrition ([address] )  Wt has been stable  On protein supplement  Will continue with monitoring  Other constipation  Stable with current meds  Will continue with monitoring  Ambulatory dysfunction  Walks using her walker, at times impulsive with not using her walker around her bed, needs NH care  Close monitoring       Faith Hall MD  9/75/22524:99 PM

## 2022-03-11 NOTE — ASSESSMENT & PLAN NOTE
Walks using her walker, at times impulsive with not using her walker around her bed, needs NH care  Close monitoring

## 2022-03-28 ENCOUNTER — NURSING HOME VISIT (OUTPATIENT)
Dept: GERIATRICS | Facility: OTHER | Age: 82
End: 2022-03-28
Payer: MEDICARE

## 2022-03-28 DIAGNOSIS — N76.4 FURUNCLE OF VULVA: Primary | ICD-10-CM

## 2022-03-28 PROCEDURE — 99308 SBSQ NF CARE LOW MDM 20: CPT | Performed by: FAMILY MEDICINE

## 2022-03-29 NOTE — PROGRESS NOTES
[facility]  [address]  [phone]  Facility: [facility]/          NAME: Abdoulaye Moreno  AGE: 80 y o  SEX: female    DATE OF ENCOUNTER: 3/28/2022    Chief Complaint     Pain in genital area     History of Present Illness     HPI    The following portions of the patient's history were reviewed and updated as appropriate (from facility chart and hospital records): allergies, current medications, past family history, past medical history, past social history, past surgical history and problem list  Pt was seen and examined per staff request and report of pt's genital area change with some bloody drainage, and tenderness which was noticed today  Pt reports discomfort on exam      Review of Systems     Review of Systems   Unable to perform ROS: Dementia   Genitourinary:        Pain in genital area        Active Problem List     Patient Active Problem List   Diagnosis    Fall    Ambulatory dysfunction    Moderate protein-calorie malnutrition ([address] )    Anxiety    Other constipation    Headache    Essential hypertension    Furuncle of vulva       Objective     Vitals: Afebrile     Physical Exam  Genitourinary:     Comments: Left labia major has raised edematous, red, with open spot and pus drainage  +tenderness         Pertinent Laboratory/Diagnostic Studies:  No lab for this visit     Current Medications   Medication list in facility chart was reviewed and necessary changes made  Assessment and Plan   Furuncle of vulva  Added Keflex, local warm compress, and Floaresto  Close monitoring  Jarrett Reynolds MD  6/75/01708:59 PM

## 2022-04-11 ENCOUNTER — NURSING HOME VISIT (OUTPATIENT)
Dept: GERIATRICS | Facility: OTHER | Age: 82
End: 2022-04-11
Payer: MEDICARE

## 2022-04-11 DIAGNOSIS — E44.0 MODERATE PROTEIN-CALORIE MALNUTRITION (HCC): ICD-10-CM

## 2022-04-11 DIAGNOSIS — K59.09 OTHER CONSTIPATION: ICD-10-CM

## 2022-04-11 DIAGNOSIS — F41.9 ANXIETY: ICD-10-CM

## 2022-04-11 DIAGNOSIS — I10 ESSENTIAL HYPERTENSION: Primary | ICD-10-CM

## 2022-04-11 DIAGNOSIS — N76.4 FURUNCLE OF VULVA: ICD-10-CM

## 2022-04-11 PROCEDURE — 99309 SBSQ NF CARE MODERATE MDM 30: CPT | Performed by: FAMILY MEDICINE

## 2022-04-11 NOTE — PROGRESS NOTES
[address]  [phone]  Facility:  [place] [address]          NAME: Reanna Alfaro  AGE: 80 y o  SEX: female    DATE OF ENCOUNTER: 4/11/2022    Chief Complaint     Patient has no complaint    History of Present Illness     HPI    The following portions of the patient's history were reviewed and updated as appropriate (from facility chart and hospital records): allergies, current medications, past family history, past medical history, past social history, past surgical history and problem list   Patient was seen and examined for follow-up on vulva Furuncle, hypertension, anxiety, constipation, and weight loss  Patient's weight has been stable  Blood pressure has been stable  Last blood work in January  Her vulva walk furuncle has been resolved per staff report  Walks using her walker  Anxiety overall stable but with some on/off Of increased anxiety  Continues with nursing home care  No specific issues or concerns  Wt has bee stable  No report of any BM issues  Review of Systems     Review of Systems   Unable to perform ROS: Dementia   Musculoskeletal:        Denies any pain        Active Problem List     Patient Active Problem List   Diagnosis    Fall    Ambulatory dysfunction    Moderate protein-calorie malnutrition ([address] )    Anxiety    Other constipation    Headache    Essential hypertension    Furuncle of vulva       Objective     Vitals:  Reviewed    Physical Exam  Vitals reviewed  Constitutional:       General: She is not in acute distress  Appearance: Normal appearance  She is well-developed  She is not ill-appearing, toxic-appearing or diaphoretic  HENT:      Head: Normocephalic and atraumatic  Right Ear: External ear normal       Left Ear: External ear normal       Ears:      Comments: Hard of hearing     Nose: Nose normal  No congestion or rhinorrhea        Mouth/Throat:      Mouth: Mucous membranes are moist    Eyes: General: No scleral icterus  Right eye: No discharge  Left eye: No discharge  Conjunctiva/sclera: Conjunctivae normal       Pupils: Pupils are equal, round, and reactive to light  Cardiovascular:      Rate and Rhythm: Normal rate and regular rhythm  Heart sounds: Normal heart sounds  No murmur heard  No friction rub  No gallop  Pulmonary:      Effort: Pulmonary effort is normal  No respiratory distress  Breath sounds: Normal breath sounds  No stridor  No wheezing, rhonchi or rales  Chest:      Chest wall: No tenderness  Abdominal:      General: Bowel sounds are normal  There is no distension  Palpations: Abdomen is soft  There is no mass  Tenderness: There is no abdominal tenderness  There is no guarding or rebound  Hernia: No hernia is present  Genitourinary:     Comments: Deferred  Musculoskeletal:         General: Deformity (Slight kyphosis) present  No swelling, tenderness or signs of injury  Normal range of motion  Cervical back: Normal range of motion and neck supple  No rigidity or tenderness  Right lower leg: No edema  Left lower leg: No edema  Comments: Lying in bed, limited range of motion, no asymmetry  Lymphadenopathy:      Cervical: No cervical adenopathy  Skin:     General: Skin is warm and dry  Coloration: Skin is not jaundiced or pale  Findings: No bruising, erythema, lesion or rash  Comments: Didn't examine sacral area  Neurological:      Mental Status: She is alert  Cranial Nerves: Cranial nerve deficit (Hard of hearing) present  Comments: Forgetful, not oriented, follows commands  Psychiatric:         Behavior: Behavior normal          Pertinent Laboratory/Diagnostic Studies:  1/10:  CBC, CMP  Current Medications   Medication list in facility chart was reviewed and no changes made  Assessment and Plan     Essential hypertension  Blood pressure stable, on amlodipine    Last lab in January  Will continue with monitoring  Moderate protein-calorie malnutrition ([address] )  On protein supplements, weight stable  Will continue with monitoring  Other constipation  Stable with current bowel protocol  Anxiety  Stable with Ativan and Zoloft, will continue with monitoring  Last lab in January stable  Furuncle of vulva  Resolved      Azalea Yang MD  2/74/85337:13 PM

## 2022-05-06 DIAGNOSIS — F41.9 ANXIETY: ICD-10-CM

## 2022-05-06 RX ORDER — LORAZEPAM 0.5 MG/1
TABLET ORAL
Qty: 60 TABLET | Refills: 0 | Status: SHIPPED | OUTPATIENT
Start: 2022-05-06 | End: 2022-05-24

## 2022-05-11 ENCOUNTER — HOSPITAL ENCOUNTER (INPATIENT)
Facility: HOSPITAL | Age: 82
LOS: 4 days | Discharge: NURSING, REHAB OR LONG-TERM CARE | DRG: 308 | End: 2022-05-15
Attending: EMERGENCY MEDICINE | Admitting: FAMILY MEDICINE
Payer: MEDICARE

## 2022-05-11 ENCOUNTER — APPOINTMENT (EMERGENCY)
Dept: RADIOLOGY | Facility: HOSPITAL | Age: 82
DRG: 308 | End: 2022-05-11
Payer: MEDICARE

## 2022-05-11 DIAGNOSIS — I10 ESSENTIAL HYPERTENSION: ICD-10-CM

## 2022-05-11 DIAGNOSIS — E44.0 MODERATE PROTEIN-CALORIE MALNUTRITION (HCC): ICD-10-CM

## 2022-05-11 DIAGNOSIS — Z87.19 HISTORY OF GI BLEED: ICD-10-CM

## 2022-05-11 DIAGNOSIS — F41.9 ANXIETY: ICD-10-CM

## 2022-05-11 DIAGNOSIS — R26.2 AMBULATORY DYSFUNCTION: ICD-10-CM

## 2022-05-11 DIAGNOSIS — F03.90 DEMENTIA (HCC): ICD-10-CM

## 2022-05-11 DIAGNOSIS — I48.91 NEW ONSET ATRIAL FIBRILLATION (HCC): Primary | ICD-10-CM

## 2022-05-11 PROBLEM — I07.1 TRICUSPID REGURGITATION: Status: ACTIVE | Noted: 2022-05-11

## 2022-05-11 PROBLEM — E87.5 HYPERKALEMIA: Status: ACTIVE | Noted: 2022-05-11

## 2022-05-11 PROBLEM — N17.9 AKI (ACUTE KIDNEY INJURY) (HCC): Status: ACTIVE | Noted: 2022-05-11

## 2022-05-11 PROBLEM — G93.41 METABOLIC ENCEPHALOPATHY: Status: ACTIVE | Noted: 2022-05-11

## 2022-05-11 LAB
2HR DELTA HS TROPONIN: 19 NG/L
ALBUMIN SERPL BCP-MCNC: 3.4 G/DL (ref 3.5–5)
ALP SERPL-CCNC: 104 U/L (ref 46–116)
ALT SERPL W P-5'-P-CCNC: 19 U/L (ref 12–78)
ANION GAP SERPL CALCULATED.3IONS-SCNC: 8 MMOL/L (ref 4–13)
APTT PPP: 29 SECONDS (ref 23–37)
AST SERPL W P-5'-P-CCNC: 33 U/L (ref 5–45)
ATRIAL RATE: 144 BPM
ATRIAL RATE: 192 BPM
BASOPHILS # BLD AUTO: 0.08 THOUSANDS/ΜL (ref 0–0.1)
BASOPHILS NFR BLD AUTO: 1 % (ref 0–1)
BILIRUB SERPL-MCNC: 0.47 MG/DL (ref 0.2–1)
BUN SERPL-MCNC: 19 MG/DL (ref 5–25)
CALCIUM ALBUM COR SERPL-MCNC: 9.4 MG/DL (ref 8.3–10.1)
CALCIUM SERPL-MCNC: 8.9 MG/DL (ref 8.3–10.1)
CARDIAC TROPONIN I PNL SERPL HS: 28 NG/L
CARDIAC TROPONIN I PNL SERPL HS: 9 NG/L
CHLORIDE SERPL-SCNC: 98 MMOL/L (ref 100–108)
CO2 SERPL-SCNC: 30 MMOL/L (ref 21–32)
CREAT SERPL-MCNC: 0.69 MG/DL (ref 0.6–1.3)
EOSINOPHIL # BLD AUTO: 0.02 THOUSAND/ΜL (ref 0–0.61)
EOSINOPHIL NFR BLD AUTO: 0 % (ref 0–6)
ERYTHROCYTE [DISTWIDTH] IN BLOOD BY AUTOMATED COUNT: 12.5 % (ref 11.6–15.1)
GFR SERPL CREATININE-BSD FRML MDRD: 81 ML/MIN/1.73SQ M
GLUCOSE SERPL-MCNC: 130 MG/DL (ref 65–140)
HCT VFR BLD AUTO: 44.3 % (ref 34.8–46.1)
HGB BLD-MCNC: 14.2 G/DL (ref 11.5–15.4)
IMM GRANULOCYTES # BLD AUTO: 0.04 THOUSAND/UL (ref 0–0.2)
IMM GRANULOCYTES NFR BLD AUTO: 0 % (ref 0–2)
INR PPP: 1.04 (ref 0.84–1.19)
LYMPHOCYTES # BLD AUTO: 1.2 THOUSANDS/ΜL (ref 0.6–4.47)
LYMPHOCYTES NFR BLD AUTO: 10 % (ref 14–44)
MCH RBC QN AUTO: 31.6 PG (ref 26.8–34.3)
MCHC RBC AUTO-ENTMCNC: 32.1 G/DL (ref 31.4–37.4)
MCV RBC AUTO: 98 FL (ref 82–98)
MONOCYTES # BLD AUTO: 0.48 THOUSAND/ΜL (ref 0.17–1.22)
MONOCYTES NFR BLD AUTO: 4 % (ref 4–12)
NEUTROPHILS # BLD AUTO: 9.86 THOUSANDS/ΜL (ref 1.85–7.62)
NEUTS SEG NFR BLD AUTO: 85 % (ref 43–75)
NRBC BLD AUTO-RTO: 0 /100 WBCS
PLATELET # BLD AUTO: 324 THOUSANDS/UL (ref 149–390)
PMV BLD AUTO: 10.6 FL (ref 8.9–12.7)
POTASSIUM SERPL-SCNC: 5.4 MMOL/L (ref 3.5–5.3)
PROT SERPL-MCNC: 7.6 G/DL (ref 6.4–8.2)
PROTHROMBIN TIME: 13.4 SECONDS (ref 11.6–14.5)
QRS AXIS: 68 DEGREES
QRS AXIS: 75 DEGREES
QRSD INTERVAL: 78 MS
QRSD INTERVAL: 92 MS
QT INTERVAL: 314 MS
QT INTERVAL: 324 MS
QTC INTERVAL: 407 MS
QTC INTERVAL: 438 MS
RBC # BLD AUTO: 4.5 MILLION/UL (ref 3.81–5.12)
SODIUM SERPL-SCNC: 136 MMOL/L (ref 136–145)
T WAVE AXIS: 114 DEGREES
T WAVE AXIS: 75 DEGREES
TSH SERPL DL<=0.05 MIU/L-ACNC: 1.4 UIU/ML (ref 0.45–4.5)
VENTRICULAR RATE: 117 BPM
VENTRICULAR RATE: 95 BPM
WBC # BLD AUTO: 11.68 THOUSAND/UL (ref 4.31–10.16)

## 2022-05-11 PROCEDURE — 99285 EMERGENCY DEPT VISIT HI MDM: CPT | Performed by: EMERGENCY MEDICINE

## 2022-05-11 PROCEDURE — 71045 X-RAY EXAM CHEST 1 VIEW: CPT

## 2022-05-11 PROCEDURE — 85025 COMPLETE CBC W/AUTO DIFF WBC: CPT | Performed by: EMERGENCY MEDICINE

## 2022-05-11 PROCEDURE — 84484 ASSAY OF TROPONIN QUANT: CPT | Performed by: EMERGENCY MEDICINE

## 2022-05-11 PROCEDURE — 93005 ELECTROCARDIOGRAM TRACING: CPT

## 2022-05-11 PROCEDURE — 84443 ASSAY THYROID STIM HORMONE: CPT | Performed by: EMERGENCY MEDICINE

## 2022-05-11 PROCEDURE — 93010 ELECTROCARDIOGRAM REPORT: CPT | Performed by: INTERNAL MEDICINE

## 2022-05-11 PROCEDURE — 80053 COMPREHEN METABOLIC PANEL: CPT | Performed by: EMERGENCY MEDICINE

## 2022-05-11 PROCEDURE — 85730 THROMBOPLASTIN TIME PARTIAL: CPT | Performed by: EMERGENCY MEDICINE

## 2022-05-11 PROCEDURE — 99223 1ST HOSP IP/OBS HIGH 75: CPT | Performed by: FAMILY MEDICINE

## 2022-05-11 PROCEDURE — 99285 EMERGENCY DEPT VISIT HI MDM: CPT

## 2022-05-11 PROCEDURE — 99222 1ST HOSP IP/OBS MODERATE 55: CPT | Performed by: INTERNAL MEDICINE

## 2022-05-11 PROCEDURE — 36415 COLL VENOUS BLD VENIPUNCTURE: CPT | Performed by: EMERGENCY MEDICINE

## 2022-05-11 PROCEDURE — 85610 PROTHROMBIN TIME: CPT | Performed by: EMERGENCY MEDICINE

## 2022-05-11 RX ORDER — OLANZAPINE 5 MG/1
2.5 TABLET ORAL EVERY 8 HOURS PRN
Status: DISCONTINUED | OUTPATIENT
Start: 2022-05-11 | End: 2022-05-11

## 2022-05-11 RX ORDER — LORAZEPAM 0.5 MG/1
0.5 TABLET ORAL ONCE
Status: COMPLETED | OUTPATIENT
Start: 2022-05-11 | End: 2022-05-11

## 2022-05-11 RX ORDER — LORAZEPAM 0.5 MG/1
0.5 TABLET ORAL 2 TIMES DAILY
Status: DISCONTINUED | OUTPATIENT
Start: 2022-05-11 | End: 2022-05-11

## 2022-05-11 RX ORDER — SODIUM CHLORIDE 9 MG/ML
75 INJECTION, SOLUTION INTRAVENOUS CONTINUOUS
Status: DISPENSED | OUTPATIENT
Start: 2022-05-11 | End: 2022-05-12

## 2022-05-11 RX ORDER — LORAZEPAM 2 MG/ML
0.5 INJECTION INTRAMUSCULAR DAILY
Status: DISCONTINUED | OUTPATIENT
Start: 2022-05-11 | End: 2022-05-12

## 2022-05-11 RX ORDER — LORAZEPAM 0.5 MG/1
0.25 TABLET ORAL 2 TIMES DAILY
Status: DISCONTINUED | OUTPATIENT
Start: 2022-05-11 | End: 2022-05-11

## 2022-05-11 RX ORDER — DOCUSATE SODIUM 100 MG/1
100 CAPSULE, LIQUID FILLED ORAL 2 TIMES DAILY
Status: DISCONTINUED | OUTPATIENT
Start: 2022-05-11 | End: 2022-05-15 | Stop reason: HOSPADM

## 2022-05-11 RX ORDER — LORAZEPAM 0.5 MG/1
0.5 TABLET ORAL EVERY EVENING
Status: DISCONTINUED | OUTPATIENT
Start: 2022-05-11 | End: 2022-05-11

## 2022-05-11 RX ORDER — OLANZAPINE 10 MG/1
2.5 INJECTION, POWDER, LYOPHILIZED, FOR SOLUTION INTRAMUSCULAR ONCE
Status: COMPLETED | OUTPATIENT
Start: 2022-05-11 | End: 2022-05-11

## 2022-05-11 RX ORDER — BISACODYL 10 MG
10 SUPPOSITORY, RECTAL RECTAL DAILY PRN
Status: DISCONTINUED | OUTPATIENT
Start: 2022-05-11 | End: 2022-05-15 | Stop reason: HOSPADM

## 2022-05-11 RX ORDER — PANTOPRAZOLE SODIUM 20 MG/1
20 TABLET, DELAYED RELEASE ORAL
Status: DISCONTINUED | OUTPATIENT
Start: 2022-05-12 | End: 2022-05-15 | Stop reason: HOSPADM

## 2022-05-11 RX ORDER — LORAZEPAM 2 MG/ML
0.5 INJECTION INTRAMUSCULAR ONCE
Status: COMPLETED | OUTPATIENT
Start: 2022-05-11 | End: 2022-05-11

## 2022-05-11 RX ORDER — AMLODIPINE BESYLATE 5 MG/1
5 TABLET ORAL DAILY
Status: DISCONTINUED | OUTPATIENT
Start: 2022-05-11 | End: 2022-05-11

## 2022-05-11 RX ORDER — LORAZEPAM 2 MG/ML
0.25 INJECTION INTRAMUSCULAR 2 TIMES DAILY
Status: DISCONTINUED | OUTPATIENT
Start: 2022-05-11 | End: 2022-05-12

## 2022-05-11 RX ORDER — MIRTAZAPINE 15 MG/1
15 TABLET, FILM COATED ORAL
Status: DISCONTINUED | OUTPATIENT
Start: 2022-05-11 | End: 2022-05-15 | Stop reason: HOSPADM

## 2022-05-11 RX ORDER — OLANZAPINE 10 MG/1
2.5 INJECTION, POWDER, LYOPHILIZED, FOR SOLUTION INTRAMUSCULAR EVERY 8 HOURS PRN
Status: DISCONTINUED | OUTPATIENT
Start: 2022-05-11 | End: 2022-05-11

## 2022-05-11 RX ORDER — METOPROLOL TARTRATE 5 MG/5ML
2.5 INJECTION INTRAVENOUS EVERY 8 HOURS
Status: DISCONTINUED | OUTPATIENT
Start: 2022-05-11 | End: 2022-05-11

## 2022-05-11 RX ORDER — POLYETHYLENE GLYCOL 3350 17 G/17G
17 POWDER, FOR SOLUTION ORAL DAILY
Status: DISCONTINUED | OUTPATIENT
Start: 2022-05-11 | End: 2022-05-15 | Stop reason: HOSPADM

## 2022-05-11 RX ORDER — ONDANSETRON 2 MG/ML
4 INJECTION INTRAMUSCULAR; INTRAVENOUS EVERY 6 HOURS PRN
Status: DISCONTINUED | OUTPATIENT
Start: 2022-05-11 | End: 2022-05-15 | Stop reason: HOSPADM

## 2022-05-11 RX ADMIN — SODIUM CHLORIDE 75 ML/HR: 0.9 INJECTION, SOLUTION INTRAVENOUS at 14:36

## 2022-05-11 RX ADMIN — SODIUM CHLORIDE 75 ML/HR: 0.9 INJECTION, SOLUTION INTRAVENOUS at 20:24

## 2022-05-11 RX ADMIN — LORAZEPAM 0.5 MG: 0.5 TABLET ORAL at 11:19

## 2022-05-11 RX ADMIN — APIXABAN 2.5 MG: 2.5 TABLET, FILM COATED ORAL at 17:41

## 2022-05-11 RX ADMIN — OLANZAPINE 2.5 MG: 10 INJECTION, POWDER, LYOPHILIZED, FOR SOLUTION INTRAMUSCULAR at 21:32

## 2022-05-11 RX ADMIN — SODIUM CHLORIDE 500 ML: 0.9 INJECTION, SOLUTION INTRAVENOUS at 17:30

## 2022-05-11 RX ADMIN — LORAZEPAM 0.5 MG: 2 INJECTION INTRAMUSCULAR; INTRAVENOUS at 15:55

## 2022-05-11 RX ADMIN — METOPROLOL TARTRATE 2.5 MG: 1 INJECTION, SOLUTION INTRAVENOUS at 15:55

## 2022-05-11 RX ADMIN — WATER 10 ML: 1 INJECTION INTRAMUSCULAR; INTRAVENOUS; SUBCUTANEOUS at 21:32

## 2022-05-11 RX ADMIN — DOCUSATE SODIUM 100 MG: 100 CAPSULE, LIQUID FILLED ORAL at 17:30

## 2022-05-11 NOTE — ASSESSMENT & PLAN NOTE
Patient is on Ativan p r n   And Remeron at bedtime  Would consider other options for anxiety treatment  For now continue home regimen  Obtain geriatrics consultation with patient currently appearing agitated

## 2022-05-11 NOTE — ASSESSMENT & PLAN NOTE
Mild with potassium at 5 4, mild hemolysis noted on the lab report  IV fluids initiated  Monitor BMP

## 2022-05-11 NOTE — ASSESSMENT & PLAN NOTE
Malnutrition Findings:           noted with BMI of 19  Obtain nutrition consult, offered nutritional supplements                       BMI Findings: Body mass index is 19 49 kg/m²

## 2022-05-11 NOTE — NURSING NOTE
Patient very combative, confused and uncooperative  This RN unable to do a thorough assessment  Unable to listen to lungs, look at skin on backside, or complete admission navigator  Will attempt again at another time

## 2022-05-11 NOTE — H&P
[facility]  H&P- Theresa Gipson 1940, 80 y o  female MRN: [id number]  Unit/Bed#: ED 18 Encounter: [id number]  Primary Care Provider: Sanjuana Goldberg DO   Date and time admitted to hospital: 5/11/2022  9:38 AM    * New onset atrial fibrillation [facility])  Assessment & Plan  This is an 25-year-old patient with past medical history significant for anxiety, cognitive decline and hypertension, in a very remote history of GI bleed due to gastric ulcer presents from nursing home facility after being found for rapid heart rate, found to [facility]  · Admit to med surg level of care  · Telemetry monitoring  · [id number] of 4, anticoagulation is indicated, initiated on Eliquis 2 5 mg b i d  Based on age and weight  · Initiated on metoprolol 25 mg b i d   · 2D echo ordered  · Request Cardiology evaluation    Hyperkalemia  Assessment & Plan  Mild with potassium at 5 4, mild hemolysis noted on the lab report  IV fluids initiated  Monitor BMP    AMANDA (acute kidney injury) ([facility] [address] )  Assessment & Plan  Possible mild acute kidney injury with baseline creatinine previously around 0 3, currently 0 69 - although prior baseline from 3 years ago  Patient with history of malnutrition, dementia presenting with rapid Afib  Initiate gentle IV fluids  Monitor renal function      Metabolic encephalopathy  Assessment & Plan  Patient presents with new onset AFib, mild AMANDA with creatinine of 0 6 and baseline around 0 3  At baseline she is oriented to self and situation, however, currently she is agitated, oriented to self only, ripped out her peripheral IV requiring bilateral wrist restraints  Underlying dementia, patient on Ativan and Remeron per home regimen  · Add Zyprexa p r n    · Request Geriatric medicine evaluation  · IV fluids in the setting of mild AMANDA, treat underlying disturbances    Dementia [facility])  Assessment & Plan  Patient with underlying cognitive decline and anxiety, resident at a nursing home  As above, she is currently significantly agitated  Will continue current regimen and await Geriatrics recommendations    Tricuspid regurgitation  Assessment & Plan  Noted on prior 2D echo from 2019  Will update echo in the setting of new onset AFib    Essential hypertension  Assessment & Plan  On amlodipine 5 mg daily - will hold with low normal hypertension, initiation of metoprolol in the setting of new onset Afib  Monitor vital signs    Other constipation  Assessment & Plan  Chronic, continue home regimen    Anxiety  Assessment & Plan  Patient is on Ativan p r n  And Remeron at bedtime  Would consider other options for anxiety treatment  For now continue home regimen  Obtain geriatrics consultation with patient currently appearing agitated    Moderate protein-calorie malnutrition ([facility] [address] )  Assessment & Plan  Malnutrition Findings:           noted with BMI of 19  Obtain nutrition consult, offered nutritional supplements                       BMI Findings: Body mass index is 19 49 kg/m²  Ambulatory dysfunction  Assessment & Plan  Comes from nursing home   Will request PT/OT eval           VTE Pharmacologic Prophylaxis: VTE Score: 6 High Risk (Score >/= 5) - Pharmacological DVT Prophylaxis Ordered: apixaban (Eliquis)  Sequential Compression Devices Ordered  Code Status: Level 3 - DNAR and DNI   Discussion with family: Updated  (daughter) via phone  Anticipated Length of Stay: Patient will be admitted on an inpatient basis with an anticipated length of stay of greater than 2 midnights secondary to IV treatments, cardiac workup, close monitoring  Total Time for Visit, including Counseling / Coordination of Care: 60 minutes Greater than 50% of this total time spent on direct patient counseling and coordination of care      Chief Complaint: rapid heart rate    History of Present Illness:  Jorge Weathers is a 80 y o  female with a PMH of HTN, anxiety and cognitive decline who presents from [address] [place] [address] home with rapid heart rate  Found to have new onset afib  Unable to provide history due to underlying cognitive decline and agitation at this time  History obtained from chart and daughter over the phone  Per nursing home the patient was initially complaining of anxiety and some neck pain and at that time was noted to have elevated heart rate  Daughter saw patient over Mother's Day who seemed a little "off" per daughter - a little edgy and tired but nothing major, did not have any complaints  On my encounter, the patient is oriented to self only  She is unsure of her location  She offers no specific complaints at this time  She is unable to give meaningful answers to review of systems questions  Review of Systems:  Review of Systems   Unable to perform ROS: Dementia       Past Medical and Surgical History:   Past Medical History:   Diagnosis Date    Acute and chronic respiratory failure with hypercapnia ([address] ) 7/22/2019    Ambulatory dysfunction     Anemia     hgb 5 2o bleeding uler in 1970's    Arthritis     Dehydration 7/24/2019    Dementia ([address] )     Depression     Eating disorder     Elevated temperature 12/29/2021    Encephalopathy     Gastrointestinal bleeding     Gastrointestinal bleeding     GI bleeding 7/27/2019    Hyperlipidemia     Hypertension     Pressure injury of head, stage 1 7/21/2019    Pressure injury of sacral region, stage 3 ([address] ) 7/21/2019    Respiratory failure ([address] )     Scalp hematoma 7/21/2019    Severe protein-calorie malnutrition Elois Ketan: less than 60% of standard weight) (HCC)     Severe protein-calorie malnutrition (HCC) 7/21/2019    Skin rash 11/13/2020    Unstageable pressure ulcer ([address] ) 9/4/2019    Vulvar mass     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       History reviewed  No pertinent surgical history  Meds/Allergies:  Prior to Admission medications    Medication Sig Start Date End Date Taking?  Authorizing Provider   bisacodyl (DULCOLAX) 10 mg suppository Insert 10 mg into the rectum as needed for constipation   Yes Historical Provider, MD   LORazepam (ATIVAN) 0 5 mg tablet 1/2 tab BID at at 0800, and 1200 and 1 Tab at 1800  5/6/22  Yes Elif Mensah MD   mirtazapine (REMERON) 15 mg tablet Take 15 mg by mouth daily at bedtime   Yes Historical Provider, MD   ondansetron (ZOFRAN-ODT) 4 mg disintegrating tablet Take 1 tablet (4 mg total) by mouth every 6 (six) hours as needed for nausea or vomiting 8/4/19  Yes Gabrielle Flores MD   polyethylene glycol (MIRALAX) 17 g packet Take 17 g by mouth daily 8/5/19  Yes Gabrielle Flores MD   Sodium Phosphates (Enema) ENEM Insert 1 enema into the rectum as needed   Yes Historical Provider, MD   sorbitol 70 % solution Take 30 mL by mouth daily as needed   Yes Historical Provider, MD   acetaminophen (TYLENOL) 325 mg tablet Take 650 mg by mouth every 4 (four) hours as needed for mild pain or fever FEVER 100 DEGREES OR HIGHER    Historical Provider, MD   acetaminophen (TYLENOL) 650 mg suppository Insert 650 mg into the rectum every 4 (four) hours as needed for mild pain or fever FEVER 100 DEGREES OR GREATER  Historical Provider, MD   amLODIPine (NORVASC) 5 mg tablet Take 5 mg by mouth daily    Historical Provider, MD   docusate sodium (COLACE) 100 mg capsule Take 1 capsule (100 mg total) by mouth 2 (two) times a day 8/4/19   Gabrielle Flores MD   hydrocortisone (Proctozone-HC) 2 5 % rectal cream Apply 1 application topically 2 (two) times a day    Historical Provider, MD   mirtazapine (REMERON) 7 5 MG tablet Take 7 5 mg by mouth daily at bedtime    Historical Provider, MD     I have reveiwed home medications using records provided by [facility]      Allergies: No Known Allergies    Social History:  Marital Status: Single   Occupation: retired  Patient Pre-hospital Living Situation: [facility]  Patient Pre-hospital Level of Mobility: walks with walker  Patient Pre-hospital Diet Restrictions: none   Substance Use History:   Social History     Substance and Sexual Activity   Alcohol Use Not Currently    Comment: only socially in the past     Social History     Tobacco Use   Smoking Status Never Smoker   Smokeless Tobacco Never Used     Social History     Substance and Sexual Activity   Drug Use Never       Family History:  Family History   Problem Relation Age of Onset    Parkinsonism Mother     COPD Father        Physical Exam:     Vitals:   Blood Pressure: 117/81 (05/11/22 1201)  Pulse: 98 (05/11/22 1201)  Temperature: 97 8 °F (36 6 °C) (05/11/22 0942)  Temp Source: Oral (05/11/22 0942)  Respirations: 20 (05/11/22 1201)  Height: 4' 10" (147 3 cm) (05/11/22 0942)  Weight - Scale: 42 3 kg (93 lb 4 1 oz) (05/11/22 0942)  SpO2: 97 % (05/11/22 1201)    Physical Exam  Constitutional:       Comments: Frail-appearing    HENT:      Head: Normocephalic and atraumatic  Nose: No congestion  Mouth/Throat:      Pharynx: Oropharynx is clear  Eyes:      Conjunctiva/sclera: Conjunctivae normal    Cardiovascular:      Rate and Rhythm: Tachycardia present  Rhythm irregular  Heart sounds: No murmur heard  Pulmonary:      Effort: No respiratory distress  Breath sounds: No wheezing or rales  Abdominal:      General: There is no distension  Tenderness: There is no abdominal tenderness  There is no guarding  Musculoskeletal:      Right lower leg: No edema  Left lower leg: No edema  Skin:     General: Skin is warm and dry  Neurological:      Mental Status: She is disoriented  Psychiatric:         Behavior: Behavior is agitated  Cognition and Memory: Cognition is impaired            Additional Data:     Lab Results:  Results from last 7 days   Lab Units 05/11/22  0945   WBC Thousand/uL 11 68*   HEMOGLOBIN g/dL 14 2   HEMATOCRIT % 44 3   PLATELETS Thousands/uL 324   NEUTROS PCT % 85*   LYMPHS PCT % 10*   MONOS PCT % 4   EOS PCT % 0     Results from last 7 days Lab Units 05/11/22  0945   SODIUM mmol/L 136   POTASSIUM mmol/L 5 4*   CHLORIDE mmol/L 98*   CO2 mmol/L 30   BUN mg/dL 19   CREATININE mg/dL 0 69   ANION GAP mmol/L 8   CALCIUM mg/dL 8 9   ALBUMIN g/dL 3 4*   TOTAL BILIRUBIN mg/dL 0 47   ALK PHOS U/L 104   ALT U/L 19   AST U/L 33   GLUCOSE RANDOM mg/dL 130     Results from last 7 days   Lab Units 05/11/22  0945   INR  1 04                   Imaging: Reviewed radiology reports from this admission including: chest xray and Personally reviewed the following imaging: chest xray  XR chest 1 view portable   ED Interpretation by Adeel Segura DO (05/11 1129)   Increased interstitial markings  No change from previous      Final Result by Meka Dukes MD (05/11 1213)      No acute cardiopulmonary disease  Workstation performed: [id number]             EKG and Other Studies Reviewed on Admission:   · EKG: Atrial fibrillation  HR 90s  ** Please Note: This note has been constructed using a voice recognition system   **

## 2022-05-11 NOTE — PLAN OF CARE
Problem: NEUROSENSORY - ADULT  Goal: Achieves stable or improved neurological status  Description: INTERVENTIONS  - Monitor and report changes in neurological status  - Monitor vital signs such as temperature, blood pressure, glucose, and any other labs ordered   - Initiate measures to prevent increased intracranial pressure  - Monitor for seizure activity and implement precautions if appropriate      Outcome: Progressing  Goal: Remains free of injury related to seizures activity  Description: INTERVENTIONS  - Maintain airway, patient safety  and administer oxygen as ordered  - Monitor patient for seizure activity, document and report duration and description of seizure to physician/advanced practitioner  - If seizure occurs,  ensure patient safety during seizure  - Reorient patient post seizure  - Seizure pads on all 4 side rails  - Instruct patient/family to notify RN of any seizure activity including if an aura is experienced  - Instruct patient/family to call for assistance with activity based on nursing assessment  - Administer anti-seizure medications if ordered    Outcome: Progressing  Goal: Achieves maximal functionality and self care  Description: INTERVENTIONS  - Monitor swallowing and airway patency with patient fatigue and changes in neurological status  - Encourage and assist patient to increase activity and self care     - Encourage visually impaired, hearing impaired and aphasic patients to use assistive/communication devices  Outcome: Progressing     Problem: CARDIOVASCULAR - ADULT  Goal: Maintains optimal cardiac output and hemodynamic stability  Description: INTERVENTIONS:  - Monitor I/O, vital signs and rhythm  - Monitor for S/S and trends of decreased cardiac output  - Administer and titrate ordered vasoactive medications to optimize hemodynamic stability  - Assess quality of pulses, skin color and temperature  - Assess for signs of decreased coronary artery perfusion  - Instruct patient to report change in severity of symptoms  Outcome: Progressing  Goal: Absence of cardiac dysrhythmias or at baseline rhythm  Description: INTERVENTIONS:  - Continuous cardiac monitoring, vital signs, obtain 12 lead EKG if ordered  - Administer antiarrhythmic and heart rate control medications as ordered  - Monitor electrolytes and administer replacement therapy as ordered  Outcome: Progressing     Problem: RESPIRATORY - ADULT  Goal: Achieves optimal ventilation and oxygenation  Description: INTERVENTIONS:  - Assess for changes in respiratory status  - Assess for changes in mentation and behavior  - Position to facilitate oxygenation and minimize respiratory effort  - Oxygen administered by appropriate delivery if ordered  - Initiate smoking cessation education as indicated  - Encourage broncho-pulmonary hygiene including cough, deep breathe, Incentive Spirometry  - Assess the need for suctioning and aspirate as needed  - Assess and instruct to report SOB or any respiratory difficulty  - Respiratory Therapy support as indicated  Outcome: Progressing     Problem: GASTROINTESTINAL - ADULT  Goal: Minimal or absence of nausea and/or vomiting  Description: INTERVENTIONS:  - Administer IV fluids if ordered to ensure adequate hydration  - Maintain NPO status until nausea and vomiting are resolved  - Nasogastric tube if ordered  - Administer ordered antiemetic medications as needed  - Provide nonpharmacologic comfort measures as appropriate  - Advance diet as tolerated, if ordered  - Consider nutrition services referral to assist patient with adequate nutrition and appropriate food choices  Outcome: Progressing  Goal: Maintains or returns to baseline bowel function  Description: INTERVENTIONS:  - Assess bowel function  - Encourage oral fluids to ensure adequate hydration  - Administer IV fluids if ordered to ensure adequate hydration  - Administer ordered medications as needed  - Encourage mobilization and activity  - Consider nutritional services referral to assist patient with adequate nutrition and appropriate food choices  Outcome: Progressing  Goal: Maintains adequate nutritional intake  Description: INTERVENTIONS:  - Monitor percentage of each meal consumed  - Identify factors contributing to decreased intake, treat as appropriate  - Assist with meals as needed  - Monitor I&O, weight, and lab values if indicated  - Obtain nutrition services referral as needed  Outcome: Progressing  Goal: Establish and maintain optimal ostomy function  Description: INTERVENTIONS:  - Assess bowel function  - Encourage oral fluids to ensure adequate hydration  - Administer IV fluids if ordered to ensure adequate hydration   - Administer ordered medications as needed  - Encourage mobilization and activity  - Nutrition services referral to assist patient with appropriate food choices  - Assess stoma site  - Consider wound care consult   Outcome: Progressing  Goal: Oral mucous membranes remain intact  Description: INTERVENTIONS  - Assess oral mucosa and hygiene practices  - Implement preventative oral hygiene regimen  - Implement oral medicated treatments as ordered  - Initiate Nutrition services referral as needed  Outcome: Progressing     Problem: GENITOURINARY - ADULT  Goal: Maintains or returns to baseline urinary function  Description: INTERVENTIONS:  - Assess urinary function  - Encourage oral fluids to ensure adequate hydration if ordered  - Administer IV fluids as ordered to ensure adequate hydration  - Administer ordered medications as needed  - Offer frequent toileting  - Follow urinary retention protocol if ordered  Outcome: Progressing  Goal: Absence of urinary retention  Description: INTERVENTIONS:  - Assess patients ability to void and empty bladder  - Monitor I/O  - Bladder scan as needed  - Discuss with physician/AP medications to alleviate retention as needed  - Discuss catheterization for long term situations as appropriate  Outcome: Progressing  Goal: Urinary catheter remains patent  Description: INTERVENTIONS:  - Assess patency of urinary catheter  - If patient has a chronic neal, consider changing catheter if non-functioning  - Follow guidelines for intermittent irrigation of non-functioning urinary catheter  Outcome: Progressing     Problem: METABOLIC, FLUID AND ELECTROLYTES - ADULT  Goal: Electrolytes maintained within normal limits  Description: INTERVENTIONS:  - Monitor labs and assess patient for signs and symptoms of electrolyte imbalances  - Administer electrolyte replacement as ordered  - Monitor response to electrolyte replacements, including repeat lab results as appropriate  - Instruct patient on fluid and nutrition as appropriate  Outcome: Progressing  Goal: Fluid balance maintained  Description: INTERVENTIONS:  - Monitor labs   - Monitor I/O and WT  - Instruct patient on fluid and nutrition as appropriate  - Assess for signs & symptoms of volume excess or deficit  Outcome: Progressing  Goal: Glucose maintained within target range  Description: INTERVENTIONS:  - Monitor Blood Glucose as ordered  - Assess for signs and symptoms of hyperglycemia and hypoglycemia  - Administer ordered medications to maintain glucose within target range  - Assess nutritional intake and initiate nutrition service referral as needed  Outcome: Progressing     Problem: SKIN/TISSUE INTEGRITY - ADULT  Goal: Skin Integrity remains intact(Skin Breakdown Prevention)  Description: Assess:  -Perform Samir assessment every shift  -Clean and moisturize skin every shift  -Inspect skin when repositioning, toileting, and assisting with ADLS  -Assess under medical devices such as telemetry every 4 hours  -Assess extremities for adequate circulation and sensation     Bed Management:  -Have minimal linens on bed & keep smooth, unwrinkled  -Change linens as needed when moist or perspiring  -Avoid sitting or lying in one position for more than 2 hours while in bed  -Keep HOB at 30 degrees     Toileting:  -Offer bedside commode  -Assess for incontinence every 4 hours  -Use incontinent care products after each incontinent episode such as foaming cleanser and chucks pads    Activity:  -Mobilize patient 3 times a day  -Encourage activity and walks on unit  -Encourage or provide ROM exercises   -Turn and reposition patient every 2 Hours  -Use appropriate equipment to lift or move patient in bed  -Instruct/ Assist with weight shifting every 120 minutes when out of bed in chair  -Consider limitation of chair time 2 hour intervals    Skin Care:  -Avoid use of baby powder, tape, friction and shearing, hot water or constrictive clothing  -Relieve pressure over bony prominences using wedges  -Do not massage red bony areas    Next Steps:  -Teach patient strategies to minimize risks such as weight shifting   -Consider consults to  interdisciplinary teams such as geriatrics, wound care  Outcome: Progressing  Goal: Incision(s), wounds(s) or drain site(s) healing without S/S of infection  Description: INTERVENTIONS  - Assess and document dressing, incision, wound bed, drain sites and surrounding tissue  - Provide patient and family education  - Perform skin care/dressing changes every per order  Outcome: Progressing  Goal: Pressure injury heals and does not worsen  Description: Interventions:  - Implement low air loss mattress or specialty surface (Criteria met)  - Apply silicone foam dressing  - Instruct/assist with weight shifting every 120 minutes minutes when in chair   - Limit chair time to 2 hour intervals  - Use special pressure reducing interventions such as weight shifting when in chair   - Apply fecal or urinary incontinence containment device   - Perform passive or active ROM every 4 hours  - Turn and reposition patient & offload bony prominences every 2 hours   - Utilize friction reducing device or surface for transfers   - Consider consults to  interdisciplinary teams such as wound care and pt/ot  - Use incontinent care products after each incontinent episode such as foaming cleanser and chucks pads  - Consider nutrition services referral as needed  Outcome: Progressing     Problem: HEMATOLOGIC - ADULT  Goal: Maintains hematologic stability  Description: INTERVENTIONS  - Assess for signs and symptoms of bleeding or hemorrhage  - Monitor labs  - Administer supportive blood products/factors as ordered and appropriate  Outcome: Progressing     Problem: MUSCULOSKELETAL - ADULT  Goal: Maintain or return mobility to safest level of function  Description: INTERVENTIONS:  - Assess patient's ability to carry out ADLs; assess patient's baseline for ADL function and identify physical deficits which impact ability to perform ADLs (bathing, care of mouth/teeth, toileting, grooming, dressing, etc )  - Assess/evaluate cause of self-care deficits   - Assess range of motion  - Assess patient's mobility  - Assess patient's need for assistive devices and provide as appropriate  - Encourage maximum independence but intervene and supervise when necessary  - Involve family in performance of ADLs  - Assess for home care needs following discharge   - Consider OT consult to assist with ADL evaluation and planning for discharge  - Provide patient education as appropriate  Outcome: Progressing  Goal: Maintain proper alignment of affected body part  Description: INTERVENTIONS:  - Support, maintain and protect limb and body alignment  - Provide patient/ family with appropriate education  Outcome: Progressing     Problem: Potential for Falls  Goal: Patient will remain free of falls  Description: INTERVENTIONS:  - Educate patient/family on patient safety including physical limitations  - Instruct patient to call for assistance with activity   - Consult OT/PT to assist with strengthening/mobility   - Keep Call bell within reach  - Keep bed low and locked with side rails adjusted as appropriate  - Keep care items and personal belongings within reach  - Initiate and maintain comfort rounds  - Make Fall Risk Sign visible to staff  - Offer Toileting every 2 Hours, in advance of need  - Initiate/Maintain bed alarm  - Obtain necessary fall risk management equipment: alarms  - Apply yellow socks and bracelet for high fall risk patients  - Consider moving patient to room near nurses station  Outcome: Progressing

## 2022-05-11 NOTE — QUICK NOTE
I was notified that patient is refusing all oral medications  Will proceed with IV Ativan and metoprolol  Zyprexa p r n  Agitation

## 2022-05-11 NOTE — ED PROVIDER NOTES
History  Chief Complaint   Patient presents with    Rapid Heart Rate     Pt  Brought in from [address]  Patient was found to have a heart rate in the 160s  Patient had initial complaint of neck pain and anxiety  Patient only complaint as of now is palpitations  26-year-old female from [facility] with history of dementia, hypertension, hyperlipidemia presents to the ED with rapid heart rate  Patient is a poor historian this time and states she feels out of sorts but cannot describe how she feels  Nursing home reports that she had initially complained of anxiety and neck pain and they noticed an elevated heart rate  Patient denies chest pain or shortness of breath  No nausea or vomiting  No prior history of atrial fibrillation  History provided by:  Nursing home  History limited by:  Dementia  Rapid Heart Rate  Palpitations quality:  Fast  Onset quality:  Unable to specify  Timing:  Unable to specify  Progression:  Unable to specify  Chronicity:  New  Context: anxiety    Relieved by:  None tried  Worsened by:  Nothing  Ineffective treatments:  None tried  Associated symptoms: malaise/fatigue    Associated symptoms: no chest pain, no chest pressure, no cough, no diaphoresis, no dizziness, no lower extremity edema, no nausea, no shortness of breath, no syncope, no vomiting and no weakness    Risk factors: no diabetes mellitus, no hx of atrial fibrillation, no hx of PE and no hx of thyroid disease        Prior to Admission Medications   Prescriptions Last Dose Informant Patient Reported? Taking? LORazepam (ATIVAN) 0 5 mg tablet   No Yes   Si/2 tab BID at at 0800, and 1200 and 1 Tab at 1800     Sodium Phosphates (Enema) ENEM   Yes Yes   Sig: Insert 1 enema into the rectum as needed   acetaminophen (TYLENOL) 325 mg tablet   Yes No   Sig: Take 650 mg by mouth every 4 (four) hours as needed for mild pain or fever FEVER 100 DEGREES OR HIGHER   acetaminophen (TYLENOL) 650 mg suppository   Yes No   Sig: Insert 650 mg into the rectum every 4 (four) hours as needed for mild pain or fever FEVER 100 DEGREES OR GREATER    amLODIPine (NORVASC) 5 mg tablet   Yes No   Sig: Take 5 mg by mouth daily   bisacodyl (DULCOLAX) 10 mg suppository   Yes Yes   Sig: Insert 10 mg into the rectum as needed for constipation   docusate sodium (COLACE) 100 mg capsule   No No   Sig: Take 1 capsule (100 mg total) by mouth 2 (two) times a day   hydrocortisone (Proctozone-HC) 2 5 % rectal cream   Yes No   Sig: Apply 1 application topically 2 (two) times a day   mirtazapine (REMERON) 15 mg tablet   Yes Yes   Sig: Take 15 mg by mouth daily at bedtime   mirtazapine (REMERON) 7 5 MG tablet   Yes No   Sig: Take 7 5 mg by mouth daily at bedtime   ondansetron (ZOFRAN-ODT) 4 mg disintegrating tablet   No Yes   Sig: Take 1 tablet (4 mg total) by mouth every 6 (six) hours as needed for nausea or vomiting   polyethylene glycol (MIRALAX) 17 g packet   No Yes   Sig: Take 17 g by mouth daily   sorbitol 70 % solution   Yes Yes   Sig: Take 30 mL by mouth daily as needed      Facility-Administered Medications: None       Past Medical History:   Diagnosis Date    Acute and chronic respiratory failure with hypercapnia (HCC) 7/22/2019    Ambulatory dysfunction     Anemia     hgb 5 2o bleeding uler in 1970's    Arthritis     Dehydration 7/24/2019    Dementia ([facility] )     Depression     Eating disorder     Elevated temperature 12/29/2021    Encephalopathy     Gastrointestinal bleeding     Gastrointestinal bleeding     GI bleeding 7/27/2019    Hyperlipidemia     Hypertension     Pressure injury of head, stage 1 7/21/2019    Pressure injury of sacral region, stage 3 ([facility] ) 7/21/2019    Respiratory failure ([facility] )     Scalp hematoma 7/21/2019    Severe protein-calorie malnutrition [place]iel Sensor: less than 60% of standard weight) ([facility])     Severe protein-calorie malnutrition (HCC) 7/21/2019    Skin rash 11/13/2020    Unstageable pressure ulcer ([facility] ) 9/4/2019    Vulvar mass     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       History reviewed  No pertinent surgical history  Family History   Problem Relation Age of Onset    Parkinsonism Mother     COPD Father      I have reviewed and agree with the history as documented  E-Cigarette/Vaping    E-Cigarette Use Never User      E-Cigarette/Vaping Substances    Nicotine No     THC No     CBD No     Flavoring No     Other No     Unknown No      Social History     Tobacco Use    Smoking status: Never Smoker    Smokeless tobacco: Never Used   Vaping Use    Vaping Use: Never used   Substance Use Topics    Alcohol use: Not Currently     Comment: only socially in the past    Drug use: Never       Review of Systems   Unable to perform ROS: Dementia   Constitutional: Positive for malaise/fatigue  Negative for diaphoresis  Respiratory: Negative for cough and shortness of breath  Cardiovascular: Positive for palpitations  Negative for chest pain and syncope  Gastrointestinal: Negative for nausea and vomiting  Neurological: Negative for dizziness and weakness  Physical Exam  Physical Exam  Vitals and nursing note reviewed  Constitutional:       General: She is awake  She is not in acute distress  Appearance: She is well-developed and underweight  She is not ill-appearing, toxic-appearing or diaphoretic  HENT:      Head: Normocephalic and atraumatic  Right Ear: External ear normal       Left Ear: External ear normal       Nose: Nose normal       Mouth/Throat:      Mouth: Mucous membranes are moist       Pharynx: No oropharyngeal exudate  Eyes:      General: No scleral icterus  Right eye: No discharge  Left eye: No discharge  Conjunctiva/sclera: Conjunctivae normal       Pupils: Pupils are equal, round, and reactive to light  Neck:      Thyroid: No thyromegaly  Vascular: No JVD  Trachea: No tracheal deviation     Cardiovascular:      Rate and Rhythm: Normal rate  Rhythm irregularly irregular  Heart sounds: Normal heart sounds  No murmur heard  No friction rub  No gallop  Pulmonary:      Effort: Pulmonary effort is normal  No respiratory distress  Breath sounds: Normal breath sounds  No stridor  No wheezing, rhonchi or rales  Chest:      Chest wall: No tenderness  Abdominal:      General: Bowel sounds are normal  There is no distension  Palpations: Abdomen is soft  There is no mass  Tenderness: There is no abdominal tenderness  Hernia: No hernia is present  Musculoskeletal:         General: No tenderness or deformity  Normal range of motion  Cervical back: Normal range of motion and neck supple  Right lower leg: No edema  Left lower leg: No edema  Lymphadenopathy:      Cervical: No cervical adenopathy  Skin:     General: Skin is warm and dry  Coloration: Skin is not jaundiced or pale  Findings: No bruising, erythema, lesion or rash  Neurological:      General: No focal deficit present  Mental Status: She is alert  She is disoriented  Motor: No weakness or abnormal muscle tone  Coordination: Coordination normal       Deep Tendon Reflexes: Reflexes are normal and symmetric  Psychiatric:         Mood and Affect: Mood normal          Behavior: Behavior is cooperative           Vital Signs  ED Triage Vitals [05/11/22 0942]   Temperature Pulse Respirations Blood Pressure SpO2   97 8 °F (36 6 °C) (!) 122 20 112/66 97 %      Temp Source Heart Rate Source Patient Position - Orthostatic VS BP Location FiO2 (%)   Oral Monitor Lying Right arm --      Pain Score       --           Vitals:    05/11/22 0942   BP: 112/66   Pulse: (!) 122   Patient Position - Orthostatic VS: Lying         Visual Acuity      ED Medications  Medications - No data to display    Diagnostic Studies  Results Reviewed     Procedure Component Value Units Date/Time    APTT [[id number]]     Lab Status: No result Specimen: Blood     Comprehensive metabolic panel [[id number]]     Lab Status: No result Specimen: Blood     TSH [[id number]]     Lab Status: No result Specimen: Blood     HS Troponin 0hr (reflex protocol) [[id number]]     Lab Status: No result Specimen: Blood     CBC and differential [[id number]]     Lab Status: No result Specimen: Blood     Protime-INR [[id number]]     Lab Status: No result Specimen: Blood                  No orders to display              Procedures  ECG 12 Lead Documentation Only    Date/Time: 5/11/2022 10:11 AM  Performed by: Mamta Powers DO  Authorized by: Mamta Powers DO     Indications / Diagnosis:  Rapid heart rate  ECG reviewed by me, the ED Provider: yes    Patient location:  ED  Interpretation:     Interpretation: abnormal    Rate:     ECG rate:  95    ECG rate assessment: normal    Rhythm:     Rhythm: atrial fibrillation    Ectopy:     Ectopy: none    Conduction:     Conduction: normal    ST segments:     ST segments:  Normal  T waves:     T waves: normal               ED Course                                             MDM  Number of Diagnoses or Management Options  Diagnosis management comments: 35-year-old female from a nursing home presents with rapid heart rate noted at the nursing home  Patient had a complaint of anxiety and neck pain at the nursing home and it was noted that her heart rate was elevated  Patient's only complaint here is that she feels out of sorts she has had no fever, nausea, vomiting, chest pain or shortness of breath  No prior history of atrial fibrillation  On exam she is alert but disoriented and in no acute distress  She is in atrial fibrillation on the monitor with controlled rate  She has no murmur on exam and lungs are clear  No extremity edema  Will do cardiac workup    Patient will most likely require admission for new onset atrial fibrillation       Amount and/or Complexity of Data Reviewed  Clinical lab tests: ordered and reviewed  Tests in the radiology section of CPT®: ordered and reviewed  Decide to obtain previous medical records or to obtain history from someone other than the patient: yes  Review and summarize past medical records: yes  Independent visualization of images, tracings, or specimens: yes        Disposition  Final diagnoses:   None     ED Disposition     None      Follow-up Information    None         Patient's Medications   Discharge Prescriptions    No medications on file       No discharge procedures on file      PDMP Review       Value Time User    PDMP Reviewed  Yes 5/6/2022  2:05 PM hCristoph Aleman MD          ED Provider  Electronically Signed by           Yolie Ernandez DO  05/11/22 1011

## 2022-05-11 NOTE — ASSESSMENT & PLAN NOTE
On amlodipine 5 mg daily - will hold with low normal hypertension, initiation of metoprolol in the setting of new onset Afib  Monitor vital signs

## 2022-05-11 NOTE — ASSESSMENT & PLAN NOTE
Patient presents with new onset AFib, mild AMANDA with creatinine of 0 6 and baseline around 0 3  At baseline she is oriented to self and situation, however, currently she is agitated, oriented to self only, ripped out her peripheral IV requiring bilateral wrist restraints  Underlying dementia, patient on Ativan and Remeron per home regimen  · Add Zyprexa p r n    · Request Geriatric medicine evaluation  · IV fluids in the setting of mild AMANDA, treat underlying disturbances

## 2022-05-11 NOTE — ASSESSMENT & PLAN NOTE
Possible mild acute kidney injury with baseline creatinine previously around 0 3, currently 0 69 - although prior baseline from 3 years ago  Patient with history of malnutrition, dementia presenting with rapid Afib  Initiate gentle IV fluids  Monitor renal function

## 2022-05-11 NOTE — CONSULTS
Consult - Cardiology   Danilo Longo 80 y o  female MRN: [id number]  Unit/Bed#: ED 18 Encounter: [id number]        Reason For Consult:  Tachycardia-atrial fibrillation                 Assessment:  Atrial fibrillation - RVR (POA)   -New diagnosis   -Seemingly poorly felt by the patient - though she is a very poor historian (demented)  Hx hypertension   -O/p Rx[de-identified]  Norvasc 5 mg daily   Dementia  Anxiety  Probable protein calorie malnutrition    Discussion / Plan:  #  elderly, demented, nursing home resident arrives agitated in atrial fibrillation with accelerated ventricular rate      Currently having trouble administering intravenous or oral medications to this patient because of agitation/dementia  · Patient had been on a Cardizem drip in the emergency department but allegedly removed her IV  Now placed on Lopressor twice daily by admitting service   ~~> if the patient would per minute would suggest resuming continuous infusion until rates are controlled  Will titrate oral therapy guided by response  · Though anticoagulation indicated, a better understanding of this patient's history and assessment of safe use should proceed initiation of therapy  · Echocardiogram ordered though if her cooperation hinders completion this could be deferred as it is unlikely to affect near-term management      History Of Present Illness:  Danilo Longo is an 55-year-old resident of [facility] with regular care by gerontologist   Today she was brought to the emergency department with the patient herself possibly reporting feeling out of sorts  Nursing home staff indicate that prior to arrival the patient had expressed complaints of neck discomfort with reported or observed anxiety and notation of elevated heart rates  In the emergency department she was observed to have atrial fibrillation with accelerated ventricular rate    At the time of my visit the patient is in restraints, disoriented, somewhat agitated, and not able to provide any meaningful history having no idea where she is, how she got here, reporting that she lives with her parents, with no idea of her own age  For what it is worth, she denies any recognition of her heart rate & rhythm, chest pain, or dyspnea and cannot answer for me if/how she ambulates  Past Medical History:        Past Medical History:   Diagnosis Date    Acute and chronic respiratory failure with hypercapnia ([facility] ) 7/22/2019    Ambulatory dysfunction     Anemia     hgb 5 2o bleeding uler in 1970's    Arthritis     Dehydration 7/24/2019    Dementia ([facility] )     Depression     Eating disorder     Elevated temperature 12/29/2021    Encephalopathy     Gastrointestinal bleeding     Gastrointestinal bleeding     GI bleeding 7/27/2019    Hyperlipidemia     Hypertension     Pressure injury of head, stage 1 7/21/2019    Pressure injury of sacral region, stage 3 ([place] [address] ) 7/21/2019    Respiratory failure ([place] [address] )     Scalp hematoma 7/21/2019    Severe protein-calorie malnutrition [place]: less than 60% of standard weight) ([facility])     Severe protein-calorie malnutrition ([facility] ) 7/21/2019    Skin rash 11/13/2020    Unstageable pressure ulcer ([facility] ) 9/4/2019    Vulvar mass     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    History reviewed  No pertinent surgical history  Allergy:        No Known Allergies    Medications:       Prior to Admission medications    Medication Sig Start Date End Date Taking?  Authorizing Provider   bisacodyl (DULCOLAX) 10 mg suppository Insert 10 mg into the rectum as needed for constipation   Yes Historical Provider, MD   LORazepam (ATIVAN) 0 5 mg tablet 1/2 tab BID at at 0800, and 1200 and 1 Tab at 1800  5/6/22  Yes Cecelia Pool MD   mirtazapine (REMERON) 15 mg tablet Take 15 mg by mouth daily at bedtime   Yes Historical Provider, MD   ondansetron (ZOFRAN-ODT) 4 mg disintegrating tablet Take 1 tablet (4 mg total) by mouth every 6 (six) hours as needed for nausea or vomiting 19  Yes Aldon Romberg, MD   polyethylene glycol (MIRALAX) 17 g packet Take 17 g by mouth daily 19  Yes Aldon Romberg, MD   Sodium Phosphates (Enema) ENEM Insert 1 enema into the rectum as needed   Yes Historical Provider, MD   sorbitol 70 % solution Take 30 mL by mouth daily as needed   Yes Historical Provider, MD   acetaminophen (TYLENOL) 325 mg tablet Take 650 mg by mouth every 4 (four) hours as needed for mild pain or fever FEVER 100 DEGREES OR HIGHER    Historical Provider, MD   acetaminophen (TYLENOL) 650 mg suppository Insert 650 mg into the rectum every 4 (four) hours as needed for mild pain or fever FEVER 100 DEGREES OR GREATER      Historical Provider, MD   amLODIPine (NORVASC) 5 mg tablet Take 5 mg by mouth daily    Historical Provider, MD   docusate sodium (COLACE) 100 mg capsule Take 1 capsule (100 mg total) by mouth 2 (two) times a day 19   Aldon Romberg, MD   hydrocortisone (Proctozone-HC) 2 5 % rectal cream Apply 1 application topically 2 (two) times a day    Historical Provider, MD   mirtazapine (REMERON) 7 5 MG tablet Take 7 5 mg by mouth daily at bedtime    Historical Provider, MD       Family History:     Family History   Problem Relation Age of Onset    Parkinsonism Mother     COPD Father         Social History:       Social History     Socioeconomic History    Marital status: Single     Spouse name: None    Number of children: None    Years of education: None    Highest education level: None   Occupational History    None   Tobacco Use    Smoking status: Never Smoker    Smokeless tobacco: Never Used   Vaping Use    Vaping Use: Never used   Substance and Sexual Activity    Alcohol use: Not Currently     Comment: only socially in the past    Drug use: Never    Sexual activity: Not Currently     Partners: Male     Birth control/protection: None   Other Topics Concern    None   Social History Narrative    · Most recent tobacco use screenin2019      Social Determinants of Health     Financial Resource Strain: Not on file   Food Insecurity: Not on file   Transportation Needs: Not on file   Physical Activity: Not on file   Stress: Not on file   Social Connections: Not on file   Intimate Partner Violence: Not on file   Housing Stability: Not on file       ROS:  Unobtainable due to dementia    Exam:  General:  Alert, somewhat agitated, and disoriented though she did permit me to examine her  Visually the patient looks underweight  Head: Normocephalic, atraumatic  Eyes:  EOMI  Pupils - equal, round, reactive to accomodation  No icterus  Normal Conjunctiva  Oropharynx: Moist without lesion  Neck:  No gross bruit, JVD, thyromegaly, or lymphadenopathy  Heart:  Irregular with increased rate  No significant pathologic murmur  Lungs:  Clear without rales/rhonchi/wheeze  Abdomen:  Soft and nontender with normal bowel sounds  No organomegaly or mass  Lower Limbs:  No edema  Pulses[de-identified]  RLE - DP:  2+                 LLE - DP:  2+  Musculoskeletal: Independent movement of limbs observed, Formal ROM and strength eval not performed  Neurologic:    Oriented to: Only person  Cranial Nerves: grossly intact - vision, smell, taste, and hearing were not tested  Motor function: grossly normal, symmetric   Sensation: Was not tested      Vitals:    05/11/22 0942 05/11/22 1055 05/11/22 1201   BP: 112/66 94/57 117/81   BP Location: Right arm Right arm Right arm   Pulse: (!) 122 103 98   Resp: 20 22 20   Temp: 97 8 °F (36 6 °C)     TempSrc: Oral     SpO2: 97% 93% 97%   Weight: 42 3 kg (93 lb 4 1 oz)     Height: 4' 10" (1 473 m)             DATA:        Telemetry:   Atrial fibrillation ventricular rate              -----------------------------------------------------------------------------------------------------------------------------------------------  Weights:     Wt Readings from Last 20 Encounters:   05/11/22 42 3 kg (93 lb 4 1 oz)   07/31/19 32 9 kg (72 lb 8 5 oz)   , Body mass index is 19 49 kg/m²           Lab Studies:               Results from last 7 days   Lab Units 05/11/22  0945   WBC Thousand/uL 11 68*   HEMOGLOBIN g/dL 14 2   HEMATOCRIT % 44 3   PLATELETS Thousands/uL 324   ,   Results from last 7 days   Lab Units 05/11/22  0945   POTASSIUM mmol/L 5 4*   CHLORIDE mmol/L 98*   CO2 mmol/L 30   BUN mg/dL 19   CREATININE mg/dL 0 69   CALCIUM mg/dL 8 9   ALK PHOS U/L 104   ALT U/L 19   AST U/L 33

## 2022-05-11 NOTE — ASSESSMENT & PLAN NOTE
This is an 80year-old patient with past medical history significant for anxiety, cognitive decline and hypertension, in a very remote history of GI bleed due to gastric ulcer presents from nursing home facility after being found for rapid heart rate, found to be in rapid AFib at [address] ED  · Admit to med surg level of care  · Telemetry monitoring  · [id number] of 4, anticoagulation is indicated, initiated on Eliquis 2 5 mg b i d   Based on age and weight  · Initiated on metoprolol 25 mg b i d   · 2D echo ordered  · Request Cardiology evaluation

## 2022-05-11 NOTE — ASSESSMENT & PLAN NOTE
Patient with underlying cognitive decline and anxiety, resident at a nursing home  As above, she is currently significantly agitated  Will continue current regimen and await Geriatrics recommendations

## 2022-05-12 ENCOUNTER — APPOINTMENT (INPATIENT)
Dept: NON INVASIVE DIAGNOSTICS | Facility: HOSPITAL | Age: 82
DRG: 308 | End: 2022-05-12
Payer: MEDICARE

## 2022-05-12 ENCOUNTER — EPISODE CHANGES (OUTPATIENT)
Dept: CASE MANAGEMENT | Facility: OTHER | Age: 82
End: 2022-05-12

## 2022-05-12 LAB
ANION GAP SERPL CALCULATED.3IONS-SCNC: 7 MMOL/L (ref 4–13)
AORTIC ROOT: 2.5 CM
BASOPHILS # BLD AUTO: 0.06 THOUSANDS/ΜL (ref 0–0.1)
BASOPHILS NFR BLD AUTO: 1 % (ref 0–1)
BUN SERPL-MCNC: 14 MG/DL (ref 5–25)
CALCIUM SERPL-MCNC: 7.9 MG/DL (ref 8.3–10.1)
CHLORIDE SERPL-SCNC: 108 MMOL/L (ref 100–108)
CO2 SERPL-SCNC: 27 MMOL/L (ref 21–32)
CREAT SERPL-MCNC: 0.56 MG/DL (ref 0.6–1.3)
EOSINOPHIL # BLD AUTO: 0.1 THOUSAND/ΜL (ref 0–0.61)
EOSINOPHIL NFR BLD AUTO: 1 % (ref 0–6)
ERYTHROCYTE [DISTWIDTH] IN BLOOD BY AUTOMATED COUNT: 12.7 % (ref 11.6–15.1)
FRACTIONAL SHORTENING: 37 (ref 28–44)
GFR SERPL CREATININE-BSD FRML MDRD: 87 ML/MIN/1.73SQ M
GLUCOSE SERPL-MCNC: 90 MG/DL (ref 65–140)
HCT VFR BLD AUTO: 39.1 % (ref 34.8–46.1)
HGB BLD-MCNC: 12.6 G/DL (ref 11.5–15.4)
IMM GRANULOCYTES # BLD AUTO: 0.03 THOUSAND/UL (ref 0–0.2)
IMM GRANULOCYTES NFR BLD AUTO: 0 % (ref 0–2)
INTERVENTRICULAR SEPTUM IN DIASTOLE (PARASTERNAL SHORT AXIS VIEW): 0.9 CM
INTERVENTRICULAR SEPTUM: 0.9 CM (ref 0.6–1.1)
LEFT ATRIUM SIZE: 2.7 CM
LEFT INTERNAL DIMENSION IN SYSTOLE: 1.7 CM (ref 2.1–4)
LEFT VENTRICULAR INTERNAL DIMENSION IN DIASTOLE: 2.7 CM (ref 3.5–6)
LEFT VENTRICULAR POSTERIOR WALL IN END DIASTOLE: 0.9 CM
LEFT VENTRICULAR STROKE VOLUME: 20 ML
LVSV (TEICH): 20 ML
LYMPHOCYTES # BLD AUTO: 1.37 THOUSANDS/ΜL (ref 0.6–4.47)
LYMPHOCYTES NFR BLD AUTO: 16 % (ref 14–44)
MAGNESIUM SERPL-MCNC: 2.1 MG/DL (ref 1.6–2.6)
MCH RBC QN AUTO: 32.2 PG (ref 26.8–34.3)
MCHC RBC AUTO-ENTMCNC: 32.2 G/DL (ref 31.4–37.4)
MCV RBC AUTO: 100 FL (ref 82–98)
MONOCYTES # BLD AUTO: 0.61 THOUSAND/ΜL (ref 0.17–1.22)
MONOCYTES NFR BLD AUTO: 7 % (ref 4–12)
NEUTROPHILS # BLD AUTO: 6.32 THOUSANDS/ΜL (ref 1.85–7.62)
NEUTS SEG NFR BLD AUTO: 75 % (ref 43–75)
NRBC BLD AUTO-RTO: 0 /100 WBCS
PLATELET # BLD AUTO: 248 THOUSANDS/UL (ref 149–390)
PMV BLD AUTO: 10 FL (ref 8.9–12.7)
POTASSIUM SERPL-SCNC: 3.9 MMOL/L (ref 3.5–5.3)
RBC # BLD AUTO: 3.91 MILLION/UL (ref 3.81–5.12)
SL CV PED ECHO LEFT VENTRICLE DIASTOLIC VOLUME (MOD BIPLANE) 2D: 28 ML
SL CV PED ECHO LEFT VENTRICLE SYSTOLIC VOLUME (MOD BIPLANE) 2D: 8 ML
SODIUM SERPL-SCNC: 142 MMOL/L (ref 136–145)
TR MAX PG: 18 MMHG
TR PEAK VELOCITY: 2.1 M/S
TRICUSPID VALVE PEAK REGURGITATION VELOCITY: 2.12 M/S
WBC # BLD AUTO: 8.49 THOUSAND/UL (ref 4.31–10.16)

## 2022-05-12 PROCEDURE — 93308 TTE F-UP OR LMTD: CPT | Performed by: INTERNAL MEDICINE

## 2022-05-12 PROCEDURE — 80048 BASIC METABOLIC PNL TOTAL CA: CPT | Performed by: FAMILY MEDICINE

## 2022-05-12 PROCEDURE — 99223 1ST HOSP IP/OBS HIGH 75: CPT | Performed by: NURSE PRACTITIONER

## 2022-05-12 PROCEDURE — 99232 SBSQ HOSP IP/OBS MODERATE 35: CPT | Performed by: FAMILY MEDICINE

## 2022-05-12 PROCEDURE — 99232 SBSQ HOSP IP/OBS MODERATE 35: CPT | Performed by: INTERNAL MEDICINE

## 2022-05-12 PROCEDURE — 93325 DOPPLER ECHO COLOR FLOW MAPG: CPT | Performed by: INTERNAL MEDICINE

## 2022-05-12 PROCEDURE — 85025 COMPLETE CBC W/AUTO DIFF WBC: CPT | Performed by: FAMILY MEDICINE

## 2022-05-12 PROCEDURE — 93308 TTE F-UP OR LMTD: CPT

## 2022-05-12 PROCEDURE — 93321 DOPPLER ECHO F-UP/LMTD STD: CPT | Performed by: INTERNAL MEDICINE

## 2022-05-12 PROCEDURE — 83735 ASSAY OF MAGNESIUM: CPT | Performed by: FAMILY MEDICINE

## 2022-05-12 PROCEDURE — 97163 PT EVAL HIGH COMPLEX 45 MIN: CPT

## 2022-05-12 PROCEDURE — 93321 DOPPLER ECHO F-UP/LMTD STD: CPT

## 2022-05-12 PROCEDURE — 93325 DOPPLER ECHO COLOR FLOW MAPG: CPT

## 2022-05-12 PROCEDURE — 97167 OT EVAL HIGH COMPLEX 60 MIN: CPT

## 2022-05-12 RX ORDER — LORAZEPAM 2 MG/ML
0.5 INJECTION INTRAMUSCULAR ONCE
Status: COMPLETED | OUTPATIENT
Start: 2022-05-12 | End: 2022-05-12

## 2022-05-12 RX ORDER — METOPROLOL TARTRATE 5 MG/5ML
2.5 INJECTION INTRAVENOUS EVERY 6 HOURS PRN
Status: DISCONTINUED | OUTPATIENT
Start: 2022-05-12 | End: 2022-05-15 | Stop reason: HOSPADM

## 2022-05-12 RX ORDER — LORAZEPAM 0.5 MG/1
0.25 TABLET ORAL 2 TIMES DAILY
Status: DISCONTINUED | OUTPATIENT
Start: 2022-05-12 | End: 2022-05-15 | Stop reason: HOSPADM

## 2022-05-12 RX ORDER — LORAZEPAM 0.5 MG/1
0.5 TABLET ORAL DAILY
Status: DISCONTINUED | OUTPATIENT
Start: 2022-05-12 | End: 2022-05-15 | Stop reason: HOSPADM

## 2022-05-12 RX ADMIN — METOPROLOL TARTRATE 25 MG: 25 TABLET, FILM COATED ORAL at 18:16

## 2022-05-12 RX ADMIN — LORAZEPAM 0.5 MG: 2 INJECTION INTRAMUSCULAR; INTRAVENOUS at 01:16

## 2022-05-12 RX ADMIN — LORAZEPAM 0.25 MG: 2 INJECTION INTRAMUSCULAR; INTRAVENOUS at 12:04

## 2022-05-12 RX ADMIN — LORAZEPAM 0.25 MG: 2 INJECTION INTRAMUSCULAR; INTRAVENOUS at 09:07

## 2022-05-12 RX ADMIN — LORAZEPAM 0.5 MG: 0.5 TABLET ORAL at 18:16

## 2022-05-12 RX ADMIN — MIRTAZAPINE 15 MG: 15 TABLET, FILM COATED ORAL at 21:30

## 2022-05-12 RX ADMIN — APIXABAN 2.5 MG: 2.5 TABLET, FILM COATED ORAL at 18:16

## 2022-05-12 RX ADMIN — POLYETHYLENE GLYCOL 3350 17 G: 17 POWDER, FOR SOLUTION ORAL at 09:11

## 2022-05-12 NOTE — OCCUPATIONAL THERAPY NOTE
Occupational Therapy Evaluation     Patient Name: Jorge Weathers  VHLCL'E Date: 5/12/2022  Problem List  Principal Problem:    New onset atrial fibrillation [facility])  Active Problems:    Ambulatory dysfunction    Moderate protein-calorie malnutrition ([address] )    Anxiety    Other constipation    Essential hypertension    Tricuspid regurgitation    Dementia (HCC)    Metabolic encephalopathy    AMANDA (acute kidney injury) ([address] )    Hyperkalemia    Past Medical History  Past Medical History:   Diagnosis Date    Acute and chronic respiratory failure with hypercapnia ([address] ) 7/22/2019    Ambulatory dysfunction     Anemia     hgb 5 2o bleeding uler in 1970's    Arthritis     Dehydration 7/24/2019    Dementia ([address] )     Depression     Eating disorder     Elevated temperature 12/29/2021    Encephalopathy     Gastrointestinal bleeding     Gastrointestinal bleeding     GI bleeding 7/27/2019    Hyperlipidemia     Hypertension     Pressure injury of head, stage 1 7/21/2019    Pressure injury of sacral region, stage 3 ([facility] [facility] ) 7/21/2019    Respiratory failure ([facility] [address] )     Scalp hematoma 7/21/2019    Severe protein-calorie malnutrition Alexis Clayton: less than 60% of standard weight) ([facility])     Severe protein-calorie malnutrition ([address] ) 7/21/2019    Skin rash 11/13/2020    Unstageable pressure ulcer ([address] ) 9/4/2019    Vulvar mass     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     Past Surgical History  History reviewed  No pertinent surgical history  05/12/22 0945   OT Last Visit   OT Visit Date 05/12/22   Note Type   Note type Evaluation   Restrictions/Precautions   Weight Bearing Precautions Per Order No   Other Precautions Cognitive;1:1;Restraints; Chair Alarm; Bed Alarm; Fall Risk   Pain Assessment   Pain Assessment Tool 0-10   Pain Score No Pain   Home Living   Type of Home SNF  ([facility])   Additional Comments Pt w/ h/o dementia and unable to provide history  Prior Function   Lives With Facility staff   Comments pt w/ h/o dementia and unable to provide PLOF  Lifestyle   Autonomy PLOF unknown as pt not able to provide hsitory due to dementia  States "I don't know" when asked questions  Psychosocial   Psychosocial (WDL) WDL   Subjective   Subjective " I don't want any tubes in my arm ! "   ADL   Eating Assistance 5  Supervision/Setup   Eating Deficit Setup   Grooming Assistance 4  Minimal Assistance   Grooming Deficit Setup   UB Bathing Assistance 4  Minimal Assistance   UB Bathing Deficit Setup   LB Bathing Assistance 3  Moderate Assistance   LB Bathing Deficit Setup   UB Dressing Assistance 4  Minimal Assistance   UB Dressing Deficit Setup   LB Dressing Assistance 2  Maximal Assistance   LB Dressing Deficit Setup   Toileting Assistance  3  Moderate Assistance   Toileting Deficit Supervison/safety   Additional Comments Function fluctuates pending volition  Bed Mobility   Supine to Sit 2  Maximal assistance   Additional items Assist x 2;HOB elevated; Bedrails; Increased time required;LE management   Sit to Supine 2  Maximal assistance   Additional items Assist x 2; Increased time required;LE management; Bedrails   Additional Comments Remains in bed w/ all needs, alarm engaged, and 1:1 present  Transfers   Sit to Stand 3  Moderate assistance   Additional items Assist x 2; Increased time required;Verbal cues   Stand to Sit 3  Moderate assistance   Additional items Assist x 2; Increased time required;Verbal cues   Additional items   (Mod x 2 to side step toward [facility] using arm held assist )   Additional Comments Posterior lean  Cues to initiate task     Balance   Static Sitting Fair -   Dynamic Sitting Fair -   Static Standing Poor   Dynamic Standing Poor -   Activity Tolerance   Activity Tolerance Treatment limited secondary to medical complications (Comment)  (H/o dementia)   Medical Staff Made Aware PT student and PT Adeel    Pt seen for co-evaluation/ with skilled Physical Therapist and student 2* clinically unstable/unpredictable presentation, medical complexity, fall risk, functional limitations, impaired functional balance, decreased cognition/safety awareness, and limited activity tolerance which is decline from PLOF and may impact overall occupational performance  Nurse Made Aware Vladimir Jean Baptiste Assessment   RUE Assessment WFL  (Grossly 4-/5)   LUE Assessment   LUE Assessment WFL  (Grossly 4-/5)   Hand Function   Gross Motor Coordination Functional   Fine Motor Coordination Functional   Cognition   Overall Cognitive Status Impaired   Arousal/Participation Responsive;Persistent stimuli required   Attention Difficulty attending to directions   Orientation Level Oriented to person   Memory Decreased short term memory;Decreased recall of recent events;Decreased recall of precautions;Decreased recall of biographical information   Following Commands Follows one step commands with increased time or repetition   Comments Easily agitated  Assessment   Limitation Decreased ADL status; Decreased UE strength;Decreased cognition;Decreased endurance;Decreased self-care trans;Decreased high-level ADLs   Prognosis Fair   Assessment Pt admit 5/11/22 with new onset of A-fib  OT completed extensive review of pt's medical and social history  Pt with h/o anxiety, dementia, HTN, and GI bleed  Prior to admit was living at [facility]  Pt w/ h/o dementia and therefore unable to obtain PLOF  Pt presents to OT below baseline due to the following performance deficits: strength;  balance; stand tolerance; functional mobility; problem solving; sequencing; attention; awareness; coping; community integration; self care  Therefore, pt would benefit from OT services to achieve optimal level of performance to decrease caregiver burden  Occupational performance areas to be addressed include: eating, grooming, bathing, toileting, dressing, activity tolerance, functional mobility, and clothing management   Pt is Max x 2 for bed mobility, Mod x 2 to stand, and Mod x 2 to side step toward [facility] using arm held assist  Heavy posterior lean  + restraints  Min for UB ADLS and Mod/Max for LB ADLS  Volition affects function  Currently very easily agitated  1:1 present  Based on findings, pt is of high complexity  The patient's raw score on the AM-PAC Daily Activity inpatient short form is 15, standardized score is 34 69, less than 39 4  Patients at this level are likely to benefit from discharge to post-acute rehabilitation services  Recommend pt return to facility w/ rehab in familiar environment due to h/o dementia to decrease caregiver burden  Goals   Patient Goals None stated due to g/o dementia   Plan   Treatment Interventions ADL retraining;UE strengthening/ROM; Endurance training;Patient/family training;Equipment evaluation/education; Activityengagement   Goal Expiration Date 05/26/22   OT Treatment Day 0   OT Frequency 1-2x/wk   Recommendation   OT Discharge Recommendation Return to facility with rehabilitation services   [facility] Daily Activity Inpatient   Lower Body Dressing 2   Bathing 2   Toileting 2   Upper Body Dressing 3   Grooming 3   Eating 3   Daily Activity Raw Score 15   Daily Activity Standardized Score (Calc for Raw Score >=11) 34 69   AM-PAC Applied Cognition Inpatient   Following a Speech/Presentation 2   Understanding Ordinary Conversation 3   Taking Medications 2   Remembering Where Things Are Placed or Put Away 2   Remembering List of 4-5 Errands 1   Taking Care of Complicated Tasks 1   Applied Cognition Raw Score 11   Applied Cognition Standardized Score 27 03        GOALS:     Pt will complete UB ADL's w/ (S) after s/u     Pt will complete LB ADL's w/ Min after s/u    Pt will complete toileting including hygiene and clothing management w/ Min     Pt will complete functional transfers w/ (S) using RW     Pt will complete dynamic stand balance w/ F for safe clothing management     Pt will improve stand tolerance to 4-6 mins for safety w/ ADL tasks     Pt will improve activity tolerance to Ffor 20- 30 min tx session for increased engagement in functional tasks       [place] MS, OTR/L

## 2022-05-12 NOTE — PLAN OF CARE
Problem: NEUROSENSORY - ADULT  Goal: Achieves stable or improved neurological status  Description: INTERVENTIONS  - Monitor and report changes in neurological status  - Monitor vital signs such as temperature, blood pressure, glucose, and any other labs ordered   - Initiate measures to prevent increased intracranial pressure  - Monitor for seizure activity and implement precautions if appropriate      Outcome: Progressing  Goal: Remains free of injury related to seizures activity  Description: INTERVENTIONS  - Maintain airway, patient safety  and administer oxygen as ordered  - Monitor patient for seizure activity, document and report duration and description of seizure to physician/advanced practitioner  - If seizure occurs,  ensure patient safety during seizure  - Reorient patient post seizure  - Seizure pads on all 4 side rails  - Instruct patient/family to notify RN of any seizure activity including if an aura is experienced  - Instruct patient/family to call for assistance with activity based on nursing assessment  - Administer anti-seizure medications if ordered    Outcome: Progressing  Goal: Achieves maximal functionality and self care  Description: INTERVENTIONS  - Monitor swallowing and airway patency with patient fatigue and changes in neurological status  - Encourage and assist patient to increase activity and self care     - Encourage visually impaired, hearing impaired and aphasic patients to use assistive/communication devices  Outcome: Progressing     Problem: CARDIOVASCULAR - ADULT  Goal: Maintains optimal cardiac output and hemodynamic stability  Description: INTERVENTIONS:  - Monitor I/O, vital signs and rhythm  - Monitor for S/S and trends of decreased cardiac output  - Administer and titrate ordered vasoactive medications to optimize hemodynamic stability  - Assess quality of pulses, skin color and temperature  - Assess for signs of decreased coronary artery perfusion  - Instruct patient to report change in severity of symptoms  Outcome: Progressing  Goal: Absence of cardiac dysrhythmias or at baseline rhythm  Description: INTERVENTIONS:  - Continuous cardiac monitoring, vital signs, obtain 12 lead EKG if ordered  - Administer antiarrhythmic and heart rate control medications as ordered  - Monitor electrolytes and administer replacement therapy as ordered  Outcome: Progressing     Problem: RESPIRATORY - ADULT  Goal: Achieves optimal ventilation and oxygenation  Description: INTERVENTIONS:  - Assess for changes in respiratory status  - Assess for changes in mentation and behavior  - Position to facilitate oxygenation and minimize respiratory effort  - Oxygen administered by appropriate delivery if ordered  - Initiate smoking cessation education as indicated  - Encourage broncho-pulmonary hygiene including cough, deep breathe, Incentive Spirometry  - Assess the need for suctioning and aspirate as needed  - Assess and instruct to report SOB or any respiratory difficulty  - Respiratory Therapy support as indicated  Outcome: Progressing     Problem: GASTROINTESTINAL - ADULT  Goal: Minimal or absence of nausea and/or vomiting  Description: INTERVENTIONS:  - Administer IV fluids if ordered to ensure adequate hydration  - Maintain NPO status until nausea and vomiting are resolved  - Nasogastric tube if ordered  - Administer ordered antiemetic medications as needed  - Provide nonpharmacologic comfort measures as appropriate  - Advance diet as tolerated, if ordered  - Consider nutrition services referral to assist patient with adequate nutrition and appropriate food choices  Outcome: Progressing  Goal: Maintains or returns to baseline bowel function  Description: INTERVENTIONS:  - Assess bowel function  - Encourage oral fluids to ensure adequate hydration  - Administer IV fluids if ordered to ensure adequate hydration  - Administer ordered medications as needed  - Encourage mobilization and activity  - Consider nutritional services referral to assist patient with adequate nutrition and appropriate food choices  Outcome: Progressing  Goal: Maintains adequate nutritional intake  Description: INTERVENTIONS:  - Monitor percentage of each meal consumed  - Identify factors contributing to decreased intake, treat as appropriate  - Assist with meals as needed  - Monitor I&O, weight, and lab values if indicated  - Obtain nutrition services referral as needed  Outcome: Progressing  Goal: Establish and maintain optimal ostomy function  Description: INTERVENTIONS:  - Assess bowel function  - Encourage oral fluids to ensure adequate hydration  - Administer IV fluids if ordered to ensure adequate hydration   - Administer ordered medications as needed  - Encourage mobilization and activity  - Nutrition services referral to assist patient with appropriate food choices  - Assess stoma site  - Consider wound care consult   Outcome: Progressing  Goal: Oral mucous membranes remain intact  Description: INTERVENTIONS  - Assess oral mucosa and hygiene practices  - Implement preventative oral hygiene regimen  - Implement oral medicated treatments as ordered  - Initiate Nutrition services referral as needed  Outcome: Progressing     Problem: GENITOURINARY - ADULT  Goal: Maintains or returns to baseline urinary function  Description: INTERVENTIONS:  - Assess urinary function  - Encourage oral fluids to ensure adequate hydration if ordered  - Administer IV fluids as ordered to ensure adequate hydration  - Administer ordered medications as needed  - Offer frequent toileting  - Follow urinary retention protocol if ordered  Outcome: Progressing  Goal: Absence of urinary retention  Description: INTERVENTIONS:  - Assess patients ability to void and empty bladder  - Monitor I/O  - Bladder scan as needed  - Discuss with physician/AP medications to alleviate retention as needed  - Discuss catheterization for long term situations as appropriate  Outcome: Progressing  Goal: Urinary catheter remains patent  Description: INTERVENTIONS:  - Assess patency of urinary catheter  - If patient has a chronic neal, consider changing catheter if non-functioning  - Follow guidelines for intermittent irrigation of non-functioning urinary catheter  Outcome: Progressing     Problem: METABOLIC, FLUID AND ELECTROLYTES - ADULT  Goal: Electrolytes maintained within normal limits  Description: INTERVENTIONS:  - Monitor labs and assess patient for signs and symptoms of electrolyte imbalances  - Administer electrolyte replacement as ordered  - Monitor response to electrolyte replacements, including repeat lab results as appropriate  - Instruct patient on fluid and nutrition as appropriate  Outcome: Progressing  Goal: Fluid balance maintained  Description: INTERVENTIONS:  - Monitor labs   - Monitor I/O and WT  - Instruct patient on fluid and nutrition as appropriate  - Assess for signs & symptoms of volume excess or deficit  Outcome: Progressing  Goal: Glucose maintained within target range  Description: INTERVENTIONS:  - Monitor Blood Glucose as ordered  - Assess for signs and symptoms of hyperglycemia and hypoglycemia  - Administer ordered medications to maintain glucose within target range  - Assess nutritional intake and initiate nutrition service referral as needed  Outcome: Progressing        Problem: HEMATOLOGIC - ADULT  Goal: Maintains hematologic stability  Description: INTERVENTIONS  - Assess for signs and symptoms of bleeding or hemorrhage  - Monitor labs  - Administer supportive blood products/factors as ordered and appropriate  Outcome: Progressing     Problem: MUSCULOSKELETAL - ADULT  Goal: Maintain or return mobility to safest level of function  Description: INTERVENTIONS:  - Assess patient's ability to carry out ADLs; assess patient's baseline for ADL function and identify physical deficits which impact ability to perform ADLs (bathing, care of mouth/teeth, toileting, grooming, dressing, etc )  - Assess/evaluate cause of self-care deficits   - Assess range of motion  - Assess patient's mobility  - Assess patient's need for assistive devices and provide as appropriate  - Encourage maximum independence but intervene and supervise when necessary  - Involve family in performance of ADLs  - Assess for home care needs following discharge   - Consider OT consult to assist with ADL evaluation and planning for discharge  - Provide patient education as appropriate  Outcome: Progressing  Goal: Maintain proper alignment of affected body part  Description: INTERVENTIONS:  - Support, maintain and protect limb and body alignment  - Provide patient/ family with appropriate education  Outcome: Progressing     Problem: Potential for Falls  Goal: Patient will remain free of falls  Description: INTERVENTIONS:  - Educate patient/family on patient safety including physical limitations  - Instruct patient to call for assistance with activity   - Consult OT/PT to assist with strengthening/mobility   - Keep Call bell within reach  - Keep bed low and locked with side rails adjusted as appropriate  - Keep care items and personal belongings within reach  - Initiate and maintain comfort rounds  - Make Fall Risk Sign visible to staff  - Offer Toileting every 2 Hours, in advance of need  - Initiate/Maintain bed alarm  - Obtain necessary fall risk management equipment: bed alarm   - Apply yellow socks and bracelet for high fall risk patients  - Consider moving patient to room near nurses station  Outcome: Progressing     Problem: SAFETY,RESTRAINT: NV/NON-SELF DESTRUCTIVE BEHAVIOR  Goal: Remains free of harm/injury (restraint for non violent/non self-detsructive behavior)  Description: INTERVENTIONS:  - Instruct patient/family regarding restraint use   - Assess and monitor physiologic and psychological status   - Provide interventions and comfort measures to meet assessed patient needs   - Identify and implement measures to help patient regain control  - Assess readiness for release of restraint   Outcome: Progressing  Goal: Returns to optimal restraint-free functioning  Description: INTERVENTIONS:  - Assess the patient's behavior and symptoms that indicate continued need for restraint  - Identify and implement measures to help patient regain control  - Assess readiness for release of restraint   Outcome: Progressing

## 2022-05-12 NOTE — PHYSICAL THERAPY NOTE
PT EVALUATION    Pt  Name: Garrett Mejias  Pt  Age: 80 y o  MRN: [id number]  LENGTH OF STAY: 1      Admitting Diagnoses:   Anxiety [F41 9]  Rapid heart rate [R00 0]  Essential hypertension [I10]  Dementia ([facility] [address] ) [F03 90]  New onset atrial fibrillation ([address] ) [I48 91]  Moderate protein-calorie malnutrition ([address] ) [E44 0]  Ambulatory dysfunction [R26 2]    Past Medical History:   Diagnosis Date    Acute and chronic respiratory failure with hypercapnia ([address] ) 7/22/2019    Ambulatory dysfunction     Anemia     hgb 5 2o bleeding uler in 1970's    Arthritis     Dehydration 7/24/2019    Dementia ([facility] [address] )     Depression     Eating disorder     Elevated temperature 12/29/2021    Encephalopathy     Gastrointestinal bleeding     Gastrointestinal bleeding     GI bleeding 7/27/2019    Hyperlipidemia     Hypertension     Pressure injury of head, stage 1 7/21/2019    Pressure injury of sacral region, stage 3 ([facility] [address] ) 7/21/2019    Respiratory failure ([facility] [address] )     Scalp hematoma 7/21/2019    Severe protein-calorie malnutrition Paulene Meo: less than 60% of standard weight) ([facility])     Severe protein-calorie malnutrition ([address] ) 7/21/2019    Skin rash 11/13/2020    Unstageable pressure ulcer ([address] ) 9/4/2019    Vulvar mass     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       History reviewed  No pertinent surgical history  Imaging Studies:  XR chest 1 view portable   ED Interpretation by Patrick Huang DO (05/11 9527)   Increased interstitial markings  No change from previous      Final Result by Rosio Ochoa MD (05/11 1215)      No acute cardiopulmonary disease  Workstation performed: [id number]                05/12/22 0935   PT Last Visit   PT Visit Date 05/12/22   Note Type   Note type Evaluation   Pain Assessment   Pain Assessment Tool 0-10   Pain Score No Pain   Restrictions/Precautions   Weight Bearing Precautions Per Order No   Other Precautions Agitated;1:1;Cognitive; Chair Alarm; Restraints; Fall Risk  (B/L wrist restraints)   Home Living   Type of Home SNF  ([facility])   Additional Comments Pt poor historian 2* dementia and unable to provide previous living situation  Prior Function   Comments Pt poor historian 2* dementia and unable to provide PLOF  General   Additional Pertinent History Acute and chronic respiratory failure, anemia, arthritis, dementia, depression, encephalopathy, GI bleeding, HLD, HTN   Family/Caregiver Present No   Cognition   Overall Cognitive Status Impaired   Arousal/Participation Uncooperative   Attention Difficulty attending to directions   Orientation Level Oriented to person   Following Commands Follows one step commands with increased time or repetition   Comments Easily agitated   Subjective   Subjective None stated at this time   RUE Assessment   RUE Assessment   (Refer to OT)   LUE Assessment   LUE Assessment   (Refer to OT)   RLE Assessment   RLE Assessment WFL  (Unable to assess MMT 2* uncooperative and agitated, but able to stand, appears at least 3+/5 grossly)   LLE Assessment   LLE Assessment WFL  (Unable to assess MMT 2* uncooperative and agitated, but able to stand, appears at least 3+/5 grossly)   Bed Mobility   Supine to Sit 2  Maximal assistance   Additional items Assist x 2;HOB elevated; Bedrails; Increased time required;LE management   Sit to Supine 2  Maximal assistance   Additional items Assist x 2;HOB elevated; Bedrails; Increased time required;LE management   Additional Comments Pt supine in bed at beginning and end of session  Pt resistant but tolerated bed mobility   Transfers   Sit to Stand 3  Moderate assistance   Additional items Assist x 2; Increased time required;Verbal cues  (w/ RW)   Stand to Sit 3  Moderate assistance   Additional items Assist x 2; Increased time required;Verbal cues  (w/ RW)   Additional Comments Pt w/ posterior lean during transfers  VCs for hand placement  Ambulation/Elevation   Gait pattern Retropulsion; Short stride; Excessively slow  (Lateral steps towards HOB) Gait Assistance 3  Moderate assist   Additional items Assist x 2;Verbal cues   Assistive Device None   Distance 3 lateral steps towards [facility]   Ambulation/Elevation Additional Comments Max VCs required to complete lateral steps towards [facility]  VCs for RW sequencing and hand placement  Balance   Static Sitting Fair -   Dynamic Sitting Fair -   Static Standing Poor   Dynamic Standing Poor -   Ambulatory Poor -   Endurance Deficit   Endurance Deficit Yes   Endurance Deficit Description 2* to fatigue   Activity Tolerance   Activity Tolerance Patient limited by fatigue;Treatment limited secondary to agitation  (h/o dementia)   Medical Staff Made Aware FRANK Kelly   Nurse Made Aware GLEN Dukes   Assessment   Prognosis Fair   Problem List Decreased strength;Decreased endurance; Impaired balance;Decreased mobility; Decreased cognition; Impaired judgement;Decreased safety awareness   Assessment Pt is a 81 y/o female who presented on 5/11/22 from [facility] with symptoms of tachycardia in 160's and complaints of anxiety and neck pain  Pt admitted with A-fib, hyperkalemia, AMANDA, and metabolic encephalopathy  Pt's PMH that may impact PT session includes acute and chronic respiratory failure, anemia, arthritis, dementia, depression, encephalopathy, GI bleeding, moderate protein-calorie malnutrition, HLD, and HTN  Pt referred to PT for mobility assessment and discharge planning  On eval, patient demonstrated maxAx2 with bed mobility, and modAx2 with tranfers and ambulation  Pt agitated and resistant to PT session however tolerated sit<>stand w/ RW and 3 lateral side steps towards HOB  Pt demonstrated retropulsion and unsteadiness w/ lateral side steps  Pt denied SOB, dizziness, or pain throughout session  Pt w/ soft restraints and beginning of session and applied again at end of session 2* pt attempted to remove peripheral IV  The patient's AM-PAC Basic Mobility Inpatient Short Form Raw Score is 9   A Raw score of less than or equal to 16 suggests the patient may benefit from discharge to post-acute rehabilitation services  Please also refer to the recommendation of the Physical Therapist for safe discharge planning  From PT standpoint, recommend return to previous SNF w/ rehab services as appropriate when medically cleared  Will continue PT per POC  At end of session, pt stable and supine w/ all needs within reach  Patient educated to call nsg staff to assist with OOB activities  Nsg notified  Goals   Patient Goals none stated 2* to impaired cognition   STG Expiration Date 05/26/22   Short Term Goal #1 Goals to be met in 14 days: patient able to: 1  Increased MMT of 1/2 grade grossly to improve overall functional mobility; 2  Improved balance by 1/2 grade grossly to improve safety and independence; 3  Improved bed mobility to S to progress towards PLOF; 4  Improved transfer ability to minAx1 to improve overall function and independence; 5  Increase ambulation with RW to approximately 150 ft with minAx1 assistance to increase endurance and overall strength; 6  Patient/caregiver education   PT Treatment Day 0   Plan   Treatment/Interventions Functional transfer training;LE strengthening/ROM; Therapeutic exercise; Endurance training;Patient/family training;Bed mobility;Gait training;Spoke to nursing;OT   PT Frequency 3-5x/wk   Recommendation   PT Discharge Recommendation Return to facility with rehabilitation services   AM-PAC Basic Mobility Inpatient   Turning in Bed Without Bedrails 1   Lying on Back to Sitting on Edge of Flat Bed 1   Moving Bed to Chair 2   Standing Up From Chair 2   Walk in Room 2   Climb 3-5 Stairs 1   Basic Mobility Inpatient Raw Score 9   Turning Head Towards Sound 2   Follow Simple Instructions 2   Low Function Basic Mobility Raw Score 13   Low Function Basic Mobility Standardized Score 20 14   Highest Level Of Mobility   [facility] Goal 3: Sit at edge of bed   -M Highest Level of Mobility 4: Move to chair/commode   -HLM Goal Achieved Yes   End of Consult   Patient Position at End of Consult Supine;Bed/Chair alarm activated; All needs within reach   End of Consult Comments Pt stable and supine at end of session w/ all needs within reach     Hx/personal factors: co-morbidities, advanced age, dec cognition, fall risk, and assist w/ ADL's  Examination: dec mobility, dec balance, dec endurance, dec amb, risk for falls, pain, dec cognition  Clinical: unpredictable (ongoing medical status, abnormal lab values, risk for falls, and pain mgt)  Complexity: high    Bosie Platts, SPT

## 2022-05-12 NOTE — PROGRESS NOTES
[address] [facility]  Progress Note - Reanna Alfaro 1940, 80 y o  female MRN: [id number]  Unit/Bed#: E4 -01 Encounter: [id number]  Primary Care Provider: Yrn Parker DO   Date and time admitted to hospital: 5/11/2022  9:38 AM    * New onset atrial fibrillation [facility])  Assessment & Plan  This is an 43-year-old patient with past medical history significant for anxiety, cognitive decline and hypertension, in a very remote history of GI bleed due to gastric ulcer presents from nursing home facility after being found for rapid heart rate, found to be in rapid AFib at [facility]    · Heart rate significantly improved, question if volume depletion contributing to rapid AFib  · Telemetry monitoring  · [id number] of 4, anticoagulation is indicated, initiated on Eliquis 2 5 mg b i d   Based on age and weight  · Initiated on metoprolol 25 mg TID  · 2D echo obtained, f/u report   · Cardiology evaluation appreciated     Hyperkalemia  Assessment & Plan  Mild with potassium at 5 4, mild hemolysis noted on the lab report  Normalized on today's labs    AMANDA (acute kidney injury) ([facility] [address] )  Assessment & Plan  Possible mild acute kidney injury with baseline creatinine previously around 0 3, currently 0 69 - although prior baseline from 3 years ago  Improved with IV fluids, creatinine at 0 56 this morning  Patient with history of malnutrition, dementia presenting with rapid Afib  Continue gentle IV fluids through this afternoon  Monitor BMP      Metabolic encephalopathy  Assessment & Plan  Patient presents with new onset AFib, mild AMANDA with creatinine of 0 6 and baseline around 0 3  At baseline she is oriented to self and situation, however, currently she is agitated, oriented to self only, ripped out her peripheral IV requiring bilateral wrist restraints  Underlying dementia, patient on Ativan and Remeron per home regimen  · Request Geriatric medicine evaluation  · IV fluids in the setting of mild AMANDA, treat underlying disturbances    Dementia [facility])  Assessment & Plan  Patient with underlying cognitive decline and anxiety, resident at a nursing home  As above, she is currently significantly agitated  Will continue current regimen and await Geriatrics recommendations    Essential hypertension  Assessment & Plan  On amlodipine 5 mg daily - will hold with low normal hypertension, initiation of metoprolol in the setting of new onset Afib  Monitor vital signs    Other constipation  Assessment & Plan  Chronic, continue home regimen    Anxiety  Assessment & Plan  Patient is on Ativan p r n  And Remeron at bedtime  Would consider other options for anxiety treatment  For now continue home regimen  Obtain geriatrics consultation with patient with significant recurrent agitation requiring one-to-one observation and restraints    Moderate protein-calorie malnutrition ([facility] [address] )  Assessment & Plan  Malnutrition Findings:           noted with BMI of 19  Obtain nutrition consult, offered nutritional supplements                       BMI Findings: Body mass index is 18 81 kg/m²  Ambulatory dysfunction  Assessment & Plan  Comes from nursing home   PT/OT eval requested         VTE Pharmacologic Prophylaxis: VTE Score: 6 High Risk (Score >/= 5) - Pharmacological DVT Prophylaxis Ordered: apixaban (Eliquis)  Sequential Compression Devices Ordered  Patient Centered Rounds: I performed bedside rounds with nursing staff today  Discussions with Specialists or Other Care Team Provider:  Cardiology    Education and Discussions with Family / Patient: Updated  (daughter) via phone  Time Spent for Care: 45 minutes  More than 50% of total time spent on counseling and coordination of care as described above      Current Length of Stay: 1 day(s)  Current Patient Status: Inpatient   Certification Statement: The patient will continue to require additional inpatient hospital stay due to Close monitoring  Discharge Plan: Anticipate discharge in 24-48 hrs to rehab facility  Code Status: Level 3 - DNAR and DNI    Subjective:   Patient seen and examined  She is confused and unable to provide meaningful history  Her heart rate is improved  Objective:     Vitals:   Temp (24hrs), Av °F (36 7 °C), Min:97 1 °F (36 2 °C), Max:99 °F (37 2 °C)    Temp:  [97 1 °F (36 2 °C)-99 °F (37 2 °C)] 97 9 °F (36 6 °C)  HR:  [] 77  Resp:  [20] 20  BP: ()/(54-66) 114/56  SpO2:  [92 %-96 %] 96 %  Body mass index is 18 81 kg/m²  Input and Output Summary (last 24 hours): Intake/Output Summary (Last 24 hours) at 2022 1229  Last data filed at 2022  Gross per 24 hour   Intake 500 ml   Output --   Net 500 ml       Physical Exam:   Physical Exam  Constitutional:       General: She is not in acute distress  Comments: Frail    HENT:      Head: Normocephalic and atraumatic  Nose: No congestion  Mouth/Throat:      Pharynx: Oropharynx is clear  Eyes:      Conjunctiva/sclera: Conjunctivae normal    Cardiovascular:      Rate and Rhythm: Normal rate and regular rhythm  Heart sounds: No murmur heard  Pulmonary:      Effort: No respiratory distress  Breath sounds: No wheezing or rales  Abdominal:      General: There is no distension  Tenderness: There is no abdominal tenderness  There is no guarding  Musculoskeletal:      Right lower leg: No edema  Left lower leg: No edema  Skin:     General: Skin is warm and dry  Neurological:      Mental Status: She is disoriented  Psychiatric:         Mood and Affect: Mood is anxious            Additional Data:     Labs:  Results from last 7 days   Lab Units 22  0844   WBC Thousand/uL 8 49   HEMOGLOBIN g/dL 12 6   HEMATOCRIT % 39 1   PLATELETS Thousands/uL 248   NEUTROS PCT % 75   LYMPHS PCT % 16   MONOS PCT % 7   EOS PCT % 1     Results from last 7 days   Lab Units 22  0844 22  0945   SODIUM mmol/L 142 136   POTASSIUM mmol/L 3 9 5 4*   CHLORIDE mmol/L 108 98*   CO2 mmol/L 27 30   BUN mg/dL 14 19   CREATININE mg/dL 0 56* 0 69   ANION GAP mmol/L 7 8   CALCIUM mg/dL 7 9* 8 9   ALBUMIN g/dL  --  3 4*   TOTAL BILIRUBIN mg/dL  --  0 47   ALK PHOS U/L  --  104   ALT U/L  --  19   AST U/L  --  33   GLUCOSE RANDOM mg/dL 90 130     Results from last 7 days   Lab Units 05/11/22  0945   INR  1 04                   Lines/Drains:  Invasive Devices  Report    Peripheral Intravenous Line  Duration           Peripheral IV 05/12/22 Right;Ventral (anterior) Forearm <1 day                  Telemetry:  Telemetry Orders (From admission, onward)             48 Hour Telemetry Monitoring  Continuous x 48 hours        References:    Telemetry Guidelines   Question:  Reason for 48 Hour Telemetry  Answer:  Arrhythmias Requiring Medical Therapy (eg  SVT, Vtach/fib, Bradycardia, Uncontrolled A-fib)                 Telemetry Reviewed: Atrial fibrillation   HR averaging 70-90  Indication for Continued Telemetry Use: Arrthymias requiring medical therapy             Imaging:     Recent Cultures (last 7 days):         Last 24 Hours Medication List:   Current Facility-Administered Medications   Medication Dose Route Frequency Provider Last Rate    apixaban  2 5 mg Oral BID Yanci Del Rio MD      bisacodyl  10 mg Rectal Daily PRN Yanci Del Rio MD      docusate sodium  100 mg Oral BID Yanci Del Rio MD      LORazepam  0 25 mg Oral BID Yanci Del Rio MD      LORazepam  0 5 mg Oral Daily Yanci Del Rio MD      metoprolol tartrate  25 mg Oral Q8H Yanci Del Rio MD      mirtazapine  15 mg Oral HS Pricilla Aguila MD      ondansetron  4 mg Intravenous Q6H PRN Yanci Del Rio MD      pantoprazole  20 mg Oral Early Morning Yanci Del Rio MD      polyethylene glycol  17 g Oral Daily Yanci Del Rio MD      sodium chloride  75 mL/hr Intravenous Continuous Yanci Del Rio MD 75 mL/hr (05/11/22 2024)        Today, Patient Was Seen By: Ilene Saldaña, MD    **Please Note: This note may have been constructed using a voice recognition system  **

## 2022-05-12 NOTE — ASSESSMENT & PLAN NOTE
This is an 80year-old patient with past medical history significant for anxiety, cognitive decline and hypertension, in a very remote history of GI bleed due to gastric ulcer presents from nursing home facility after being found for rapid heart rate, found to be in rapid AFib at [facility]    · Heart rate significantly improved, question if volume depletion contributing to rapid AFib  · Telemetry monitoring  · [id number] of 4, anticoagulation is indicated, initiated on Eliquis 2 5 mg b i d   Based on age and weight  · Initiated on metoprolol 25 mg TID  · 2D echo obtained, f/u report   · Cardiology evaluation appreciated

## 2022-05-12 NOTE — ASSESSMENT & PLAN NOTE
Patient presents with new onset AFib, mild AMANDA with creatinine of 0 6 and baseline around 0 3  At baseline she is oriented to self and situation, however, currently she is agitated, oriented to self only, ripped out her peripheral IV requiring bilateral wrist restraints  Underlying dementia, patient on Ativan and Remeron per home regimen  · Request Geriatric medicine evaluation  · IV fluids in the setting of mild AMANDA, treat underlying disturbances

## 2022-05-12 NOTE — PLAN OF CARE
Problem: PHYSICAL THERAPY ADULT  Goal: Performs mobility at highest level of function for planned discharge setting  See evaluation for individualized goals  Description: Treatment/Interventions: Functional transfer training, LE strengthening/ROM, Therapeutic exercise, Endurance training, Patient/family training, Bed mobility, Gait training, Spoke to nursing, OT          See flowsheet documentation for full assessment, interventions and recommendations  Note: Prognosis: Fair  Problem List: Decreased strength, Decreased endurance, Impaired balance, Decreased mobility, Decreased cognition, Impaired judgement, Decreased safety awareness  Assessment: Pt is a 81 y/o female who presented on 5/11/22 from [facility] with symptoms of tachycardia in 160's and complaints of anxiety and neck pain  Pt admitted with A-fib, hyperkalemia, AMANDA, and metabolic encephalopathy  Pt's PMH that may impact PT session includes acute and chronic respiratory failure, anemia, arthritis, dementia, depression, encephalopathy, GI bleeding, moderate protein-calorie malnutrition, HLD, and HTN  Pt referred to PT for mobility assessment and discharge planning  On eval, patient demonstrated maxAx2 with bed mobility, and modAx2 with tranfers and ambulation  Pt agitated and resistant to PT session however tolerated sit<>stand w/ RW and 3 lateral side steps towards HOB  Pt demonstrated retropulsion and unsteadiness w/ lateral side steps  Pt denied SOB, dizziness, or pain throughout session  Pt w/ soft restraints and beginning of session and applied again at end of session 2* pt attempted to remove peripheral IV  The patient's AM-PAC Basic Mobility Inpatient Short Form Raw Score is 9  A Raw score of less than or equal to 16 suggests the patient may benefit from discharge to post-acute rehabilitation services  Please also refer to the recommendation of the Physical Therapist for safe discharge planning   From PT standpoint, recommend return to previous SNF w/ rehab services as appropriate when medically cleared  Will continue PT per POC  At end of session, pt stable and supine w/ all needs within reach  Patient educated to call nsg staff to assist with OOB activities  Nsg notified  PT Discharge Recommendation: Return to facility with rehabilitation services          See flowsheet documentation for full assessment

## 2022-05-12 NOTE — PLAN OF CARE
Problem: NEUROSENSORY - ADULT  Goal: Achieves stable or improved neurological status  Description: INTERVENTIONS  - Monitor and report changes in neurological status  - Monitor vital signs such as temperature, blood pressure, glucose, and any other labs ordered   - Initiate measures to prevent increased intracranial pressure  - Monitor for seizure activity and implement precautions if appropriate      Outcome: Progressing  Goal: Remains free of injury related to seizures activity  Description: INTERVENTIONS  - Maintain airway, patient safety  and administer oxygen as ordered  - Monitor patient for seizure activity, document and report duration and description of seizure to physician/advanced practitioner  - If seizure occurs,  ensure patient safety during seizure  - Reorient patient post seizure  - Seizure pads on all 4 side rails  - Instruct patient/family to notify RN of any seizure activity including if an aura is experienced  - Instruct patient/family to call for assistance with activity based on nursing assessment  - Administer anti-seizure medications if ordered    Outcome: Progressing  Goal: Achieves maximal functionality and self care  Description: INTERVENTIONS  - Monitor swallowing and airway patency with patient fatigue and changes in neurological status  - Encourage and assist patient to increase activity and self care     - Encourage visually impaired, hearing impaired and aphasic patients to use assistive/communication devices  Outcome: Progressing     Problem: CARDIOVASCULAR - ADULT  Goal: Maintains optimal cardiac output and hemodynamic stability  Description: INTERVENTIONS:  - Monitor I/O, vital signs and rhythm  - Monitor for S/S and trends of decreased cardiac output  - Administer and titrate ordered vasoactive medications to optimize hemodynamic stability  - Assess quality of pulses, skin color and temperature  - Assess for signs of decreased coronary artery perfusion  - Instruct patient to report change in severity of symptoms  Outcome: Progressing  Goal: Absence of cardiac dysrhythmias or at baseline rhythm  Description: INTERVENTIONS:  - Continuous cardiac monitoring, vital signs, obtain 12 lead EKG if ordered  - Administer antiarrhythmic and heart rate control medications as ordered  - Monitor electrolytes and administer replacement therapy as ordered  Outcome: Progressing     Problem: RESPIRATORY - ADULT  Goal: Achieves optimal ventilation and oxygenation  Description: INTERVENTIONS:  - Assess for changes in respiratory status  - Assess for changes in mentation and behavior  - Position to facilitate oxygenation and minimize respiratory effort  - Oxygen administered by appropriate delivery if ordered  - Initiate smoking cessation education as indicated  - Encourage broncho-pulmonary hygiene including cough, deep breathe, Incentive Spirometry  - Assess the need for suctioning and aspirate as needed  - Assess and instruct to report SOB or any respiratory difficulty  - Respiratory Therapy support as indicated  Outcome: Progressing     Problem: GASTROINTESTINAL - ADULT  Goal: Minimal or absence of nausea and/or vomiting  Description: INTERVENTIONS:  - Administer IV fluids if ordered to ensure adequate hydration  - Maintain NPO status until nausea and vomiting are resolved  - Nasogastric tube if ordered  - Administer ordered antiemetic medications as needed  - Provide nonpharmacologic comfort measures as appropriate  - Advance diet as tolerated, if ordered  - Consider nutrition services referral to assist patient with adequate nutrition and appropriate food choices  Outcome: Progressing  Goal: Maintains or returns to baseline bowel function  Description: INTERVENTIONS:  - Assess bowel function  - Encourage oral fluids to ensure adequate hydration  - Administer IV fluids if ordered to ensure adequate hydration  - Administer ordered medications as needed  - Encourage mobilization and activity  - Consider nutritional services referral to assist patient with adequate nutrition and appropriate food choices  Outcome: Progressing  Goal: Maintains adequate nutritional intake  Description: INTERVENTIONS:  - Monitor percentage of each meal consumed  - Identify factors contributing to decreased intake, treat as appropriate  - Assist with meals as needed  - Monitor I&O, weight, and lab values if indicated  - Obtain nutrition services referral as needed  Outcome: Progressing  Goal: Establish and maintain optimal ostomy function  Description: INTERVENTIONS:  - Assess bowel function  - Encourage oral fluids to ensure adequate hydration  - Administer IV fluids if ordered to ensure adequate hydration   - Administer ordered medications as needed  - Encourage mobilization and activity  - Nutrition services referral to assist patient with appropriate food choices  - Assess stoma site  - Consider wound care consult   Outcome: Progressing  Goal: Oral mucous membranes remain intact  Description: INTERVENTIONS  - Assess oral mucosa and hygiene practices  - Implement preventative oral hygiene regimen  - Implement oral medicated treatments as ordered  - Initiate Nutrition services referral as needed  Outcome: Progressing     Problem: GENITOURINARY - ADULT  Goal: Maintains or returns to baseline urinary function  Description: INTERVENTIONS:  - Assess urinary function  - Encourage oral fluids to ensure adequate hydration if ordered  - Administer IV fluids as ordered to ensure adequate hydration  - Administer ordered medications as needed  - Offer frequent toileting  - Follow urinary retention protocol if ordered  Outcome: Progressing  Goal: Absence of urinary retention  Description: INTERVENTIONS:  - Assess patients ability to void and empty bladder  - Monitor I/O  - Bladder scan as needed  - Discuss with physician/AP medications to alleviate retention as needed  - Discuss catheterization for long term situations as appropriate  Outcome: Progressing  Goal: Urinary catheter remains patent  Description: INTERVENTIONS:  - Assess patency of urinary catheter  - If patient has a chronic neal, consider changing catheter if non-functioning  - Follow guidelines for intermittent irrigation of non-functioning urinary catheter  Outcome: Progressing     Problem: METABOLIC, FLUID AND ELECTROLYTES - ADULT  Goal: Electrolytes maintained within normal limits  Description: INTERVENTIONS:  - Monitor labs and assess patient for signs and symptoms of electrolyte imbalances  - Administer electrolyte replacement as ordered  - Monitor response to electrolyte replacements, including repeat lab results as appropriate  - Instruct patient on fluid and nutrition as appropriate  Outcome: Progressing  Goal: Fluid balance maintained  Description: INTERVENTIONS:  - Monitor labs   - Monitor I/O and WT  - Instruct patient on fluid and nutrition as appropriate  - Assess for signs & symptoms of volume excess or deficit  Outcome: Progressing  Goal: Glucose maintained within target range  Description: INTERVENTIONS:  - Monitor Blood Glucose as ordered  - Assess for signs and symptoms of hyperglycemia and hypoglycemia  - Administer ordered medications to maintain glucose within target range  - Assess nutritional intake and initiate nutrition service referral as needed  Outcome: Progressing     Problem: SKIN/TISSUE INTEGRITY - ADULT  Goal: Skin Integrity remains intact(Skin Breakdown Prevention)  Description: Assess:  -Perform Samir assessment every shift  -Clean and moisturize skin every 4 hours  -Inspect skin when repositioning, toileting, and assisting with ADLS  -Assess under medical devices such as telemetry box every 4 hours  -Assess extremities for adequate circulation and sensation     Bed Management:  -Have minimal linens on bed & keep smooth, unwrinkled  -Change linens as needed when moist or perspiring  -Avoid sitting or lying in one position for more than 2 hours while in bed  -Keep HOB at 30 degrees     Toileting:  -Offer bedside commode  -Assess for incontinence every 4 hours  -Use incontinent care products after each incontinent episode such as foaming cleanser    Activity:  -Mobilize patient 3 times a day  -Encourage activity and walks on unit  -Encourage or provide ROM exercises   -Turn and reposition patient every 2 Hours  -Use appropriate equipment to lift or move patient in bed  -Instruct/ Assist with weight shifting every 120 mins when out of bed in chair  -Consider limitation of chair time 2 hour intervals    Skin Care:  -Avoid use of baby powder, tape, friction and shearing, hot water or constrictive clothing  -Relieve pressure over bony prominences using wedges  -Do not massage red bony areas    Next Steps:  -Teach patient strategies to minimize risks such as weight shifting   -Consider consults to  interdisciplinary teams such as geriatrics  Outcome: Progressing  Goal: Incision(s), wounds(s) or drain site(s) healing without S/S of infection  Description: INTERVENTIONS  - Assess and document dressing, incision, wound bed, drain sites and surrounding tissue  - Provide patient and family education  - Perform skin care/dressing changes every per order  Outcome: Progressing  Goal: Pressure injury heals and does not worsen  Description: Interventions:  - Implement low air loss mattress or specialty surface (Criteria met)  - Apply silicone foam dressing  - Instruct/assist with weight shifting every 120 minutes when in chair   - Limit chair time to 2 hour intervals  - Use special pressure reducing interventions such as weight shifting when in chair   - Apply fecal or urinary incontinence containment device   - Perform passive or active ROM every shift  - Turn and reposition patient & offload bony prominences every 2 hours   - Utilize friction reducing device or surface for transfers   - Consider consults to  interdisciplinary teams such as wound care and geriatrics  - Use incontinent care products after each incontinent episode such as foaming cleanser  - Consider nutrition services referral as needed  Outcome: Progressing     Problem: HEMATOLOGIC - ADULT  Goal: Maintains hematologic stability  Description: INTERVENTIONS  - Assess for signs and symptoms of bleeding or hemorrhage  - Monitor labs  - Administer supportive blood products/factors as ordered and appropriate  Outcome: Progressing     Problem: MUSCULOSKELETAL - ADULT  Goal: Maintain or return mobility to safest level of function  Description: INTERVENTIONS:  - Assess patient's ability to carry out ADLs; assess patient's baseline for ADL function and identify physical deficits which impact ability to perform ADLs (bathing, care of mouth/teeth, toileting, grooming, dressing, etc )  - Assess/evaluate cause of self-care deficits   - Assess range of motion  - Assess patient's mobility  - Assess patient's need for assistive devices and provide as appropriate  - Encourage maximum independence but intervene and supervise when necessary  - Involve family in performance of ADLs  - Assess for home care needs following discharge   - Consider OT consult to assist with ADL evaluation and planning for discharge  - Provide patient education as appropriate  Outcome: Progressing  Goal: Maintain proper alignment of affected body part  Description: INTERVENTIONS:  - Support, maintain and protect limb and body alignment  - Provide patient/ family with appropriate education  Outcome: Progressing     Problem: Potential for Falls  Goal: Patient will remain free of falls  Description: INTERVENTIONS:  - Educate patient/family on patient safety including physical limitations  - Instruct patient to call for assistance with activity   - Consult OT/PT to assist with strengthening/mobility   - Keep Call bell within reach  - Keep bed low and locked with side rails adjusted as appropriate  - Keep care items and personal belongings within reach  - Initiate and maintain comfort rounds  - Make Fall Risk Sign visible to staff  - Offer Toileting every 2 Hours, in advance of need  - Initiate/Maintain bed alarm  - Obtain necessary fall risk management equipment: continuous obs  - Apply yellow socks and bracelet for high fall risk patients  - Consider moving patient to room near nurses station  Outcome: Progressing     Problem: SAFETY,RESTRAINT: NV/NON-SELF DESTRUCTIVE BEHAVIOR  Goal: Remains free of harm/injury (restraint for non violent/non self-detsructive behavior)  Description: INTERVENTIONS:  - Instruct patient/family regarding restraint use   - Assess and monitor physiologic and psychological status   - Provide interventions and comfort measures to meet assessed patient needs   - Identify and implement measures to help patient regain control  - Assess readiness for release of restraint   Outcome: Progressing  Goal: Returns to optimal restraint-free functioning  Description: INTERVENTIONS:  - Assess the patient's behavior and symptoms that indicate continued need for restraint  - Identify and implement measures to help patient regain control  - Assess readiness for release of restraint   Outcome: Progressing

## 2022-05-12 NOTE — PROGRESS NOTES
CARDIOLOGY INPATIENT FOLLOW-UP PROGRESS NOTE  *-*-*-*-*-*-*-*-*-*-*-*-*-*-*-*-*-*-*-*-*-*-*-*-*-*-*-*-*-*-*-*-*-*-*-*-*-*-*-*-*-*-*-*-*-*-*-*-*-*-*-*-*-*-  [id number] DATE: 05/12/22 4:13 PM   AUTHOR: Anne Paredes MD  PATIENT: Forrest Mancilla   1940    [id number]   80 y o    female  INPATIENT HOSPITALIST PHYSICIAN: Gerber Sweeney MD  DATE OF ADMISSION: 5/11/2022  9:38 AM; LENGTH OF STAY: 1 days  *-*-*-*-*-*-*-*-*-*-*-*-*-*-*-*-*-*-*-*-*-*-*-*-*-*-*-*-*-*-*-*-*-*-*-*-*-*-*-*-*-*-*-*-*-*-*-*-*-*-*-*-*-*-    CARDIOLOGY ASSESSMENT:  1  Atrial fibrillation with rapid ventricular response  2  Primary hypertension  3  Dementia with agitation  4  Anxiety  5  Protein calorie malnutrition  *-*-*-*-*-*-*-*-*-*-*-*-*-*-*-*-*-*-*-*-*-*-*-*-*-*-*-*-*-*-*-*-*-*-*-*-*-*-*-*-*-*-*-*-*-*-*-*-*-*-*-*-*-*-  CURRENT CARDIAC MEDICATIONS:  Apixaban 2 5 mg twice daily, metoprolol tartrate 25 mg q 8 hours,    PERTINENT LABS AND OTHER INVESTIGATIONS:  Normal renal function and electrolytes except for decreased calcium of 7 9  ECHOCARDIOGRAM AND OTHER CARDIAC TESTS RESULTS:  Echocardiogram was attempted today but could not be completed as patient was agitated and combative with the tech only parasternal views could be obtained  Based on this left ventricular cavity size is normal, wall thickness is mildly increased, left ventricular systolic function appears normal on limited evaluation  Diastolic function could not be assessed  There was trace mitral and tricuspid valve regurgitation  There was aortic valve sclerosis without stenosis or regurgitation  Findings do not suggest pulmonary hypertension  *-*-*-*-*-*-*-*-*-*-*-*-*-*-*-*-*-*-*-*-*-*-*-*-*-*-*-*-*-*-*-*-*-*-*-*-*-*-*-*-*-*-*-*-*-*-*-*-*-*-*-*-*-*-  Patient appears to be overall stable from cardiac perspective  Heart rates at times a little high as she is not cooperative in taking medications    Left ventricular function appears to be overall normal based on limited visualization  PLAN:  -- recommend continued current medications as long as she can take it     Metoprolol dose can be changed to 25 mg twice daily if she is not tachycardic  -- if she has been to be in a monitored setting then Eliquis can be continued however if she is going to be dependent then she is not a good candidate for chronic anticoagulation  -- cardiology team will sign off and be available to follow as needed  *-*-*-*-*-*-*-*-*-*-*-*-*-*-*-*-*-*-*-*-*-*-*-*-*-*-*-*-*-*-*-*-*-*-*-*-*-*-*-*-*-*-*-*-*-*-*-*-*-*-*-*-*-*-  INTERVAL CHANGES / HISTORY OF PRESENT ILLNESS:   No acute events overnight  Patient remains in atrial fibrillation low heart rate are better controlled  Patient has been refusing medications and not been cooperative with staff  His agitated and combative at times  At the time of my visit patient was sleeping comfortably and does not respond to verbal command  As per  Has been ambulating independently but is being monitored closely  *-*-*-*-*-*-*-*-*-*-*-*-*-*-*-*-*-*-*-*-*-*-*-*-*-*-*-*-*-*-*-*-*-*-*-*-*-*-*-*-*-*-*-*-*-*-*-*-*-*-*-*-*-*    PERTINENT REVIEW OF SYMPTOMS:  As noted above    *-*-*-*-*-*-*-*-*-*-*-*-*-*-*-*-*-*-*-*-*-*-*-*-*-*-*-*-*-*-*-*-*-*-*-*-*-*-*-*-*-*-*-*-*-*-*-*-*-*-*-*-*-*-  VITAL SIGNS:  Vitals:    22 0713 22 0832 22 1100 22 1500   BP: 114/60 114/60 114/56 111/69   BP Location: Right arm  Right arm Right arm   Pulse: 101 100 77 88   Resp: 20  20 20   Temp: 97 9 °F (36 6 °C)   98 °F (36 7 °C)   TempSrc: Temporal   Temporal   SpO2: 94%  96% 96%   Weight:  40 8 kg (90 lb)     Height:  4' 10" (1 473 m)        Temp (24hrs), Av 8 °F (36 6 °C), Min:97 1 °F (36 2 °C), Max:98 3 °F (36 8 °C)  Current: Temperature: 98 °F (36 7 °C)    Weight    22 0942 22 0600 22 0832   Weight: 42 3 kg (93 lb 4 1 oz) 41 kg (90 lb 6 2 oz) 40 8 kg (90 lb)      Body mass index is 18 81 kg/m²   Wt Readings from Last 3 Encounters: 05/12/22 40 8 kg (90 lb)   07/31/19 32 9 kg (72 lb 8 5 oz)      Intake/Output Summary (Last 24 hours) at 5/12/2022 1613  Last data filed at 5/11/2022 2023  Gross per 24 hour   Intake 500 ml   Output --   Net 500 ml          *-*-*-*-*-*-*-*-*-*-*-*-*-*-*-*-*-*-*-*-*-*-*-*-*-*-*-*-*-*-*-*-*-*-*-*-*-*-*-*-*-*-*-*-*-*-*-*-*-*-*-*-*-*-  PHYSICAL EXAM:  General Appearance:    Alert, cooperative, in no respiratory distress, appears stated age, frail   Head, Eyes, ENT:     signs of protein calorie malnutrition, moist mucous mebranes  Neck:   Supple, no carotid bruit or JVD   Back:     Symmetric, no curvature  Lungs:     Respirations unlabored  decreased breath sounds at bases,    Chest wall:    No tenderness or deformity   Heart:     irregularly irregular rhythm, slightly distant heart sounds, unable to appreciate murmur   Abdomen:     Soft, non-tender,    Extremities:   Extremities warm, no cyanosis or edema   Skin:   Skin color, texture, turgor normal, no rashes or lesions     *-*-*-*-*-*-*-*-*-*-*-*-*-*-*-*-*-*-*-*-*-*-*-*-*-*-*-*-*-*-*-*-*-*-*-*-*-*-*-*-*-*-*-*-*-*-*-*-*-*-*-*-*-*-  TELEMETRY, LAST ECG:  Telemetry reviewed    Atrial fibrillation currently with controlled ventricular response  Was in rapid heart rate earlier   Results for orders placed or performed during the hospital encounter of 05/11/22   ECG 12 lead   Result Value    Ventricular Rate 117    Atrial Rate 144    NM Interval     QRSD Interval 78    QT Interval 314    QTC Interval 438    P Axis     QRS Axis 75    T Wave Axis 114    Narrative    Atrial fibrillation with rapid ventricular response with premature ventricular or aberrantly conducted complexes  ST & T wave abnormality, consider inferior ischemia or digitalis effect  Abnormal ECG  When compared with ECG of 11-MAY-2022 09:47, (unconfirmed)  ST now depressed in Inferior leads  T wave inversion now evident in Inferior leads  Confirmed by Parmjit Shook ([id number]) on 5/11/2022 6:18:51 PM *-*-*-*-*-*-*-*-*-*-*-*-*-*-*-*-*-*-*-*-*-*-*-*-*-*-*-*-*-*-*-*-*-*-*-*-*-*-*-*-*-*-*-*-*-*-*-*-*-*-*-*-*-*-      CURRENT SCHEDULED MEDICATIONS:    Current Facility-Administered Medications:     apixaban (ELIQUIS) tablet 2 5 mg, 2 5 mg, Oral, BID, Pam Corrales MD, 2 5 mg at 05/11/22 1741    bisacodyl (DULCOLAX) rectal suppository 10 mg, 10 mg, Rectal, Daily PRN, Pam Corrales MD    docusate sodium (COLACE) capsule 100 mg, 100 mg, Oral, BID, Pam Corrales MD, 100 mg at 05/11/22 1730    LORazepam (ATIVAN) tablet 0 25 mg, 0 25 mg, Oral, BID, Pam Corrales MD    LORazepam (ATIVAN) tablet 0 5 mg, 0 5 mg, Oral, Daily, Pam Corrales MD    metoprolol (LOPRESSOR) injection 2 5 mg, 2 5 mg, Intravenous, Q6H PRN, Pam Corrales MD    metoprolol tartrate (LOPRESSOR) tablet 25 mg, 25 mg, Oral, Q8H, Pricilla Aguila MD    mirtazapine (REMERON) tablet 15 mg, 15 mg, Oral, HS, Pricilla Aguila MD    ondansetron (ZOFRAN) injection 4 mg, 4 mg, Intravenous, Q6H PRN, Pam Corrales MD    pantoprazole (PROTONIX) EC tablet 20 mg, 20 mg, Oral, Early Morning, Pricilla Aguila MD    polyethylene glycol (MIRALAX) packet 17 g, 17 g, Oral, Daily, Pam Corrales MD, 17 g at 05/12/22 0911 ALLERGIES:  No Known Allergies     *-*-*-*-*-*-*-*-*-*-*-*-*-*-*-*-*-*-*-*-*-*-*-*-*-*-*-*-*-*-*-*-*-*-*-*-*-*-*-*-*-*-*-*-*-*-*-*-*-*-*-*-*-*  LABORATORY DATA:  I have personally reviewed pertinent labs      CMP:  Results from last 7 days   Lab Units 05/12/22  0844 05/11/22  0945   SODIUM mmol/L 142 136   POTASSIUM mmol/L 3 9 5 4*   CHLORIDE mmol/L 108 98*   CO2 mmol/L 27 30   BUN mg/dL 14 19   CREATININE mg/dL 0 56* 0 69   CALCIUM mg/dL 7 9* 8 9   ALK PHOS U/L  --  104   ALT U/L  --  19   AST U/L  --  33        Results from last 7 days   Lab Units 05/12/22  0844   MAGNESIUM mg/dL 2 1        Cardiac Profile:       Invalid input(s): CK, CKMBP             CBC:   Results from last 7 days   Lab Units 05/12/22  0844 05/11/22  0945   WBC Thousand/uL 8 49 11 68*   HEMOGLOBIN g/dL 12 6 14 2   HEMATOCRIT % 39 1 44 3   PLATELETS Thousands/uL 248 324     PT/INR: No results found for: PT, INR, Microbiology:          *-*-*-*-*-*-*-*-*-*-*-*-*-*-*-*-*-*-*-*-*-*-*-*-*-*-*-*-*-*-*-*-*-*-*-*-*-*-*-*-*-*-*-*-*-*-*-*-*-*-*-*-*-*-  IMAGING STUDIES REPORTS: Imaging studies results reviewed    No valid procedures specified  No Chest XR results available for this patient  *-*-*-*-*-*-*-*-*-*-*-*-*-*-*-*-*-*-*-*-*-*-*-*-*-*-*-*-*-*-*-*-*-*-*-*-*-*-*-*-*-*-*-*-*-*-*-*-*-*-*-*-*-*-    Results for orders placed during the hospital encounter of 19    Echo complete with contrast if indicated    Narrative  [facility]  [facility], [address] [facility]  [phone]    Transthoracic Echocardiogram  2D, M-mode, Doppler, and Color Doppler    Study date:  2019    Patient: Jack Vicente  MR number: [id number]  Account number: [de-identified]  : 1940  Age: 66 years  Gender: Female  Status: Inpatient  Location: Bedside  Height: 58 in  Weight: 70 lb  BP: 179/ 85 mmHg    Indications: Syncope and Collapse    Diagnoses: R55  - Syncope and collapse    Sonographer:  EFREM Marcelo  Primary Physician:  Orlando Pastor MD  Referring Physician:  Orlando Pastor MD  Group:  [facility]  Cardiology Fellow: Janelle Mcconnell MD  Interpreting Physician:  Alpa Johnston MD    IMPRESSIONS:  This was a technically difficult study  No apical images were obtained, therefore, Doppler investigation of the aortic and mitral valves could not be performed  SUMMARY    LEFT VENTRICLE:  Systolic function was normal  Ejection fraction was estimated to be 65 %  There were no regional wall motion abnormalities  There was no evidence of concentric hypertrophy  MITRAL VALVE:  There was mild regurgitation  TRICUSPID VALVE:  There was mild to moderate regurgitation    Pulmonary artery systolic pressure was at the upper limits of normal   Estimated peak PA pressure was 35 mmHg  AORTA:  There was mild dilatation of the ascending aorta at 27 mm (23 1 mm/m^2)  IVC, HEPATIC VEINS:  The inferior vena was small, appearing collapsed from external pressure  The respirophasic change in diameter was more than 50%  PERICARDIUM:  A small pericardial effusion was identified along the right ventricular free wall  The fluid had no internal echoes  There was no evidence of hemodynamic compromise  HISTORY: PRIOR HISTORY: Acute hypercapnic respiratory failure    PROCEDURE: The procedure was performed at the bedside  This was a routine study  The transthoracic approach was used  The study included complete 2D imaging, M-mode, complete spectral Doppler, and color Doppler  The heart rate was 77 bpm,  at the start of the study  Echocardiographic views were limited due to low windows  Image quality was adequate  LEFT VENTRICLE: Size was normal  Systolic function was normal  Ejection fraction was estimated to be 65 %  There were no regional wall motion abnormalities  Wall thickness was normal  There was no evidence of concentric hypertrophy  DOPPLER: Left ventricular diastolic function parameters were normal for the patient's age  RIGHT VENTRICLE: The size was normal  Systolic function was normal     LEFT ATRIUM: Size was normal     RIGHT ATRIUM: Size was normal     MITRAL VALVE: Valve structure was normal  There was normal leaflet separation  DOPPLER: Transmitral velocity could not be determined  There was mild regurgitation  AORTIC VALVE: The valve was trileaflet  Leaflets exhibited mild calcification, normal cuspal separation, and sclerosis  DOPPLER: Transaortic velocity could not be determined  There was no evidence visually for stenosis  There was trace  regurgitation  TRICUSPID VALVE: There was normal leaflet separation  DOPPLER: The transtricuspid velocity was within the normal range  There was no evidence for stenosis   There was mild to moderate regurgitation  Pulmonary artery systolic pressure was at  the upper limits of normal  Estimated peak PA pressure was 35 mmHg  PULMONIC VALVE: DOPPLER: The transpulmonic velocity was within the normal range  There was trace regurgitation  PERICARDIUM: A small pericardial effusion was identified along the right ventricular free wall  The fluid had no internal echoes  There was no evidence of hemodynamic compromise  AORTA: The root exhibited normal size  There was mild dilatation of the ascending aorta at 27 mm (23 1 mm/m^2)  SYSTEMIC VEINS: IVC: The inferior vena was small, appearing collapsed from external pressure  The respirophasic change in diameter was more than 50%  SYSTEM MEASUREMENT TABLES    2D  %FS: 47 96 %  Ao Diam: 2 14 cm  EDV(Teich): 35 4 ml  EF(Cube): 85 91 %  EF(Teich): 80 78 %  ESV(Cube): 3 86 ml  ESV(Teich): 6 8 ml  IVSd: 0 57 cm  LA Diam: 2 58 cm  LVIDd: 3 01 cm  LVIDs: 1 57 cm  LVPWd: 0 91 cm  SV(Cube): 23 52 ml  SV(Teich): 28 59 ml    CW  TR Vmax: 2 76 m/s  TR maxP 41 mmHg    [facility]    Prepared and electronically signed by    Julien Chappell MD  Signed 2019 13:43:33    No results found for this or any previous visit  No results found for this or any previous visit  No results found for this or any previous visit         *-*-*-*-*-*-*-*-*-*-*-*-*-*-*-*-*-*-*-*-*-*-*-*-*-*-*-*-*-*-*-*-*-*-*-*-*-*-*-*-*-*-*-*-*-*-*-*-*-*-*-*-*-*-  SIGNATURES:   [unfilled]   Vicky Holcomb MD

## 2022-05-12 NOTE — ASSESSMENT & PLAN NOTE
Patient is on Ativan p r n   And Remeron at bedtime  Would consider other options for anxiety treatment  For now continue home regimen  Obtain geriatrics consultation with patient with significant recurrent agitation requiring one-to-one observation and restraints

## 2022-05-12 NOTE — CONSULTS
Consultation - Geriatrics   Cheri John 80 y o  female MRN: [id number]  Unit/Bed#: E4 -01 Encounter: [id number]      Assessment/Plan  1  Ambulatory dysfunction s/p fall   Baseline ambulation walker with supervision  o Fall type x1, few months ago, mechanical (tripped on a rug)   PT OT following  Rec return to facility with rehab services   Fall precautions  2  Deconditioning/frailty  · Baseline function supervision for ADLs, dependent for IADLs, lives in SNF  · Now increased risk for deconditioning/frailty d/t hospitalization, new onset AFib,h/o cognitive impairment and PCM  · Optimize diet, hydration, mobility for healing  · GFR 87- keep hydrated  · Alb 3 4  - continue nutrition supplements as previously written  · Monitor ss infection, dehydration, dvt, skin breakdown  · Encourage cough and deep breathing exercises, IS   3   MCI  · Baseline mentation:  Poor short-term memory, does better with longer-term memory  · CTH:  2019:  No acute intracranial hemorrhage seen  Chronic small vessel ischemic changes  · Gerilabs:  TSH WNL  Consider recheck folate/B12  · Recommend OT check MOCa when medically clear  · Cont 24/7 cares and management in SNF by  geriatrics  · Engage in regular social, physical, cognitive activity  Optimize acute and chronic conditions  · Frequent reminders and close monitoring for safety  4  Acute encephalopathy  · Mixed hyperactive/hypoactive delirium  · Cause considerations:  Hospitalization, rapid AFib, chronic anxiety, cognitive impairment, poor sleep, medications, mild dehydration  · Recommend relaxation techniques, 1:1 assistance, avoid further benzos, but do not stop present regimen, regulate sleep-wake cycle  Do not overwhelm  · [place] delirium precautions  First line treatment is reorient, redirect, reassure  Include family as able    Avoid restraints and sedating medications  · Identify and treat reversible causes of confusion including infection, dehydration, electrolyte imbalance, anemia, hypoxia, urinary retention, constipation, pain, sleep disturbance  · Avoid deliriogenic medications such as tramadol, high dose narcotics, benzodiazepines, anticholinergics  · Engage in regular activity  Monitor sleep wake cycle  Monitor depression  · Ensure adequate hydration and nutrition  Mobilize early and often according to poc  · Qtc 438ms  If needed for severe agitations that are not redirectable, would recommend zyprexa 2 5mg IM q8h prn  Monitor ekg and keep electrolytes balanced (including mag) if utilized  5   Chronic pain  · Take scheduled Tylenol at home, was complaining of neck pain yesterday  · Recommend scheduled Tylenol, topical analgesics  · Continue nonpharmacological methods of pain control  6  Anxiety/depression   Baseline mood:  Very anxious, small changes heighten patient's anxiety  o Continue:  Remeron, current Ativan regimen  - Typically would recommend GDR of ativan  However, this has been addressed by geriatrics physician in facility who recommends not decreasing dosage as pt is doing well with it at home   Encourage relaxation techniques, emotional support  7  Vision/hearing impairment   Baseline vision/hearing:  No hearing or vision impairment per SNF staff   Cont supportive environment:  adequate lighting, quiet/calm space  Speak clearly and toward patient when communicating  Avoid outside noise distractions  Assist as needed for adls  8   Constipation   LBM none listed in epic, has chronic constipation  o Continues colace/miralax   Encourage dietary fiber, fluids, mobility as able  9  Rapid Afib  · New onset afib noted yesterday  Now RRR  · Started on eliquis and metoprolol  Cont to monitor for hypotension and bleed precautions    10   Home medication review   PDMP:  Regular long term use of ativan  Review of medications with facility staff  Amlodipine 5mg po daily  Ativan 0 5m/2 tab at , 1 tab at 1800  remeron 15mg qhs  Tylenol scheduled  miralax prn      History of Present Illness   Physician Requesting Consult: Gerber Sweeney MD  Reason for Consult / Principal Problem: anxiety/dementia  Hx and PE limited by: dementia/lethargy  HPI: Forrest Mancilla is a 80y o  year old female who presents with rapid heart rate and increased anxiety  Pt was given zyprexa for agitation  She was noted to be in rapid afib and was started on eliquis  Comorbidities include htn, anxiety, cognitive impairment, pcm  Patient seen for eval, she is sleeping, awakes to name call, states she is at home  Denies pain  Falls back to sleep  Unable to further obtain ROS d/t delirium  Pt is currently restrained with 1:1 observer  Pt was unrestrained this am and became aggressive/combative, punching and kicking staff  She did finally fall asleep after being awake all night and is currently resting comfortably  Did reach out to -A and talked to [place]  Patient resides in SNF  She is supervision for adls/dependent for iadls  She uses a walker for ambulation - most recent fall was several months ago when she tripped on a rub  Pt's appetite is good - she eats toast in am and eats good lunch/supper  She also takes bid ensure supplements  Pt toliets self  She does have chronic constipation  Pt does not have hearing/vision/dentition impairments  No swallow concerns  Pt has poor ST memory loss= her memory is better for two weeks ago than for today  Pt has chronic anxiety  Per nursing, any change in routine causes pt to become extremely upset and agitated  She does not have chronic pain except for occasional headaches  She does sleep well  Patient is followed monthly by  geriatrics in her SNF unit      Inpatient consult to Gerontology  Consult performed by: KATHIE Hayward  Consult ordered by: Gerber Sweeney MD          Review of Systems   Unable to perform ROS: Dementia       Historical Information   Past Medical History:   Diagnosis Date    Acute and chronic respiratory failure with hypercapnia ([facility] ) 7/22/2019    Ambulatory dysfunction     Anemia     hgb 5 2o bleeding uler in 1970's    Arthritis     Dehydration 7/24/2019    Dementia ([facility] )     Depression     Eating disorder     Elevated temperature 12/29/2021    Encephalopathy     Gastrointestinal bleeding     Gastrointestinal bleeding     GI bleeding 7/27/2019    Hyperlipidemia     Hypertension     Pressure injury of head, stage 1 7/21/2019    [facility], [facility] 7[facility]21/[facility]    Respiratory failure ([facility] )     Scalp hematoma 7/21/2019    Severe protein-calorie malnutrition Adam Batch: less than 60% of standard weight) ([facility])     Severe protein-calorie malnutrition ([facility]) 7/21/2019    Skin rash 11/13/2020    Unstageable pressure ulcer ([facility] ) 9/4/2019    Vulvar mass     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     History reviewed  No pertinent surgical history    Social History   Social History     Substance and Sexual Activity   Alcohol Use Not Currently    Comment: only socially in the past     Social History     Substance and Sexual Activity   Drug Use Never     Social History     Tobacco Use   Smoking Status Never Smoker   Smokeless Tobacco Never Used         Family History:   Family History   Problem Relation Age of Onset    Parkinsonism Mother     COPD Father        Meds/Allergies   Current meds:   Current Facility-Administered Medications   Medication Dose Route Frequency    apixaban (ELIQUIS) tablet 2 5 mg  2 5 mg Oral BID    bisacodyl (DULCOLAX) rectal suppository 10 mg  10 mg Rectal Daily PRN    docusate sodium (COLACE) capsule 100 mg  100 mg Oral BID    LORazepam (ATIVAN) tablet 0 25 mg  0 25 mg Oral BID    LORazepam (ATIVAN) tablet 0 5 mg  0 5 mg Oral Daily    metoprolol tartrate (LOPRESSOR) tablet 25 mg  25 mg Oral Q8H    mirtazapine (REMERON) tablet 15 mg  15 mg Oral HS    ondansetron (ZOFRAN) injection 4 mg  4 mg Intravenous Q6H PRN    pantoprazole (PROTONIX) EC tablet 20 mg  20 mg Oral Early Morning    polyethylene glycol (MIRALAX) packet 17 g  17 g Oral Daily      Current PTA meds:  Medications Prior to Admission   Medication    bisacodyl (DULCOLAX) 10 mg suppository    LORazepam (ATIVAN) 0 5 mg tablet    mirtazapine (REMERON) 15 mg tablet    ondansetron (ZOFRAN-ODT) 4 mg disintegrating tablet    polyethylene glycol (MIRALAX) 17 g packet    Sodium Phosphates (Enema) ENEM    sorbitol 70 % solution    acetaminophen (TYLENOL) 325 mg tablet    acetaminophen (TYLENOL) 650 mg suppository    amLODIPine (NORVASC) 5 mg tablet    docusate sodium (COLACE) 100 mg capsule    hydrocortisone (Proctozone-HC) 2 5 % rectal cream    mirtazapine (REMERON) 7 5 MG tablet        No Known Allergies    Objective   Vitals: Blood pressure 114/56, pulse 77, temperature 97 9 °F (36 6 °C), temperature source Temporal, resp  rate 20, height 4' 10" (1 473 m), weight 40 8 kg (90 lb), SpO2 96 %  ,Body mass index is 18 81 kg/m²  Physical Exam  Vitals and nursing note reviewed  Constitutional:       General: She is not in acute distress  Appearance: Normal appearance  She is well-developed  She is not diaphoretic  HENT:      Head: Normocephalic  Cardiovascular:      Rate and Rhythm: Normal rate  Heart sounds: No friction rub  No gallop  Pulmonary:      Effort: Pulmonary effort is normal  No respiratory distress  Breath sounds: Normal breath sounds  No wheezing or rales  Abdominal:      General: Bowel sounds are normal  There is no distension  Palpations: Abdomen is soft  Tenderness: There is no abdominal tenderness  There is no rebound  Musculoskeletal:         General: Normal range of motion  Skin:     General: Skin is warm and dry  Neurological:      General: No focal deficit present  Mental Status: She is alert  Comments: Pt wakes to name, states she is at home  Disoriented to t/s  She falls easily back to sleep  Lab Results:   Results from last 7 days   Lab Units 05/12/22  0844   WBC Thousand/uL 8 49   HEMOGLOBIN g/dL 12 6   HEMATOCRIT % 39 1   PLATELETS Thousands/uL 248        Results from last 7 days   Lab Units 05/12/22  0844 05/11/22  0945   POTASSIUM mmol/L 3 9 5 4*   CHLORIDE mmol/L 108 98*   CO2 mmol/L 27 30   BUN mg/dL 14 19   CREATININE mg/dL 0 56* 0 69   CALCIUM mg/dL 7 9* 8 9   ALK PHOS U/L  --  104   ALT U/L  --  19   AST U/L  --  33       Imaging Studies: I have personally reviewed pertinent reports  EKG, Pathology, and Other Studies: I have personally reviewed pertinent reports      VTE Prophylaxis: eliquis    Code Status: Level 3 - DNAR and DNI

## 2022-05-12 NOTE — ASSESSMENT & PLAN NOTE
Malnutrition Findings:           noted with BMI of 19  Obtain nutrition consult, offered nutritional supplements                       BMI Findings: Body mass index is 18 81 kg/m²

## 2022-05-12 NOTE — PLAN OF CARE
Problem: OCCUPATIONAL THERAPY ADULT  Goal: Performs self-care activities at highest level of function for planned discharge setting  See evaluation for individualized goals  Description: Treatment Interventions: ADL retraining, UE strengthening/ROM, Endurance training, Patient/family training, Equipment evaluation/education, Activityengagement          See flowsheet documentation for full assessment, interventions and recommendations  Note: Limitation: Decreased ADL status, Decreased UE strength, Decreased cognition, Decreased endurance, Decreased self-care trans, Decreased high-level ADLs  Prognosis: Fair  Assessment: Pt admit 5/11/22 with new onset of A-fib  OT completed extensive review of pt's medical and social history  Pt with h/o anxiety, dementia, HTN, and GI bleed  Prior to admit was living at [facility]  Pt w/ h/o dementia and therefore unable to obtain PLOF  Pt presents to OT below baseline due to the following performance deficits: strength;  balance; stand tolerance; functional mobility; problem solving; sequencing; attention; awareness; coping; community integration; self care  Therefore, pt would benefit from OT services to achieve optimal level of performance to decrease caregiver burden  Occupational performance areas to be addressed include: eating, grooming, bathing, toileting, dressing, activity tolerance, functional mobility, and clothing management  Pt is Max x 2 for bed mobility, Mod x 2 to stand, and Mod x 2 to side step toward [facility] using arm held assist  Heavy posterior lean  + restraints  Min for UB ADLS and Mod/Max for LB ADLS  Volition affects function  Currently very easily agitated  1:1 present  Based on findings, pt is of high complexity  The patient's raw score on the AM-PAC Daily Activity inpatient short form is 15, standardized score is 34 69, less than 39 4  Patients at this level are likely to benefit from discharge to post-acute rehabilitation services   Recommend pt return to facility w/ rehab in familiar environment due to h/o dementia to decrease caregiver burden       OT Discharge Recommendation: Return to facility with rehabilitation services

## 2022-05-12 NOTE — ASSESSMENT & PLAN NOTE
Possible mild acute kidney injury with baseline creatinine previously around 0 3, currently 0 69 - although prior baseline from 3 years ago  Improved with IV fluids, creatinine at 0 56 this morning  Patient with history of malnutrition, dementia presenting with rapid Afib  Continue gentle IV fluids through this afternoon  Monitor BMP

## 2022-05-13 LAB
ANION GAP SERPL CALCULATED.3IONS-SCNC: 7 MMOL/L (ref 4–13)
BASOPHILS # BLD AUTO: 0.08 THOUSANDS/ΜL (ref 0–0.1)
BASOPHILS NFR BLD AUTO: 1 % (ref 0–1)
BUN SERPL-MCNC: 17 MG/DL (ref 5–25)
CALCIUM SERPL-MCNC: 8.4 MG/DL (ref 8.3–10.1)
CHLORIDE SERPL-SCNC: 105 MMOL/L (ref 100–108)
CO2 SERPL-SCNC: 28 MMOL/L (ref 21–32)
CREAT SERPL-MCNC: 0.58 MG/DL (ref 0.6–1.3)
EOSINOPHIL # BLD AUTO: 0.08 THOUSAND/ΜL (ref 0–0.61)
EOSINOPHIL NFR BLD AUTO: 1 % (ref 0–6)
ERYTHROCYTE [DISTWIDTH] IN BLOOD BY AUTOMATED COUNT: 12.7 % (ref 11.6–15.1)
FLUAV RNA RESP QL NAA+PROBE: NEGATIVE
FLUBV RNA RESP QL NAA+PROBE: NEGATIVE
GFR SERPL CREATININE-BSD FRML MDRD: 86 ML/MIN/1.73SQ M
GLUCOSE SERPL-MCNC: 85 MG/DL (ref 65–140)
HCT VFR BLD AUTO: 41.5 % (ref 34.8–46.1)
HGB BLD-MCNC: 13.1 G/DL (ref 11.5–15.4)
IMM GRANULOCYTES # BLD AUTO: 0.05 THOUSAND/UL (ref 0–0.2)
IMM GRANULOCYTES NFR BLD AUTO: 1 % (ref 0–2)
LYMPHOCYTES # BLD AUTO: 1.25 THOUSANDS/ΜL (ref 0.6–4.47)
LYMPHOCYTES NFR BLD AUTO: 12 % (ref 14–44)
MCH RBC QN AUTO: 31.5 PG (ref 26.8–34.3)
MCHC RBC AUTO-ENTMCNC: 31.6 G/DL (ref 31.4–37.4)
MCV RBC AUTO: 100 FL (ref 82–98)
MONOCYTES # BLD AUTO: 0.65 THOUSAND/ΜL (ref 0.17–1.22)
MONOCYTES NFR BLD AUTO: 6 % (ref 4–12)
NEUTROPHILS # BLD AUTO: 8.42 THOUSANDS/ΜL (ref 1.85–7.62)
NEUTS SEG NFR BLD AUTO: 79 % (ref 43–75)
NRBC BLD AUTO-RTO: 0 /100 WBCS
PLATELET # BLD AUTO: 266 THOUSANDS/UL (ref 149–390)
PMV BLD AUTO: 10 FL (ref 8.9–12.7)
POTASSIUM SERPL-SCNC: 3.3 MMOL/L (ref 3.5–5.3)
RBC # BLD AUTO: 4.16 MILLION/UL (ref 3.81–5.12)
RSV RNA RESP QL NAA+PROBE: NEGATIVE
SARS-COV-2 RNA RESP QL NAA+PROBE: NEGATIVE
SODIUM SERPL-SCNC: 140 MMOL/L (ref 136–145)
WBC # BLD AUTO: 10.53 THOUSAND/UL (ref 4.31–10.16)

## 2022-05-13 PROCEDURE — 85025 COMPLETE CBC W/AUTO DIFF WBC: CPT | Performed by: FAMILY MEDICINE

## 2022-05-13 PROCEDURE — 0241U HB NFCT DS VIR RESP RNA 4 TRGT: CPT | Performed by: FAMILY MEDICINE

## 2022-05-13 PROCEDURE — 99232 SBSQ HOSP IP/OBS MODERATE 35: CPT | Performed by: NURSE PRACTITIONER

## 2022-05-13 PROCEDURE — 99232 SBSQ HOSP IP/OBS MODERATE 35: CPT | Performed by: FAMILY MEDICINE

## 2022-05-13 PROCEDURE — 80048 BASIC METABOLIC PNL TOTAL CA: CPT | Performed by: FAMILY MEDICINE

## 2022-05-13 RX ORDER — OLANZAPINE 5 MG/1
5 TABLET, ORALLY DISINTEGRATING ORAL ONCE
Status: DISCONTINUED | OUTPATIENT
Start: 2022-05-13 | End: 2022-05-13

## 2022-05-13 RX ORDER — HALOPERIDOL 5 MG/ML
2 INJECTION INTRAMUSCULAR ONCE
Status: COMPLETED | OUTPATIENT
Start: 2022-05-13 | End: 2022-05-13

## 2022-05-13 RX ORDER — OLANZAPINE 10 MG/1
2.5 INJECTION, POWDER, LYOPHILIZED, FOR SOLUTION INTRAMUSCULAR EVERY 12 HOURS
Status: DISCONTINUED | OUTPATIENT
Start: 2022-05-13 | End: 2022-05-13

## 2022-05-13 RX ORDER — OLANZAPINE 10 MG/1
2.5 INJECTION, POWDER, LYOPHILIZED, FOR SOLUTION INTRAMUSCULAR EVERY 12 HOURS PRN
Status: DISCONTINUED | OUTPATIENT
Start: 2022-05-13 | End: 2022-05-15 | Stop reason: HOSPADM

## 2022-05-13 RX ORDER — POTASSIUM CHLORIDE 20 MEQ/1
40 TABLET, EXTENDED RELEASE ORAL 2 TIMES DAILY
Status: DISPENSED | OUTPATIENT
Start: 2022-05-13 | End: 2022-05-14

## 2022-05-13 RX ORDER — METOCLOPRAMIDE HYDROCHLORIDE 5 MG/ML
10 INJECTION INTRAMUSCULAR; INTRAVENOUS ONCE
Status: COMPLETED | OUTPATIENT
Start: 2022-05-13 | End: 2022-05-13

## 2022-05-13 RX ADMIN — METOPROLOL TARTRATE 25 MG: 25 TABLET, FILM COATED ORAL at 09:09

## 2022-05-13 RX ADMIN — METOPROLOL TARTRATE 2.5 MG: 1 INJECTION, SOLUTION INTRAVENOUS at 06:09

## 2022-05-13 RX ADMIN — LORAZEPAM 0.25 MG: 0.5 TABLET ORAL at 09:07

## 2022-05-13 RX ADMIN — METOPROLOL TARTRATE 25 MG: 25 TABLET, FILM COATED ORAL at 22:19

## 2022-05-13 RX ADMIN — PANTOPRAZOLE SODIUM 20 MG: 20 TABLET, DELAYED RELEASE ORAL at 06:05

## 2022-05-13 RX ADMIN — APIXABAN 2.5 MG: 2.5 TABLET, FILM COATED ORAL at 09:08

## 2022-05-13 RX ADMIN — METOCLOPRAMIDE 10 MG: 5 INJECTION, SOLUTION INTRAMUSCULAR; INTRAVENOUS at 03:10

## 2022-05-13 RX ADMIN — APIXABAN 2.5 MG: 2.5 TABLET, FILM COATED ORAL at 17:26

## 2022-05-13 RX ADMIN — OLANZAPINE 2.5 MG: 10 INJECTION, POWDER, FOR SOLUTION INTRAMUSCULAR at 17:32

## 2022-05-13 RX ADMIN — METOPROLOL TARTRATE 2.5 MG: 1 INJECTION, SOLUTION INTRAVENOUS at 00:09

## 2022-05-13 RX ADMIN — LORAZEPAM 0.25 MG: 0.5 TABLET ORAL at 13:22

## 2022-05-13 RX ADMIN — POLYETHYLENE GLYCOL 3350 17 G: 17 POWDER, FOR SOLUTION ORAL at 09:08

## 2022-05-13 RX ADMIN — HALOPERIDOL LACTATE 2 MG: 5 INJECTION, SOLUTION INTRAMUSCULAR at 01:20

## 2022-05-13 RX ADMIN — LORAZEPAM 0.5 MG: 0.5 TABLET ORAL at 17:26

## 2022-05-13 RX ADMIN — WATER 2.1 ML: 1 INJECTION INTRAMUSCULAR; INTRAVENOUS; SUBCUTANEOUS at 17:32

## 2022-05-13 RX ADMIN — MIRTAZAPINE 15 MG: 15 TABLET, FILM COATED ORAL at 22:19

## 2022-05-13 RX ADMIN — POTASSIUM CHLORIDE 40 MEQ: 20 TABLET, EXTENDED RELEASE ORAL at 09:07

## 2022-05-13 RX ADMIN — DOCUSATE SODIUM 100 MG: 100 CAPSULE, LIQUID FILLED ORAL at 09:08

## 2022-05-13 NOTE — ASSESSMENT & PLAN NOTE
Malnutrition Findings:   Adult Malnutrition type: Social/enviromental  Adult Degree of Malnutrition: Malnutrition of moderate degree  Malnutrition Characteristics: Fat loss, Muscle loss noted with BMI of 19  Obtain nutrition consult, offered nutritional supplements                  360 Statement: mild body fat depletion- slightly dark orbital area  mild muscle mass depletion- slightly depressed temporal area, visible clavicle  treated with oral diet, addition of nutrition supplements  BMI Findings: Body mass index is 18 8 kg/m²

## 2022-05-13 NOTE — ASSESSMENT & PLAN NOTE
This is an 80year-old patient with past medical history significant for anxiety, cognitive decline and hypertension, in a very remote history of GI bleed due to gastric ulcer presents from nursing home facility after being found for rapid heart rate, found to be in rapid AFib at [facility]    · Heart rate significantly improved, question if volume depletion contributing to rapid AFib  · Telemetry monitoring discontinued, due to advanced dementia difficult to maintain, no additional medications or workup recommended by Cardiology   · [id number] of 4, anticoagulation is indicated, initiated on Eliquis 2 5 mg b i d   Based on age and weight  · Continue metoprolol 25 mg BID  · 2D echo was ordered however very limited study due to patient's agitation and difficulty to redirect   · Cardiology evaluation appreciated, signed off

## 2022-05-13 NOTE — PLAN OF CARE
Problem: NEUROSENSORY - ADULT  Goal: Achieves stable or improved neurological status  Description: INTERVENTIONS  - Monitor and report changes in neurological status  - Monitor vital signs such as temperature, blood pressure, glucose, and any other labs ordered   - Initiate measures to prevent increased intracranial pressure  - Monitor for seizure activity and implement precautions if appropriate      Outcome: Progressing  Goal: Remains free of injury related to seizures activity  Description: INTERVENTIONS  - Maintain airway, patient safety  and administer oxygen as ordered  - Monitor patient for seizure activity, document and report duration and description of seizure to physician/advanced practitioner  - If seizure occurs,  ensure patient safety during seizure  - Reorient patient post seizure  - Seizure pads on all 4 side rails  - Instruct patient/family to notify RN of any seizure activity including if an aura is experienced  - Instruct patient/family to call for assistance with activity based on nursing assessment  - Administer anti-seizure medications if ordered    Outcome: Progressing  Goal: Achieves maximal functionality and self care  Description: INTERVENTIONS  - Monitor swallowing and airway patency with patient fatigue and changes in neurological status  - Encourage and assist patient to increase activity and self care     - Encourage visually impaired, hearing impaired and aphasic patients to use assistive/communication devices  Outcome: Progressing     Problem: CARDIOVASCULAR - ADULT  Goal: Maintains optimal cardiac output and hemodynamic stability  Description: INTERVENTIONS:  - Monitor I/O, vital signs and rhythm  - Monitor for S/S and trends of decreased cardiac output  - Administer and titrate ordered vasoactive medications to optimize hemodynamic stability  - Assess quality of pulses, skin color and temperature  - Assess for signs of decreased coronary artery perfusion  - Instruct patient to report change in severity of symptoms  Outcome: Progressing  Goal: Absence of cardiac dysrhythmias or at baseline rhythm  Description: INTERVENTIONS:  - Continuous cardiac monitoring, vital signs, obtain 12 lead EKG if ordered  - Administer antiarrhythmic and heart rate control medications as ordered  - Monitor electrolytes and administer replacement therapy as ordered  Outcome: Progressing     Problem: RESPIRATORY - ADULT  Goal: Achieves optimal ventilation and oxygenation  Description: INTERVENTIONS:  - Assess for changes in respiratory status  - Assess for changes in mentation and behavior  - Position to facilitate oxygenation and minimize respiratory effort  - Oxygen administered by appropriate delivery if ordered  - Initiate smoking cessation education as indicated  - Encourage broncho-pulmonary hygiene including cough, deep breathe, Incentive Spirometry  - Assess the need for suctioning and aspirate as needed  - Assess and instruct to report SOB or any respiratory difficulty  - Respiratory Therapy support as indicated  Outcome: Progressing     Problem: GASTROINTESTINAL - ADULT  Goal: Minimal or absence of nausea and/or vomiting  Description: INTERVENTIONS:  - Administer IV fluids if ordered to ensure adequate hydration  - Maintain NPO status until nausea and vomiting are resolved  - Nasogastric tube if ordered  - Administer ordered antiemetic medications as needed  - Provide nonpharmacologic comfort measures as appropriate  - Advance diet as tolerated, if ordered  - Consider nutrition services referral to assist patient with adequate nutrition and appropriate food choices  Outcome: Progressing  Goal: Maintains or returns to baseline bowel function  Description: INTERVENTIONS:  - Assess bowel function  - Encourage oral fluids to ensure adequate hydration  - Administer IV fluids if ordered to ensure adequate hydration  - Administer ordered medications as needed  - Encourage mobilization and activity  - Consider nutritional services referral to assist patient with adequate nutrition and appropriate food choices  Outcome: Progressing  Goal: Maintains adequate nutritional intake  Description: INTERVENTIONS:  - Monitor percentage of each meal consumed  - Identify factors contributing to decreased intake, treat as appropriate  - Assist with meals as needed  - Monitor I&O, weight, and lab values if indicated  - Obtain nutrition services referral as needed  Outcome: Progressing  Goal: Establish and maintain optimal ostomy function  Description: INTERVENTIONS:  - Assess bowel function  - Encourage oral fluids to ensure adequate hydration  - Administer IV fluids if ordered to ensure adequate hydration   - Administer ordered medications as needed  - Encourage mobilization and activity  - Nutrition services referral to assist patient with appropriate food choices  - Assess stoma site  - Consider wound care consult   Outcome: Progressing  Goal: Oral mucous membranes remain intact  Description: INTERVENTIONS  - Assess oral mucosa and hygiene practices  - Implement preventative oral hygiene regimen  - Implement oral medicated treatments as ordered  - Initiate Nutrition services referral as needed  Outcome: Progressing     Problem: GENITOURINARY - ADULT  Goal: Maintains or returns to baseline urinary function  Description: INTERVENTIONS:  - Assess urinary function  - Encourage oral fluids to ensure adequate hydration if ordered  - Administer IV fluids as ordered to ensure adequate hydration  - Administer ordered medications as needed  - Offer frequent toileting  - Follow urinary retention protocol if ordered  Outcome: Progressing  Goal: Absence of urinary retention  Description: INTERVENTIONS:  - Assess patients ability to void and empty bladder  - Monitor I/O  - Bladder scan as needed  - Discuss with physician/AP medications to alleviate retention as needed  - Discuss catheterization for long term situations as appropriate  Outcome: Progressing  Goal: Urinary catheter remains patent  Description: INTERVENTIONS:  - Assess patency of urinary catheter  - If patient has a chronic neal, consider changing catheter if non-functioning  - Follow guidelines for intermittent irrigation of non-functioning urinary catheter  Outcome: Progressing     Problem: METABOLIC, FLUID AND ELECTROLYTES - ADULT  Goal: Electrolytes maintained within normal limits  Description: INTERVENTIONS:  - Monitor labs and assess patient for signs and symptoms of electrolyte imbalances  - Administer electrolyte replacement as ordered  - Monitor response to electrolyte replacements, including repeat lab results as appropriate  - Instruct patient on fluid and nutrition as appropriate  Outcome: Progressing  Goal: Fluid balance maintained  Description: INTERVENTIONS:  - Monitor labs   - Monitor I/O and WT  - Instruct patient on fluid and nutrition as appropriate  - Assess for signs & symptoms of volume excess or deficit  Outcome: Progressing  Goal: Glucose maintained within target range  Description: INTERVENTIONS:  - Monitor Blood Glucose as ordered  - Assess for signs and symptoms of hyperglycemia and hypoglycemia  - Administer ordered medications to maintain glucose within target range  - Assess nutritional intake and initiate nutrition service referral as needed  Outcome: Progressing     Problem: SKIN/TISSUE INTEGRITY - ADULT  Goal: Skin Integrity remains intact(Skin Breakdown Prevention)  Description: Assess:  -Perform Samir assessment every   -Clean and moisturize skin every   -Inspect skin when repositioning, toileting, and assisting with ADLS  -Assess under medical devices such as  every   -Assess extremities for adequate circulation and sensation     Bed Management:  -Have minimal linens on bed & keep smooth, unwrinkled  -Change linens as needed when moist or perspiring  -Avoid sitting or lying in one position for more than  hours while in bed  -Keep HOB at degrees Toileting:  -Offer bedside commode  -Assess for incontinence every   -Use incontinent care products after each incontinent episode such as    Activity:  -Mobilize patient  times a day  -Encourage activity and walks on unit  -Encourage or provide ROM exercises   -Turn and reposition patient every  Hours  -Use appropriate equipment to lift or move patient in bed  -Instruct/ Assist with weight shifting every  when out of bed in chair  -Consider limitation of chair time  hour intervals    Skin Care:  -Avoid use of baby powder, tape, friction and shearing, hot water or constrictive clothing  -Relieve pressure over bony prominences using  -Do not massage red bony areas    Next Steps:  -Teach patient strategies to minimize risks such as    -Consider consults to  interdisciplinary teams such as   Outcome: Progressing  Goal: Incision(s), wounds(s) or drain site(s) healing without S/S of infection  Description: INTERVENTIONS  - Assess and document dressing, incision, wound bed, drain sites and surrounding tissue  - Provide patient and family education  - Perform skin care/dressing changes every   Outcome: Progressing  Goal: Pressure injury heals and does not worsen  Description: Interventions:  - Implement low air loss mattress or specialty surface (Criteria met)  - Apply silicone foam dressing  - Instruct/assist with weight shifting every  minutes when in chair   - Limit chair time to  hour intervals  - Use special pressure reducing interventions such as when in chair   - Apply fecal or urinary incontinence containment device   - Perform passive or active ROM every   - Turn and reposition patient & offload bony prominences every  hours   - Utilize friction reducing device or surface for transfers   - Consider consults to  interdisciplinary teams such as   - Use incontinent care products after each incontinent episode such as  - Consider nutrition services referral as needed  Outcome: Progressing     Problem: HEMATOLOGIC - ADULT  Goal: Maintains hematologic stability  Description: INTERVENTIONS  - Assess for signs and symptoms of bleeding or hemorrhage  - Monitor labs  - Administer supportive blood products/factors as ordered and appropriate  Outcome: Progressing     Problem: MUSCULOSKELETAL - ADULT  Goal: Maintain or return mobility to safest level of function  Description: INTERVENTIONS:  - Assess patient's ability to carry out ADLs; assess patient's baseline for ADL function and identify physical deficits which impact ability to perform ADLs (bathing, care of mouth/teeth, toileting, grooming, dressing, etc )  - Assess/evaluate cause of self-care deficits   - Assess range of motion  - Assess patient's mobility  - Assess patient's need for assistive devices and provide as appropriate  - Encourage maximum independence but intervene and supervise when necessary  - Involve family in performance of ADLs  - Assess for home care needs following discharge   - Consider OT consult to assist with ADL evaluation and planning for discharge  - Provide patient education as appropriate  Outcome: Progressing  Goal: Maintain proper alignment of affected body part  Description: INTERVENTIONS:  - Support, maintain and protect limb and body alignment  - Provide patient/ family with appropriate education  Outcome: Progressing     Problem: Potential for Falls  Goal: Patient will remain free of falls  Description: INTERVENTIONS:  - Educate patient/family on patient safety including physical limitations  - Instruct patient to call for assistance with activity   - Consult OT/PT to assist with strengthening/mobility   - Keep Call bell within reach  - Keep bed low and locked with side rails adjusted as appropriate  - Keep care items and personal belongings within reach  - Initiate and maintain comfort rounds  - Make Fall Risk Sign visible to staff  - Offer Toileting every  Hours, in advance of need  - Initiate/Maintain alarm  - Obtain necessary fall risk management equipment:   - Apply yellow socks and bracelet for high fall risk patients  - Consider moving patient to room near nurses station  Outcome: Progressing     Problem: SAFETY,RESTRAINT: NV/NON-SELF DESTRUCTIVE BEHAVIOR  Goal: Remains free of harm/injury (restraint for non violent/non self-detsructive behavior)  Description: INTERVENTIONS:  - Instruct patient/family regarding restraint use   - Assess and monitor physiologic and psychological status   - Provide interventions and comfort measures to meet assessed patient needs   - Identify and implement measures to help patient regain control  - Assess readiness for release of restraint   Outcome: Progressing  Goal: Returns to optimal restraint-free functioning  Description: INTERVENTIONS:  - Assess the patient's behavior and symptoms that indicate continued need for restraint  - Identify and implement measures to help patient regain control  - Assess readiness for release of restraint   Outcome: Progressing     Problem: Nutrition/Hydration-ADULT  Goal: Nutrient/Hydration intake appropriate for improving, restoring or maintaining nutritional needs  Description: Monitor and assess patient's nutrition/hydration status for malnutrition  Collaborate with interdisciplinary team and initiate plan and interventions as ordered  Monitor patient's weight and dietary intake as ordered or per policy  Utilize nutrition screening tool and intervene as necessary  Determine patient's food preferences and provide high-protein, high-caloric foods as appropriate       INTERVENTIONS:  - Monitor oral intake, urinary output, labs, and treatment plans  - Assess nutrition and hydration status and recommend course of action  - Evaluate amount of meals eaten  - Assist patient with eating if necessary   - Allow adequate time for meals  - Recommend/ encourage appropriate diets, oral nutritional supplements, and vitamin/mineral supplements  - Order, calculate, and assess calorie counts as needed  - Recommend, monitor, and adjust tube feedings and TPN/PPN based on assessed needs  - Assess need for intravenous fluids  - Provide specific nutrition/hydration education as appropriate  - Include patient/family/caregiver in decisions related to nutrition  Outcome: Progressing

## 2022-05-13 NOTE — NURSING NOTE
Pt is sitting on the side of the bed - trying to get up and walk ( she is very unsteady)  - very confused, agitated  -"hollering" at times" Hitting at 1:1 - Refuses to eat or drink  Dr Celene Kayser notified and aware of same

## 2022-05-13 NOTE — ASSESSMENT & PLAN NOTE
Patient presents with new onset AFib, mild AMANDA with creatinine of 0 6 and baseline around 0 3  At baseline she is oriented to self and situation, however, currently she is agitated, oriented to self only, ripped out her peripheral IV requiring bilateral wrist restraints  Underlying dementia, patient on Ativan and Remeron per home regimen  · Geriatric medicine evaluation appreciated, continue home meds - hope to d/c back to regular environment  · Will discontinued 1:1 observation

## 2022-05-13 NOTE — NURSING NOTE
This RN was only assigned to this pt for 4hrs  In those 4hrs, I determined that the pt was calm, cooperative, and no longer attempting to d/c medical interventions  Soft wrist restraints d/c'd at 0600  1:1 still necessary, however   Will hand off to next Rn

## 2022-05-13 NOTE — ASSESSMENT & PLAN NOTE
Patient is on Ativan p r n   And Remeron at bedtime  Would consider other options for anxiety treatment moving forward   For now continue home regimen  Geriatric medicine consultation appreciated, hopefully can return to regular environment tomorrow

## 2022-05-13 NOTE — PROGRESS NOTES
[facility]  Progress Note - Dione Cure 1940, 80 y o  female MRN: [id number]  Unit/Bed#: E4 -01 Encounter: [id number]  Primary Care Provider: Alyssa Rutherford DO   Date and time admitted to hospital: 5/11/2022  9:38 AM    * New onset atrial fibrillation [facility])  Assessment & Plan  This is an 55-year-old patient with past medical history significant for anxiety, cognitive decline and hypertension, in a very remote history of GI bleed due to gastric ulcer presents from nursing home facility after being found for rapid heart rate, [facility]    · Heart rate significantly improved, question if volume depletion contributing to rapid AFib  · Telemetry monitoring discontinued, due to advanced dementia difficult to maintain, no additional medications or workup recommended by Cardiology   · [id number] of 4, anticoagulation is indicated, initiated on Eliquis 2 5 mg b i d   Based on age and weight  · Continue metoprolol 25 mg BID  · 2D echo was ordered however very limited study due to patient's agitation and difficulty to redirect   · Cardiology evaluation appreciated, signed off      AMANDA (acute kidney injury) ([facility] )  Assessment & Plan  Possible mild acute kidney injury with baseline creatinine previously around 0 3, currently 0 69 - although prior baseline from 3 years ago  Improved with IV fluids, creatinine at 0 56 this morning  Patient with history of malnutrition, dementia presenting with rapid Afib  Continue gentle IV fluids through this afternoon  Monitor BMP      Metabolic encephalopathy  Assessment & Plan  Patient presents with new onset AFib, mild AMANDA with creatinine of 0 6 and baseline around 0 3  At baseline she is oriented to self and situation, however, currently she is agitated, oriented to self only, ripped out her peripheral IV requiring bilateral wrist restraints  Underlying dementia, patient on Ativan and Remeron per home regimen  · Geriatric medicine evaluation appreciated, continue home meds - hope to d/c back to regular environment  · Will discontinued 1:1 observation    Dementia [facility])  Assessment & Plan  Patient with underlying cognitive decline and anxiety, resident at a nursing home  As above, she is currently significantly agitated  Will continue current regimen and await Geriatrics recommendations    Essential hypertension  Assessment & Plan  On amlodipine 5 mg daily - will hold with low normal hypertension, initiation of metoprolol in the setting of new onset Afib  Monitor vital signs    Other constipation  Assessment & Plan  Chronic, continue home regimen    Anxiety  Assessment & Plan  Patient is on Ativan p r n  And Remeron at bedtime  Would consider other options for anxiety treatment moving forward   For now continue home regimen  Geriatric medicine consultation appreciated, hopefully can return to regular environment tomorrow     Moderate protein-calorie malnutrition ([facility] )  Assessment & Plan  Malnutrition Findings:   Adult Malnutrition type: Social/enviromental  Adult Degree of Malnutrition: Malnutrition of moderate degree  Malnutrition Characteristics: Fat loss, Muscle loss noted with BMI of 19  Obtain nutrition consult, offered nutritional supplements                  360 Statement: mild body fat depletion- slightly dark orbital area  mild muscle mass depletion- slightly depressed temporal area, visible clavicle  treated with oral diet, addition of nutrition supplements  BMI Findings: Body mass index is 18 8 kg/m²  Ambulatory dysfunction  Assessment & Plan  Comes from nursing home   PT/OT eval requested           VTE Pharmacologic Prophylaxis: VTE Score: 6 High Risk (Score >/= 5) - Pharmacological DVT Prophylaxis Ordered: apixaban (Eliquis)  Sequential Compression Devices Ordered  Patient Centered Rounds: I performed bedside rounds with nursing staff today    Discussions with Specialists or Other Care Team Provider:  Case management    Education and Discussions with Family / Patient: Will update daughter via telephone  Time Spent for Care: 30 minutes  More than 50% of total time spent on counseling and coordination of care as described above  Current Length of Stay: 2 day(s)  Current Patient Status: Inpatient   Certification Statement: The patient will continue to require additional inpatient hospital stay due to [facility]  discharge planning  Discharge Plan: Anticipate discharge tomorrow to rehab facility  Code Status: Level 3 - DNAR and DNI    Subjective:   Patient seen and examined  She is disoriented and unable to provide meaningful history  No acute distress  Objective:     Vitals:   Temp (24hrs), Av 9 °F (36 6 °C), Min:97 °F (36 1 °C), Max:98 6 °F (37 °C)    Temp:  [97 °F (36 1 °C)-98 6 °F (37 °C)] 98 6 °F (37 °C)  HR:  [] 97  Resp:  [18-20] 20  BP: ()/(56-82) 112/63  SpO2:  [92 %-96 %] 96 %  Body mass index is 18 8 kg/m²  Input and Output Summary (last 24 hours):   No intake or output data in the 24 hours ending 22 1232    Physical Exam:   Physical Exam  Constitutional:       General: She is not in acute distress  Comments: Frail    HENT:      Head: Normocephalic and atraumatic  Nose: No congestion  Mouth/Throat:      Pharynx: Oropharynx is clear  Eyes:      Conjunctiva/sclera: Conjunctivae normal    Cardiovascular:      Rate and Rhythm: Normal rate  Rhythm irregular  Heart sounds: No murmur heard  Pulmonary:      Effort: No respiratory distress  Breath sounds: No wheezing or rales  Abdominal:      General: There is no distension  Tenderness: There is no abdominal tenderness  There is no guarding  Musculoskeletal:      Right lower leg: No edema  Left lower leg: No edema  Skin:     General: Skin is warm and dry  Neurological:      Mental Status: She is disoriented     Psychiatric:         Cognition and Memory: Cognition is impaired  Additional Data:     Labs:  Results from last 7 days   Lab Units 05/13/22  0610   WBC Thousand/uL 10 53*   HEMOGLOBIN g/dL 13 1   HEMATOCRIT % 41 5   PLATELETS Thousands/uL 266   NEUTROS PCT % 79*   LYMPHS PCT % 12*   MONOS PCT % 6   EOS PCT % 1     Results from last 7 days   Lab Units 05/13/22  0610 05/12/22  0844 05/11/22  0945   SODIUM mmol/L 140   < > 136   POTASSIUM mmol/L 3 3*   < > 5 4*   CHLORIDE mmol/L 105   < > 98*   CO2 mmol/L 28   < > 30   BUN mg/dL 17   < > 19   CREATININE mg/dL 0 58*   < > 0 69   ANION GAP mmol/L 7   < > 8   CALCIUM mg/dL 8 4   < > 8 9   ALBUMIN g/dL  --   --  3 4*   TOTAL BILIRUBIN mg/dL  --   --  0 47   ALK PHOS U/L  --   --  104   ALT U/L  --   --  19   AST U/L  --   --  33   GLUCOSE RANDOM mg/dL 85   < > 130    < > = values in this interval not displayed       Results from last 7 days   Lab Units 05/11/22  0945   INR  1 04                   Lines/Drains:  Invasive Devices  Report    Peripheral Intravenous Line  Duration           Peripheral IV 05/12/22 Right;Ventral (anterior) Forearm 1 day                      Imaging: Reviewed radiology reports from this admission including: ECHO    Recent Cultures (last 7 days):         Last 24 Hours Medication List:   Current Facility-Administered Medications   Medication Dose Route Frequency Provider Last Rate    apixaban  2 5 mg Oral BID Tyrone Jaramillo MD      bisacodyl  10 mg Rectal Daily PRN Tyrone Jaramillo MD      docusate sodium  100 mg Oral BID Tyrone Jaramillo MD      LORazepam  0 25 mg Oral BID Tyrone Jaramillo MD      LORazepam  0 5 mg Oral Daily Tyrone Jaramillo MD      metoprolol  2 5 mg Intravenous Q6H PRN Tyrone Jaramillo MD      metoprolol tartrate  25 mg Oral Q12H [address] Tyrone Jaramillo MD      mirtazapine  15 mg Oral HS Pricilla Aguila MD      ondansetron  4 mg Intravenous Q6H PRN Tyrone Jaramillo MD      pantoprazole  20 mg Oral Early Morning Tyrone Jaramillo MD      polyethylene glycol  17 g Oral Daily Cristina Alfaro MD      potassium chloride  40 mEq Oral BID Cristina Alfaro MD          Today, Patient Was Seen By: Liane Duron MD    **Please Note: This note may have been constructed using a voice recognition system  **

## 2022-05-13 NOTE — CASE MANAGEMENT
Case Management Assessment & Discharge Planning Note    Patient name Garrett Mejias  Location [facility] [address] [place] [address] [birth year] MRN [id number]  : 1940 Date 2022       Current Admission Date: 2022  Current Admission Diagnosis:New onset atrial fibrillation [facility])   Patient Active Problem List    Diagnosis Date Noted    Tricuspid regurgitation 2022    Dementia ([facility] ) 2022    [facility]     Metabolic encephalopathy     AMANDA (acute kidney injury) ([facility] ) 2022    Hyperkalemia 2022    Furuncle of vulva 2022    Essential hypertension 2020    Headache 2020    Other constipation 2019    Anxiety 2019    Moderate protein-calorie malnutrition [facility] )     Fall 2019    Ambulatory dysfunction 2019      LOS (days): 2  Geometric Mean LOS (GMLOS) (days): 3 50  Days to GMLOS:1 6     OBJECTIVE:    Risk of Unplanned Readmission Score: 12 54      BPCI Notification Given?: Yes  Current admission status: Inpatient       Preferred Pharmacy:   [facility], [address] [place] [address],[address]  [address] [place] [address]  Phone: [phone] Fax: [phone]    Primary Care Provider: Priyank Baeza DO    Primary Insurance: MEDICARE  Secondary Insurance: [facility]    ASSESSMENT:  Christian [address] [employer]     Toan Kor[facility] Representative - Daughter   Primary Phone: [phone] (Mobile)  Home Phone: [phone]               Patient Information  Admitted from[de-identified] Facility  Mental Status: Confused  Assessment information provided by[de-identified] Daughter Fani Burger)  Primary Caregiver:  Other (Comment) (from [facility] 30 day bed hold)  Support Systems: Daughter  [place] of Residence: [address] [place] [address] do you live in?: [place]  Type of Current Residence: Facility  In the last 12 months, was there a time when you were not able to pay the mortgage or rent on time?: No  In the last 12 months, was there a time when you did not have a steady place to sleep or slept in a shelter (including now)?: No  Homeless/housing insecurity resource given?: N/A  Living Arrangements:  (Lives at [facility])    Activities of Daily Living Prior to Admission  Functional Status: Assistance  Does patient use assisted devices?: Yes  Assisted Devices (DME) used: Jake Mitchell         Patient Information Continued  Income Source: SSI/SSD  Within the past 12 months, you worried that your food would run out before you got the money to buy more : Never true  Within the past 12 months, the food you bought just didn't last and you didn't have money to get more : Never true  Food insecurity resource given?: N/A  Does patient receive dialysis treatments?: No         Means of Transportation  Means of Transport to Appts[de-identified] Family transport  In the past 12 months, has lack of transportation kept you from medical appointments or from getting medications?: No  In the past 12 months, has lack of transportation kept you from meetings, work, or from getting things needed for daily living?: No  Was application for public transport provided?: N/A        DISCHARGE DETAILS:    Discharge planning discussed with[de-identified] Zeina Zuniga  Freedom of Choice: Yes  Comments - Freedom of Choice: 30 day bed hold at [address] Lara contacted family/caregiver?: Yes  Were Treatment Team discharge recommendations reviewed with patient/caregiver?: Yes  Did patient/caregiver verbalize understanding of patient care needs?: Yes  Were patient/caregiver advised of the risks associated with not following Treatment Team discharge recommendations?: Yes    Contacts  Patient Contacts: Daughter - Jing Zuniga  Relationship to Patient[de-identified] Family  Contact Method: Phone  Phone Number: [phone]  Reason/Outcome: Discharge Planning, Emergency Contact, Continuity of [facility]         Is the patient interested in [facility] at discharge?: No       Discharge Destination Plan[de-identified] SNF  Transport at Discharge : [address], [address] : [address]  Receiving Facility/Agency Phone Number: [phone]  Facility/Agency Fax Number: [phone]

## 2022-05-13 NOTE — PROGRESS NOTES
Progress Note - Danilo Longo 80 y o  female MRN: [id number]    Unit/Bed#: E4 -01 Encounter: [id number]      Assessment/Plan:  1  Ambulatory dysfunction  · PT OT following  One assist to BR For return to facility with supports:  Stable  Fall precautions  2  Deconditioning/frailty  · Cont to optimize diet, hydration, mobility for healing  · GFR 86, keep hydrated  K 3 3  Replace per primary team  · Continue to encourage p o  Intake and supplements as written  · Monitor ss infection, dehydration, dvt, skin breakdown  · Encourage cough and deep breathing exercises, IS   3   MCi  · Stable at present  Patient is aware that she is forgetful which worsens her anxiety  · Continue frequent reminders, close monitoring for safety, emotional support  4  Acute encephalopathy  · Has underlying cognitive impairment, has underlying anxiety  Is acutely anxious secondary to change in environment  · Continue delirium precautions as previously written, continue one-to-one supervision, to not overwhelm with information, regulate sleep-wake cycle, regulate pain control, continue home Ativan regimen at this time  Avoid restraints of possible  · Instead of Haldol, would recommend Zyprexa 2 5 mg IM q 8 hours p r n  For severe agitations at a not redirectable  · Will probably do best to return to own environment  5   Anxiety/depression/insomnia  · Patient has history of anxiety and panic with changes  · Continue home Ativan regimen and Remeron at this time  · Continue emotional support, relaxation techniques, good sleep hygiene techniques  6  Constipation  · No BM 5/13, has been loose and watery  · Consider changing miralax to prn  7  Rapid AFib  · Currently rate controlled without shortness of breath or chest pain  · Continues on beta-blocker and [address]  Bleed precautions    Subjective:   Patient seen for geriatrics follow up  She continues to be confused and anxious  She is oriented to person only, not tps    She is more aware this am, but can not remember why she is here and just wants to go home  She denies pain  She denies cp/sob/cough  She denies gi/gu distress  She did not eat breakfast, she states she will eat when she goes home  She did not sleep well (although she does not remember)- she did require haldol over night  Restraints are currently off, 1:1 continues  Objective:     Vitals: Blood pressure 112/63, pulse 97, temperature 98 6 °F (37 °C), temperature source Temporal, resp  rate 20, height 4' 10" (1 473 m), weight 40 8 kg (89 lb 15 2 oz), SpO2 96 %  ,Body mass index is 18 8 kg/m²  No intake or output data in the 24 hours ending 05/13/22 1042    Physical Exam:   General:  Alert, oriented to person only, not tps, no distress  Cards:  RRR, no murmur/gallop/rub noted  Pulm:  Norm effort/no distress, cta, no w/r/r  Abd:  Soft, nt, nd, +bs  MS:  Norm ROM, no focal weakness  Ext:  W/d, no edema       Invasive Devices  Report    Peripheral Intravenous Line  Duration           Peripheral IV 05/12/22 Right;Ventral (anterior) Forearm 1 day                Lab, Imaging and other studies: I have personally reviewed pertinent reports      VTE Pharmacologic Prophylaxis: eliquis

## 2022-05-14 LAB
ANION GAP SERPL CALCULATED.3IONS-SCNC: 9 MMOL/L (ref 4–13)
BUN SERPL-MCNC: 14 MG/DL (ref 5–25)
CALCIUM SERPL-MCNC: 8.4 MG/DL (ref 8.3–10.1)
CHLORIDE SERPL-SCNC: 101 MMOL/L (ref 100–108)
CO2 SERPL-SCNC: 30 MMOL/L (ref 21–32)
CREAT SERPL-MCNC: 0.56 MG/DL (ref 0.6–1.3)
GFR SERPL CREATININE-BSD FRML MDRD: 87 ML/MIN/1.73SQ M
GLUCOSE SERPL-MCNC: 67 MG/DL (ref 65–140)
POTASSIUM SERPL-SCNC: 3.5 MMOL/L (ref 3.5–5.3)
SODIUM SERPL-SCNC: 140 MMOL/L (ref 136–145)

## 2022-05-14 PROCEDURE — 99232 SBSQ HOSP IP/OBS MODERATE 35: CPT | Performed by: FAMILY MEDICINE

## 2022-05-14 PROCEDURE — 80048 BASIC METABOLIC PNL TOTAL CA: CPT | Performed by: FAMILY MEDICINE

## 2022-05-14 RX ORDER — ACETAMINOPHEN 325 MG/1
650 TABLET ORAL EVERY 6 HOURS PRN
Status: DISCONTINUED | OUTPATIENT
Start: 2022-05-14 | End: 2022-05-15 | Stop reason: HOSPADM

## 2022-05-14 RX ADMIN — MIRTAZAPINE 15 MG: 15 TABLET, FILM COATED ORAL at 21:52

## 2022-05-14 RX ADMIN — DOCUSATE SODIUM 100 MG: 100 CAPSULE, LIQUID FILLED ORAL at 09:44

## 2022-05-14 RX ADMIN — LORAZEPAM 0.25 MG: 0.5 TABLET ORAL at 13:10

## 2022-05-14 RX ADMIN — LORAZEPAM 0.5 MG: 0.5 TABLET ORAL at 17:16

## 2022-05-14 RX ADMIN — LORAZEPAM 0.25 MG: 0.5 TABLET ORAL at 09:43

## 2022-05-14 RX ADMIN — APIXABAN 2.5 MG: 2.5 TABLET, FILM COATED ORAL at 09:44

## 2022-05-14 RX ADMIN — DOCUSATE SODIUM 100 MG: 100 CAPSULE, LIQUID FILLED ORAL at 17:15

## 2022-05-14 RX ADMIN — APIXABAN 2.5 MG: 2.5 TABLET, FILM COATED ORAL at 17:16

## 2022-05-14 RX ADMIN — OLANZAPINE 2.5 MG: 10 INJECTION, POWDER, FOR SOLUTION INTRAMUSCULAR at 05:19

## 2022-05-14 RX ADMIN — METOPROLOL TARTRATE 25 MG: 25 TABLET, FILM COATED ORAL at 09:43

## 2022-05-14 RX ADMIN — METOPROLOL TARTRATE 25 MG: 25 TABLET, FILM COATED ORAL at 21:52

## 2022-05-14 NOTE — ASSESSMENT & PLAN NOTE
Malnutrition Findings:   Adult Malnutrition type: Social/enviromental  Adult Degree of Malnutrition: Malnutrition of moderate degree  Malnutrition Characteristics: Fat loss, Muscle loss noted with BMI of 19  Obtain nutrition consult, offered nutritional supplements                  360 Statement: mild body fat depletion- slightly dark orbital area  mild muscle mass depletion- slightly depressed temporal area, visible clavicle  treated with oral diet, addition of nutrition supplements  BMI Findings: Body mass index is 17 83 kg/m²

## 2022-05-14 NOTE — ASSESSMENT & PLAN NOTE
Patient is on Ativan p r n  And Remeron at bedtime  Would consider other options for anxiety treatment moving forward   For now continue home regimen  Geriatric medicine consultation appreciated, hopefully can return to regular environment tomorrow   Zyprexa p r n

## 2022-05-14 NOTE — ASSESSMENT & PLAN NOTE
This is an 80year-old patient with past medical history significant for anxiety, cognitive decline and hypertension, in a very remote history of GI bleed due to gastric ulcer presents from nursing home facility after being found for rapid heart rate, found to be in rapid AFib at [place] [address] ED    · Heart rate significantly improved, question if volume depletion contributing to rapid AFib  · Telemetry monitoring discontinued, due to advanced dementia difficult to maintain, no additional medications or workup recommended by Cardiology   · [id number] of 4, anticoagulation is indicated, initiated on Eliquis 2 5 mg b i d   Based on age and weight  · Continue metoprolol 25 mg BID  · 2D echo was ordered however very limited study due to patient's agitation and difficulty to redirect   · Cardiology evaluation appreciated, signed off

## 2022-05-14 NOTE — ASSESSMENT & PLAN NOTE
Possible mild acute kidney injury with baseline creatinine previously around 0 3, currently 0 69 - although prior baseline from 3 years ago  Improved with IV fluids, creatinine at 0 56 off IV fluids, no additional monitoring  Patient with history of malnutrition, dementia presenting with rapid Afib now acceptable rate  Monitor BMP

## 2022-05-14 NOTE — ASSESSMENT & PLAN NOTE
Mild with potassium at 5 4, mild hemolysis noted on the lab report  Normalized on today's labs  Patient was then noted for hypokalemia and received replacement

## 2022-05-14 NOTE — ASSESSMENT & PLAN NOTE
Patient with underlying cognitive decline and anxiety, resident at a nursing home  As above, continue Zyprexa p r n , take off continual observation  Will continue current regimen and await Geriatrics recommendations

## 2022-05-14 NOTE — CASE MANAGEMENT
Case Management Discharge Planning Note    Patient name Garrett Mejias  Location [address] MRN [id number]  : 1940 Date 2022       Current Admission Date: 2022  Current Admission Diagnosis:New onset atrial fibrillation [facility])   Patient Active Problem List    Diagnosis Date Noted    Tricuspid regurgitation 2022    [facility]    [facility] onset [facility]     Metabolic encephalopathy     AMANDA (acute kidney injury) ([facility] ) 2022    Hyperkalemia 2022    Furuncle of vulva 2022    Essential hypertension 2020    Headache 2020    Other constipation 2019    Anxiety 2019    Moderate protein-calorie malnutrition ([facility] )     Fall 2019    Ambulatory dysfunction 2019      LOS (days): 3  Geometric Mean LOS (GMLOS) (days): 3 50  Days to GMLOS:0 3     OBJECTIVE:  Risk of Unplanned Readmission Score: 15 14         Current admission status: Inpatient   Preferred Pharmacy:   [facility], [facility],4Th Floor  [address] [facility],Fourth Floor  Phone: [phone] Fax: [phone]    Primary Care Provider: Priyank Baeza DO    Primary Insurance: MEDICARE  Secondary Insurance: [address] DETAILS:    Additional Comments: This CM following pt for weekend coverage  Updated patient's daughter via phone after VM received  Daughter expressed concerns that her mother would not be able to return to [facility] after update from MD SCOTT assured daughter that the patient will be able to return to [facility] after she is off of 1:1 here in the hospital for 24hrs per [facility]'s protocol  Daughter Collette Latif expresses understanding of the situation at this time

## 2022-05-14 NOTE — PROGRESS NOTES
[address] [facility]  Progress Note - Garrett Mejias 1940, 80 y o  female MRN: [id number]  Unit/Bed#: E4 -01 Encounter: [id number]  Primary Care Provider: Priyank Baeza DO   Date and time admitted to hospital: 5/11/2022  9:38 AM    * New onset atrial fibrillation [facility])  Assessment & Plan  This is an 26-year-old patient with past medical history significant for anxiety, cognitive decline and hypertension, in a very remote history of GI bleed due to gastric ulcer presents from nursing home facility after being found for rapid heart rate, found to be in rapid AFib at [facility]    · Heart rate significantly improved, question if volume depletion contributing to rapid AFib  · Telemetry monitoring discontinued, due to advanced dementia difficult to maintain, no additional medications or workup recommended by Cardiology   · [id number] of 4, anticoagulation is indicated, initiated on Eliquis 2 5 mg b i d   Based on age and weight  · Continue metoprolol 25 mg BID  · 2D echo was ordered however very limited study due to patient's agitation and difficulty to redirect   · Cardiology evaluation appreciated, signed off      Hyperkalemia  Assessment & Plan  Mild with potassium at 5 4, mild hemolysis noted on the lab report  Normalized on today's labs  Patient was then noted for hypokalemia and received replacement    AMANDA (acute kidney injury) ([facility] [address] )  Assessment & Plan  Possible mild acute kidney injury with baseline creatinine previously around 0 3, currently 0 69 - although prior baseline from 3 years ago  Improved with IV fluids, creatinine at 0 56 off IV fluids, no additional monitoring  Patient with history of malnutrition, dementia presenting with rapid Afib now acceptable rate  Monitor BMP      Metabolic encephalopathy  Assessment & Plan  Patient presents with new onset AFib, mild AMANDA with creatinine of 0 6 and baseline around 0 3  At baseline she is oriented to self and situation, however, currently she is agitated, oriented to self only, ripped out her peripheral IV requiring bilateral wrist restraints  Underlying dementia, patient on Ativan and Remeron per home regimen  · Geriatric medicine evaluation appreciated, continue home meds - hope to d/c back to regular environment  · Will discontinue 1:1 observation    Dementia [facility])  Assessment & Plan  Patient with underlying cognitive decline and anxiety, resident at a nursing home  As above, continue Zyprexa p r n , take off continual observation  Will continue current regimen and await Geriatrics recommendations    Essential hypertension  Assessment & Plan  On amlodipine 5 mg daily - will hold with low normal hypertension, initiation of metoprolol in the setting of new onset Afib  Monitor vital signs    Other constipation  Assessment & Plan  Chronic, continue home regimen    Anxiety  Assessment & Plan  Patient is on Ativan p r n  And Remeron at bedtime  Would consider other options for anxiety treatment moving forward   For now continue home regimen  Geriatric medicine consultation appreciated, hopefully can return to regular environment tomorrow   Zyprexa p r n  Moderate protein-calorie malnutrition ([address] )  Assessment & Plan  Malnutrition Findings:   Adult Malnutrition type: Social/enviromental  Adult Degree of Malnutrition: Malnutrition of moderate degree  Malnutrition Characteristics: Fat loss, Muscle loss noted with BMI of 19  Obtain nutrition consult, offered nutritional supplements                  360 Statement: mild body fat depletion- slightly dark orbital area  mild muscle mass depletion- slightly depressed temporal area, visible clavicle  treated with oral diet, addition of nutrition supplements  BMI Findings: Body mass index is 17 83 kg/m²         Ambulatory dysfunction  Assessment & Plan  Comes from nursing home   PT/OT eval requested           VTE Pharmacologic Prophylaxis: VTE Score: 6 High Risk (Score >/= 5) - Pharmacological DVT Prophylaxis Ordered: apixaban (Eliquis)  Sequential Compression Devices Ordered  Patient Centered Rounds: I performed bedside rounds with nursing staff today  Discussions with Specialists or Other Care Team Provider:  Case management    Education and Discussions with Family / Patient: Updated  (daughter) via phone  Time Spent for Care: 30 minutes  More than 50% of total time spent on counseling and coordination of care as described above  Current Length of Stay: 3 day(s)  Current Patient Status: Inpatient   Certification Statement: The patient will continue to require additional inpatient hospital stay due to Awaiting acceptance at facility  Discharge Plan: Anticipate discharge tomorrow to rehab facility  Code Status: Level 3 - DNAR and DNI    Subjective:   Patient seen and examined  Sleeping, arousable  Confused per baseline  Objective:     Vitals:   Temp (24hrs), Av °F (36 1 °C), Min:97 °F (36 1 °C), Max:97 1 °F (36 2 °C)    Temp:  [97 °F (36 1 °C)-97 1 °F (36 2 °C)] 97 1 °F (36 2 °C)  HR:  [78-90] 78  Resp:  [16-18] 16  BP: (136-151)/(72-76) 151/76  SpO2:  [92 %-97 %] 94 %  Body mass index is 17 83 kg/m²  Input and Output Summary (last 24 hours):   No intake or output data in the 24 hours ending 22 1535    Physical Exam:   Physical Exam  Constitutional:       General: She is not in acute distress  Comments: Frail    HENT:      Head: Normocephalic and atraumatic  Nose: No congestion  Mouth/Throat:      Pharynx: Oropharynx is clear  Eyes:      Conjunctiva/sclera: Conjunctivae normal    Cardiovascular:      Rate and Rhythm: Normal rate  Rhythm irregular  Heart sounds: No murmur heard  Pulmonary:      Effort: No respiratory distress  Breath sounds: No wheezing or rales  Abdominal:      General: There is no distension  Musculoskeletal:      Right lower leg: No edema  Left lower leg: No edema     Neurological: Mental Status: She is disoriented  Psychiatric:         Cognition and Memory: Cognition is impaired  Additional Data:     Labs:  Results from last 7 days   Lab Units 05/13/22  0610   WBC Thousand/uL 10 53*   HEMOGLOBIN g/dL 13 1   HEMATOCRIT % 41 5   PLATELETS Thousands/uL 266   NEUTROS PCT % 79*   LYMPHS PCT % 12*   MONOS PCT % 6   EOS PCT % 1     Results from last 7 days   Lab Units 05/14/22  1015 05/12/22  0844 05/11/22  0945   SODIUM mmol/L 140   < > 136   POTASSIUM mmol/L 3 5   < > 5 4*   CHLORIDE mmol/L 101   < > 98*   CO2 mmol/L 30   < > 30   BUN mg/dL 14   < > 19   CREATININE mg/dL 0 56*   < > 0 69   ANION GAP mmol/L 9   < > 8   CALCIUM mg/dL 8 4   < > 8 9   ALBUMIN g/dL  --   --  3 4*   TOTAL BILIRUBIN mg/dL  --   --  0 47   ALK PHOS U/L  --   --  104   ALT U/L  --   --  19   AST U/L  --   --  33   GLUCOSE RANDOM mg/dL 67   < > 130    < > = values in this interval not displayed  Results from last 7 days   Lab Units 05/11/22  0945   INR  1 04                   Lines/Drains:  Invasive Devices  Report    Peripheral Intravenous Line  Duration           Peripheral IV 05/12/22 Right;Ventral (anterior) Forearm 2 days                      Imaging: No pertinent imaging reviewed      Recent Cultures (last 7 days):         Last 24 Hours Medication List:   Current Facility-Administered Medications   Medication Dose Route Frequency Provider Last Rate    acetaminophen  650 mg Oral Q6H PRN Evon Maxwell, PA-C      apixaban  2 5 mg Oral BID Shraddha Hood MD      bisacodyl  10 mg Rectal Daily PRN Shraddha Hood MD      docusate sodium  100 mg Oral BID Shraddha Hood MD      LORazepam  0 25 mg Oral BID Shraddha Hood MD      LORazepam  0 5 mg Oral Daily Shraddha Hood MD      metoprolol  2 5 mg Intravenous Q6H PRN Shraddha Hood MD      metoprolol tartrate  25 mg Oral Q12H [address] Shraddha Hood MD      mirtazapine  15 mg Oral HS Pricilla Aguila MD      OLANZapine  2 5 mg Intramuscular Q12H PRN Pricilla Pollo Gayle MD      ondansetron  4 mg Intravenous Q6H PRN Martin Dodd MD      pantoprazole  20 mg Oral Early Morning Martin Dodd MD      polyethylene glycol  17 g Oral Daily Martin Dodd MD          Today, Patient Was Seen By: Sophia Reyna MD    **Please Note: This note may have been constructed using a voice recognition system  **

## 2022-05-14 NOTE — PLAN OF CARE
Problem: NEUROSENSORY - ADULT  Goal: Achieves stable or improved neurological status  Description: INTERVENTIONS  - Monitor and report changes in neurological status  - Monitor vital signs such as temperature, blood pressure, glucose, and any other labs ordered   - Initiate measures to prevent increased intracranial pressure  - Monitor for seizure activity and implement precautions if appropriate      Outcome: Progressing  Goal: Remains free of injury related to seizures activity  Description: INTERVENTIONS  - Maintain airway, patient safety  and administer oxygen as ordered  - Monitor patient for seizure activity, document and report duration and description of seizure to physician/advanced practitioner  - If seizure occurs,  ensure patient safety during seizure  - Reorient patient post seizure  - Seizure pads on all 4 side rails  - Instruct patient/family to notify RN of any seizure activity including if an aura is experienced  - Instruct patient/family to call for assistance with activity based on nursing assessment  - Administer anti-seizure medications if ordered    Outcome: Progressing  Goal: Achieves maximal functionality and self care  Description: INTERVENTIONS  - Monitor swallowing and airway patency with patient fatigue and changes in neurological status  - Encourage and assist patient to increase activity and self care     - Encourage visually impaired, hearing impaired and aphasic patients to use assistive/communication devices  Outcome: Progressing     Problem: CARDIOVASCULAR - ADULT  Goal: Maintains optimal cardiac output and hemodynamic stability  Description: INTERVENTIONS:  - Monitor I/O, vital signs and rhythm  - Monitor for S/S and trends of decreased cardiac output  - Administer and titrate ordered vasoactive medications to optimize hemodynamic stability  - Assess quality of pulses, skin color and temperature  - Assess for signs of decreased coronary artery perfusion  - Instruct patient to report change in severity of symptoms  Outcome: Progressing  Goal: Absence of cardiac dysrhythmias or at baseline rhythm  Description: INTERVENTIONS:  - Continuous cardiac monitoring, vital signs, obtain 12 lead EKG if ordered  - Administer antiarrhythmic and heart rate control medications as ordered  - Monitor electrolytes and administer replacement therapy as ordered  Outcome: Progressing     Problem: RESPIRATORY - ADULT  Goal: Achieves optimal ventilation and oxygenation  Description: INTERVENTIONS:  - Assess for changes in respiratory status  - Assess for changes in mentation and behavior  - Position to facilitate oxygenation and minimize respiratory effort  - Oxygen administered by appropriate delivery if ordered  - Initiate smoking cessation education as indicated  - Encourage broncho-pulmonary hygiene including cough, deep breathe, Incentive Spirometry  - Assess the need for suctioning and aspirate as needed  - Assess and instruct to report SOB or any respiratory difficulty  - Respiratory Therapy support as indicated  Outcome: Progressing     Problem: GASTROINTESTINAL - ADULT  Goal: Minimal or absence of nausea and/or vomiting  Description: INTERVENTIONS:  - Administer IV fluids if ordered to ensure adequate hydration  - Maintain NPO status until nausea and vomiting are resolved  - Nasogastric tube if ordered  - Administer ordered antiemetic medications as needed  - Provide nonpharmacologic comfort measures as appropriate  - Advance diet as tolerated, if ordered  - Consider nutrition services referral to assist patient with adequate nutrition and appropriate food choices  Outcome: Progressing  Goal: Maintains or returns to baseline bowel function  Description: INTERVENTIONS:  - Assess bowel function  - Encourage oral fluids to ensure adequate hydration  - Administer IV fluids if ordered to ensure adequate hydration  - Administer ordered medications as needed  - Encourage mobilization and activity  - Consider nutritional services referral to assist patient with adequate nutrition and appropriate food choices  Outcome: Progressing  Goal: Maintains adequate nutritional intake  Description: INTERVENTIONS:  - Monitor percentage of each meal consumed  - Identify factors contributing to decreased intake, treat as appropriate  - Assist with meals as needed  - Monitor I&O, weight, and lab values if indicated  - Obtain nutrition services referral as needed  Outcome: Progressing  Goal: Establish and maintain optimal ostomy function  Description: INTERVENTIONS:  - Assess bowel function  - Encourage oral fluids to ensure adequate hydration  - Administer IV fluids if ordered to ensure adequate hydration   - Administer ordered medications as needed  - Encourage mobilization and activity  - Nutrition services referral to assist patient with appropriate food choices  - Assess stoma site  - Consider wound care consult   Outcome: Progressing  Goal: Oral mucous membranes remain intact  Description: INTERVENTIONS  - Assess oral mucosa and hygiene practices  - Implement preventative oral hygiene regimen  - Implement oral medicated treatments as ordered  - Initiate Nutrition services referral as needed  Outcome: Progressing     Problem: GENITOURINARY - ADULT  Goal: Maintains or returns to baseline urinary function  Description: INTERVENTIONS:  - Assess urinary function  - Encourage oral fluids to ensure adequate hydration if ordered  - Administer IV fluids as ordered to ensure adequate hydration  - Administer ordered medications as needed  - Offer frequent toileting  - Follow urinary retention protocol if ordered  Outcome: Progressing  Goal: Absence of urinary retention  Description: INTERVENTIONS:  - Assess patients ability to void and empty bladder  - Monitor I/O  - Bladder scan as needed  - Discuss with physician/AP medications to alleviate retention as needed  - Discuss catheterization for long term situations as appropriate  Outcome: Progressing  Goal: Urinary catheter remains patent  Description: INTERVENTIONS:  - Assess patency of urinary catheter  - If patient has a chronic neal, consider changing catheter if non-functioning  - Follow guidelines for intermittent irrigation of non-functioning urinary catheter  Outcome: Progressing     Problem: METABOLIC, FLUID AND ELECTROLYTES - ADULT  Goal: Electrolytes maintained within normal limits  Description: INTERVENTIONS:  - Monitor labs and assess patient for signs and symptoms of electrolyte imbalances  - Administer electrolyte replacement as ordered  - Monitor response to electrolyte replacements, including repeat lab results as appropriate  - Instruct patient on fluid and nutrition as appropriate  Outcome: Progressing  Goal: Fluid balance maintained  Description: INTERVENTIONS:  - Monitor labs   - Monitor I/O and WT  - Instruct patient on fluid and nutrition as appropriate  - Assess for signs & symptoms of volume excess or deficit  Outcome: Progressing  Goal: Glucose maintained within target range  Description: INTERVENTIONS:  - Monitor Blood Glucose as ordered  - Assess for signs and symptoms of hyperglycemia and hypoglycemia  - Administer ordered medications to maintain glucose within target range  - Assess nutritional intake and initiate nutrition service referral as needed  Outcome: Progressing     Problem: SKIN/TISSUE INTEGRITY - ADULT  Goal: Skin Integrity remains intact(Skin Breakdown Prevention)  Description: Assess:  -Perform Samir assessment every shift  -Clean and moisturize skin every shift  -Inspect skin when repositioning, toileting, and assisting with ADLS  -Assess under medical devices such as IV every shift  -Assess extremities for adequate circulation and sensation     Bed Management:  -Have minimal linens on bed & keep smooth, unwrinkled  -Change linens as needed when moist or perspiring  -Avoid sitting or lying in one position for more than 2 hours while in bed      Toileting:  -Offer bedside commode  -Assess for incontinence every shift  -Use incontinent care products after each incontinent episode such as soap/water    Activity:  -Mobilize patient 3 times a day  -Encourage activity and walks on unit  -Encourage or provide ROM exercises   -Turn and reposition patient every 2 Hours  -Use appropriate equipment to lift or move patient in bed  -Instruct/ Assist with weight shifting every 2 hr when out of bed in chair  -Consider limitation of chair time 2 hour intervals    Skin Care:  -Avoid use of baby powder, tape, friction and shearing, hot water or constrictive clothing  -Relieve pressure over bony prominences using foam dressing  -Do not massage red bony areas    Next Steps:  -Teach patient strategies to minimize risks such as call bell   -Consider consults to  interdisciplinary teams such as PT/OT  Outcome: Progressing  Goal: Incision(s), wounds(s) or drain site(s) healing without S/S of infection  Description: INTERVENTIONS  - Assess and document dressing, incision, wound bed, drain sites and surrounding tissue  - Provide patient and family education  - Perform skin care/dressing changes every shift  Outcome: Progressing  Goal: Pressure injury heals and does not worsen  Description: Interventions:  - Implement low air loss mattress or specialty surface (Criteria met)  - Apply silicone foam dressing  - Instruct/assist with weight shifting every 15 minutes when in chair   - Limit chair time to 2 hour intervals  - Use special pressure reducing interventions such as reposition when in chair   - Apply fecal or urinary incontinence containment device   - Perform passive or active ROM every shift  - Turn and reposition patient & offload bony prominences every 2 hours   - Utilize friction reducing device or surface for transfers   - Consider consults to  interdisciplinary teams such as PT/OT  - Use incontinent care products after each incontinent episode such as soap/water  - Consider nutrition services referral as needed  Outcome: Progressing     Problem: HEMATOLOGIC - ADULT  Goal: Maintains hematologic stability  Description: INTERVENTIONS  - Assess for signs and symptoms of bleeding or hemorrhage  - Monitor labs  - Administer supportive blood products/factors as ordered and appropriate  Outcome: Progressing     Problem: MUSCULOSKELETAL - ADULT  Goal: Maintain or return mobility to safest level of function  Description: INTERVENTIONS:  - Assess patient's ability to carry out ADLs; assess patient's baseline for ADL function and identify physical deficits which impact ability to perform ADLs (bathing, care of mouth/teeth, toileting, grooming, dressing, etc )  - Assess/evaluate cause of self-care deficits   - Assess range of motion  - Assess patient's mobility  - Assess patient's need for assistive devices and provide as appropriate  - Encourage maximum independence but intervene and supervise when necessary  - Involve family in performance of ADLs  - Assess for home care needs following discharge   - Consider OT consult to assist with ADL evaluation and planning for discharge  - Provide patient education as appropriate  Outcome: Progressing  Goal: Maintain proper alignment of affected body part  Description: INTERVENTIONS:  - Support, maintain and protect limb and body alignment  - Provide patient/ family with appropriate education  Outcome: Progressing     Problem: Potential for Falls  Goal: Patient will remain free of falls  Description: INTERVENTIONS:  - Educate patient/family on patient safety including physical limitations  - Instruct patient to call for assistance with activity   - Consult OT/PT to assist with strengthening/mobility   - Keep Call bell within reach  - Keep bed low and locked with side rails adjusted as appropriate  - Keep care items and personal belongings within reach  - Initiate and maintain comfort rounds  - Make Fall Risk Sign visible to staff  - Offer Toileting every 2 Hours, in advance of need  - Initiate/Maintain bed alarm  - Obtain necessary fall risk management equipment: bed alert/foot wear  - Apply yellow socks and bracelet for high fall risk patients  - Consider moving patient to room near nurses station  Outcome: Progressing     Problem: SAFETY,RESTRAINT: NV/NON-SELF DESTRUCTIVE BEHAVIOR  Goal: Remains free of harm/injury (restraint for non violent/non self-detsructive behavior)  Description: INTERVENTIONS:  - Instruct patient/family regarding restraint use   - Assess and monitor physiologic and psychological status   - Provide interventions and comfort measures to meet assessed patient needs   - Identify and implement measures to help patient regain control  - Assess readiness for release of restraint   Outcome: Progressing  Goal: Returns to optimal restraint-free functioning  Description: INTERVENTIONS:  - Assess the patient's behavior and symptoms that indicate continued need for restraint  - Identify and implement measures to help patient regain control  - Assess readiness for release of restraint   Outcome: Progressing     Problem: Nutrition/Hydration-ADULT  Goal: Nutrient/Hydration intake appropriate for improving, restoring or maintaining nutritional needs  Description: Monitor and assess patient's nutrition/hydration status for malnutrition  Collaborate with interdisciplinary team and initiate plan and interventions as ordered  Monitor patient's weight and dietary intake as ordered or per policy  Utilize nutrition screening tool and intervene as necessary  Determine patient's food preferences and provide high-protein, high-caloric foods as appropriate       INTERVENTIONS:  - Monitor oral intake, urinary output, labs, and treatment plans  - Assess nutrition and hydration status and recommend course of action  - Evaluate amount of meals eaten  - Assist patient with eating if necessary   - Allow adequate time for meals  - Recommend/ encourage appropriate diets, oral nutritional supplements, and vitamin/mineral supplements  - Order, calculate, and assess calorie counts as needed  - Recommend, monitor, and adjust tube feedings and TPN/PPN based on assessed needs  - Assess need for intravenous fluids  - Provide specific nutrition/hydration education as appropriate  - Include patient/family/caregiver in decisions related to nutrition  Outcome: Progressing     Problem: MOBILITY - ADULT  Goal: Maintain or return to baseline ADL function  Description: INTERVENTIONS:  -  Assess patient's ability to carry out ADLs; assess patient's baseline for ADL function and identify physical deficits which impact ability to perform ADLs (bathing, care of mouth/teeth, toileting, grooming, dressing, etc )  - Assess/evaluate cause of self-care deficits   - Assess range of motion  - Assess patient's mobility; develop plan if impaired  - Assess patient's need for assistive devices and provide as appropriate  - Encourage maximum independence but intervene and supervise when necessary  - Involve family in performance of ADLs  - Assess for home care needs following discharge   - Consider OT consult to assist with ADL evaluation and planning for discharge  - Provide patient education as appropriate  Outcome: Progressing  Goal: Maintains/Returns to pre admission functional level  Description: INTERVENTIONS:  - Perform BMAT or MOVE assessment daily    - Set and communicate daily mobility goal to care team and patient/family/caregiver  - Collaborate with rehabilitation services on mobility goals if consulted  - Perform Range of Motion 3 times a day  - Reposition patient every 2 hours    - Dangle patient 3 times a day  - Stand patient 3 times a day  - Ambulate patient 3 times a day  - Out of bed to chair 3 times a day   - Out of bed for meals 3 times a day  - Out of bed for toileting  - Record patient progress and toleration of activity level   Outcome: Progressing     Problem: Prexisting or High Potential for Compromised Skin Integrity  Goal: Skin integrity is maintained or improved  Description: INTERVENTIONS:  - Identify patients at risk for skin breakdown  - Assess and monitor skin integrity  - Assess and monitor nutrition and hydration status  - Monitor labs   - Assess for incontinence   - Turn and reposition patient  - Assist with mobility/ambulation  - Relieve pressure over bony prominences  - Avoid friction and shearing  - Provide appropriate hygiene as needed including keeping skin clean and dry  - Evaluate need for skin moisturizer/barrier cream  - Collaborate with interdisciplinary team   - Patient/family teaching  - Consider wound care consult   Outcome: Progressing

## 2022-05-14 NOTE — PLAN OF CARE
Problem: NEUROSENSORY - ADULT  Goal: Achieves stable or improved neurological status  Description: INTERVENTIONS  - Monitor and report changes in neurological status  - Monitor vital signs such as temperature, blood pressure, glucose, and any other labs ordered   - Initiate measures to prevent increased intracranial pressure  - Monitor for seizure activity and implement precautions if appropriate      Outcome: Progressing  Goal: Remains free of injury related to seizures activity  Description: INTERVENTIONS  - Maintain airway, patient safety  and administer oxygen as ordered  - Monitor patient for seizure activity, document and report duration and description of seizure to physician/advanced practitioner  - If seizure occurs,  ensure patient safety during seizure  - Reorient patient post seizure  - Seizure pads on all 4 side rails  - Instruct patient/family to notify RN of any seizure activity including if an aura is experienced  - Instruct patient/family to call for assistance with activity based on nursing assessment  - Administer anti-seizure medications if ordered    Outcome: Progressing  Goal: Achieves maximal functionality and self care  Description: INTERVENTIONS  - Monitor swallowing and airway patency with patient fatigue and changes in neurological status  - Encourage and assist patient to increase activity and self care     - Encourage visually impaired, hearing impaired and aphasic patients to use assistive/communication devices  Outcome: Progressing     Problem: CARDIOVASCULAR - ADULT  Goal: Maintains optimal cardiac output and hemodynamic stability  Description: INTERVENTIONS:  - Monitor I/O, vital signs and rhythm  - Monitor for S/S and trends of decreased cardiac output  - Administer and titrate ordered vasoactive medications to optimize hemodynamic stability  - Assess quality of pulses, skin color and temperature  - Assess for signs of decreased coronary artery perfusion  - Instruct patient to report change in severity of symptoms  Outcome: Progressing  Goal: Absence of cardiac dysrhythmias or at baseline rhythm  Description: INTERVENTIONS:  - Continuous cardiac monitoring, vital signs, obtain 12 lead EKG if ordered  - Administer antiarrhythmic and heart rate control medications as ordered  - Monitor electrolytes and administer replacement therapy as ordered  Outcome: Progressing     Problem: RESPIRATORY - ADULT  Goal: Achieves optimal ventilation and oxygenation  Description: INTERVENTIONS:  - Assess for changes in respiratory status  - Assess for changes in mentation and behavior  - Position to facilitate oxygenation and minimize respiratory effort  - Oxygen administered by appropriate delivery if ordered  - Initiate smoking cessation education as indicated  - Encourage broncho-pulmonary hygiene including cough, deep breathe, Incentive Spirometry  - Assess the need for suctioning and aspirate as needed  - Assess and instruct to report SOB or any respiratory difficulty  - Respiratory Therapy support as indicated  Outcome: Progressing     Problem: GASTROINTESTINAL - ADULT  Goal: Minimal or absence of nausea and/or vomiting  Description: INTERVENTIONS:  - Administer IV fluids if ordered to ensure adequate hydration  - Maintain NPO status until nausea and vomiting are resolved  - Nasogastric tube if ordered  - Administer ordered antiemetic medications as needed  - Provide nonpharmacologic comfort measures as appropriate  - Advance diet as tolerated, if ordered  - Consider nutrition services referral to assist patient with adequate nutrition and appropriate food choices  Outcome: Progressing  Goal: Maintains or returns to baseline bowel function  Description: INTERVENTIONS:  - Assess bowel function  - Encourage oral fluids to ensure adequate hydration  - Administer IV fluids if ordered to ensure adequate hydration  - Administer ordered medications as needed  - Encourage mobilization and activity  - Consider nutritional services referral to assist patient with adequate nutrition and appropriate food choices  Outcome: Progressing  Goal: Maintains adequate nutritional intake  Description: INTERVENTIONS:  - Monitor percentage of each meal consumed  - Identify factors contributing to decreased intake, treat as appropriate  - Assist with meals as needed  - Monitor I&O, weight, and lab values if indicated  - Obtain nutrition services referral as needed  Outcome: Progressing  Goal: Establish and maintain optimal ostomy function  Description: INTERVENTIONS:  - Assess bowel function  - Encourage oral fluids to ensure adequate hydration  - Administer IV fluids if ordered to ensure adequate hydration   - Administer ordered medications as needed  - Encourage mobilization and activity  - Nutrition services referral to assist patient with appropriate food choices  - Assess stoma site  - Consider wound care consult   Outcome: Progressing  Goal: Oral mucous membranes remain intact  Description: INTERVENTIONS  - Assess oral mucosa and hygiene practices  - Implement preventative oral hygiene regimen  - Implement oral medicated treatments as ordered  - Initiate Nutrition services referral as needed  Outcome: Progressing     Problem: GENITOURINARY - ADULT  Goal: Maintains or returns to baseline urinary function  Description: INTERVENTIONS:  - Assess urinary function  - Encourage oral fluids to ensure adequate hydration if ordered  - Administer IV fluids as ordered to ensure adequate hydration  - Administer ordered medications as needed  - Offer frequent toileting  - Follow urinary retention protocol if ordered  Outcome: Progressing  Goal: Absence of urinary retention  Description: INTERVENTIONS:  - Assess patients ability to void and empty bladder  - Monitor I/O  - Bladder scan as needed  - Discuss with physician/AP medications to alleviate retention as needed  - Discuss catheterization for long term situations as appropriate  Outcome: Progressing  Goal: Urinary catheter remains patent  Description: INTERVENTIONS:  - Assess patency of urinary catheter  - If patient has a chronic neal, consider changing catheter if non-functioning  - Follow guidelines for intermittent irrigation of non-functioning urinary catheter  Outcome: Progressing     Problem: METABOLIC, FLUID AND ELECTROLYTES - ADULT  Goal: Electrolytes maintained within normal limits  Description: INTERVENTIONS:  - Monitor labs and assess patient for signs and symptoms of electrolyte imbalances  - Administer electrolyte replacement as ordered  - Monitor response to electrolyte replacements, including repeat lab results as appropriate  - Instruct patient on fluid and nutrition as appropriate  Outcome: Progressing  Goal: Fluid balance maintained  Description: INTERVENTIONS:  - Monitor labs   - Monitor I/O and WT  - Instruct patient on fluid and nutrition as appropriate  - Assess for signs & symptoms of volume excess or deficit  Outcome: Progressing  Goal: Glucose maintained within target range  Description: INTERVENTIONS:  - Monitor Blood Glucose as ordered  - Assess for signs and symptoms of hyperglycemia and hypoglycemia  - Administer ordered medications to maintain glucose within target range  - Assess nutritional intake and initiate nutrition service referral as needed  Outcome: Progressing     Problem: HEMATOLOGIC - ADULT  Goal: Maintains hematologic stability  Description: INTERVENTIONS  - Assess for signs and symptoms of bleeding or hemorrhage  - Monitor labs  - Administer supportive blood products/factors as ordered and appropriate  Outcome: Progressing     Goal: Pressure injury heals and does not worsen  Description: Interventions:  - Implement low air loss mattress or specialty surface (Criteria met)  - Apply silicone foam dressing  - Apply fecal or urinary incontinence containment device   - Utilize friction reducing device or surface for transfers   - Consider nutrition services referral as needed  Outcome: Progressing     Problem: MUSCULOSKELETAL - ADULT  Goal: Maintain or return mobility to safest level of function  Description: INTERVENTIONS:  - Assess patient's ability to carry out ADLs; assess patient's baseline for ADL function and identify physical deficits which impact ability to perform ADLs (bathing, care of mouth/teeth, toileting, grooming, dressing, etc )  - Assess/evaluate cause of self-care deficits   - Assess range of motion  - Assess patient's mobility  - Assess patient's need for assistive devices and provide as appropriate  - Encourage maximum independence but intervene and supervise when necessary  - Involve family in performance of ADLs  - Assess for home care needs following discharge   - Consider OT consult to assist with ADL evaluation and planning for discharge  - Provide patient education as appropriate  Outcome: Progressing  Goal: Maintain proper alignment of affected body part  Description: INTERVENTIONS:  - Support, maintain and protect limb and body alignment  - Provide patient/ family with appropriate education  Outcome: Progressing     Problem: Potential for Falls  Goal: Patient will remain free of falls  Description: INTERVENTIONS:  - Educate patient/family on patient safety including physical limitations  - Instruct patient to call for assistance with activity   - Consult OT/PT to assist with strengthening/mobility   - Keep Call bell within reach  - Keep bed low and locked with side rails adjusted as appropriate  - Keep care items and personal belongings within reach  - Initiate and maintain comfort rounds  - Make Fall Risk Sign visible to staff  - Apply yellow socks and bracelet for high fall risk patients  - Consider moving patient to room near nurses station  Outcome: Progressing     Problem: SAFETY,RESTRAINT: NV/NON-SELF DESTRUCTIVE BEHAVIOR  Goal: Remains free of harm/injury (restraint for non violent/non self-detsructive behavior)  Description: INTERVENTIONS:  - Instruct patient/family regarding restraint use   - Assess and monitor physiologic and psychological status   - Provide interventions and comfort measures to meet assessed patient needs   - Identify and implement measures to help patient regain control  - Assess readiness for release of restraint   Outcome: Progressing  Goal: Returns to optimal restraint-free functioning  Description: INTERVENTIONS:  - Assess the patient's behavior and symptoms that indicate continued need for restraint  - Identify and implement measures to help patient regain control  - Assess readiness for release of restraint   Outcome: Progressing     Problem: Nutrition/Hydration-ADULT  Goal: Nutrient/Hydration intake appropriate for improving, restoring or maintaining nutritional needs  Description: Monitor and assess patient's nutrition/hydration status for malnutrition  Collaborate with interdisciplinary team and initiate plan and interventions as ordered  Monitor patient's weight and dietary intake as ordered or per policy  Utilize nutrition screening tool and intervene as necessary  Determine patient's food preferences and provide high-protein, high-caloric foods as appropriate       INTERVENTIONS:  - Monitor oral intake, urinary output, labs, and treatment plans  - Assess nutrition and hydration status and recommend course of action  - Evaluate amount of meals eaten  - Assist patient with eating if necessary   - Allow adequate time for meals  - Recommend/ encourage appropriate diets, oral nutritional supplements, and vitamin/mineral supplements  - Order, calculate, and assess calorie counts as needed  - Recommend, monitor, and adjust tube feedings and TPN/PPN based on assessed needs  - Assess need for intravenous fluids  - Provide specific nutrition/hydration education as appropriate  - Include patient/family/caregiver in decisions related to nutrition  Outcome: Progressing     Problem: MOBILITY - ADULT  Goal: Maintain or return to baseline ADL function  Description: INTERVENTIONS:  -  Assess patient's ability to carry out ADLs; assess patient's baseline for ADL function and identify physical deficits which impact ability to perform ADLs (bathing, care of mouth/teeth, toileting, grooming, dressing, etc )  - Assess/evaluate cause of self-care deficits   - Assess range of motion  - Assess patient's mobility; develop plan if impaired  - Assess patient's need for assistive devices and provide as appropriate  - Encourage maximum independence but intervene and supervise when necessary  - Involve family in performance of ADLs  - Assess for home care needs following discharge   - Consider OT consult to assist with ADL evaluation and planning for discharge  - Provide patient education as appropriate  Outcome: Progressing  Goal: Maintains/Returns to pre admission functional level  Description: INTERVENTIONS:  - Perform BMAT or MOVE assessment daily    - Set and communicate daily mobility goal to care team and patient/family/caregiver     - Collaborate with rehabilitation services on mobility goals if consulted  - Out of bed for toileting  - Record patient progress and toleration of activity level   Outcome: Progressing     Problem: Prexisting or High Potential for Compromised Skin Integrity  Goal: Skin integrity is maintained or improved  Description: INTERVENTIONS:  - Identify patients at risk for skin breakdown  - Assess and monitor skin integrity  - Assess and monitor nutrition and hydration status  - Monitor labs   - Assess for incontinence   - Turn and reposition patient  - Assist with mobility/ambulation  - Relieve pressure over bony prominences  - Avoid friction and shearing  - Provide appropriate hygiene as needed including keeping skin clean and dry  - Evaluate need for skin moisturizer/barrier cream  - Collaborate with interdisciplinary team   - Patient/family teaching  - Consider wound care consult   Outcome: Progressing

## 2022-05-14 NOTE — ASSESSMENT & PLAN NOTE
Patient presents with new onset AFib, mild AMANDA with creatinine of 0 6 and baseline around 0 3  At baseline she is oriented to self and situation, however, currently she is agitated, oriented to self only, ripped out her peripheral IV requiring bilateral wrist restraints  Underlying dementia, patient on Ativan and Remeron per home regimen  · Geriatric medicine evaluation appreciated, continue home meds - hope to d/c back to regular environment  · Will discontinue 1:1 observation

## 2022-05-15 ENCOUNTER — TELEPHONE (OUTPATIENT)
Dept: OTHER | Facility: OTHER | Age: 82
End: 2022-05-15

## 2022-05-15 VITALS
WEIGHT: 86.42 LBS | TEMPERATURE: 97.5 F | SYSTOLIC BLOOD PRESSURE: 138 MMHG | OXYGEN SATURATION: 94 % | HEART RATE: 81 BPM | BODY MASS INDEX: 18.14 KG/M2 | RESPIRATION RATE: 16 BRPM | DIASTOLIC BLOOD PRESSURE: 83 MMHG | HEIGHT: 58 IN

## 2022-05-15 PROCEDURE — 99239 HOSP IP/OBS DSCHRG MGMT >30: CPT | Performed by: FAMILY MEDICINE

## 2022-05-15 RX ORDER — PANTOPRAZOLE SODIUM 20 MG/1
20 TABLET, DELAYED RELEASE ORAL
Refills: 0
Start: 2022-05-16

## 2022-05-15 RX ADMIN — METOPROLOL TARTRATE 25 MG: 25 TABLET, FILM COATED ORAL at 08:12

## 2022-05-15 RX ADMIN — POLYETHYLENE GLYCOL 3350 17 G: 17 POWDER, FOR SOLUTION ORAL at 08:12

## 2022-05-15 RX ADMIN — PANTOPRAZOLE SODIUM 20 MG: 20 TABLET, DELAYED RELEASE ORAL at 05:32

## 2022-05-15 RX ADMIN — LORAZEPAM 0.25 MG: 0.5 TABLET ORAL at 13:30

## 2022-05-15 RX ADMIN — LORAZEPAM 0.25 MG: 0.5 TABLET ORAL at 08:11

## 2022-05-15 RX ADMIN — APIXABAN 2.5 MG: 2.5 TABLET, FILM COATED ORAL at 08:12

## 2022-05-15 RX ADMIN — DOCUSATE SODIUM 100 MG: 100 CAPSULE, LIQUID FILLED ORAL at 08:11

## 2022-05-15 NOTE — DISCHARGE SUMMARY
[address] [facility]  Discharge- Arby Chimera 1940, 80 y o  female MRN: [id number]  Unit/Bed#: E4 -01 Encounter: [id number]  Primary Care Provider: Adam Kilgore DO   Date and time admitted to hospital: 5/11/2022  9:38 AM    * New onset atrial fibrillation [facility])  Assessment & Plan  This is an 58-year-old patient with past medical history significant for anxiety, cognitive decline and hypertension, in a very remote history of GI bleed due to gastric ulcer presents from nursing home facility after being found for rapid heart rate, found to be in rapid AFib at [address] ED    · Heart rate significantly improved, question if volume depletion contributing to rapid AFib  · Telemetry monitoring discontinued, due to advanced dementia difficult to maintain, no additional medications or workup recommended by Cardiology   · [id number] of 4, anticoagulation is indicated, initiated on Eliquis 2 5 mg b i d   Based on age and weight  · Continue metoprolol 25 mg BID  · 2D echo was ordered however very limited study due to patient's agitation and difficulty to redirect   · Cardiology evaluation appreciated, signed off      AMANDA (acute kidney injury) [facility])  Assessment & Plan  Possible mild acute kidney injury with baseline creatinine previously around 0 3, currently 0 69 - although prior baseline from 3 years ago  Improved with IV fluids, creatinine at 0 56 off IV fluids, no additional monitoring  Patient with history of malnutrition, dementia presenting with rapid Afib now acceptable rate  Monitor BMP      Metabolic encephalopathy  Assessment & Plan  Patient presents with new onset AFib, mild AMANDA with creatinine of 0 6 and baseline around 0 3  At baseline she is oriented to self and situation, however, currently she is agitated, oriented to self only, ripped out her peripheral IV requiring bilateral wrist restraints  Underlying dementia, patient on Ativan and Remeron per home regimen  · Geriatric medicine evaluation appreciated, continue home meds - hope to d/c back to regular environment  · Off 1:1 observation, appropriate for discharge back to facility     Dementia [facility])  Assessment & Plan  Patient with underlying cognitive decline and anxiety, resident at a nursing home  As above, continue Zyprexa p r n , take off continual observation  Will continue current regimen and await Geriatrics recommendations    Essential hypertension  Assessment & Plan  On amlodipine 5 mg daily - will hold with low normal hypertension, initiation of metoprolol in the setting of new onset Afib  Monitor vital signs    Other constipation  Assessment & Plan  Chronic, continue home regimen    Anxiety  Assessment & Plan  Patient is on Ativan p r n  And Remeron at bedtime  Would consider other options for anxiety treatment moving forward   For now continue home regimen  Geriatric medicine consultation appreciated, hopefully can return to regular environment tomorrow   Zyprexa p r n  Moderate protein-calorie malnutrition ([facility] [address] )  Assessment & Plan  Malnutrition Findings:   Adult Malnutrition type: Social/enviromental  Adult Degree of Malnutrition: Malnutrition of moderate degree  Malnutrition Characteristics: Fat loss, Muscle loss noted with BMI of 19  Obtain nutrition consult, offered nutritional supplements                  360 Statement: mild body fat depletion- slightly dark orbital area  mild muscle mass depletion- slightly depressed temporal area, visible clavicle  treated with oral diet, addition of nutrition supplements  BMI Findings: Body mass index is 18 06 kg/m²         Ambulatory dysfunction  Assessment & Plan  Comes from nursing home   PT/OT eval requested         Medical Problems             Resolved Problems  Date Reviewed: 5/15/2022   None               Discharging Physician / Practitioner: Fabricio Vences MD  PCP: Noah Arrington DO  Admission Date:   Admission Orders (From admission, onward)     Ordered        05/11/22 1218  Inpatient Admission  Once                      Discharge Date: 05/15/22    Consultations During Hospital Stay:  · Cardiology, Geriatric medicine    Procedures Performed:        History    Dementia, HTN  Interpretation Summary       This is a technically very limited transthoracic echocardiogram study as patient was not able to cooperate for image acquisition due to her dementia and agitation  Only parasternal views could be obtained      Based on limited evaluation her left ventricular  cavity size appears to be normal, wall thickness is mildly increased  Left ventricular systolic function is normal   Left atrial size is normal   Aortic valve is mildly thickened and sclerotic without any stenosis or regurgitation  There is trace mitral and tricuspid valve regurgitation  There is no obvious pulmonary hypertension based on limited evaluation  There is trace, hemodynamically insignificant pericardial effusion      Compared to previous echocardiogram from July 23, 2019 left ventricular function appears to be same and normal   Left ventricular wall thickness may be mildly increased now        Findings    Left Ventricle Normal left ventricular cavity size, mildly increased wall thickness, normal left ventricular systolic function  Regional wall motion  could not be assessed as full study could not be completed  Indeterminate diastolic function  Right Ventricle Right ventricular could not be fully assessed  Based on parasternal views it appears to be normal in size and function  Right ventricular systolic pressure could not be assessed  Left Atrium Left atrium appears normal in size on limited visualization  Right Atrium Right atrium is not well visualized  Aortic Valve Tricuspid aortic valve with mild leaflet thickening and sclerosis  Adequate cuspal separation  No aortic valve stenosis or regurgitation     Mitral Valve Mild mitral valve leaflet thickening and sclerosis  Trace mitral valve regurgitation on limited visualization  Tricuspid Valve Trace tricuspid valve regurgitation on limited visualization  Pulmonic Valve Grossly normal pulmonic valve structure  Normal leaflet mobility  Trace pulmonary valve regurgitation  Ascending Aorta Aortic root and proximal ascending aorta are normal in size on 2D imaging  IVC/SVC Inferior vena cava was not assist    Pericardium Trace, hemodynamically insignificant pericardial effusion noted posterior to the heart on limited visualization  XR chest 1 view portable   ED Interpretation by Yolie Ernandez DO (05/11 1129)   Increased interstitial markings  No change from previous      Final Result by Mustapha Rivera MD (05/11 1213)      No acute cardiopulmonary disease  Workstation performed: [id number]               Significant Findings / Test Results:   Results from last 7 days   Lab Units 05/13/22  0610   WBC Thousand/uL 10 53*   HEMOGLOBIN g/dL 13 1   HEMATOCRIT % 41 5   PLATELETS Thousands/uL 266     Results from last 7 days   Lab Units 05/14/22  1015   SODIUM mmol/L 140   POTASSIUM mmol/L 3 5   CHLORIDE mmol/L 101   CO2 mmol/L 30   BUN mg/dL 14   CREATININE mg/dL 0 56*   CALCIUM mg/dL 8 4         Incidental Findings:   · None     Test Results Pending at Discharge (will require follow up): · None      Outpatient Tests Requested:  · None    Complications:  None    Reason for Admission: rapid heart rate     Hospital Course:   Brandt Hollis is a 80 y o  female patient with history of dementia with behavioral abnormality, remote history of GI bleed, hypertension who originally presented to the hospital on 5/11/2022 due to heart rate identified at her nursing home facility  The patient was diagnosed with AFib with RVR  She was initiated on metoprolol and anticoagulation with Eliquis  Her heart rate remained in acceptable range thereafter      The patient is appropriate to return to her facility  She is referred to Cardiology  Please see above list of diagnoses and related plan for additional information  Condition at Discharge: stable    Discharge Day Visit / Exam:   Subjective:  Patient seen and examined  She is confused per her baseline  No overnight events  Vitals: Blood Pressure: 150/79 (05/15/22 0801)  Pulse: 100 (05/15/22 0801)  Temperature: 97 8 °F (36 6 °C) (05/15/22 0801)  Temp Source: Temporal (05/15/22 0801)  Respirations: 18 (05/15/22 0801)  Height: 4' 10" (147 3 cm) (05/12/22 7912)  Weight - Scale: 39 2 kg (86 lb 6 7 oz) (05/15/22 0559)  SpO2: 91 % (05/15/22 0801)  Exam:   Physical Exam  Constitutional:       General: She is not in acute distress  HENT:      Head: Normocephalic and atraumatic  Nose: No congestion  Mouth/Throat:      Pharynx: Oropharynx is clear  Cardiovascular:      Rate and Rhythm: Normal rate and regular rhythm  Heart sounds: No murmur heard  Pulmonary:      Effort: No respiratory distress  Breath sounds: No wheezing or rales  Abdominal:      General: There is no distension  Tenderness: There is no abdominal tenderness  There is no guarding  Musculoskeletal:      Right lower leg: No edema  Left lower leg: No edema  Skin:     General: Skin is warm and dry  Neurological:      Mental Status: She is oriented to person, place, and time  Psychiatric:         Mood and Affect: Mood normal           Discussion with Family: Updated  (daughter) via phone  Discharge instructions/Information to patient and family:   See after visit summary for information provided to patient and family  Provisions for Follow-Up Care:  See after visit summary for information related to follow-up care and any pertinent home health orders  Disposition:   Juan Parham Shashi at [facility] Readmission: No     Discharge Statement:  I spent 35 minutes discharging the patient   This time was spent on the day of discharge  I had direct contact with the patient on the day of discharge  Greater than 50% of the total time was spent examining patient, answering all patient questions, arranging and discussing plan of care with patient as well as directly providing post-discharge instructions  Additional time then spent on discharge activities  Discharge Medications:  See after visit summary for reconciled discharge medications provided to patient and/or family        **Please Note: This note may have been constructed using a voice recognition system**

## 2022-05-15 NOTE — CASE MANAGEMENT
Case Management Discharge Planning Note    Patient name Mikaela Gregorio  Location [address] [address] [address]-* MRN [id number]  : 1940 Date 5/15/2022       Current Admission Date: 2022  Current Admission Diagnosis:New onset atrial fibrillation [facility])   Patient Active Problem List    Diagnosis Date Noted    Tricuspid regurgitation 2022    Dementia ([facility] [address] ) 2022    [facility]     Metabolic encephalopathy     AMANDA (acute kidney injury) ([facility] ) 2022    Hyperkalemia 2022    Furuncle of vulva 2022    Essential hypertension 2020    Headache 2020    Other constipation 2019    Anxiety 2019    Moderate protein-calorie malnutrition ([facility] [address] )     Fall 2019    Ambulatory dysfunction 2019      LOS (days): 4  Geometric Mean LOS (GMLOS) (days): 3 50  Days to GMLOS:-0 4     OBJECTIVE:  Risk of Unplanned Readmission Score: 15 29         Current admission status: Inpatient   Preferred Pharmacy:   [facility] [address], [address] [place] [address],[address]  [address] [place] [address],Fourth Floor  Phone: [phone] Fax: [phone]    Primary Care Provider: Natalia Soto DO    Primary Insurance: MEDICARE  Secondary Insurance: [address] [place] [address] Drive:       Transported by (Company and Unit #): [id number]   Back to [address] Sina Tarango MD, RN, daughter Collette Montalvo and Claude Rodriguez -Supervisor at [facility] aware      IMM completed : 5/15/22 at 08:45  IMM: read to daughter , copy placed in scan bin and copy left in room

## 2022-05-15 NOTE — ASSESSMENT & PLAN NOTE
Malnutrition Findings:   Adult Malnutrition type: Social/enviromental  Adult Degree of Malnutrition: Malnutrition of moderate degree  Malnutrition Characteristics: Fat loss, Muscle loss noted with BMI of 19  Obtain nutrition consult, offered nutritional supplements                  360 Statement: mild body fat depletion- slightly dark orbital area  mild muscle mass depletion- slightly depressed temporal area, visible clavicle  treated with oral diet, addition of nutrition supplements  BMI Findings: Body mass index is 18 06 kg/m²

## 2022-05-15 NOTE — ASSESSMENT & PLAN NOTE
Patient presents with new onset AFib, mild AMANDA with creatinine of 0 6 and baseline around 0 3  At baseline she is oriented to self and situation, however, currently she is agitated, oriented to self only, ripped out her peripheral IV requiring bilateral wrist restraints  Underlying dementia, patient on Ativan and Remeron per home regimen  · Geriatric medicine evaluation appreciated, continue home meds - hope to d/c back to regular environment  · Off 1:1 observation, appropriate for discharge back to facility

## 2022-05-15 NOTE — ASSESSMENT & PLAN NOTE
This is an 80year-old patient with past medical history significant for anxiety, cognitive decline and hypertension, in a very remote history of GI bleed due to gastric ulcer presents from nursing home facility after being found for rapid heart rate, found to be in rapid AFib at [facility]    · Heart rate significantly improved, question if volume depletion contributing to rapid AFib  · Telemetry monitoring discontinued, due to advanced dementia difficult to maintain, no additional medications or workup recommended by Cardiology   · [id number] of 4, anticoagulation is indicated, initiated on Eliquis 2 5 mg b i d   Based on age and weight  · Continue metoprolol 25 mg BID  · 2D echo was ordered however very limited study due to patient's agitation and difficulty to redirect   · Cardiology evaluation appreciated, signed off

## 2022-05-15 NOTE — PLAN OF CARE
Problem: NEUROSENSORY - ADULT  Goal: Achieves stable or improved neurological status  Description: INTERVENTIONS  - Monitor and report changes in neurological status  - Monitor vital signs such as temperature, blood pressure, glucose, and any other labs ordered   - Initiate measures to prevent increased intracranial pressure  - Monitor for seizure activity and implement precautions if appropriate      Outcome: Progressing  Goal: Remains free of injury related to seizures activity  Description: INTERVENTIONS  - Maintain airway, patient safety  and administer oxygen as ordered  - Monitor patient for seizure activity, document and report duration and description of seizure to physician/advanced practitioner  - If seizure occurs,  ensure patient safety during seizure  - Reorient patient post seizure  - Seizure pads on all 4 side rails  - Instruct patient/family to notify RN of any seizure activity including if an aura is experienced  - Instruct patient/family to call for assistance with activity based on nursing assessment  - Administer anti-seizure medications if ordered    Outcome: Progressing  Goal: Achieves maximal functionality and self care  Description: INTERVENTIONS  - Monitor swallowing and airway patency with patient fatigue and changes in neurological status  - Encourage and assist patient to increase activity and self care     - Encourage visually impaired, hearing impaired and aphasic patients to use assistive/communication devices  Outcome: Progressing     Problem: CARDIOVASCULAR - ADULT  Goal: Maintains optimal cardiac output and hemodynamic stability  Description: INTERVENTIONS:  - Monitor I/O, vital signs and rhythm  - Monitor for S/S and trends of decreased cardiac output  - Administer and titrate ordered vasoactive medications to optimize hemodynamic stability  - Assess quality of pulses, skin color and temperature  - Assess for signs of decreased coronary artery perfusion  - Instruct patient to report change in severity of symptoms  Outcome: Progressing  Goal: Absence of cardiac dysrhythmias or at baseline rhythm  Description: INTERVENTIONS:  - Continuous cardiac monitoring, vital signs, obtain 12 lead EKG if ordered  - Administer antiarrhythmic and heart rate control medications as ordered  - Monitor electrolytes and administer replacement therapy as ordered  Outcome: Progressing     Problem: RESPIRATORY - ADULT  Goal: Achieves optimal ventilation and oxygenation  Description: INTERVENTIONS:  - Assess for changes in respiratory status  - Assess for changes in mentation and behavior  - Position to facilitate oxygenation and minimize respiratory effort  - Oxygen administered by appropriate delivery if ordered  - Initiate smoking cessation education as indicated  - Encourage broncho-pulmonary hygiene including cough, deep breathe, Incentive Spirometry  - Assess the need for suctioning and aspirate as needed  - Assess and instruct to report SOB or any respiratory difficulty  - Respiratory Therapy support as indicated  Outcome: Progressing     Problem: GASTROINTESTINAL - ADULT  Goal: Minimal or absence of nausea and/or vomiting  Description: INTERVENTIONS:  - Administer IV fluids if ordered to ensure adequate hydration  - Maintain NPO status until nausea and vomiting are resolved  - Nasogastric tube if ordered  - Administer ordered antiemetic medications as needed  - Provide nonpharmacologic comfort measures as appropriate  - Advance diet as tolerated, if ordered  - Consider nutrition services referral to assist patient with adequate nutrition and appropriate food choices  Outcome: Progressing  Goal: Maintains or returns to baseline bowel function  Description: INTERVENTIONS:  - Assess bowel function  - Encourage oral fluids to ensure adequate hydration  - Administer IV fluids if ordered to ensure adequate hydration  - Administer ordered medications as needed  - Encourage mobilization and activity  - Consider nutritional services referral to assist patient with adequate nutrition and appropriate food choices  Outcome: Progressing  Goal: Maintains adequate nutritional intake  Description: INTERVENTIONS:  - Monitor percentage of each meal consumed  - Identify factors contributing to decreased intake, treat as appropriate  - Assist with meals as needed  - Monitor I&O, weight, and lab values if indicated  - Obtain nutrition services referral as needed  Outcome: Progressing  Goal: Establish and maintain optimal ostomy function  Description: INTERVENTIONS:  - Assess bowel function  - Encourage oral fluids to ensure adequate hydration  - Administer IV fluids if ordered to ensure adequate hydration   - Administer ordered medications as needed  - Encourage mobilization and activity  - Nutrition services referral to assist patient with appropriate food choices  - Assess stoma site  - Consider wound care consult   Outcome: Progressing  Goal: Oral mucous membranes remain intact  Description: INTERVENTIONS  - Assess oral mucosa and hygiene practices  - Implement preventative oral hygiene regimen  - Implement oral medicated treatments as ordered  - Initiate Nutrition services referral as needed  Outcome: Progressing     Problem: GENITOURINARY - ADULT  Goal: Maintains or returns to baseline urinary function  Description: INTERVENTIONS:  - Assess urinary function  - Encourage oral fluids to ensure adequate hydration if ordered  - Administer IV fluids as ordered to ensure adequate hydration  - Administer ordered medications as needed  - Offer frequent toileting  - Follow urinary retention protocol if ordered  Outcome: Progressing  Goal: Absence of urinary retention  Description: INTERVENTIONS:  - Assess patients ability to void and empty bladder  - Monitor I/O  - Bladder scan as needed  - Discuss with physician/AP medications to alleviate retention as needed  - Discuss catheterization for long term situations as appropriate  Outcome: Progressing  Goal: Urinary catheter remains patent  Description: INTERVENTIONS:  - Assess patency of urinary catheter  - If patient has a chronic neal, consider changing catheter if non-functioning  - Follow guidelines for intermittent irrigation of non-functioning urinary catheter  Outcome: Progressing     Problem: METABOLIC, FLUID AND ELECTROLYTES - ADULT  Goal: Electrolytes maintained within normal limits  Description: INTERVENTIONS:  - Monitor labs and assess patient for signs and symptoms of electrolyte imbalances  - Administer electrolyte replacement as ordered  - Monitor response to electrolyte replacements, including repeat lab results as appropriate  - Instruct patient on fluid and nutrition as appropriate  Outcome: Progressing  Goal: Fluid balance maintained  Description: INTERVENTIONS:  - Monitor labs   - Monitor I/O and WT  - Instruct patient on fluid and nutrition as appropriate  - Assess for signs & symptoms of volume excess or deficit  Outcome: Progressing  Goal: Glucose maintained within target range  Description: INTERVENTIONS:  - Monitor Blood Glucose as ordered  - Assess for signs and symptoms of hyperglycemia and hypoglycemia  - Administer ordered medications to maintain glucose within target range  - Assess nutritional intake and initiate nutrition service referral as needed  Outcome: Progressing     Problem: SKIN/TISSUE INTEGRITY - ADULT  Goal: Skin Integrity remains intact(Skin Breakdown Prevention)  Description: Assess:  -Perform Samir assessment every shift  -Clean and moisturize skin every shift  -Inspect skin when repositioning, toileting, and assisting with ADLS  -Assess under medical devices such as IV every shift  -Assess extremities for adequate circulation and sensation     Bed Management:  -Have minimal linens on bed & keep smooth, unwrinkled  -Change linens as needed when moist or perspiring  -Avoid sitting or lying in one position for more than 2 hours while in bed  -Keep HOB at 30 degrees     Toileting:  -Offer bedside commode  -Assess for incontinence every shift  -Use incontinent care products after each incontinent episode such as soap/water    Activity:  -Mobilize patient 3 times a day  -Encourage activity and walks on unit  -Encourage or provide ROM exercises   -Turn and reposition patient every 2 Hours  -Use appropriate equipment to lift or move patient in bed  -Instruct/ Assist with weight shifting every shift when out of bed in chair  -Consider limitation of chair time 2 hour intervals    Skin Care:  -Avoid use of baby powder, tape, friction and shearing, hot water or constrictive clothing  -Relieve pressure over bony prominences using foam dressing  -Do not massage red bony areas    Next Steps:  -Teach patient strategies to minimize risks such as call for help  -Consider consults to  interdisciplinary teams such as PT/OT  Outcome: Progressing  Goal: Incision(s), wounds(s) or drain site(s) healing without S/S of infection  Description: INTERVENTIONS  - Assess and document dressing, incision, wound bed, drain sites and surrounding tissue  - Provide patient and family education  - Perform skin care/dressing changes every shift  Outcome: Progressing  Goal: Pressure injury heals and does not worsen  Description: Interventions:  - Implement low air loss mattress or specialty surface (Criteria met)  - Apply silicone foam dressing  - Instruct/assist with weight shifting every 15 minutes when in chair   - Limit chair time to 2 hour intervals  - Use special pressure reducing interventions such as reposition when in chair   - Apply fecal or urinary incontinence containment device   - Perform passive or active ROM every shift  - Turn and reposition patient & offload bony prominences every 2 hours   - Utilize friction reducing device or surface for transfers   - Consider consults to  interdisciplinary teams such as PT/OT  - Use incontinent care products after each incontinent episode such as soap/water  - Consider nutrition services referral as needed  Outcome: Progressing     Problem: HEMATOLOGIC - ADULT  Goal: Maintains hematologic stability  Description: INTERVENTIONS  - Assess for signs and symptoms of bleeding or hemorrhage  - Monitor labs  - Administer supportive blood products/factors as ordered and appropriate  Outcome: Progressing     Problem: MUSCULOSKELETAL - ADULT  Goal: Maintain or return mobility to safest level of function  Description: INTERVENTIONS:  - Assess patient's ability to carry out ADLs; assess patient's baseline for ADL function and identify physical deficits which impact ability to perform ADLs (bathing, care of mouth/teeth, toileting, grooming, dressing, etc )  - Assess/evaluate cause of self-care deficits   - Assess range of motion  - Assess patient's mobility  - Assess patient's need for assistive devices and provide as appropriate  - Encourage maximum independence but intervene and supervise when necessary  - Involve family in performance of ADLs  - Assess for home care needs following discharge   - Consider OT consult to assist with ADL evaluation and planning for discharge  - Provide patient education as appropriate  Outcome: Progressing  Goal: Maintain proper alignment of affected body part  Description: INTERVENTIONS:  - Support, maintain and protect limb and body alignment  - Provide patient/ family with appropriate education  Outcome: Progressing     Problem: Potential for Falls  Goal: Patient will remain free of falls  Description: INTERVENTIONS:  - Educate patient/family on patient safety including physical limitations  - Instruct patient to call for assistance with activity   - Consult OT/PT to assist with strengthening/mobility   - Keep Call bell within reach  - Keep bed low and locked with side rails adjusted as appropriate  - Keep care items and personal belongings within reach  - Initiate and maintain comfort rounds  - Make Fall Risk Sign visible to staff  - Offer Toileting every 2 Hours, in advance of need  - Initiate/Maintain bed alarm  - Obtain necessary fall risk management equipment: call bell  - Apply yellow socks and bracelet for high fall risk patients  - Consider moving patient to room near nurses station  Outcome: Progressing     Problem: SAFETY,RESTRAINT: NV/NON-SELF DESTRUCTIVE BEHAVIOR  Goal: Remains free of harm/injury (restraint for non violent/non self-detsructive behavior)  Description: INTERVENTIONS:  - Instruct patient/family regarding restraint use   - Assess and monitor physiologic and psychological status   - Provide interventions and comfort measures to meet assessed patient needs   - Identify and implement measures to help patient regain control  - Assess readiness for release of restraint   Outcome: Progressing  Goal: Returns to optimal restraint-free functioning  Description: INTERVENTIONS:  - Assess the patient's behavior and symptoms that indicate continued need for restraint  - Identify and implement measures to help patient regain control  - Assess readiness for release of restraint   Outcome: Progressing     Problem: Nutrition/Hydration-ADULT  Goal: Nutrient/Hydration intake appropriate for improving, restoring or maintaining nutritional needs  Description: Monitor and assess patient's nutrition/hydration status for malnutrition  Collaborate with interdisciplinary team and initiate plan and interventions as ordered  Monitor patient's weight and dietary intake as ordered or per policy  Utilize nutrition screening tool and intervene as necessary  Determine patient's food preferences and provide high-protein, high-caloric foods as appropriate       INTERVENTIONS:  - Monitor oral intake, urinary output, labs, and treatment plans  - Assess nutrition and hydration status and recommend course of action  - Evaluate amount of meals eaten  - Assist patient with eating if necessary   - Allow adequate time for meals  - Recommend/ encourage appropriate diets, oral nutritional supplements, and vitamin/mineral supplements  - Order, calculate, and assess calorie counts as needed  - Recommend, monitor, and adjust tube feedings and TPN/PPN based on assessed needs  - Assess need for intravenous fluids  - Provide specific nutrition/hydration education as appropriate  - Include patient/family/caregiver in decisions related to nutrition  Outcome: Progressing     Problem: MOBILITY - ADULT  Goal: Maintain or return to baseline ADL function  Description: INTERVENTIONS:  -  Assess patient's ability to carry out ADLs; assess patient's baseline for ADL function and identify physical deficits which impact ability to perform ADLs (bathing, care of mouth/teeth, toileting, grooming, dressing, etc )  - Assess/evaluate cause of self-care deficits   - Assess range of motion  - Assess patient's mobility; develop plan if impaired  - Assess patient's need for assistive devices and provide as appropriate  - Encourage maximum independence but intervene and supervise when necessary  - Involve family in performance of ADLs  - Assess for home care needs following discharge   - Consider OT consult to assist with ADL evaluation and planning for discharge  - Provide patient education as appropriate  Outcome: Progressing  Goal: Maintains/Returns to pre admission functional level  Description: INTERVENTIONS:  - Perform BMAT or MOVE assessment daily    - Set and communicate daily mobility goal to care team and patient/family/caregiver  - Collaborate with rehabilitation services on mobility goals if consulted  - Perform Range of Motion 2 times a day  - Reposition patient every 2 hours    - Dangle patient 3 times a day  - Stand patient 3 times a day  - Ambulate patient 3 times a day  - Out of bed to chair 3 times a day   - Out of bed for meals 3 times a day  - Out of bed for toileting  - Record patient progress and toleration of activity level   Outcome: Progressing     Problem: Prexisting or High Potential for Compromised Skin Integrity  Goal: Skin integrity is maintained or improved  Description: INTERVENTIONS:  - Identify patients at risk for skin breakdown  - Assess and monitor skin integrity  - Assess and monitor nutrition and hydration status  - Monitor labs   - Assess for incontinence   - Turn and reposition patient  - Assist with mobility/ambulation  - Relieve pressure over bony prominences  - Avoid friction and shearing  - Provide appropriate hygiene as needed including keeping skin clean and dry  - Evaluate need for skin moisturizer/barrier cream  - Collaborate with interdisciplinary team   - Patient/family teaching  - Consider wound care consult   Outcome: Progressing

## 2022-05-16 ENCOUNTER — NURSING HOME VISIT (OUTPATIENT)
Dept: GERIATRICS | Facility: OTHER | Age: 82
End: 2022-05-16
Payer: MEDICARE

## 2022-05-16 DIAGNOSIS — R26.2 AMBULATORY DYSFUNCTION: Primary | ICD-10-CM

## 2022-05-16 DIAGNOSIS — E44.0 MODERATE PROTEIN-CALORIE MALNUTRITION (HCC): ICD-10-CM
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
## 2022-05-16 DIAGNOSIS — I48.91 NEW ONSET ATRIAL FIBRILLATION (HCC): ICD-10-CM

## 2022-05-16 DIAGNOSIS — F03.90 DEMENTIA WITHOUT BEHAVIORAL DISTURBANCE, UNSPECIFIED DEMENTIA TYPE (HCC): ICD-10-CM

## 2022-05-16 PROCEDURE — 99309 SBSQ NF CARE MODERATE MDM 30: CPT | Performed by: NURSE PRACTITIONER

## 2022-05-17 ENCOUNTER — APPOINTMENT (EMERGENCY)
Dept: CT IMAGING | Facility: HOSPITAL | Age: 82
End: 2022-05-17
Payer: MEDICARE

## 2022-05-17 ENCOUNTER — HOSPITAL ENCOUNTER (EMERGENCY)
Facility: HOSPITAL | Age: 82
Discharge: HOME | End: 2022-05-17
Attending: EMERGENCY MEDICINE
Payer: MEDICARE

## 2022-05-17 ENCOUNTER — TELEPHONE (OUTPATIENT)
Dept: OTHER | Facility: OTHER | Age: 82
End: 2022-05-17

## 2022-05-17 VITALS
RESPIRATION RATE: 18 BRPM | OXYGEN SATURATION: 94 % | BODY MASS INDEX: 18.33 KG/M2 | HEIGHT: 58 IN | SYSTOLIC BLOOD PRESSURE: 157 MMHG | HEART RATE: 56 BPM | WEIGHT: 87.3 LBS | DIASTOLIC BLOOD PRESSURE: 79 MMHG | TEMPERATURE: 98.4 F

## 2022-05-17 DIAGNOSIS — Z23 NEED FOR VACCINATION WITH COMBINED DIPHTHERIA-TETANUS-PERTUSSIS (DTAP): ICD-10-CM

## 2022-05-17 DIAGNOSIS — S51.811A SKIN TEAR OF RIGHT FOREARM WITHOUT COMPLICATION, INITIAL ENCOUNTER: ICD-10-CM

## 2022-05-17 DIAGNOSIS — S09.90XA INJURY OF HEAD, INITIAL ENCOUNTER: ICD-10-CM

## 2022-05-17 DIAGNOSIS — S00.03XA HEMATOMA OF SCALP, INITIAL ENCOUNTER: Primary | ICD-10-CM

## 2022-05-17 LAB
ATRIAL RATE: 70 BPM
P AXIS: 76 DEGREES
PR INTERVAL: 144 MS
QRS AXIS: 78 DEGREES
QRSD INTERVAL: 88 MS
QT INTERVAL: 368 MS
QTC INTERVAL: 397 MS
T WAVE AXIS: 70 DEGREES
VENTRICULAR RATE: 70 BPM

## 2022-05-17 PROCEDURE — 90715 TDAP VACCINE 7 YRS/> IM: CPT | Performed by: EMERGENCY MEDICINE

## 2022-05-17 PROCEDURE — 72125 CT NECK SPINE W/O DYE: CPT

## 2022-05-17 PROCEDURE — 90471 IMMUNIZATION ADMIN: CPT

## 2022-05-17 PROCEDURE — 99285 EMERGENCY DEPT VISIT HI MDM: CPT

## 2022-05-17 PROCEDURE — 70450 CT HEAD/BRAIN W/O DYE: CPT

## 2022-05-17 PROCEDURE — 93010 ELECTROCARDIOGRAM REPORT: CPT | Performed by: INTERNAL MEDICINE

## 2022-05-17 PROCEDURE — 93005 ELECTROCARDIOGRAM TRACING: CPT

## 2022-05-17 PROCEDURE — 99284 EMERGENCY DEPT VISIT MOD MDM: CPT | Performed by: EMERGENCY MEDICINE

## 2022-05-17 RX ORDER — ACETAMINOPHEN 325 MG/1
650 TABLET ORAL ONCE
Status: COMPLETED | OUTPATIENT
Start: 2022-05-17 | End: 2022-05-17

## 2022-05-17 RX ADMIN — ACETAMINOPHEN 325MG 650 MG: 325 TABLET ORAL at 20:17

## 2022-05-17 RX ADMIN — TETANUS TOXOID, REDUCED DIPHTHERIA TOXOID AND ACELLULAR PERTUSSIS VACCINE, ADSORBED 0.5 ML: 5; 2.5; 8; 8; 2.5 SUSPENSION INTRAMUSCULAR at 20:18

## 2022-05-17 NOTE — ED PROVIDER NOTES
History  Chief Complaint   Patient presents with    Fall     Per EMS pt from [place] [address], reports unwitnessed fall at facility  Per ems head strike and large hematoma noted to right temple, laceration noted to right forearm  Pt has unequal pupils that are reactive but pt states that's normal  Per EMS pt staff gave PO ativan post fall "because she takes it at night" pt taking eliquis     Patient is an 80-year-old female  She is a resident of a nursing home  She has dementia  She is on Eliquis  She experienced an unwitnessed fall  She cannot tell me why she fell  Unclear if there was loss of consciousness  She struck her head  Staff noticed a hematoma to the right temporal area  She suffered a skin tear to her right forearm  It was Steri stripped prior to arrival   Last tetanus vaccination was greater than 10 years ago  No neck pain  She denies other injuries  It was noted that her pupils were unequal   Patient relates that her pupils are always on equal   Symptoms are moderate in severity  No aggravating or relieving factors  Prior to Admission Medications   Prescriptions Last Dose Informant Patient Reported? Taking? LORazepam (ATIVAN) 0 5 mg tablet   No No   Si/2 tab BID at at 0800, and 1200 and 1 Tab at 1800  Sodium Phosphates (Enema) ENEM   Yes No   Sig: Insert 1 enema into the rectum as needed   acetaminophen (TYLENOL) 325 mg tablet   Yes No   Sig: Take 650 mg by mouth every 4 (four) hours as needed for mild pain or fever FEVER 100 DEGREES OR HIGHER   acetaminophen (TYLENOL) 650 mg suppository   Yes No   Sig: Insert 650 mg into the rectum every 4 (four) hours as needed for mild pain or fever FEVER 100 DEGREES OR GREATER    apixaban (ELIQUIS) 2 5 mg   No No   Sig: Take 1 tablet (2 5 mg total) by mouth in the morning and 1 tablet (2 5 mg total) in the evening     bisacodyl (DULCOLAX) 10 mg suppository   Yes No   Sig: Insert 10 mg into the rectum as needed for constipation   docusate sodium (COLACE) 100 mg capsule   No No   Sig: Take 1 capsule (100 mg total) by mouth 2 (two) times a day   hydrocortisone (Proctozone-HC) 2 5 % rectal cream   Yes No   Sig: Apply 1 application topically 2 (two) times a day   metoprolol tartrate (LOPRESSOR) 25 mg tablet   No No   Sig: Take 1 5 tablets (37 5 mg total) by mouth every 12 (twelve) hours   mirtazapine (REMERON) 15 mg tablet   Yes No   Sig: Take 15 mg by mouth daily at bedtime   ondansetron (ZOFRAN-ODT) 4 mg disintegrating tablet   No No   Sig: Take 1 tablet (4 mg total) by mouth every 6 (six) hours as needed for nausea or vomiting   pantoprazole (PROTONIX) 20 mg tablet   No No   Sig: Take 1 tablet (20 mg total) by mouth daily in the early morning   polyethylene glycol (MIRALAX) 17 g packet   No No   Sig: Take 17 g by mouth daily   sorbitol 70 % solution   Yes No   Sig: Take 30 mL by mouth daily as needed      Facility-Administered Medications: None       Past Medical History:   Diagnosis Date    Acute and chronic respiratory failure with hypercapnia (HCC) 7/22/2019    Ambulatory dysfunction     Anemia     hgb 5 2o bleeding uler in 1970's    Arthritis     Dehydration 7/24/2019    Dementia ([facility] )     Depression     Eating disorder     Elevated temperature 12/29/2021    Encephalopathy     Gastrointestinal bleeding     Gastrointestinal bleeding     GI bleeding 7/27/2019    Hyperlipidemia     Hypertension     Pressure injury of head, stage 1 7/21/2019    Pressure injury of sacral region, stage 3 ([facility] ) 7/21/2019    Respiratory failure ([facility] )     Scalp hematoma 7/21/2019    Severe protein-calorie malnutrition Orvil Seamen: less than 60% of standard weight) ([facility])     Severe protein-calorie malnutrition (HCC) 7/21/2019    Skin rash 11/13/2020    Unstageable pressure ulcer ([address] ) 9/4/2019    Vulvar mass     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       No past surgical history on file      Family History   Problem Relation Age of Onset    Parkinsonism Mother    Frannie Splinter COPD Father      I have reviewed and agree with the history as documented  E-Cigarette/Vaping    E-Cigarette Use Never User      E-Cigarette/Vaping Substances    Nicotine No     THC No     CBD No     Flavoring No     Other No     Unknown No      Social History     Tobacco Use    Smoking status: Never Smoker    Smokeless tobacco: Never Used   Vaping Use    Vaping Use: Never used   Substance Use Topics    Alcohol use: Not Currently     Comment: only socially in the past    Drug use: Never       Review of Systems   Unable to perform ROS: Dementia       Physical Exam  Physical Exam  Constitutional:       General: She is not in acute distress  HENT:      Head: Normocephalic  Comments: There is a hematoma to the right temporal scalp  No laceration  Head is otherwise atraumatic  Nose: Nose normal       Mouth/Throat:      Mouth: Mucous membranes are moist    Eyes:      Extraocular Movements: Extraocular movements intact  Comments: Left pupil is greater than the right pupil  Both are reactive  Cardiovascular:      Rate and Rhythm: Normal rate and regular rhythm  Pulses: Normal pulses  Heart sounds: Normal heart sounds  No murmur heard  No friction rub  No gallop  Pulmonary:      Effort: Pulmonary effort is normal  No respiratory distress  Comments: Scattered crackles and wheezes are present  Abdominal:      General: Bowel sounds are normal  There is no distension  Palpations: Abdomen is soft  Tenderness: There is no abdominal tenderness  Musculoskeletal:         General: Signs of injury present  Cervical back: Neck supple  No tenderness  Comments: There is a skin tear to the right arm that is approximated with Steri-Strips  There is no active bleeding  Otherwise extremities are atraumatic  Pelvis is nontender  No palpable thoracic or lumbar tenderness  Skin:     General: Skin is warm and dry        Capillary Refill: Capillary refill takes less than 2 seconds  Neurological:      Mental Status: She is alert  GCS: GCS eye subscore is 4  GCS verbal subscore is 5  GCS motor subscore is 6  Sensory: Sensation is intact  Motor: Motor function is intact  Psychiatric:         Mood and Affect: Mood normal          Behavior: Behavior normal          Vital Signs  ED Triage Vitals   Temperature Pulse Respirations Blood Pressure SpO2   05/17/22 1838 05/17/22 1835 05/17/22 1835 05/17/22 1835 05/17/22 1835   98 4 °F (36 9 °C) 74 18 169/85 96 %      Temp Source Heart Rate Source Patient Position - Orthostatic VS BP Location FiO2 (%)   05/17/22 1838 05/17/22 1835 05/17/22 1835 05/17/22 1835 --   Oral Monitor Lying Right arm       Pain Score       05/17/22 1835       No Pain           Vitals:    05/17/22 1835 05/17/22 1954 05/17/22 2051   BP: 169/85 167/77 157/79   Pulse: 74 80 56   Patient Position - Orthostatic VS: Lying Lying Lying         Visual Acuity  Visual Acuity    Flowsheet Row Most Recent Value   L Pupil Size (mm) 2   R Pupil Size (mm) 4          ED Medications  Medications   acetaminophen (TYLENOL) tablet 650 mg (650 mg Oral Given 5/17/22 2017)   tetanus-diphtheria-acellular pertussis (BOOSTRIX) IM injection 0 5 mL (0 5 mL Intramuscular Given 5/17/22 2018)       Diagnostic Studies  Results Reviewed     None                 CT cervical spine without contrast   Final Result by Roland Ahmadi MD (05/17 1947)      No cervical spine fracture or traumatic malalignment  Workstation performed: [id number]         CT head without contrast   Final Result by Roland Ahmadi MD (05/17 2004)      No acute intracranial abnormality  Microangiopathic changes                    Workstation performed: [id number]                    Procedures  Procedures         ED Course                               SBIRT 20yo+    Flowsheet Row Most Recent Value   SBIRT (23 yo +)    In order to provide better care to our patients, we are screening all of our patients for alcohol and drug use  Would it be okay to ask you these screening questions? No Filed at: 05/17/2022 1840                    [facility]  Number of Diagnoses or Management Options  Diagnosis management comments: CT of the head was negative for skull fracture intracranial hemorrhage  CT of cervical spine was negative for fracture subluxation  Appropriate for discharge and return to the nursing home  Amount and/or Complexity of Data Reviewed  Tests in the radiology section of CPT®: ordered and reviewed        Disposition  Final diagnoses:   Hematoma of scalp, initial encounter   Injury of head, initial encounter   Skin tear of right forearm without complication, initial encounter   Need for vaccination with combined diphtheria-tetanus-pertussis (DTaP)     Time reflects when diagnosis was documented in both MDM as applicable and the Disposition within this note     Time User Action Codes Description Comment    5/17/2022  8:56 PM Rosezella Libel Add [S00 03XA] Hematoma of scalp, initial encounter     5/17/2022  8:56 PM Rosezella Libel Add [S09 90XA] Injury of head, initial encounter     5/17/2022  8:56 PM Rosezella Libel Add [S26 555H] Skin tear of right forearm without complication, initial encounter     5/17/2022  8:56 PM Rosezella Libel Add [Z23] Need for vaccination with combined diphtheria-tetanus-pertussis (DTaP)       ED Disposition     ED Disposition   Discharge    Condition   Stable    Date/Time   Tue May 17, 2022  8:56 PM    Comment   Cheri John discharge to home/self care  Follow-up Information     Follow up With Specialties Details Why Contact Info    Sis Soliz, DO Family Medicine  As needed [address] [place] [address] Buffy Stewart  [phone]            Patient's Medications   Discharge Prescriptions    No medications on file       No discharge procedures on file      PDMP Review       Value Time User    PDMP Reviewed  Yes 5/12/2022 12:41 PM KATHIE Cordoba ED Provider  Electronically Signed by           Nya Brownlee MD  05/17/22 6168

## 2022-05-18 ENCOUNTER — PATIENT OUTREACH (OUTPATIENT)
Dept: FAMILY MEDICINE CLINIC | Facility: CLINIC | Age: 82
End: 2022-05-18

## 2022-05-18 VITALS
HEART RATE: 63 BPM | WEIGHT: 78.9 LBS | SYSTOLIC BLOOD PRESSURE: 128 MMHG | RESPIRATION RATE: 16 BRPM | DIASTOLIC BLOOD PRESSURE: 71 MMHG | BODY MASS INDEX: 16.49 KG/M2 | TEMPERATURE: 98.6 F

## 2022-05-18 NOTE — ASSESSMENT & PLAN NOTE
· Hx frequent falls  · Pt s/p fall with hospitalization  · Pt has cognitive deficits and is not aware of her ambulatory deficits  · Continue to assist wth ADL and iADL  · Fall precautions in room  · Remind frequently to use call bell  · Frequent "bed checks"

## 2022-05-18 NOTE — ED NOTES
Report called to [address] [facility] at [facility]        Fabby Gray, GLEN  05/17/22 5248

## 2022-05-18 NOTE — PROGRESS NOTES
[facility]  [address]  [phone]  [facility]  LTCF Progress Note  Re-Admit        NAME: Thelma Carpenter  AGE: 80 y o  SEX: female    DATE OF ENCOUNTER: 5/16/22    Assessment and Plan     1  Ambulatory dysfunction  Assessment & Plan:  · Currently at [facility]  · Continue supportive care  · Assist with ADL and iADL  · Pt can be anxious and impulsive, does not know her limitations with ambulating and walking on her own  · Restorative care  · Monitor for acute changes  · Continue with fall precautions       2  Dementia without behavioral disturbance, unspecified dementia type ([facility] )  Assessment & Plan:  · Cognitive deficits  · Currently on zyprexa  · Pt is anxious at times and very "antsy", gets up and walks around with out using call bell or waiting for assistance  · High fall risk  · Continue to redirect and reorient  · Continue with fall precautions  · Pt needs supervision with ambulating  · Continue to encourage pt to use call bell      3  Fall, subsequent encounter  Assessment & Plan:  · Hx frequent falls  · Pt s/p fall with hospitalization  · Pt has cognitive deficits and is not aware of her ambulatory deficits  · Continue to assist wth ADL and iADL  · Fall precautions in room  · Remind frequently to use call bell  · Frequent "bed checks"      4  Moderate protein-calorie malnutrition ([facility] )  Assessment & Plan:  · Multifactor and age  · Encourage completion of meals and good hydration  · Continue with supplements  · Dietician to follow pt  · Restorative care        5  New onset atrial fibrillation (HCC)  Assessment & Plan:  · Rate controlled  · Metoprolol  · eliquis for anticoagulation  · Monitor HR and BP  · Monitor for acute changes          No orders of the defined types were placed in this encounter        History of Present Illness     Thelma Carpenter 80 y o  female seen for follow-up of care and treatment plan for ambulatory dysfunction doing well at LTCF, dementia at baseline, s/p fall with high fall risk, moderate protein calorie malnutrition encourage nutrition and hydration, afib rate controlled     The following portions of the patient's history were reviewed and updated as appropriate: allergies, current medications, past family history, past medical history, past social history, past surgical history and problem list     Review of Systems     Review of Systems   Constitutional: Negative for chills and fever  HENT: Negative for ear pain and sore throat  Eyes: Negative for pain and visual disturbance  Respiratory: Negative for cough, shortness of breath and wheezing  Cardiovascular: Negative for chest pain, palpitations and leg swelling  Gastrointestinal: Negative for abdominal pain and vomiting  Genitourinary: Negative for dysuria and hematuria  Musculoskeletal: Positive for gait problem  Negative for arthralgias and back pain  Skin: Negative for color change and rash  Neurological: Positive for weakness  Negative for seizures and syncope  Psychiatric/Behavioral: Positive for confusion  All other systems reviewed and are negative  Objective     /71   Pulse 63   Temp 98 6 °F (37 °C)   Resp 16   Wt 35 8 kg (78 lb 14 4 oz)   BMI 16 49 kg/m²     Physical Exam  Vitals reviewed  Constitutional:       Appearance: She is well-developed  HENT:      Head: Normocephalic and atraumatic  Eyes:      Conjunctiva/sclera: Conjunctivae normal    Cardiovascular:      Rate and Rhythm: Normal rate and regular rhythm  Heart sounds: Normal heart sounds, S1 normal and S2 normal  No murmur heard  Comments: New onset afib  Pulmonary:      Effort: Pulmonary effort is normal  No respiratory distress  Breath sounds: Normal breath sounds  No wheezing  Abdominal:      General: Bowel sounds are normal  There is no distension  Palpations: Abdomen is soft  Tenderness: There is no abdominal tenderness     Musculoskeletal: General: Normal range of motion  Cervical back: Normal range of motion  Comments: Generalized weakness   Skin:     General: Skin is warm and dry  Neurological:      Mental Status: She is alert  Psychiatric:         Attention and Perception: Attention normal          Mood and Affect: Mood is anxious  Speech: Speech normal          Cognition and Memory: Cognition is impaired  Memory is impaired  Pertinent Laboratory/Diagnostic Studies:  CBA Labs Reviewed    Current Medications   Medication list reviewed and updated today  Please see paper chart for updated medication list      [facility]  :24 PM      Name: Forrest Mancilla  : 1940  MRN: [id number]  DOS: 22    Diagnoses:   Diagnosis ICD-10-CM Associated Orders   1  Ambulatory dysfunction  R26 2    2  Dementia without behavioral disturbance, unspecified dementia type ([facility] )  F03 90    3  Fall, subsequent encounter  W19  XXXD    4  Moderate protein-calorie malnutrition (HCC)  E44 0    5   New onset atrial fibrillation (HCC)  I48 91

## 2022-05-18 NOTE — ASSESSMENT & PLAN NOTE
· Cognitive deficits  · Currently on zyprexa  · Pt is anxious at times and very "antsy", gets up and walks around with out using call bell or waiting for assistance  · High fall risk  · Continue to redirect and reorient  · Continue with fall precautions  · Pt needs supervision with ambulating  · Continue to encourage pt to use call bell

## 2022-05-18 NOTE — ED NOTES
[facility] called to arrange for transport and will call back with transport time        Hadley Wilder RN  05/17/22 2044

## 2022-05-18 NOTE — ASSESSMENT & PLAN NOTE
· Rate controlled  · Metoprolol  · eliquis for anticoagulation  · Monitor HR and BP  · Monitor for acute changes

## 2022-05-18 NOTE — ASSESSMENT & PLAN NOTE
· Multifactor and age  · Encourage completion of meals and good hydration  · Continue with supplements  · Dietician to follow pt  · Restorative care

## 2022-05-18 NOTE — ASSESSMENT & PLAN NOTE
· Currently at [place] [address]  · Continue supportive care  · Assist with ADL and iADL  · Pt can be anxious and impulsive, does not know her limitations with ambulating and walking on her own  · Restorative care  · Monitor for acute changes  · Continue with fall precautions

## 2022-05-18 NOTE — PROGRESS NOTES
Patient is a [facility] referral for arrhythmia  She is a long term care resident of [facility]  in [facility] [place]  [place]  The patient is a resident of [facility] one of their skilled nursing units  The patient does not meet complex care management criteria  Will continue to follow in surveillance for the duration of her bundle episode

## 2022-05-19 ENCOUNTER — TELEPHONE (OUTPATIENT)
Dept: OTHER | Facility: OTHER | Age: 82
End: 2022-05-19

## 2022-05-19 ENCOUNTER — NURSING HOME VISIT (OUTPATIENT)
Dept: GERIATRICS | Facility: OTHER | Age: 82
End: 2022-05-19
Payer: MEDICARE

## 2022-05-19 DIAGNOSIS — R26.2 AMBULATORY DYSFUNCTION: Primary | ICD-10-CM

## 2022-05-19 DIAGNOSIS — S00.03XD TRAUMATIC HEMATOMA OF SCALP, SUBSEQUENT ENCOUNTER: ICD-10-CM

## 2022-05-19 DIAGNOSIS — I48.91 NEW ONSET ATRIAL FIBRILLATION (HCC): ICD-10-CM

## 2022-05-19 DIAGNOSIS — F03.90 DEMENTIA WITHOUT BEHAVIORAL DISTURBANCE, UNSPECIFIED DEMENTIA TYPE (HCC): ICD-10-CM

## 2022-05-19 DIAGNOSIS — I10 ESSENTIAL HYPERTENSION: ICD-10-CM

## 2022-05-19 PROBLEM — N76.4 FURUNCLE OF VULVA: Status: RESOLVED | Noted: 2022-03-28 | Resolved: 2022-05-19

## 2022-05-19 PROBLEM — S00.03XA TRAUMATIC HEMATOMA OF SCALP: Status: ACTIVE | Noted: 2022-05-19

## 2022-05-19 PROCEDURE — 99309 SBSQ NF CARE MODERATE MDM 30: CPT | Performed by: FAMILY MEDICINE

## 2022-05-19 NOTE — ASSESSMENT & PLAN NOTE
Ordered wall compress to the area, will monitor closely  Ordered labs  Tylenol daily scheduled, and p r n  for pain

## 2022-05-19 NOTE — ASSESSMENT & PLAN NOTE
Patient has been having overall cognitive decline, will monitor closely, needs nursing home care  Speech therapist on board

## 2022-05-19 NOTE — ASSESSMENT & PLAN NOTE
Multifactorial with generalized weakness, recent hospitalization, and a fall 2 days ago  Needs nursing home care  Therapy on board

## 2022-05-19 NOTE — PROGRESS NOTES
[facility]  [address]  [phone]  [facility]          NAME: Danilo Longo  AGE: 80 y o  SEX: female    DATE OF ENCOUNTER: 5/19/2022    Chief Complaint     "I have headache"    History of Present Illness     HPI    The following portions of the patient's history were reviewed and updated as appropriate (from facility chart and hospital records): allergies, current medications, past family history, past medical history, past social history, past surgical history and problem list   Patient with past medical, social, surgical family history and medical problem as this note who was recently admitted to the hospital on May 11th weight rapid AFib, and was back to [facility] on May 15th had an unwitnessed fall on 5/17 and was sent back to the hospital, had CT of brain and was returned to [facility] the same day  She reports headache and points to her right temporal/frontal hematoma area  Her blood pressure has been at higher sides since readmission  Per staff report patient is not compliant with recommendation regarding her ambulation, and starts walking by herself interval   Her p o  intake has been declined  Patient's amlodipine was changed to metoprolol at the hospital     Review of Systems     Review of Systems   Reason unable to perform ROS: Overall limited with patient not being cooperative, and due to cognitive decline  Constitutional: Positive for appetite change and fatigue  HENT:        "I have headache "   Eyes: Negative  Endocrine: Negative  Allergic/Immunologic: Negative          Active Problem List     Patient Active Problem List   Diagnosis    Fall    Ambulatory dysfunction    Moderate protein-calorie malnutrition ([address] )    Anxiety    Other constipation    Headache    Essential hypertension    Tricuspid regurgitation    Dementia ([address]    New onset atrial fibrillation (HCC)    Metabolic encephalopathy    AMANDA (acute kidney injury) ([facility]    Hyperkalemia    Traumatic hematoma of scalp       Objective     Vitals:  Reviewed    Physical Exam  Vitals reviewed  Constitutional:       General: She is not in acute distress  Appearance: She is well-developed  She is ill-appearing  She is not toxic-appearing or diaphoretic  HENT:      Head: Normocephalic and atraumatic  Right Ear: External ear normal       Left Ear: External ear normal       Nose: Nose normal       Mouth/Throat:      Mouth: Mucous membranes are moist    Eyes:      General: No scleral icterus  Right eye: No discharge  Left eye: No discharge  Extraocular Movements: Extraocular movements intact  Conjunctiva/sclera: Conjunctivae normal       Comments: Pupils unequal (not new)   Cardiovascular:      Rate and Rhythm: Normal rate  Rhythm irregular  Heart sounds: Normal heart sounds  No murmur heard  No friction rub  No gallop  Pulmonary:      Effort: Pulmonary effort is normal  No respiratory distress  Breath sounds: Normal breath sounds  No stridor  No wheezing, rhonchi or rales  Chest:      Chest wall: No tenderness  Abdominal:      General: Bowel sounds are normal  There is no distension  Palpations: Abdomen is soft  There is no mass  Tenderness: There is no abdominal tenderness  There is no guarding or rebound  Hernia: No hernia is present  Genitourinary:     Comments: Deferred  Musculoskeletal:         General: Tenderness (on R temporal area on hematoma site ) present  No swelling, deformity or signs of injury  Cervical back: Normal range of motion and neck supple  No rigidity or tenderness  Right lower leg: No edema  Left lower leg: No edema  Comments: Lying in bed, none cooperative with exam, not moving  Lymphadenopathy:      Cervical: No cervical adenopathy  Skin:     General: Skin is warm and dry  Coloration: Skin is not jaundiced or pale        Findings: Bruising (external side of right eye, and on R frontal/Temproal ) and erythema (Right lateral forearm open skin area) present  No lesion or rash  Comments: Didn't examine sacral area  Neurological:      Cranial Nerves: Cranial nerve deficit present  Comments: Not oriented, not cooperative with exam    Psychiatric:         Behavior: Behavior normal          Pertinent Laboratory/Diagnostic Studies:  No lab for this visit     Current Medications   Medication list in facility chart was reviewed and necessary changes made  Assessment and Plan     Ambulatory dysfunction  Multifactorial with generalized weakness, recent hospitalization, and a fall 2 days ago  Needs nursing home care  Therapy on board  New onset atrial fibrillation (HCC)  Heart rate controlled with metoprolol, on Eliquis  Ordered lab, will follow-up closely on H&H  Essential hypertension  Blood pressure has been at times at higher side since hospital return, of amlodipine and on metoprolol  Ordered labs  Will monitor closely  Traumatic hematoma of scalp  Ordered wall compress to the area, will monitor closely  Ordered labs  Tylenol daily scheduled, and p r n  for pain  Dementia [facility])  Patient has been having overall cognitive decline, will monitor closely, needs nursing home care  Speech therapist on board      Bimal Alfonso MD  1/15/98132:40 PM

## 2022-05-20 NOTE — TELEPHONE ENCOUNTER
[phone]/KERRI from [facility]  Pt had a mismanagement of medication  Dr Phuong Cradoza was paged via TC    Please allow the on-call provider 20-30 mins to respond  If you have not heard from them within that time, please feel free to call back

## 2022-05-24 DIAGNOSIS — F41.9 ANXIETY: ICD-10-CM

## 2022-05-24 RX ORDER — LORAZEPAM 0.5 MG/1
TABLET ORAL
Qty: 60 TABLET | Refills: 0 | Status: SHIPPED | OUTPATIENT
Start: 2022-05-24 | End: 2022-07-18 | Stop reason: SDUPTHER

## 2022-06-10 ENCOUNTER — NURSING HOME VISIT (OUTPATIENT)
Dept: GERIATRICS | Facility: OTHER | Age: 82
End: 2022-06-10
Payer: MEDICARE

## 2022-06-10 DIAGNOSIS — F41.8 ANXIETY WITH DEPRESSION: ICD-10-CM

## 2022-06-10 DIAGNOSIS — F03.90 DEMENTIA WITHOUT BEHAVIORAL DISTURBANCE, UNSPECIFIED DEMENTIA TYPE (HCC): ICD-10-CM

## 2022-06-10 DIAGNOSIS — I10 ESSENTIAL HYPERTENSION: ICD-10-CM

## 2022-06-10 DIAGNOSIS — I48.91 NEW ONSET ATRIAL FIBRILLATION (HCC): Primary | ICD-10-CM

## 2022-06-10 PROCEDURE — 99309 SBSQ NF CARE MODERATE MDM 30: CPT | Performed by: FAMILY MEDICINE

## 2022-06-10 RX ORDER — SERTRALINE HYDROCHLORIDE 25 MG/1
25 TABLET, FILM COATED ORAL DAILY
COMMUNITY

## 2022-06-11 PROBLEM — F41.8 ANXIETY WITH DEPRESSION: Status: ACTIVE | Noted: 2019-08-05

## 2022-06-11 NOTE — ASSESSMENT & PLAN NOTE
Overall continues with cognitive and functional decline  Needs NH care  Wt stable  Will continue with monitoring

## 2022-06-11 NOTE — PROGRESS NOTES
[facility]  [address]  [phone]  Facility: [place] [address]          NAME: Dar Polanco  AGE: 80 y o  SEX: female    DATE OF ENCOUNTER: 06/10/2022  Late entry     Chief Complaint     Pt has no specific complaint     History of Present Illness     HPI    The following portions of the patient's history were reviewed and updated as appropriate (from facility chart and hospital records): allergies, current medications, past family history, past medical history, past social history, past surgical history and problem list  Pt was seen and examined for f/u on on HTN, Afib, Dementia, anxiety, afib  HR controlled  Pt's BP episodically at higher side but overall acceptable  Per staff report pt in am sometimes refuses her meds in am  Pt continues with therapy and while she was inially impulsive with walking now she is more stable and transfers from [facility] to [facility] and back  Review of Systems     Review of Systems   Reason unable to perform ROS: overall limited with pt's cogntivie decline    Constitutional: Negative  HENT: Negative  Eyes: Negative  Respiratory: Negative  Cardiovascular: Negative  Gastrointestinal: Negative  Endocrine: Negative  Genitourinary: Negative  Musculoskeletal: Negative  Pt denies any pain    Skin: Negative  Allergic/Immunologic: Negative  Neurological: Negative  Hematological: Negative  Psychiatric/Behavioral: Negative  All other systems reviewed and are negative        Active Problem List     Patient Active Problem List   Diagnosis    Fall    Ambulatory dysfunction    Moderate protein-calorie malnutrition (HCC)    Anxiety with depression    Other constipation    Headache    Essential hypertension    Tricuspid regurgitation    Dementia ([address] )    New onset atrial fibrillation (HCC)    Metabolic encephalopathy    AMANDA (acute kidney injury) ([address] )    Hyperkalemia    Traumatic hematoma of scalp       Objective Vitals: Reviewed     Physical Exam  Vitals reviewed  Constitutional:       General: She is not in acute distress  Appearance: She is well-developed  She is not ill-appearing, toxic-appearing or diaphoretic  HENT:      Head: Normocephalic and atraumatic  Right Ear: External ear normal       Left Ear: External ear normal       Nose: Nose normal       Mouth/Throat:      Pharynx: No oropharyngeal exudate  Eyes:      General: No scleral icterus  Right eye: No discharge  Left eye: No discharge  Conjunctiva/sclera: Conjunctivae normal       Pupils: Pupils are equal, round, and reactive to light  Cardiovascular:      Rate and Rhythm: Normal rate and regular rhythm  Heart sounds: Normal heart sounds  No murmur heard  No friction rub  No gallop  Pulmonary:      Effort: Pulmonary effort is normal  No respiratory distress  Breath sounds: Normal breath sounds  No stridor  No wheezing, rhonchi or rales  Chest:      Chest wall: No tenderness  Abdominal:      General: Bowel sounds are normal  There is no distension  Palpations: Abdomen is soft  There is no mass  Tenderness: There is no abdominal tenderness  There is no guarding or rebound  Hernia: No hernia is present  Genitourinary:     Comments: Deferred  Musculoskeletal:         General: Deformity present  No swelling, tenderness or signs of injury  Cervical back: Normal range of motion and neck supple  No rigidity or tenderness  Right lower leg: No edema  Left lower leg: No edema  Comments: Sitting in her bed, legs down, limited ROM   Lymphadenopathy:      Cervical: No cervical adenopathy  Skin:     General: Skin is warm and dry  Coloration: Skin is not jaundiced or pale  Findings: Erythema present  No bruising, lesion or rash  Comments: Didn't examine sacral area  Neurological:      Mental Status: She is alert        Cranial Nerves: Cranial nerve deficit present  Comments: Not oriented, forgetful    Psychiatric:         Behavior: Behavior normal          Pertinent Laboratory/Diagnostic Studies:  Today: [facility], 5/25: CBC, CMP    Current Medications   Medication list in facility chart was reviewed and no changes made  Assessment and Plan     New onset atrial fibrillation (HCC)  HR controlled with Metoprolol  On Eliquis, labs in May stable  Essential hypertension  BP stable but at episodes elevated  Will continue with monitoring  Dementia ([facility] )  Overall continues with cognitive and functional decline  Needs NH care  Wt stable  Will continue with monitoring  Anxiety with depression  On Ativan and Mirtazapine, and added Zoloft  Psych on board  Will continue with monitoring       Kelly Trent MD

## 2022-07-11 ENCOUNTER — NURSING HOME VISIT (OUTPATIENT)
Dept: GERIATRICS | Facility: OTHER | Age: 82
End: 2022-07-11
Payer: MEDICARE

## 2022-07-11 DIAGNOSIS — I10 ESSENTIAL HYPERTENSION: ICD-10-CM

## 2022-07-11 DIAGNOSIS — R26.2 AMBULATORY DYSFUNCTION: Primary | ICD-10-CM

## 2022-07-11 DIAGNOSIS — F03.90 DEMENTIA WITHOUT BEHAVIORAL DISTURBANCE, UNSPECIFIED DEMENTIA TYPE: ICD-10-CM

## 2022-07-11 DIAGNOSIS — I48.91 ATRIAL FIBRILLATION, UNSPECIFIED TYPE (HCC): ICD-10-CM

## 2022-07-11 PROCEDURE — 99309 SBSQ NF CARE MODERATE MDM 30: CPT | Performed by: FAMILY MEDICINE

## 2022-07-11 NOTE — PROGRESS NOTES
[address]  Facility: [place] [address]      NAME: Cornelia Mariscal  AGE: 80 y o  SEX: female    DATE OF ENCOUNTER: 7/11/2022    Code status:  No CPR pt remains with DNR    Assessment and Plan     Ambulatory dysfunction  With recent fall and hospital stay  Ordered PT/Ot to improve her gait, safety, strength, ADLS, and endurance  Needs NH care  Dementia ([facility] )  Needs NH Care  Will continue with monitoring  Ordered SLP  Afib ([facility] [address] )  HR controlled, HR controlled with Metoprolol Tartrate, on ELiquis  Ordered labs  Essential hypertension  Stable with Metoprolol  Ordered labs  Will continue with monitoring  All medications and routine orders were reviewed and updated as needed  Plan discussed with: [address] staff     Chief Complaint     Pt has no specific complaint     History of Present Illness   Pt with PMSSFH and medical problem as this note who was admitted to [address] [place] [address] on 7/7 after having a fall  She had imaging and they were negative except for a scalp laceration  She is now back to  since 7/8  Her Eliquis was on hold but then it was resumed  Pt has no specific complaint  Per staff report there was small amount of blood in her brief which is not new  Her BP slightly elevated but overall stable  Pt continues with variable meal completion but overall 50% meal completion  Slight wt loss this monht       HISTORY:  Past Medical History:   Diagnosis Date    Acute and chronic respiratory failure with hypercapnia ([facility] ) 7/22/2019    Ambulatory dysfunction     Anemia     hgb 5 2o bleeding uler in 1970's    Arthritis     Dehydration 7/24/2019    Dementia ([facility] )     Depression     Eating disorder     Elevated temperature 12/29/2021    Encephalopathy     Furuncle of vulva 3/28/2022    Gastrointestinal bleeding     Gastrointestinal bleeding     GI bleeding 7/27/2019    Hyperlipidemia     Hypertension     Pressure injury of head, stage 1 2019    Pressure injury of sacral region, stage 3 ([facility]) 2019    Respiratory failure ([facility] [facility] )     Scalp hematoma 2019    Severe protein-calorie malnutrition [place]: less than 60% of standard weight) ([facility])     Severe protein-calorie malnutrition ([facility] ) 2019    Skin rash 2020    Unstageable pressure ulcer ([facility] ) 2019    Vulvar mass     Vulvar mass 2019     Family History   Problem Relation Age of Onset    Parkinsonism Mother     COPD Father      Social History     Socioeconomic History    Marital status: Single     Spouse name: None    Number of children: None    Years of education: None    Highest education level: None   Occupational History    None   Tobacco Use    Smoking status: Never Smoker    Smokeless tobacco: Never Used   Vaping Use    Vaping Use: Never used   Substance and Sexual Activity    Alcohol use: Not Currently     Comment: only socially in the past    Drug use: Never    Sexual activity: Not Currently     Partners: Male     Birth control/protection: None   Other Topics Concern    None   Social History Narrative    · Most recent tobacco use screenin2019      Social Determinants of Health     Financial Resource Strain: Not on file   Food Insecurity: No Food Insecurity    Worried About Running Out of Food in the Last Year: Never true    [place] of Food in the Last Year: Never true   Transportation Needs: No Transportation Needs    Lack of Transportation (Medical): No    Lack of Transportation (Non-Medical):  No   Physical Activity: Not on file   Stress: Not on file   Social Connections: Not on file   Intimate Partner Violence: Not on file   Housing Stability: Unknown    Unable to Pay for Housing in the Last Year: No    Number of Places Lived in the Last Year: Not on file    Unstable Housing in the Last Year: No       Allergies:  No Known Allergies    Review of Systems     Review of Systems   Reason unable to perform ROS: overall limited with pt's dementia  Constitutional: Negative  HENT: Negative  Eyes: Negative  Respiratory: Negative  Cardiovascular: Negative  Gastrointestinal: Negative  Endocrine: Negative  Genitourinary: Negative  Musculoskeletal: Negative  Skin: Negative  Allergic/Immunologic: Negative  Neurological: Negative  Hematological: Negative  Psychiatric/Behavioral: Negative  All other systems reviewed and are negative  Medications and orders     All medications reviewed and updated in Nursing Home EMR  Objective     Vitals: Reviewed     Physical Exam  Vitals reviewed  Constitutional:       General: She is not in acute distress  Appearance: She is well-developed  She is not ill-appearing, toxic-appearing or diaphoretic  HENT:      Head: Normocephalic and atraumatic  Right Ear: External ear normal       Left Ear: External ear normal       Ears:      Comments: [place]     Nose: Nose normal  No congestion or rhinorrhea  Mouth/Throat:      Mouth: Mucous membranes are moist    Eyes:      General: No scleral icterus  Right eye: No discharge  Left eye: No discharge  Extraocular Movements: Extraocular movements intact  Conjunctiva/sclera: Conjunctivae normal       Pupils: Pupils are equal, round, and reactive to light  Cardiovascular:      Rate and Rhythm: Normal rate  Rhythm irregular  Heart sounds: Normal heart sounds  No murmur heard  No friction rub  No gallop  Pulmonary:      Effort: Pulmonary effort is normal  No respiratory distress  Breath sounds: Normal breath sounds  No stridor  No wheezing, rhonchi or rales  Chest:      Chest wall: No tenderness  Abdominal:      General: Bowel sounds are normal  There is no distension  Palpations: Abdomen is soft  There is no mass  Tenderness: There is no abdominal tenderness  There is no guarding or rebound  Hernia: No hernia is present  Genitourinary:     Comments: Deferred  Musculoskeletal:         General: Deformity (slight kyphosis ) present  No swelling, tenderness or signs of injury  Cervical back: Normal range of motion and neck supple  No rigidity or tenderness  Right lower leg: No edema  Left lower leg: No edema  Lymphadenopathy:      Cervical: No cervical adenopathy  Skin:     General: Skin is warm and dry  Coloration: Skin is not jaundiced or pale  Findings: Bruising (of left frontal and aroudn left eye ) and erythema present  No lesion or rash  Comments: Didn't examine sacral area  Neurological:      Cranial Nerves: Cranial nerve deficit ([place]) present  Comments: Not oriented, follows commands    Psychiatric:         Behavior: Behavior normal          Pertinent Laboratory/Diagnostic Studies: The following labs/studies were reviewed please see chart or hospital paperwork for details            Ellie Moore MD  7/11/2022 1:44 PM

## 2022-07-11 NOTE — ASSESSMENT & PLAN NOTE
With recent fall and hospital stay  Ordered PT/Ot to improve her gait, safety, strength, ADLS, and endurance  Needs NH care

## 2022-07-14 ENCOUNTER — NURSING HOME VISIT (OUTPATIENT)
Dept: GERIATRICS | Facility: OTHER | Age: 82
End: 2022-07-14
Payer: MEDICARE

## 2022-07-14 DIAGNOSIS — F03.90 DEMENTIA WITHOUT BEHAVIORAL DISTURBANCE, UNSPECIFIED DEMENTIA TYPE: ICD-10-CM

## 2022-07-14 DIAGNOSIS — R26.2 AMBULATORY DYSFUNCTION: ICD-10-CM

## 2022-07-14 DIAGNOSIS — E44.0 MODERATE PROTEIN-CALORIE MALNUTRITION (HCC): ICD-10-CM

## 2022-07-14 DIAGNOSIS — I48.91 ATRIAL FIBRILLATION, UNSPECIFIED TYPE (HCC): Primary | ICD-10-CM

## 2022-07-14 PROCEDURE — 99309 SBSQ NF CARE MODERATE MDM 30: CPT | Performed by: NURSE PRACTITIONER

## 2022-07-15 NOTE — ASSESSMENT & PLAN NOTE
Malnutrition Findings:   Frail stature  Ambulatory dysfunction  Poor to fair appetite/ meal completion: 50-76%  Weight loss    BMI Findings: 15 4 kg/m2 (Underweight: 7/13/2022)  No anemia (7/13/2022)  Renal functions WNL (7/13/2022)  Continue Ensure BID  Continue SALINA with regular consistency  Continue monitor weight

## 2022-07-15 NOTE — ASSESSMENT & PLAN NOTE
BP range (7/1-14/2022) = 118/76 to 177/91  Ave SBP: 150  HR range (7/1-14/2022) = 62 to 102/min  Continue Eliquis 2 5mg BID/ Metoprolol tartrate 37 5mg BID  Continue Pantoprazole 20mg daily (gastroprotective)

## 2022-07-15 NOTE — PROGRESS NOTES
[address], [address] [place] [address]  [phone]    NAME: Melina Webb  AGE: 80 y o  SEX: female    Progress Note    Location: [facility]  POS: 31 (SNF)    Assessment/Plan:    Afib (HCC)  BP range (7/1-14/2022) = 118/76 to 177/91  Ave SBP: 150  HR range (7/1-14/2022) = 62 to 102/min  Continue Eliquis 2 5mg BID/ Metoprolol tartrate 37 5mg BID  Continue Pantoprazole 20mg daily (gastroprotective)    Dementia (HCC)  Continue LTCF supportive care  Continue to actively offer oral fluids/ snacks  Continue to redirect/ reorient as often as needed  Small increments of weight loss on review  Continue fall precaution  Continue low dose Lorazepam TID (Anxiety)    Moderate protein-calorie malnutrition (HCC)  Malnutrition Findings:   Frail stature  Ambulatory dysfunction  Poor to fair appetite/ meal completion: 50-76%  Weight loss    BMI Findings: 15 4 kg/m2 (Underweight: 7/13/2022)  No anemia (7/13/2022)  Renal functions WNL (7/13/2022)  Continue Ensure BID  Continue SALINA with regular consistency  Continue monitor weight    Ambulatory dysfunction  Recent fall the scalp hematoma and facial bruising (7/7/2022)  Continue fall precaution  Continue PT/OT as scheduled  Chief complaint / Reason for visit: Follow-up visit    History of Present Illness: This is an 14-year-old female patient [facility] discharge from [facility] with Dx of Ambulatory dysfunction, Scalp hematoma  Neuro and trauma work-up negative  Patient started on rehabilitation services to improve gait, strenthening, ADLs, safety awareness and endurance  Patient is seen and examined today to follow up acute and chronic medical conditions as mentioned above and Atrial fibrillation, Dementia  Moderate Protein Calorie Malnutrition  Patient is out of bed for this visit sitting in manual wheelchair in room - alert, cooperative, calm, pleasant and not in distress    Patient is verbal with clear coherent speech - oriented to name and birthday only  Patient acknowledged feeling well and this visit - denies any acute medical concerns during ROS assessment including pain  Patient stated, " I'm alright  Patient is unable to recall events leading to fall but is aware that she did fell and transferred to the hospital   Patient mentioned that she has chronic intermittent headaches - none for this visit  Patient denies feeling dizzy/ vertigo prior to fall  Patient acknowledge that therapy is helping a lot  Per nursing, no acute medical concerns for this visit  Review of Systems:  Per history of present illness, all other systems reviewed and negative  HISTORY:  Medical Hx: Reviewed, unchanged  Family Hx: Reviewed, unchanged  Soc Hx: Reviewed,  unchanged    ALLERGY: Reviewed, unchanged  No Known Allergies     PHYSICAL EXAM:  Vital Signs:  T97 9F -P90 -R16 BP: 159/83 SpO2: 94% RA  Weight:  81 3 lbs (7/13/2022) <= 82 4 lbs (6/2/2022) <= 85 5 lbs (5/2/2022)    General: NAD  Frail stature  Head: Atraumatic  Normocephalic  Ear: slightly hard of hearing  Eye Exam: anicteric sclera, no discharge, PERRLA, No injection  Oral Exam: moist mucous membranes, no buccaloropharyngeal erythema, palatine tonsils WNL  Neck Exam: no anterior cervical lymphadenopathy noted, neck supple  Cardiovascular: regular rate, irregular rhythm, no murmurs, rubs, or gallops  Pulmonary: no wheeze, no rhonchi, no rales  No chest tenderness  Abdominal: soft, non-tender, nondistended, bowel sounds audible x 4 quadrants  Ave meal completion: 50-76% with occasional 100%  : Non distended bladder  Daily BM  Extremities and skin: no edema noted, no rashes  Intact skin  B/L matt-orbital ecchymosis, hematoma to frontal scalp area  Neurological: alert, cooperative and responsive, Oriented x 2, moving all 4 extremities symmetrically  Ambulatory dysfunction - wheelchair bound      Laboratory results / Imaging reviewed: Hard copy/ies in medical chart:    * CBC wo diff (7/13/2022): WNL     * BMP (7/13/2022) = WNL except:  Co: 36 (H)  Anion gap: 2 (L)    Current Medications: All medications reviewed and updated in Nursing Home Chart    Please note:  Voice-recognition software may have been used in the preparation of this document  Occasional wrong word or "sound-alike" substitutions may have occurred due to the inherent limitations of voice recognition software  Interpretation should be guided by context      KATHIE Pedraza  7/15/2022

## 2022-07-15 NOTE — ASSESSMENT & PLAN NOTE
Recent fall the scalp hematoma and facial bruising (7/7/2022)  Continue fall precaution  Continue PT/OT as scheduled

## 2022-07-15 NOTE — ASSESSMENT & PLAN NOTE
Continue LTCF supportive care  Continue to actively offer oral fluids/ snacks  Continue to redirect/ reorient as often as needed    Small increments of weight loss on review  Continue fall precaution  Continue low dose Lorazepam TID (Anxiety)

## 2022-07-18 DIAGNOSIS — F41.9 ANXIETY: ICD-10-CM

## 2022-07-18 RX ORDER — LORAZEPAM 0.5 MG/1
TABLET ORAL
Qty: 60 TABLET | Refills: 0 | Status: SHIPPED | OUTPATIENT
Start: 2022-07-18

## 2022-07-19 ENCOUNTER — OFFICE VISIT (OUTPATIENT)
Dept: CARDIOLOGY CLINIC | Facility: CLINIC | Age: 82
End: 2022-07-19
Payer: MEDICARE

## 2022-07-19 VITALS — DIASTOLIC BLOOD PRESSURE: 78 MMHG | HEART RATE: 68 BPM | SYSTOLIC BLOOD PRESSURE: 132 MMHG | OXYGEN SATURATION: 98 %

## 2022-07-19 DIAGNOSIS — E44.0 MODERATE PROTEIN-CALORIE MALNUTRITION (HCC): ICD-10-CM

## 2022-07-19 DIAGNOSIS — F02.818 LATE ONSET ALZHEIMER'S DEMENTIA WITH BEHAVIORAL DISTURBANCE (HCC): Primary | ICD-10-CM

## 2022-07-19 DIAGNOSIS — G30.1 LATE ONSET ALZHEIMER'S DEMENTIA WITH BEHAVIORAL DISTURBANCE (HCC): Primary | ICD-10-CM

## 2022-07-19 DIAGNOSIS — I48.0 PAROXYSMAL ATRIAL FIBRILLATION (HCC): ICD-10-CM

## 2022-07-19 DIAGNOSIS — I10 ESSENTIAL HYPERTENSION: ICD-10-CM

## 2022-07-19 DIAGNOSIS — I36.1 NONRHEUMATIC TRICUSPID VALVE REGURGITATION: ICD-10-CM
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
## 2022-07-19 PROCEDURE — 99214 OFFICE O/P EST MOD 30 MIN: CPT | Performed by: INTERNAL MEDICINE

## 2022-07-19 NOTE — PATIENT INSTRUCTIONS
CARDIOLOGY ASSESSMENT & PLAN     1  Late onset Alzheimer's dementia with behavioral disturbance ([facility] [address] )     2  Essential hypertension     3  Nonrheumatic tricuspid valve regurgitation     4  Moderate protein-calorie malnutrition ([facility] [address] )     5  Fall, subsequent encounter     6  Paroxysmal atrial fibrillation (HCC)       Afib [facility])  Ms Terri Rob is an elderly female with advanced dementia who was recently hospitalized in May with atrial fibrillation with RVR  She is unable to provide history but seems like she has been overall doing well with no recent symptoms that suggest heart failure or AFib with RVR  Currently she is in sinus rhythm  Her heart rate and blood pressure are well controlled  On examination she is noted to have evidence of protein calorie malnutrition  She is also noted to have recent fall and bruising  I was able to do a rhythm strip on her and she is noted to be in sinus rhythm  -- at this time I am recommending to continue current medications however, considering recent fall and bruising and her future fall risk Eliquis can be discontinued  This decision will have to be made by her primary physician in discussion with patient's family but I am okay with discontinuing it unless she goes in AFib again  -- she is advised to continue other medications  -- she can be followed by visiting physician at the nursing facility  Cardiology follow-up can be arranged if necessary in the future  Cardiology can follow her in 6 months but if she is well and has had no acute events this appointment can be postponed or canceled

## 2022-07-19 NOTE — ASSESSMENT & PLAN NOTE
Ms Melina Webb is an elderly female with advanced dementia who was recently hospitalized in May with atrial fibrillation with RVR  She is unable to provide history but seems like she has been overall doing well with no recent symptoms that suggest heart failure or AFib with RVR  Currently she is in sinus rhythm  Her heart rate and blood pressure are well controlled  On examination she is noted to have evidence of protein calorie malnutrition  She is also noted to have recent fall and bruising  I was able to do a rhythm strip on her and she is noted to be in sinus rhythm  -- at this time I am recommending to continue current medications however, considering recent fall and bruising and her future fall risk Eliquis can be discontinued  This decision will have to be made by her primary physician in discussion with patient's family but I am okay with discontinuing it unless she goes in AFib again  -- she is advised to continue other medications  -- she can be followed by visiting physician at the nursing facility  Cardiology follow-up can be arranged if necessary in the future  Cardiology can follow her in 6 months but if she is well and has had no acute events this appointment can be postponed or canceled

## 2022-07-19 NOTE — PROGRESS NOTES
[facility]  [address] [facility], [place]  Phone#  [phone]  Fax#  [phone]  *-*-*-*-*-*-*-*-*-*-*-*-*-*-*-*-*-*-*-*-*-*-*-*-*-*-*-*-*-*-*-*-*-*-*-*-*-*-*-*-*-*-*-*-*-*-*-*-*-*-*-*-*-*  ENCOUNTER DATE: 07/19/22 11:50 AM  PATIENT NAME: Terri Rob   1940    [id number]  AGE:81 y o  SEX: female  Kacie Godoy MD     PRIMARY CARE PHYSICIAN: Deuce Quintana MD    DIAGNOSIS:  1  Recent hospitalization for atrial fibrillation with rapid ventricular response  2  Primary hypertension  3  Advanced dementia  4  Protein calorie malnutrition  5  Metabolic encephalopathy  6  Acute kidney injury  ECHOCARDIOGRAM May 2022: This is a technically very limited transthoracic echocardiogram study as patient was not able to cooperate for image acquisition due to her dementia and agitation  Only parasternal views could be obtained      Based on limited evaluation her left ventricular  cavity size appears to be normal, wall thickness is mildly increased  Left ventricular systolic function is normal   Left atrial size is normal   Aortic valve is mildly thickened and sclerotic without any stenosis or regurgitation  There is trace mitral and tricuspid valve regurgitation  There is no obvious pulmonary hypertension based on limited evaluation  There is trace, hemodynamically insignificant pericardial effusion      Compared to previous echocardiogram from July 23, 2019 left ventricular function appears to be same and normal   Left ventricular wall thickness may be mildly increased now  CURRENT ECG   No results found for this visit on 07/19/22  CARDIOLOGY ASSESSMENT & PLAN     1  Late onset Alzheimer's dementia with behavioral disturbance ([address] )     2  Essential hypertension     3  Nonrheumatic tricuspid valve regurgitation     4  Moderate protein-calorie malnutrition ([address] )     5  Fall, subsequent encounter     6   Paroxysmal atrial fibrillation (HCC) Afib [facility])  Ms Garett Chew is an elderly female with advanced dementia who was recently hospitalized in May with atrial fibrillation with RVR  She is unable to provide history but seems like she has been overall doing well with no recent symptoms that suggest heart failure or AFib with RVR  Currently she is in sinus rhythm  Her heart rate and blood pressure are well controlled  On examination she is noted to have evidence of protein calorie malnutrition  She is also noted to have recent fall and bruising  I was able to do a rhythm strip on her and she is noted to be in sinus rhythm  -- at this time I am recommending to continue current medications however, considering recent fall and bruising and her future fall risk Eliquis can be discontinued  This decision will have to be made by her primary physician in discussion with patient's family but I am okay with discontinuing it unless she goes in AFib again  -- she is advised to continue other medications  -- she can be followed by visiting physician at the nursing facility  Cardiology follow-up can be arranged if necessary in the future  Cardiology can follow her in 6 months but if she is well and has had no acute events this appointment can be postponed or canceled  INTERVAL HISTORY & HISTORY OF PRESENT ILLNESS     Garett Chew is here for follow-up regarding her cardiac comorbidities which include:  Recent hospitalization for atrial fibrillation, and other comorbidities  Patient is a resident at [facility] and is here accompanied with staff jay  She was seen by me during hospitalization in May 2022 when she had presented with AFib with RVR  Patient has advanced dementia and is unable to cooperate for examination and evaluation  Today also she states that she does not want to be evaluated and she has been brought to office against her wish    As per the staff member complain her there have been no acute problems recently  Patient denies any chest pain or shortness of breath  She is on insisting that she just wants to go home  As per information there has been no recent orthopnea PND or pedal edema  She reports that she walks around the nursing facility and has had no recent problems  Denies any falls although it is obvious that she had a fall as she has a subcutaneous bruising and ecchymosis and localized hematoma on forehead  Her medications are reviewed  She is on metoprolol tartrate 37 5 mg twice daily, Eliquis 2 5 mg twice daily    She had recent blood work on July 13, 2022 it indicates sodium of 140, potassium 4 3, chloride 102, bicarb 36, BUN 11, creatinine 0 47, glucose 80, WBC 5 7, hemoglobin 13 2, hematocrit 40 3, platelet count 860  REVIEW OF SYSTEMS   Positive for:  As noted above  Negative for: All remaining as reviewed below and in HPI  SYSTEM SYMPTOMS REVIEWED:  General--weight change, fever, night sweats  Respiratory--cough, wheezing, shortness of breath, sputum production  Cardiovascular--chest pain, syncope, dyspnea on exertion, edema, decline in exercise tolerance, claudication   Gastrointestinal--persistent vomiting, diarrhea, abdominal distention, blood in stool   Muscular or skeletal--joint pain or swelling   Neurologic--headaches, syncope, abnormal movement  Hematologic--history of easy bruising and bleeding   Endocrine--thyroid enlargement, heat or cold intolerance, polyuria   Psychiatric--anxiety, depression     *-*-*-*-*-*-*-*-*-*-*-*-*-*-*-*-*-*-*-*-*-*-*-*-*-*-*-*-*-*-*-*-*-*-*-*-*-*-*-*-*-*-*-*-*-*-*-*-*-*-*-*-*-*-  VITAL SIGNS     CURRENT VITAL SIGNS:   Vitals:    07/19/22 1117   BP: 132/78   BP Location: Right arm   Patient Position: Sitting   Cuff Size: Standard   Pulse: 68   SpO2: 98%       BMI: There is no height or weight on file to calculate BMI      WEIGHTS:   Wt Readings from Last 25 Encounters:   05/17/22 39 6 kg (87 lb 4 8 oz)   05/18/22 35 8 kg (78 lb 14 4 oz)   05/15/22 39 2 kg (86 lb 6 7 oz)   07/31/19 32 9 kg (72 lb 8 5 oz)        *-*-*-*-*-*-*-*-*-*-*-*-*-*-*-*-*-*-*-*-*-*-*-*-*-*-*-*-*-*-*-*-*-*-*-*-*-*-*-*-*-*-*-*-*-*-*-*-*-*-*-*-*-*-  PHYSICAL EXAM     General Appearance:     alert but slightly confused  Repeatedly states that she wants to go home and she is not here by her wish  Head, Eyes, ENT:    there is evidence of protein calorie malnutrition some bruising on the face with localized hematoma on the left upper forehead is noted  There is bruising and ecchymosis around the eyes also  Neck:   Supple, no carotid bruit or JVD   Back:     Symmetric, no curvature  Lungs:     Respirations unlabored  Clear to auscultation bilaterally,    Chest wall:    No tenderness or deformity   Heart:    Regular rate and rhythm, S1 and S2 normal, no murmur, rub  or gallop  Abdomen:     Soft, non-tender, No obvious masses, or organomegaly   Extremities:   Extremities warm, no cyanosis or edema    Skin:   mild venostatic changes in lower extremities  Normal skin color, texture, and turgor   No rashes or lesions     *-*-*-*-*-*-*-*-*-*-*-*-*-*-*-*-*-*-*-*-*-*-*-*-*-*-*-*-*-*-*-*-*-*-*-*-*-*-*-*-*-*-*-*-*-*-*-*-*-*-*-*-*-*-  CURRENT MEDICATIONS LIST     Current Outpatient Medications:     acetaminophen (TYLENOL) 325 mg tablet, Take 650 mg by mouth every 4 (four) hours as needed for mild pain or fever FEVER 100 DEGREES OR HIGHER, Disp: , Rfl:     acetaminophen (TYLENOL) 650 mg suppository, Insert 650 mg into the rectum every 4 (four) hours as needed for mild pain or fever FEVER 100 DEGREES OR GREATER , Disp: , Rfl:     apixaban (ELIQUIS) 2 5 mg, Take 1 tablet (2 5 mg total) by mouth in the morning and 1 tablet (2 5 mg total) in the evening , Disp: , Rfl: 0    bisacodyl (DULCOLAX) 10 mg suppository, Insert 10 mg into the rectum as needed for constipation, Disp: , Rfl:     docusate sodium (COLACE) 100 mg capsule, Take 1 capsule (100 mg total) by mouth 2 (two) times a day, Disp: 10 capsule, Rfl: 0    hydrocortisone (ANUSOL-HC) 2 5 % rectal cream, Apply 1 application topically 2 (two) times a day, Disp: , Rfl:     LORazepam (ATIVAN) 0 5 mg tablet, 1/2 tab BID at at 0800, and 1200 and 1 Tab at 1800 , Disp: 60 tablet, Rfl: 0    metoprolol tartrate (LOPRESSOR) 25 mg tablet, Take 1 5 tablets (37 5 mg total) by mouth every 12 (twelve) hours, Disp: , Rfl: 0    mirtazapine (REMERON) 15 mg tablet, Take 15 mg by mouth daily at bedtime, Disp: , Rfl:     ondansetron (ZOFRAN-ODT) 4 mg disintegrating tablet, Take 1 tablet (4 mg total) by mouth every 6 (six) hours as needed for nausea or vomiting, Disp: 20 tablet, Rfl: 0    pantoprazole (PROTONIX) 20 mg tablet, Take 1 tablet (20 mg total) by mouth daily in the early morning, Disp: , Rfl: 0    polyethylene glycol (MIRALAX) 17 g packet, Take 17 g by mouth daily, Disp: 14 each, Rfl: 0    sertraline (ZOLOFT) 25 mg tablet, Take 25 mg by mouth daily, Disp: , Rfl:     Sodium Phosphates (Enema) ENEM, Insert 1 enema into the rectum as needed, Disp: , Rfl:     sorbitol 70 % solution, Take 30 mL by mouth daily as needed, Disp: , Rfl:        ALLERGIES   No Known Allergies    *-*-*-*-*-*-*-*-*-*-*-*-*-*-*-*-*-*-*-*-*-*-*-*-*-*-*-*-*-*-*-*-*-*-*-*-*-*-*-*-*-*-*-*-*-*-*-*-*-*-*-*-*-*-  LABORATORY DATA   No results found for: NA  Potassium   Date Value Ref Range Status   05/14/2022 3 5 3 5 - 5 3 mmol/L Final   05/13/2022 3 3 (L) 3 5 - 5 3 mmol/L Final   05/12/2022 3 9 3 5 - 5 3 mmol/L Final     Chloride   Date Value Ref Range Status   05/14/2022 101 100 - 108 mmol/L Final   05/13/2022 105 100 - 108 mmol/L Final   05/12/2022 108 100 - 108 mmol/L Final     CO2   Date Value Ref Range Status   05/14/2022 30 21 - 32 mmol/L Final   05/13/2022 28 21 - 32 mmol/L Final   05/12/2022 27 21 - 32 mmol/L Final     CO2, i-STAT   Date Value Ref Range Status   07/21/2019 36 (H) 21 - 32 mmol/L Final   07/21/2019 38 (H) 21 - 32 mmol/L Final     BUN   Date Value Ref Range Status   05/14/2022 14 5 - 25 mg/dL Final   05/13/2022 17 5 - 25 mg/dL Final   05/12/2022 14 5 - 25 mg/dL Final     Creatinine   Date Value Ref Range Status   05/14/2022 0 56 (L) 0 60 - 1 30 mg/dL Final     Comment:     Standardized to IDMS reference method   05/13/2022 0 58 (L) 0 60 - 1 30 mg/dL Final     Comment:     Standardized to IDMS reference method   05/12/2022 0 56 (L) 0 60 - 1 30 mg/dL Final     Comment:     Standardized to IDMS reference method     eGFR   Date Value Ref Range Status   05/14/2022 87 ml/min/1 73sq m Final   05/13/2022 86 ml/min/1 73sq m Final   05/12/2022 87 ml/min/1 73sq m Final     Glucose, i-STAT   Date Value Ref Range Status   07/21/2019 119 65 - 140 mg/dl Final   07/21/2019 119 65 - 140 mg/dl Final     Calcium   Date Value Ref Range Status   05/14/2022 8 4 8 3 - 10 1 mg/dL Final   05/13/2022 8 4 8 3 - 10 1 mg/dL Final   05/12/2022 7 9 (L) 8 3 - 10 1 mg/dL Final     AST   Date Value Ref Range Status   05/11/2022 33 5 - 45 U/L Final     Comment:     Slightly Hemolyzed; Results May be Affected  Specimen collection should occur prior to Sulfasalazine administration due to the potential for falsely depressed results  07/26/2019 40 5 - 45 U/L Final     Comment:     Slightly Hemolyzed; Results May be Affected  Specimen collection should occur prior to Sulfasalazine and/or Sulfapyridine administration due to the potential for falsely depressed results  07/21/2019 74 (H) 5 - 45 U/L Final     Comment:     Slightly Hemolyzed; Results May be Affected&XA&Slightly Hemolyzed; Results May be Affected  Specimen collection should occur prior to Sulfasalazine administration due to the potential for falsely depressed results  ALT   Date Value Ref Range Status   05/11/2022 19 12 - 78 U/L Final     Comment:     Specimen collection should occur prior to Sulfasalazine administration due to the potential for falsely depressed results      07/26/2019 36 12 - 78 U/L Final     Comment: Specimen collection should occur prior to Sulfasalazine and/or Sulfapyridine administration due to the potential for falsely depressed results  07/21/2019 35 12 - 78 U/L Final     Comment:       Specimen collection should occur prior to Sulfasalazine and/or Sulfapyridine administration due to the potential for falsely depressed results  Alkaline Phosphatase   Date Value Ref Range Status   05/11/2022 104 46 - 116 U/L Final   07/26/2019 57 46 - 116 U/L Final   07/21/2019 77 46 - 116 U/L Final     Magnesium   Date Value Ref Range Status   05/12/2022 2 1 1 6 - 2 6 mg/dL Final   07/26/2019 2 0 1 6 - 2 6 mg/dL Final     Comment:     Slightly Hemolyzed; Results May be Affected   07/25/2019 2 1 1 6 - 2 6 mg/dL Final     WBC   Date Value Ref Range Status   05/13/2022 10 53 (H) 4 31 - 10 16 Thousand/uL Final   05/12/2022 8 49 4 31 - 10 16 Thousand/uL Final   05/11/2022 11 68 (H) 4 31 - 10 16 Thousand/uL Final     Hemoglobin   Date Value Ref Range Status   05/13/2022 13 1 11 5 - 15 4 g/dL Final   05/12/2022 12 6 11 5 - 15 4 g/dL Final   05/11/2022 14 2 11 5 - 15 4 g/dL Final     Platelets   Date Value Ref Range Status   05/13/2022 266 149 - 390 Thousands/uL Final   05/12/2022 248 149 - 390 Thousands/uL Final   05/11/2022 324 149 - 390 Thousands/uL Final     PTT   Date Value Ref Range Status   05/11/2022 29 23 - 37 seconds Final     Comment:     Therapeutic Heparin Range =  60-90 seconds   07/26/2019 35 23 - 37 seconds Final     Comment:     Therapeutic Heparin Range =  60-90 seconds     INR   Date Value Ref Range Status   05/11/2022 1 04 0 84 - 1 19 Final   07/26/2019 1 31 (H) 0 84 - 1 19 Final     CK-MB   Date Value Ref Range Status   07/22/2019 15 6 (H) 0 0 - 5 0 ng/mL Final   07/21/2019 24 4 (H) 0 0 - 5 0 ng/mL Final     No results found for: TSH  No results found for: CHOL   No results found for: HGBA1C  Blood Culture   Date Value Ref Range Status   07/22/2019 No Growth After 5 Days    Final   07/22/2019 No Growth After 5 Days  Final       *-*-*-*-*-*-*-*-*-*-*-*-*-*-*-*-*-*-*-*-*-*-*-*-*-*-*-*-*-*-*-*-*-*-*-*-*-*-*-*-*-*-*-*-*-*-*-*-*-*-*-*-*-*-  PREVIOUS CARDIOLOGY & RADIOLOGY TEST RESULTS   I have personally reviewed pertinent results of cardiovascular tests noted below  Results for orders placed during the hospital encounter of 19    Echo complete with contrast if indicated    Narrative  [address]  [facility], [address] [facility]  [phone]    Transthoracic Echocardiogram  2D, M-mode, Doppler, and Color Doppler    Study date:  2019    Patient: Asuncion Motley  MR number: [id number]  Account number: [de-identified]  : 1940  Age: 66 years  Gender: Female  Status: Inpatient  Location: Bedside  Height: 58 in  Weight: 70 lb  BP: 179/ 85 mmHg    Indications: Syncope and Collapse    Diagnoses: R55  - Syncope and collapse    Sonographer:  EFREM Hardin  Primary Physician:  Rima Roca MD  Referring Physician:  Rima Roca MD  Group:  [place] [id number] [facility]  Cardiology Fellow: Delvin Borges MD  Interpreting Physician:  Bonita Borges MD    IMPRESSIONS:  This was a technically difficult study  No apical images were obtained, therefore, Doppler investigation of the aortic and mitral valves could not be performed  SUMMARY    LEFT VENTRICLE:  Systolic function was normal  Ejection fraction was estimated to be 65 %  There were no regional wall motion abnormalities  There was no evidence of concentric hypertrophy  MITRAL VALVE:  There was mild regurgitation  TRICUSPID VALVE:  There was mild to moderate regurgitation  Pulmonary artery systolic pressure was at the upper limits of normal   Estimated peak PA pressure was 35 mmHg  AORTA:  There was mild dilatation of the ascending aorta at 27 mm (23 1 mm/m^2)  IVC, HEPATIC VEINS:  The inferior vena was small, appearing collapsed from external pressure    The respirophasic change in diameter was more than 50%  PERICARDIUM:  A small pericardial effusion was identified along the right ventricular free wall  The fluid had no internal echoes  There was no evidence of hemodynamic compromise  HISTORY: PRIOR HISTORY: Acute hypercapnic respiratory failure    PROCEDURE: The procedure was performed at the bedside  This was a routine study  The transthoracic approach was used  The study included complete 2D imaging, M-mode, complete spectral Doppler, and color Doppler  The heart rate was 77 bpm,  at the start of the study  Echocardiographic views were limited due to low windows  Image quality was adequate  LEFT VENTRICLE: Size was normal  Systolic function was normal  Ejection fraction was estimated to be 65 %  There were no regional wall motion abnormalities  Wall thickness was normal  There was no evidence of concentric hypertrophy  DOPPLER: Left ventricular diastolic function parameters were normal for the patient's age  RIGHT VENTRICLE: The size was normal  Systolic function was normal     LEFT ATRIUM: Size was normal     RIGHT ATRIUM: Size was normal     MITRAL VALVE: Valve structure was normal  There was normal leaflet separation  DOPPLER: Transmitral velocity could not be determined  There was mild regurgitation  AORTIC VALVE: The valve was trileaflet  Leaflets exhibited mild calcification, normal cuspal separation, and sclerosis  DOPPLER: Transaortic velocity could not be determined  There was no evidence visually for stenosis  There was trace  regurgitation  TRICUSPID VALVE: There was normal leaflet separation  DOPPLER: The transtricuspid velocity was within the normal range  There was no evidence for stenosis  There was mild to moderate regurgitation  Pulmonary artery systolic pressure was at  the upper limits of normal  Estimated peak PA pressure was 35 mmHg  PULMONIC VALVE: DOPPLER: The transpulmonic velocity was within the normal range   There was trace regurgitation  PERICARDIUM: A small pericardial effusion was identified along the right ventricular free wall  The fluid had no internal echoes  There was no evidence of hemodynamic compromise  AORTA: The root exhibited normal size  There was mild dilatation of the ascending aorta at 27 mm (23 1 mm/m^2)  SYSTEMIC VEINS: IVC: The inferior vena was small, appearing collapsed from external pressure  The respirophasic change in diameter was more than 50%  SYSTEM MEASUREMENT TABLES    2D  %FS: 47 96 %  Ao Diam: 2 14 cm  EDV(Teich): 35 4 ml  EF(Cube): 85 91 %  EF(Teich): 80 78 %  ESV(Cube): 3 86 ml  ESV(Teich): 6 8 ml  IVSd: 0 57 cm  LA Diam: 2 58 cm  LVIDd: 3 01 cm  LVIDs: 1 57 cm  LVPWd: 0 91 cm  SV(Cube): 23 52 ml  SV(Teich): 28 59 ml    CW  TR Vmax: 2 76 m/s  TR maxP 41 mmHg    [facility]    Prepared and electronically signed by    Hakeem Briscoe MD  Signed 2019 13:43:33    No results found for this or any previous visit  No results found for this or any previous visit  No results found for this or any previous visit  CT head without contrast  Narrative: CT BRAIN - WITHOUT CONTRAST    INDICATION:   Trauma  COMPARISON:  2019     TECHNIQUE:  CT examination of the brain was performed  In addition to axial images, sagittal and coronal 2D reformatted images were created and submitted for interpretation  Radiation dose length product (DLP) for this visit:  836 mGy-cm   This examination, like all CT scans performed in the [facility], was performed utilizing techniques to minimize radiation dose exposure, including the use of iterative   reconstruction and automated exposure control  IMAGE QUALITY:  Diagnostic      FINDINGS:    PARENCHYMA: Decreased attenuation is noted in periventricular and subcortical white matter demonstrating an appearance that is statistically most likely to represent mild microangiopathic change; this appearance is similar when compared to most recent   prior examination  No CT signs of acute infarction  No intracranial mass, mass effect or midline shift  No acute parenchymal hemorrhage  VENTRICLES AND EXTRA-AXIAL SPACES:  Ventricles and extra-axial CSF spaces are prominent commensurate with the degree of volume loss  No hydrocephalus  No acute extra-axial hemorrhage  VISUALIZED ORBITS AND PARANASAL SINUSES:  Unremarkable  CALVARIUM AND EXTRACRANIAL SOFT TISSUES:  Right frontal scalp hematoma  Impression: No acute intracranial abnormality  Microangiopathic changes  Workstation performed: [id number]  CT cervical spine without contrast  Narrative: CT CERVICAL SPINE - WITHOUT CONTRAST    INDICATION:   Trauma  COMPARISON:  7/21/2019  TECHNIQUE:  CT examination of the cervical spine was performed without intravenous contrast   Contiguous axial images were obtained  Sagittal and coronal reconstructions were performed  Radiation dose length product (DLP) for this visit:  132 mGy-cm   This examination, like all CT scans performed in the [facility], was performed utilizing techniques to minimize radiation dose exposure, including the use of iterative   reconstruction and automated exposure control  IMAGE QUALITY:  Diagnostic  FINDINGS:    ALIGNMENT:  Normal alignment of the cervical spine  No subluxation  VERTEBRAL BODIES:  No fracture  DEGENERATIVE CHANGES:  Mild multilevel cervical degenerative changes are noted without critical central canal stenosis  PREVERTEBRAL AND PARASPINAL SOFT TISSUES:  Unremarkable  THORACIC INLET:  Biapical pleural parenchymal scarring similar to prior exam   Impression: No cervical spine fracture or traumatic malalignment           Workstation performed: [id number] *-*-*-*-*-*-*-*-*-*-*-*-*-*-*-*-*-*-*-*-*-*-*-*-*-*-*-*-*-*-*-*-*-*-*-*-*-*-*-*-*-*-*-*-*-*-*-*-*-*-*-*-*-*-  SIGNATURES:   [unfilled]   Tk Weber MD; MHA    *-*-*-*-*-*-*-*-*-*-*-*-*-*-*-*-*-*-*-*-*-*-*-*-*-*-*-*-*-*-*-*-*-*-*-*-*-*-*-*-*-*-*-*-*-*-*-*-*-*-*-*-*-*-  PAST MEDICAL HISTORY:  Past Medical History:   Diagnosis Date    Acute and chronic respiratory failure [facility]capnia ([facility] ) 7/22/2019    Ambulatory dysfunction     Anemia     hgb 5 2o bleeding uler in 1970's    Arthritis     Dehydration 7/24/2019    Dementia ([facility] )     Depression     Eating disorder     Elevated temperature 12/29/2021    Encephalopathy     Furuncle of vulva 3/28/2022    Gastrointestinal bleeding     Gastrointestinal bleeding     GI bleeding 7/27/2019    Hyperlipidemia     Hypertension     Pressure injury of head, stage 1 7/21/2019    Pressure injury of sacral region, stage 3 ([facility] ) 7/21/2019    Respiratory failure ([facility] )     Scalp hematoma 7/21/2019    Severe protein-calorie malnutrition Jay Jay Peterson: less than 60% of standard weight) ([facility])     Severe protein-calorie malnutrition (HCC) 7/21/2019    Skin rash 11/13/2020    Unstageable pressure ulcer ([facility] ) 9/4/2019    Vulvar mass     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    PAST SURGICAL HISTORY:  No past surgical history on file        FAMILY HISTORY:  Family History   Problem Relation Age of Onset    Parkinsonism Mother     COPD Father     SOCIAL HISTORY:  Social History     Tobacco Use   Smoking Status Never Smoker   Smokeless Tobacco Never Used      Social History     Substance and Sexual Activity   Alcohol Use Not Currently    Comment: only socially in the past     Social History     Substance and Sexual Activity   Drug Use Never    [unfilled]     *-*-*-*-*-*-*-*-*-*-*-*-*-*-*-*-*-*-*-*-*-*-*-*-*-*-*-*-*-*-*-*-*-*-*-*-*-*-*-*-*-*-*-*-*-*-*-*-*-*-*-*-*-*  ALLERGIES:  No Known Allergies CURRENT SCHEDULED MEDICATIONS:    Current Outpatient Medications:     acetaminophen (TYLENOL) 325 mg tablet, Take 650 mg by mouth every 4 (four) hours as needed for mild pain or fever FEVER 100 DEGREES OR HIGHER, Disp: , Rfl:     acetaminophen (TYLENOL) 650 mg suppository, Insert 650 mg into the rectum every 4 (four) hours as needed for mild pain or fever FEVER 100 DEGREES OR GREATER , Disp: , Rfl:     apixaban (ELIQUIS) 2 5 mg, Take 1 tablet (2 5 mg total) by mouth in the morning and 1 tablet (2 5 mg total) in the evening , Disp: , Rfl: 0    bisacodyl (DULCOLAX) 10 mg suppository, Insert 10 mg into the rectum as needed for constipation, Disp: , Rfl:     docusate sodium (COLACE) 100 mg capsule, Take 1 capsule (100 mg total) by mouth 2 (two) times a day, Disp: 10 capsule, Rfl: 0    hydrocortisone (ANUSOL-HC) 2 5 % rectal cream, Apply 1 application topically 2 (two) times a day, Disp: , Rfl:     LORazepam (ATIVAN) 0 5 mg tablet, 1/2 tab BID at at 0800, and 1200 and 1 Tab at 1800 , Disp: 60 tablet, Rfl: 0    metoprolol tartrate (LOPRESSOR) 25 mg tablet, Take 1 5 tablets (37 5 mg total) by mouth every 12 (twelve) hours, Disp: , Rfl: 0    mirtazapine (REMERON) 15 mg tablet, Take 15 mg by mouth daily at bedtime, Disp: , Rfl:     ondansetron (ZOFRAN-ODT) 4 mg disintegrating tablet, Take 1 tablet (4 mg total) by mouth every 6 (six) hours as needed for nausea or vomiting, Disp: 20 tablet, Rfl: 0    pantoprazole (PROTONIX) 20 mg tablet, Take 1 tablet (20 mg total) by mouth daily in the early morning, Disp: , Rfl: 0    polyethylene glycol (MIRALAX) 17 g packet, Take 17 g by mouth daily, Disp: 14 each, Rfl: 0    sertraline (ZOLOFT) 25 mg tablet, Take 25 mg by mouth daily, Disp: , Rfl:     Sodium Phosphates (Enema) ENEM, Insert 1 enema into the rectum as needed, Disp: , Rfl:     sorbitol 70 % solution, Take 30 mL by mouth daily as needed, Disp: , Rfl:      *-*-*-*-*-*-*-*-*-*-*-*-*-*-*-*-*-*-*-*-*-*-*-*-*-*-*-*-*-*-*-*-*-*-*-*-*-*-*-*-*-*-*-*-*-*-*-*-*-*-*-*-*-*

## 2022-08-15 ENCOUNTER — PATIENT OUTREACH (OUTPATIENT)
Dept: FAMILY MEDICINE CLINIC | Facility: CLINIC | Age: 82
End: 2022-08-15

## 2022-09-09 ENCOUNTER — NURSING HOME VISIT (OUTPATIENT)
Dept: GERIATRICS | Facility: OTHER | Age: 82
End: 2022-09-09
Payer: MEDICARE

## 2022-09-09 DIAGNOSIS — G30.1 LATE ONSET ALZHEIMER'S DEMENTIA WITH BEHAVIORAL DISTURBANCE (HCC): ICD-10-CM

## 2022-09-09 DIAGNOSIS — R26.2 AMBULATORY DYSFUNCTION: ICD-10-CM

## 2022-09-09 DIAGNOSIS — F41.8 ANXIETY WITH DEPRESSION: ICD-10-CM

## 2022-09-09 DIAGNOSIS — F02.818 LATE ONSET ALZHEIMER'S DEMENTIA WITH BEHAVIORAL DISTURBANCE (HCC): ICD-10-CM

## 2022-09-09 DIAGNOSIS — I48.91 NEW ONSET ATRIAL FIBRILLATION (HCC): ICD-10-CM

## 2022-09-09 DIAGNOSIS — I10 ESSENTIAL HYPERTENSION: ICD-10-CM

## 2022-09-09 DIAGNOSIS — I48.0 PAROXYSMAL ATRIAL FIBRILLATION (HCC): ICD-10-CM

## 2022-09-09 DIAGNOSIS — E44.0 MODERATE PROTEIN-CALORIE MALNUTRITION (HCC): Primary | ICD-10-CM

## 2022-09-09 PROCEDURE — 99309 SBSQ NF CARE MODERATE MDM 30: CPT | Performed by: INTERNAL MEDICINE

## 2022-09-09 NOTE — PROGRESS NOTES
[address]  [phone]  [address]      NAME: Татьяна Waldrop  AGE: 80 y o  SEX: female [id number]    DATE OF ENCOUNTER: 9/9/2022    Assessment and Plan     1  Moderate protein-calorie malnutrition ([facility] [address] )     2  Essential hypertension     3  Anxiety with depression     4  Paroxysmal atrial fibrillation (HCC)     5  Late onset Alzheimer's dementia with behavioral disturbance ([facility] [address] )     6  Ambulatory dysfunction         Moderate protein-calorie malnutrition ([facility] [address] )  Pt weighs 82 7 9/1/2022  Low but stable  Pt always been around 85 pounds for last 2 years  Cachetic    Labs reviewed & wnl  No anemia/renal failure  Good meal completion:  50-75% majority of time  Even 100% of meal completed 25% of time  Cont Ensure BID  Cont low salt diet  Monitor weight    HTN  Uncontrolled  Pt was on metoprolol tartrate 37 5 mg 1 tab po BID    158/84 9/8/222 8/30/22 HR 63  8/29 68 HR  8/28 76 HR  8/26/22 76  8/26/22 80  8/21/22 72    Reviewed labs from July 2022; wnl  Change metoprolol tartrate to 50 mg 1 tab po BID (hold if HR <55 or SBP <90)  Check vitals BID x 1 week then liberalize based on how pt's BP/HR trend is doing    Anxiety  Pt on lorazepam 0 25 mg at 8 am, 12 pm  Cont lorazepam 0 5 mg 1 tab po QHS  Sertraline 25 mg 1 tab po qam  Mirtazpaine 15 mg 1 tab po QHS    Atrial fibrillation  158/84 9/8/222  HR 63-80 range  Reviewed labs from July 2022; wnl  Change metoprolol tartrate to 50 mg 1 tab po BID (hold if HR <55 or SBP <90)    Dementia  Pt very pleasant & cooperative  Only oriented to self  Does recognize roommate  Stable     Ambulatory dysfunction  Physical therapy  Cont long term care support  Cont fall precaution    Code status: DNR    Chief Complaint     Routine Long term follow-up visit  History of Present Illness   Patient is a very pleasant 79 y/o female with has PMH of atrial fibrillation, dementia, HTN, tricuspid regurgitation, Anxiety, depression, and hypertension  Patient knows her roommates name is Dorita Aldridge  Patient does not know what location she is at  patient says she has a memory problem  Patient is not oriented to time  Patient is very conversive and pleasant  Discussed continuing to have good PO intake  Patient is not in any acute distress  Bm ok in reviewing [facility]; she has almost daily BM    The following portions of the patient's history were reviewed and updated as appropriate: allergies, current medications, past family history, past medical history, past social history, past surgical history and problem list     Review of Systems     Review of Systems   Unable to perform ROS: Dementia   All other systems reviewed and are negative  Active Problem List     Patient Active Problem List   Diagnosis    Fall    Ambulatory dysfunction    Moderate protein-calorie malnutrition (HCC)    Anxiety with depression    Other constipation    Headache    Essential hypertension    Tricuspid regurgitation    Dementia (HCC)    [facility]ib [facility] [address] [facility]    Metabolic encephalopathy    AMANDA (acute kidney injury) ([facility] [address] )    Hyperkalemia    Traumatic hematoma of scalp       Objective     Vitals: 160/90, 67, 18, 94% on room air, weighs 82 7 pounds    Physical Exam  Constitutional:       General: She is not in acute distress  Appearance: She is not ill-appearing  HENT:      Head: Normocephalic and atraumatic  Cardiovascular:      Rate and Rhythm: Normal rate and regular rhythm  Pulmonary:      Effort: Pulmonary effort is normal  No respiratory distress  Breath sounds: Normal breath sounds  No wheezing  Abdominal:      General: Bowel sounds are normal  There is no distension  Palpations: Abdomen is soft  Tenderness: There is no abdominal tenderness  Skin:     Comments: Feet: clean, dry, intact  Legs: no cyanosis, clubbing or edema   Neurological:      Mental Status: She is alert  Mental status is at baseline     Psychiatric:         Mood and Affect: Mood normal  Behavior: Behavior normal       Comments: Pt very pleasant & cooperative  Says has memory problems  Patient is alert and oriented to self only  Patient is with dementia  Patient is very pleasant  Her feet are clean dry and intact  She has early signs of onychomycosis on her greater toenails  Patient has good bilateral pedal pulses on her feet  Pertinent Laboratory/Diagnostic Studies:  Refer to facility chart  Current Medications   Medications reviewed and updated in facility chart      Aminah Estrada MD  Internal Medicine  Senior Care Physician

## 2022-09-12 ENCOUNTER — APPOINTMENT (OUTPATIENT)
Dept: RADIOLOGY | Facility: HOSPITAL | Age: 82
End: 2022-09-12
Payer: MEDICARE

## 2022-09-12 ENCOUNTER — APPOINTMENT (EMERGENCY)
Dept: CT IMAGING | Facility: HOSPITAL | Age: 82
End: 2022-09-12
Payer: MEDICARE

## 2022-09-12 ENCOUNTER — HOSPITAL ENCOUNTER (EMERGENCY)
Facility: HOSPITAL | Age: 82
Discharge: HOME | End: 2022-09-12
Attending: EMERGENCY MEDICINE
Payer: MEDICARE

## 2022-09-12 ENCOUNTER — NURSING HOME VISIT (OUTPATIENT)
Dept: GERIATRICS | Facility: OTHER | Age: 82
End: 2022-09-12
Payer: MEDICARE

## 2022-09-12 VITALS
HEART RATE: 76 BPM | TEMPERATURE: 97.7 F | RESPIRATION RATE: 18 BRPM | DIASTOLIC BLOOD PRESSURE: 73 MMHG | OXYGEN SATURATION: 96 % | SYSTOLIC BLOOD PRESSURE: 157 MMHG

## 2022-09-12 DIAGNOSIS — G30.1 LATE ONSET ALZHEIMER'S DEMENTIA WITH BEHAVIORAL DISTURBANCE (HCC): ICD-10-CM

## 2022-09-12 DIAGNOSIS — F41.8 ANXIETY WITH DEPRESSION: ICD-10-CM

## 2022-09-12 DIAGNOSIS — S01.81XA FACIAL LACERATION, INITIAL ENCOUNTER: ICD-10-CM

## 2022-09-12 DIAGNOSIS — F02.818 LATE ONSET ALZHEIMER'S DEMENTIA WITH BEHAVIORAL DISTURBANCE (HCC): ICD-10-CM

## 2022-09-12 DIAGNOSIS — S00.03XD TRAUMATIC HEMATOMA OF SCALP, SUBSEQUENT ENCOUNTER: ICD-10-CM

## 2022-09-12 DIAGNOSIS — R51.9 NONINTRACTABLE HEADACHE, UNSPECIFIED CHRONICITY PATTERN, UNSPECIFIED HEADACHE TYPE: ICD-10-CM

## 2022-09-12 DIAGNOSIS — R26.2 AMBULATORY DYSFUNCTION: ICD-10-CM
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
## 2022-09-12 DIAGNOSIS — S09.90XA INJURY OF HEAD, INITIAL ENCOUNTER: Primary | ICD-10-CM

## 2022-09-12 LAB
ANION GAP SERPL CALCULATED.3IONS-SCNC: 1 MMOL/L (ref 4–13)
ATRIAL RATE: 62 BPM
BASOPHILS # BLD AUTO: 0.09 THOUSANDS/ΜL (ref 0–0.1)
BASOPHILS NFR BLD AUTO: 1 % (ref 0–1)
BUN SERPL-MCNC: 17 MG/DL (ref 5–25)
CALCIUM SERPL-MCNC: 9 MG/DL (ref 8.3–10.1)
CHLORIDE SERPL-SCNC: 99 MMOL/L (ref 96–108)
CO2 SERPL-SCNC: 37 MMOL/L (ref 21–32)
CREAT SERPL-MCNC: 0.65 MG/DL (ref 0.6–1.3)
EOSINOPHIL # BLD AUTO: 0.32 THOUSAND/ΜL (ref 0–0.61)
EOSINOPHIL NFR BLD AUTO: 3 % (ref 0–6)
ERYTHROCYTE [DISTWIDTH] IN BLOOD BY AUTOMATED COUNT: 12.4 % (ref 11.6–15.1)
GFR SERPL CREATININE-BSD FRML MDRD: 82 ML/MIN/1.73SQ M
GLUCOSE SERPL-MCNC: 86 MG/DL (ref 65–140)
HCT VFR BLD AUTO: 44.2 % (ref 34.8–46.1)
HGB BLD-MCNC: 14 G/DL (ref 11.5–15.4)
IMM GRANULOCYTES # BLD AUTO: 0.06 THOUSAND/UL (ref 0–0.2)
IMM GRANULOCYTES NFR BLD AUTO: 1 % (ref 0–2)
LYMPHOCYTES # BLD AUTO: 1.54 THOUSANDS/ΜL (ref 0.6–4.47)
LYMPHOCYTES NFR BLD AUTO: 15 % (ref 14–44)
MCH RBC QN AUTO: 30.2 PG (ref 26.8–34.3)
MCHC RBC AUTO-ENTMCNC: 31.7 G/DL (ref 31.4–37.4)
MCV RBC AUTO: 96 FL (ref 82–98)
MONOCYTES # BLD AUTO: 0.76 THOUSAND/ΜL (ref 0.17–1.22)
MONOCYTES NFR BLD AUTO: 7 % (ref 4–12)
NEUTROPHILS # BLD AUTO: 7.76 THOUSANDS/ΜL (ref 1.85–7.62)
NEUTS SEG NFR BLD AUTO: 73 % (ref 43–75)
NRBC BLD AUTO-RTO: 0 /100 WBCS
P AXIS: 72 DEGREES
PLATELET # BLD AUTO: 293 THOUSANDS/UL (ref 149–390)
PMV BLD AUTO: 10 FL (ref 8.9–12.7)
POTASSIUM SERPL-SCNC: 4.4 MMOL/L (ref 3.5–5.3)
PR INTERVAL: 198 MS
QRS AXIS: 71 DEGREES
QRSD INTERVAL: 80 MS
QT INTERVAL: 402 MS
QTC INTERVAL: 408 MS
RBC # BLD AUTO: 4.63 MILLION/UL (ref 3.81–5.12)
SODIUM SERPL-SCNC: 137 MMOL/L (ref 135–147)
T WAVE AXIS: 79 DEGREES
VENTRICULAR RATE: 62 BPM
WBC # BLD AUTO: 10.53 THOUSAND/UL (ref 4.31–10.16)

## 2022-09-12 PROCEDURE — 99285 EMERGENCY DEPT VISIT HI MDM: CPT

## 2022-09-12 PROCEDURE — 70450 CT HEAD/BRAIN W/O DYE: CPT

## 2022-09-12 PROCEDURE — G1004 CDSM NDSC: HCPCS

## 2022-09-12 PROCEDURE — 72125 CT NECK SPINE W/O DYE: CPT

## 2022-09-12 PROCEDURE — 80048 BASIC METABOLIC PNL TOTAL CA: CPT | Performed by: EMERGENCY MEDICINE

## 2022-09-12 PROCEDURE — 93010 ELECTROCARDIOGRAM REPORT: CPT | Performed by: INTERNAL MEDICINE

## 2022-09-12 PROCEDURE — 99285 EMERGENCY DEPT VISIT HI MDM: CPT | Performed by: EMERGENCY MEDICINE

## 2022-09-12 PROCEDURE — 93005 ELECTROCARDIOGRAM TRACING: CPT

## 2022-09-12 PROCEDURE — 99307 SBSQ NF CARE SF MDM 10: CPT | Performed by: INTERNAL MEDICINE

## 2022-09-12 PROCEDURE — 85025 COMPLETE CBC W/AUTO DIFF WBC: CPT | Performed by: EMERGENCY MEDICINE

## 2022-09-12 PROCEDURE — 12013 RPR F/E/E/N/L/M 2.6-5.0 CM: CPT | Performed by: EMERGENCY MEDICINE

## 2022-09-12 PROCEDURE — 36415 COLL VENOUS BLD VENIPUNCTURE: CPT | Performed by: EMERGENCY MEDICINE

## 2022-09-12 RX ORDER — MIDAZOLAM HYDROCHLORIDE 2 MG/2ML
2 INJECTION, SOLUTION INTRAMUSCULAR; INTRAVENOUS ONCE
Status: COMPLETED | OUTPATIENT
Start: 2022-09-12 | End: 2022-09-12

## 2022-09-12 RX ADMIN — MIDAZOLAM 2 MG: 1 INJECTION INTRAMUSCULAR; INTRAVENOUS at 14:21

## 2022-09-12 NOTE — ED PROVIDER NOTES
History  Chief Complaint   Patient presents with    Fall     Pt via ems from [place] [id number] per ems staff walked into room and found pt up against her bed with a lac on her head  -thinners  Pt does have dementia and a poor historian can not say what happened  59-year-old female, presents from skilled nursing facility after being found against bed with head injury  History of dementia, unable to provide HPI  Patient awake  History provided by:  Patient, EMS personnel and nursing home   used: No    Fall      Prior to Admission Medications   Prescriptions Last Dose Informant Patient Reported? Taking? LORazepam (ATIVAN) 0 5 mg tablet   No No   Si/2 tab BID at at 0800, and 1200 and 1 Tab at 1800  Sodium Phosphates (Enema) ENEM   Yes No   Sig: Insert 1 enema into the rectum as needed   acetaminophen (TYLENOL) 325 mg tablet   Yes No   Sig: Take 650 mg by mouth every 4 (four) hours as needed for mild pain or fever FEVER 100 DEGREES OR HIGHER   acetaminophen (TYLENOL) 650 mg suppository   Yes No   Sig: Insert 650 mg into the rectum every 4 (four) hours as needed for mild pain or fever FEVER 100 DEGREES OR GREATER     bisacodyl (DULCOLAX) 10 mg suppository   Yes No   Sig: Insert 10 mg into the rectum as needed for constipation   docusate sodium (COLACE) 100 mg capsule   No No   Sig: Take 1 capsule (100 mg total) by mouth 2 (two) times a day   hydrocortisone (ANUSOL-HC) 2 5 % rectal cream   Yes No   Sig: Apply 1 application topically 2 (two) times a day   metoprolol tartrate (LOPRESSOR) 25 mg tablet   No No   Sig: Take 2 tablets (50 mg total) by mouth every 12 (twelve) hours   mirtazapine (REMERON) 15 mg tablet   Yes No   Sig: Take 15 mg by mouth daily at bedtime   pantoprazole (PROTONIX) 20 mg tablet   No No   Sig: Take 1 tablet (20 mg total) by mouth daily in the early morning   polyethylene glycol (MIRALAX) 17 g packet   No No   Sig: Take 17 g by mouth daily   sertraline (ZOLOFT) 25 mg tablet   Yes No   Sig: Take 25 mg by mouth daily   sorbitol 70 % solution   Yes No   Sig: Take 30 mL by mouth daily as needed      Facility-Administered Medications: None       Past Medical History:   Diagnosis Date    Acute and chronic respiratory failure with hypercapnia ([facility] [address] ) 7/22/2019    Ambulatory dysfunction     Anemia     hgb 5 2o bleeding uler in 1970's    Arthritis     Dehydration 7/24/2019    Dementia ([facility] [address] )     Depression     Eating disorder     Elevated temperature 12/29/2021    Encephalopathy     Furuncle of vulva 3/28/2022    Gastrointestinal bleeding     Gastrointestinal bleeding     GI bleeding 7/27/2019    Hyperlipidemia     Hypertension     Pressure injury of head, stage 1 7/21/2019    Pressure injury of sacral region, stage 3 ([facility] [address] ) 7/21/2019    Respiratory failure ([facility] )     Scalp hematoma 7/21/2019    Severe protein-calorie malnutrition [place]olene: less than 60% of standard weight) ([facility])     Severe protein-calorie malnutrition ([facility] [address] ) 7/21/2019    Skin rash 11/13/2020    [facility] 9/4/2019    Vulvar mass     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       No past surgical history on file  Family History   Problem Relation Age of Onset    Parkinsonism Mother     COPD Father      I have reviewed and agree with the history as documented  E-Cigarette/Vaping    E-Cigarette Use Never User      E-Cigarette/Vaping Substances    Nicotine No     THC No     CBD No     Flavoring No     Other No     Unknown No      Social History     Tobacco Use    Smoking status: Never Smoker    Smokeless tobacco: Never Used   Vaping Use    Vaping Use: Never used   Substance Use Topics    Alcohol use: Not Currently     Comment: only socially in the past    Drug use: Never       Review of Systems   Unable to perform ROS: Dementia       Physical Exam  Physical Exam  Vitals and nursing note reviewed  Constitutional:       General: She is not in acute distress    HENT:      Head: Comments: Laceration to mid forehead, bridge of nose  Eyes:      Extraocular Movements: Extraocular movements intact  Pupils: Pupils are equal, round, and reactive to light  Neck:      Comments: Cervical collar in place  Cardiovascular:      Rate and Rhythm: Normal rate and regular rhythm  Pulmonary:      Effort: Pulmonary effort is normal       Breath sounds: Normal breath sounds  Abdominal:      Palpations: Abdomen is soft  Tenderness: There is no abdominal tenderness  Musculoskeletal:         General: No tenderness  Normal range of motion  Skin:     General: Skin is warm and dry  Neurological:      General: No focal deficit present  Mental Status: She is alert  Comments: Confused, purposely moves all 4 extremities           Vital Signs  ED Triage Vitals   Temperature Pulse Respirations Blood Pressure SpO2   09/12/22 1405 09/12/22 1200 09/12/22 1200 09/12/22 1200 09/12/22 1200   97 7 °F (36 5 °C) 59 16 (!) 188/90 96 %      Temp Source Heart Rate Source Patient Position - Orthostatic VS BP Location FiO2 (%)   09/12/22 1405 -- 09/12/22 1200 09/12/22 1200 --   Rectal  Lying Right arm       Pain Score       --                  Vitals:    09/12/22 1200 09/12/22 1330 09/12/22 1509   BP: (!) 188/90 (!) 184/88 157/73   Pulse: 59 62 76   Patient Position - Orthostatic VS: Lying Lying Lying         Visual Acuity      ED Medications  Medications   midazolam (VERSED) injection 2 mg (2 mg Intravenous Given 9/12/22 1421)       Diagnostic Studies  Results Reviewed     Procedure Component Value Units Date/Time    Basic metabolic panel [[id number]]  (Abnormal) Collected: 09/12/22 1225    Lab Status: Final result Specimen: Blood from Arm, Left Updated: 09/12/22 1247     Sodium 137 mmol/L      Potassium 4 4 mmol/L      Chloride 99 mmol/L      CO2 37 mmol/L      ANION GAP 1 mmol/L      BUN 17 mg/dL      Creatinine 0 65 mg/dL      Glucose 86 mg/dL      Calcium 9 0 mg/dL      eGFR 82 ml/min/1 73sq m Narrative:      National Kidney Disease Foundation guidelines for Chronic Kidney Disease (CKD):     Stage 1 with normal or high GFR (GFR > 90 mL/min/1 73 square meters)    Stage 2 Mild CKD (GFR = 60-89 mL/min/1 73 square meters)    Stage 3A Moderate CKD (GFR = 45-59 mL/min/1 73 square meters)    Stage 3B Moderate CKD (GFR = 30-44 mL/min/1 73 square meters)    Stage 4 Severe CKD (GFR = 15-29 mL/min/1 73 square meters)    Stage 5 End Stage CKD (GFR <15 mL/min/1 73 square meters)  Note: GFR calculation is accurate only with a steady state creatinine    CBC and differential [[id number]]  (Abnormal) Collected: 09/12/22 1225    Lab Status: Final result Specimen: Blood from Arm, Left Updated: 09/12/22 1235     WBC 10 53 Thousand/uL      RBC 4 63 Million/uL      Hemoglobin 14 0 g/dL      Hematocrit 44 2 %      MCV 96 fL      MCH 30 2 pg      MCHC 31 7 g/dL      RDW 12 4 %      MPV 10 0 fL      Platelets 339 Thousands/uL      nRBC 0 /100 WBCs      Neutrophils Relative 73 %      Immat GRANS % 1 %      Lymphocytes Relative 15 %      Monocytes Relative 7 %      Eosinophils Relative 3 %      Basophils Relative 1 %      Neutrophils Absolute 7 76 Thousands/µL      Immature Grans Absolute 0 06 Thousand/uL      Lymphocytes Absolute 1 54 Thousands/µL      Monocytes Absolute 0 76 Thousand/µL      Eosinophils Absolute 0 32 Thousand/µL      Basophils Absolute 0 09 Thousands/µL                  CT head without contrast   Final Result by Angella Skelton DO (09/12 1541)   No acute intracranial abnormality  Workstation performed: [id number]         CT cervical spine without contrast   Final Result by Katelynn Santos DO (09/12 1607)      No cervical spine fracture or traumatic malalignment                     Workstation performed: [id number]                    Procedures  ECG 12 Lead Documentation Only    Date/Time: 9/12/2022 12:38 PM  Performed by: Prashant Marie MD  Authorized by: Prashant Marie MD ECG reviewed by me, the ED Provider: yes    Patient location:  ED  Previous ECG:     Previous ECG:  Compared to current    Similarity:  No change  Rate:     ECG rate assessment: normal    Rhythm:     Rhythm: sinus rhythm    Ectopy:     Ectopy: none    QRS:     QRS axis:  Normal    QRS intervals:  Normal  Conduction:     Conduction: normal    Comments:      Sinus rhythm at 62, no acute changes    Laceration repair    Date/Time: 9/12/2022 2:05 PM  Performed by: Chelsey Fong MD  Authorized by: Chlesey Fong MD   Body area: head/neck  Location details: forehead  Laceration length: 3 cm      Procedure Details:  Irrigation solution: saline  Irrigation method: syringe  Skin closure: glue  Patient tolerance: patient tolerated the procedure well with no immediate complications               ED Course  ED Course as of 09/12/22 1615   Mon Sep 12, 2022   1404 Patient becomes very agitated and fights when attempting to do anything, will require medication to obtain CT scanning, IM Versed ordered  1614 CT head and cervical spine with no acute traumatic injuries  Patient currently awake and agitated, will discharge back to skilled nursing facility  SBIRT 20yo+    Flowsheet Row Most Recent Value   SBIRT (25 yo +)    In order to provide better care to our patients, we are screening all of our patients for alcohol and drug use  Would it be okay to ask you these screening questions? No Filed at: 09/12/2022 1210                    MDM  Number of Diagnoses or Management Options  Diagnosis management comments: 80-year-old female, presents from skilled nursing facility with unwitnessed fall  Differential diagnosis includes traumatic intracranial hemorrhage, contusion, cervical spine fracture among other diagnosis  Imaging labs ordered, will monitor in ED and re-evaluate         Amount and/or Complexity of Data Reviewed  Clinical lab tests: ordered and reviewed  Tests in the radiology section of CPT®: ordered and reviewed  Tests in the medicine section of CPT®: ordered and reviewed  Review and summarize past medical records: yes  Independent visualization of images, tracings, or specimens: yes        Disposition  Final diagnoses:   Injury of head, initial encounter   Facial laceration, initial encounter     Time reflects when diagnosis was documented in both MDM as applicable and the Disposition within this note     Time User Action Codes Description Comment    9/12/2022  4:10 PM Henry Gutierrezo Add [S09 90XA] Injury of head, initial encounter     9/12/2022  4:10 PM Henry Gutierrezo Add [S01 81XA] Facial laceration, initial encounter       ED Disposition     ED Disposition   Discharge    Condition   Stable    Date/Time   Mon Sep 12, 2022  4:10 PM    Comment   Megha Recio discharge to home/self care  Follow-up Information     Follow up With Specialties Details Why Contact Info    Richmond Patrick MD Internal Medicine   [address] [place] [address]  [phone]            Patient's Medications   Discharge Prescriptions    No medications on file       No discharge procedures on file      PDMP Review       Value Time User    PDMP Reviewed  Yes 7/18/2022  2:37 PM Romana Zaldivar MD          ED Provider  Electronically Signed by           Sis Blair MD  09/12/22 [phone]

## 2022-09-13 NOTE — PROGRESS NOTES
[facility]  [address]  [phone]  [facility]  [address]      NAME: Marleen Sears  AGE: 80 y o  SEX: female [id number]    DATE OF ENCOUNTER: 9/12/2022    Assessment and Plan     1  Fall, initial encounter     2  Traumatic hematoma of scalp, subsequent encounter     3  Late onset Alzheimer's dementia with behavioral disturbance ([address] )     4  Nonintractable headache, unspecified chronicity pattern, unspecified headache type     5  Anxiety with depression     6  Ambulatory dysfunction        1) s/p fall  Traumatic hematoma of scalp  Initial encounter  Pt sustained a laceration to mid forehead  Left forehead hematoma  Head guaze/wrap placed & 9-1-1 called as suspected head injury  Pt transferred to [facility] [place] operated by [facility]   VSS  Pt awake and alert  Pt son in law updated; then per son in law request, pt's daughter also updated    2) Late onset Alzheimer's dementia with behavioral disturbances  Anxiety  Pt crying after hitting her forehead  Pt at baseline  Confused  Pt on mirtazpine & ativan at bedtime  Cont zoloft 25 mg 1 tab po daily    3) HA  Likely due to fall  Pt given tylenol  Cont prn tylenol    4) Ambulatory dysfunction  Re-eval with physical therapy when pt returns from hospital    Code status: DNR    Chief Complaint     Acute visit  Pt s/p fall  History of Present Illness   81 y/o F with PMH Dementia with behavioral disturbances, anxiety, depression, afib, HTN and tricuspid regurgitation  Pt has a hx of falls  Unfortunately pt was found at 11 am on the floor near the door  She was in a sitting position with the physical therapist there  Pt was bloody with a left forehead hematoma  In addition, she sustained a laceration to mid forehead  Her right nostril was also bloody  Pt had an unwitnessed fall  She has a history of dementia  Family both son in law and daughter were notified  Pt kept asking for daughter   EMS picked pt up at 11:20 and pt was safely transported to [facility] for laceration repair and further imaging to rule out bleed  Pt is NOT on any blood thinners  Pt complained of headache where she fell  She was given tylenol by her RN  The following portions of the patient's history were reviewed and updated as appropriate: allergies, current medications, past family history, past medical history, past social history, past surgical history and problem list     Review of Systems     Review of Systems   Unable to perform ROS: Dementia   Neurological: Positive for headaches  All other systems reviewed and are negative  Active Problem List     Patient Active Problem List   Diagnosis    Fall    Ambulatory dysfunction    Moderate protein-calorie malnutrition (HCC)    Anxiety with depression    Other constipation    Headache    Essential hypertension    Tricuspid regurgitation    Dementia (HCC)    Afib ([facility] [address] )    Metabolic encephalopathy    AMANDA (acute kidney injury) ([facility] [address] )    Hyperkalemia    Traumatic hematoma of scalp       Objective     Vitals: 207/100 (pt in pain and going to E R )  HR 71, RR 28, 97 8     Physical Exam  Constitutional:       General: She is not in acute distress  HENT:      Head: Normocephalic  Cardiovascular:      Rate and Rhythm: Normal rate  Pulmonary:      Effort: No accessory muscle usage  Breath sounds: Normal breath sounds  Abdominal:      General: Bowel sounds are normal  There is no distension  Palpations: Abdomen is soft  Tenderness: There is no abdominal tenderness  Skin:     Comments: Legs - no cyanosis, clubbing or edema   Neurological:      Mental Status: She is alert  Cranial Nerves: Cranial nerves are intact  No cranial nerve deficit, dysarthria or facial asymmetry  Psychiatric:         Mood and Affect: Mood is anxious  Pertinent Laboratory/Diagnostic Studies:  Refer to facility chart      Current Medications   Medications reviewed and updated in facility chart      Moy Garcia MD  Internal Medicine  Senior Care Physician

## 2022-09-21 ENCOUNTER — NURSING HOME VISIT (OUTPATIENT)
Dept: GERIATRICS | Facility: OTHER | Age: 82
End: 2022-09-21
Payer: MEDICARE

## 2022-09-21 DIAGNOSIS — I10 ESSENTIAL HYPERTENSION: ICD-10-CM

## 2022-09-21 DIAGNOSIS — N93.9 VAGINAL BLEEDING: Primary | ICD-10-CM

## 2022-09-21 DIAGNOSIS — G30.1 LATE ONSET ALZHEIMER'S DEMENTIA WITH BEHAVIORAL DISTURBANCE (HCC): ICD-10-CM
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
## 2022-09-21 DIAGNOSIS — E44.0 MODERATE PROTEIN-CALORIE MALNUTRITION (HCC): ICD-10-CM

## 2022-09-21 DIAGNOSIS — I48.0 PAROXYSMAL ATRIAL FIBRILLATION (HCC): ICD-10-CM

## 2022-09-21 DIAGNOSIS — F02.818 LATE ONSET ALZHEIMER'S DEMENTIA WITH BEHAVIORAL DISTURBANCE (HCC): ICD-10-CM

## 2022-09-21 PROCEDURE — 99309 SBSQ NF CARE MODERATE MDM 30: CPT | Performed by: INTERNAL MEDICINE

## 2022-09-23 PROBLEM — N93.9 VAGINAL BLEEDING: Status: ACTIVE | Noted: 2022-09-21

## 2022-09-23 NOTE — PROGRESS NOTES
9/23/2022  Updated pt's son-in law Heron Prado primary contact about vaginal bleeding that pt is periodically having  Her h/h is stable (14 6/43 8)  Looked at lesion with female staff Carolyn RN  Wrote orders for vaseline to be applied by female staff & wear pads  Pt has a friable lesion in vagina that easily bleeds  Want to rule out bad causes like cancer? It is not normal to have that and will be referring pt to Gynecology  Family in agreement  Son in law says his wife would come 1 hour before appt & then follow pt & meet pt at the appt to keep her calm  Also discussed how pt is at high risk of falls  We are doing everything at our end  Discussed dementia unit  However pt family wishes for pt to stay in the same room  Last time pt changed room she had a hard time sleeping

## 2022-09-23 NOTE — PROGRESS NOTES
[facility]  Niranjan Johnson 79  [phone]  [facility]  POS [address]      NAME: April Winslow  AGE: 80 y o  SEX: female [id number]    DATE OF ENCOUNTER: 9/21/2022    Assessment and Plan     1  Vaginal bleeding     2  Fall, sequela     3  Moderate protein-calorie malnutrition ([address] )     4  Essential hypertension     5  Paroxysmal atrial fibrillation (HCC)     6  Late onset Alzheimer's dementia with behavioral disturbance (HCC)          Vaginal bleeding  Pt with friable lesion noted in vagina that is bleeding  Scant bleeding  H/h stable at 14  Consult Gyn to r/o ca, cause of lesion? Apply vaseline daily by female staff (pt anxious at baseline)    S/p fall on 9/12/2022  Pt went to ER after a fall on 9/12/2022  Negative CT head  Pt returned to [facility]  in stable condition  H/H stable  Pt with residual hematoma on left forehead    Moderate protein-calorie malnutrition (HCC)  Pt weighs 82 7 9/1/2022  Low but stable  Pt always been around 85 pounds for last 2 years  Cachetic    Labs reviewed & wnl  No anemia/renal failure      Good meal completion:  % majority of time  Cont Ensure BID  Cont low salt diet  Monitor weight    HTN  Moderately controlled  Recent   Overall trend at 140/80s  Recently increased metoprolol to 50 mg 1 tab po BID  Cont metoprolol with hold parameters  Check BP three times a week  Cont prn tylenol for pain    Anxiety  Pt at baseline  Pt on lorazepam 0 25 mg at 8 am, 12 pm  Cont lorazepam 0 5 mg 1 tab po QHS  Sertraline 25 mg 1 tab po qam  Mirtazpaine 15 mg 1 tab po QHS    Atrial fibrillation  HR stable in 70s  BP stable  Reviewed labs from July 2022; wnl  Cont metoprolol tartrate to 50 mg 1 tab po BID (hold if HR <55 or SBP <90)  Pt not on anticoag as per cardiology given high fall risk    Dementia  Pt very pleasant & cooperative  Only oriented to self  Does recognize some staff  Stable     Ambulatory dysfunction  Physical therapy  Cont long term care support  Cont fall precaution    Code status: DNR    Chief Complaint     Routine Long term follow-up visit  History of Present Illness   Patient is a very pleasant 81 y/o female with has PMH of atrial fibrillation, dementia, HTN, tricuspid regurgitation, Anxiety, depression, and hypertension  Asked by RN to eval pt given new onset vaginal bleeding  Pt does scratch at area  RN Kei female chaperoned  However noted a friable lesion in her vagina  RN notified family  The following portions of the patient's history were reviewed and updated as appropriate: allergies, current medications, past family history, past medical history, past social history, past surgical history and problem list     Review of Systems     Review of Systems   Unable to perform ROS: Dementia   Genitourinary: Positive for vaginal bleeding  All other systems reviewed and are negative        Active Problem List     Patient Active Problem List   Diagnosis    Fall    Ambulatory dysfunction    Moderate protein-calorie malnutrition [address] [address] )    Anxiety with depression    Other constipation    Headache    Essential hypertension    Tricuspid regurgitation    Dementia (HCC)    Afib [facility]    Metabolic encephalopathy    AMANDA (acute kidney injury) ([facility] [address] )    Hyperkalemia    Traumatic hematoma of scalp    Vaginal bleeding       Objective     Vitals: HR 76; RR 16; temp 98 3, /68    9/21/2022  118 / 68 mmHg Lying r/arm    9/21/2022 08:44 181 / 94 mmHg    9/20/2022 16:51 132 / 68 mmHg    9/20/2022 08:48 142 / 76 mmHg    9/19/2022 17:18 136 / 77 mmHg    9/19/2022 08:44 132 / 68 mmHg    9/18/2022 16:12 142 / 82 mmHg    9/18/2022 08:35 142 / 68 mmHg    9/17/2022 16:56 150 / 69 mmHg    9/17/2022 08:18 124 / 70 mmHg    9/16/2022 17:14 138 / 74 mmHg    9/16/2022 17:13 144 / 76 mmHg    9/16/2022 10:17 152 / 80 mmHg    9/16/2022 09:23 152 / 80 mmHg    9/15/2022 16:54 156 / 80 mmHg    9/15/2022 16:53 156 / 80 mmHg    9/15/2022 08:17 168 / 83 mmHg 9/15/2022 01:02 142 / 80 mmHg    9/14/2022 18:12 155 / 74 mmHg    9/14/2022 16:41 155 / 74 mmHg    9/14/2022 12:19 182 / 98 mmHg    9/14/2022 09:30 166 / 87 mmHg    9/14/2022 09:27 166 / 87 mmHg    9/14/2022 09:05 166 / 87 mmHg    9/14/2022 01:01 155 / 80 mmHg    9/13/2022 16:55 155 / 62 mmHg    9/13/2022 16:55 155 / 62 mmHg      9/1/2022  82 7 Lbs Standing     8/1/2022 20:47 82 5 Lbs     8/1/2022 19:14 82 5 Lbs           Physical Exam  Constitutional:       General: She is not in acute distress  Appearance: She is not ill-appearing  HENT:      Head: Normocephalic  Comments: Pt has healing hematoma on left forehead; mid forehead laceration healing  Cardiovascular:      Rate and Rhythm: Normal rate and regular rhythm  Heart sounds: No murmur heard  Pulmonary:      Effort: Pulmonary effort is normal  No respiratory distress  Breath sounds: Normal breath sounds  No wheezing  Abdominal:      General: Bowel sounds are normal  There is no distension  Palpations: Abdomen is soft  Tenderness: There is no abdominal tenderness  Genitourinary:         Comments: Pt with friable flower like lesion in vagina that easily bleeds; Female RN Brian Olson chaperoned; pt calm & cooperative; says she has to go to bathroom; pt with trace blood on her female disposable underwear pad  Skin:     Comments: Skin: clean, dry  Legs: no cyanosis, clubbing or edema   Neurological:      Mental Status: She is alert  Mental status is at baseline  Psychiatric:         Mood and Affect: Mood normal          Behavior: Behavior normal       Comments: Pt very pleasant & cooperative  Says has memory problems  Patient is alert and oriented to self only  Patient is with dementia  Patient is very pleasant      Pertinent Laboratory/Diagnostic Studies:  Refer to facility chart  Current Medications   Medications reviewed and updated in facility chart      Megan Duncan MD  Internal Medicine  Senior Care Physician

## 2022-09-26 ENCOUNTER — NURSING HOME VISIT (OUTPATIENT)
Dept: GERIATRICS | Facility: OTHER | Age: 82
End: 2022-09-26
Payer: MEDICARE

## 2022-09-26 DIAGNOSIS — I48.0 PAROXYSMAL ATRIAL FIBRILLATION (HCC): ICD-10-CM

## 2022-09-26 DIAGNOSIS — F02.818 LATE ONSET ALZHEIMER'S DEMENTIA WITH BEHAVIORAL DISTURBANCE (HCC): ICD-10-CM

## 2022-09-26 DIAGNOSIS — N93.9 VAGINAL BLEEDING: ICD-10-CM
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
## 2022-09-26 DIAGNOSIS — F41.8 ANXIETY WITH DEPRESSION: ICD-10-CM

## 2022-09-26 DIAGNOSIS — E44.0 MODERATE PROTEIN-CALORIE MALNUTRITION (HCC): ICD-10-CM

## 2022-09-26 DIAGNOSIS — G30.1 LATE ONSET ALZHEIMER'S DEMENTIA WITH BEHAVIORAL DISTURBANCE (HCC): ICD-10-CM

## 2022-09-26 PROCEDURE — 99309 SBSQ NF CARE MODERATE MDM 30: CPT | Performed by: INTERNAL MEDICINE

## 2022-09-27 NOTE — PROGRESS NOTES
[facility]  Michael Johnson 79  [phone]  [facility]  POS [address]      NAME: Harmony Xiao  AGE: 80 y o  SEX: female [id number]    DATE OF ENCOUNTER: 9/26/2022    Assessment and Plan     1  Fall, sequela     2  Vaginal bleeding     3  Moderate protein-calorie malnutrition ([address] )     4  Late onset Alzheimer's dementia with behavioral disturbance (HCC)     5  Paroxysmal atrial fibrillation ([address] )     6  Anxiety with depression          S/p fall on 9/24  According to notes, pt did not hit head  appeared to have scraped her Rt knee  Pt able to ambulate with staff  Family already updated by RN    Vaginal bleeding   Pt with friable lesion noted in vagina last time that was bleeding  No further bleeding noted by staff  H/h stable at 15  Consulted Gyn re: ?source of lesion, ?prolapsed female organ vs lesion?; defer to gyn  Apply vaseline daily by female staff (pt anxious at baseline)    S/p fall on 9/12/2022  Pt went to ER after a fall on 9/12/2022  Negative CT head  Pt returned to [facility]  in stable condition  H/H stable  Pt with residual hematoma on left forehead healing  Laceration healing    Moderate protein-calorie malnutrition ([address] )  Pt weighs 82 7 9/1/2022  Low but stable  Pt always been around 85 pounds for last 2 years  Cachetic    Labs reviewed & wnl  No anemia/renal failure      Good meal completion:  % majority of time  Cont Ensure BID  Cont low salt diet  Monitor weight    HTN  Moderately controlled  Recent /68  Overall trend at 140/80s  Recently increased metoprolol to 50 mg 1 tab po BID  Cont metoprolol with hold parameters  Check BP three times a week  Cont prn tylenol for pain    Anxiety  Pt at baseline  Pt on lorazepam 0 25 mg at 8 am, 12 pm  Cont lorazepam 0 5 mg 1 tab po QHS  Sertraline 25 mg 1 tab po qam  Mirtazpaine 15 mg 1 tab po QHS    Atrial fibrillation  HR stable at 88  BP stable  Reviewed labs from July 2022; wnl  Cont metoprolol tartrate to 50 mg 1 tab po BID (hold if HR <55 or SBP <90)  Pt not on anticoag as per cardiology given high fall risk    Dementia  Pt anxious  Only oriented to self  Does recognize some staff  Meal intake: Great; eats %  BM: almost daily  Stable     Ambulatory dysfunction  Physical therapy  Cont long term care support  Cont fall precaution    Code status: DNR    Chief Complaint     Routine Long term follow-up visit  History of Present Illness   Patient is a very pleasant 81 y/o female with has PMH of atrial fibrillation, dementia, HTN, tricuspid regurgitation, Anxiety, depression, and hypertension  Asked by RN to see pt as she had fallen over the weekend on 9/24  Pt appears at baseline  Appears irritated as wants to be left alone to make her bed  Per [facility] (point care click) records, "  7/13/8863 21:50 Nurses Note Note Text: Sari Julian resident call out went into the bathroom and saw resident sitting on the floor with her back against a bathroom stall and her legs in front, positive ROM to ROBBIE and LE, abrasion noted to right knee, denies hitting head, Neuro checks started, IRF to PT for impaired gate, Tylenol X1 PRN for right knee pain  Q15 minute checks initiated because resident has altered safety awareness, non-compliant with asking for assistance  Son-in-law updated on incident  Pt seen and examined  Pt did not recognize stethoscope  The following portions of the patient's history were reviewed and updated as appropriate: allergies, current medications, past family history, past medical history, past social history, past surgical history and problem list     Review of Systems     Review of Systems   Unable to perform ROS: Dementia   Psychiatric/Behavioral: Positive for agitation and confusion  All other systems reviewed and are negative        Active Problem List     Patient Active Problem List   Diagnosis    Fall    Ambulatory dysfunction    Moderate protein-calorie malnutrition ([facility] [address] )    Anxiety with depression    Other constipation    Headache    Essential hypertension    Tricuspid regurgitation    Dementia (HCC)    Afib (HCC)    Metabolic encephalopathy    AMANDA (acute kidney injury) (HCC)    Hyperkalemia    Traumatic hematoma of scalp    Vaginal bleeding       Objective     Vitals:  82 7 Lbs 9/1/2022    Blood Pressure: 138 /  68 mmHg    Temperature: 98 5 °F    Pulse: 88 bpm    Respirations: 16 Breaths/min    Blood Sugar:     O2 Saturation: 95 0 %        Physical Exam  Vitals reviewed  Constitutional:       General: She is not in acute distress  Appearance: She is not ill-appearing  HENT:      Head: Normocephalic  Comments: Pt has healing hematoma on left forehead; mid forehead laceration healing  Cardiovascular:      Rate and Rhythm: Normal rate and regular rhythm  Heart sounds: No murmur heard  Pulmonary:      Effort: Pulmonary effort is normal  No respiratory distress  Breath sounds: Normal breath sounds  No wheezing  Abdominal:      General: Bowel sounds are normal  There is no distension  Palpations: Abdomen is soft  Tenderness: There is no abdominal tenderness  Skin:     Comments: Skin: clean, dry  Legs: no cyanosis, clubbing or edema   Neurological:      Mental Status: She is alert  Mental status is at baseline  Psychiatric:         Attention and Perception: She is inattentive  Mood and Affect: Mood is anxious  Behavior: Behavior normal       Comments: Pt irritated and wanting to be left alone so she can make her bed  Warned pt to ask for help before getting up as she is a fall risk  Pt does not appear to recognize me as the doctor  Patient is alert and oriented to self only  Patient is with dementia  Pertinent Laboratory/Diagnostic Studies:  Refer to facility chart  Current Medications   Medications reviewed and updated in facility chart      Sherwin Hurd MD  Internal Medicine  Senior Care Physician

## 2022-10-10 NOTE — PROGRESS NOTES
10/10/2022 Addendum:    Pt saw Alana Sadler today; pt noted to have a vulvular lesion; assessment "Lt vulvar lesion that is whitish lesion about 2 cm in size & has bleeding with touch"    Gyn recommendation:  "needs wide excision of vulva  Pt declines biopsy  Daughter will think about it and call if she decides if they want to proceed"

## 2022-10-11 ENCOUNTER — NURSING HOME VISIT (OUTPATIENT)
Dept: GERIATRICS | Facility: OTHER | Age: 82
End: 2022-10-11
Payer: MEDICARE

## 2022-10-11 DIAGNOSIS — I10 ESSENTIAL HYPERTENSION: ICD-10-CM

## 2022-10-11 DIAGNOSIS — N93.9 VAGINAL BLEEDING: Primary | ICD-10-CM

## 2022-10-11 DIAGNOSIS — E44.0 MODERATE PROTEIN-CALORIE MALNUTRITION (HCC): ICD-10-CM

## 2022-10-11 DIAGNOSIS — G30.1 LATE ONSET ALZHEIMER DEMENTIA, UNSPECIFIED DEMENTIA SEVERITY, UNSPECIFIED WHETHER BEHAVIORAL, PSYCHOTIC, OR MOOD DISTURBANCE OR ANXIETY (HCC): ICD-10-CM

## 2022-10-11 DIAGNOSIS — I48.0 PAROXYSMAL ATRIAL FIBRILLATION (HCC): ICD-10-CM

## 2022-10-11 DIAGNOSIS — F02.80 LATE ONSET ALZHEIMER DEMENTIA, UNSPECIFIED DEMENTIA SEVERITY, UNSPECIFIED WHETHER BEHAVIORAL, PSYCHOTIC, OR MOOD DISTURBANCE OR ANXIETY (HCC): ICD-10-CM

## 2022-10-11 DIAGNOSIS — R26.2 AMBULATORY DYSFUNCTION: ICD-10-CM

## 2022-10-11 PROCEDURE — 99309 SBSQ NF CARE MODERATE MDM 30: CPT | Performed by: INTERNAL MEDICINE

## 2022-10-12 NOTE — PROGRESS NOTES
[facility]  Niranjan Johnson 79  [phone]  [facility] [place]  POS [address]      NAME: Uziel Tomas  AGE: 80 y o  SEX: female [id number]    DATE OF ENCOUNTER: 10/11/2022    Assessment and Plan     1  Vaginal bleeding     2  Late onset Alzheimer dementia, unspecified dementia severity, unspecified whether behavioral, psychotic, or mood disturbance or anxiety ([facility] [address] )     3  Moderate protein-calorie malnutrition ([facility] [address] )     4  Essential hypertension     5  Paroxysmal atrial fibrillation (HCC)     6   Ambulatory dysfunction          Vaginal bleeding   Pt with friable lesion noted in vagina last time that was bleeding  No further bleeding noted by staff  H/h stable at 14  Pt saw Suzette Tapia today; pt noted to have a vulvular lesion; assessment "Lt vulvar lesion that is whitish lesion about 2 cm in size & has bleeding with touch"     Gyn recommendation:  "needs wide excision of vulva  Pt declines biopsy  Daughter will think about it and call if she decides if they want to proceed    S/p fall on 9/24  According to notes, pt did not hit head  appeared to have scraped her Rt knee  Pt able to ambulate with staff  Family already updated by RN    S/p fall on 9/12/2022  Pt went to ER after a fall on 9/12/2022  Negative CT head  Pt returned to [facility]  in stable condition  H/H stable  Pt had residual hematoma on left forehead healing  Pt had Laceration that healed    Moderate protein-calorie malnutrition (HCC)  84 3 weight gain from 82 7  Low but stable  Pt always been around 85 pounds for last 2 years  Cachetic    Good meal completion:  % majority of time  Cont Ensure BID  Cont low salt diet  Monitor weight    HTN  Moderately controlled  Recent /68  Cont metoprolol 50 mg 1 tab po BID  Cont metoprolol with hold parameters  Cont prn tylenol for pain    Anxiety  Pt at baseline  Pt on lorazepam 0 25 mg at 8 am, 12 pm  Cont lorazepam 0 5 mg 1 tab po QHS  Sertraline 50 mg 1 tab po qam  Mirtazpaine 15 mg 1 tab po QHS    Atrial fibrillation  HR stable at 71  BP stable  Reviewed labs from July 2022; wnl  Cont metoprolol tartrate 50 mg 1 tab po BID (hold if HR <55 or SBP <90)  Pt not on anticoag as per cardiology given high fall risk    Dementia  Pt anxious  Only oriented to self  Does recognize some staff  Meal intake: Great; eats %  BM: almost daily  Stable     Ambulatory dysfunction  Physical therapy  Cont long term care support  Cont fall precaution    Code status: DNR    Chief Complaint     Routine Long term follow-up visit  History of Present Illness   Patient is a very pleasant 79 y/o female with has PMH of atrial fibrillation, dementia, HTN, tricuspid regurgitation, Anxiety, depression, and hypertension  Pt seen and examined as part of long term care followup  Pt pleasant today laying in her bed  Pt says "I feel lazy today " Pt denies any pain  The following portions of the patient's history were reviewed and updated as appropriate: allergies, current medications, past family history, past medical history, past social history, past surgical history and problem list     Review of Systems     Review of Systems   Unable to perform ROS: Dementia   All other systems reviewed and are negative  Active Problem List     Patient Active Problem List   Diagnosis   • Fall   • Ambulatory dysfunction   • Moderate protein-calorie malnutrition (HCC)   • Anxiety with depression   • Other constipation   • Headache   • Essential hypertension   • Tricuspid regurgitation   • Dementia (HCC)   • Afib (HCC)   • Metabolic encephalopathy   • AMANDA (acute kidney injury) ([facility] [address] )   • Hyperkalemia   • Traumatic hematoma of scalp   • Vaginal bleeding       Objective     Vitals:       84 3 Lbs 10/1/2022    Blood Pressure: 135 /  68 mmHg    Temperature: 98 1 °F    Pulse: 71 bpm    Respirations:       Physical Exam  Vitals reviewed  Constitutional:       General: She is not in acute distress  Appearance: She is not ill-appearing  HENT:      Head: Normocephalic  Cardiovascular:      Rate and Rhythm: Normal rate and regular rhythm  Heart sounds: No murmur heard  Pulmonary:      Effort: Pulmonary effort is normal  No respiratory distress  Breath sounds: Normal breath sounds  No wheezing  Abdominal:      General: Bowel sounds are normal  There is no distension  Palpations: Abdomen is soft  Tenderness: There is no abdominal tenderness  Skin:     Comments: Skin: clean, dry  Legs: no cyanosis, clubbing or edema   Neurological:      Mental Status: She is alert  Mental status is at baseline  Psychiatric:      Comments: Calm cooperative     Patient is alert and oriented to self only  Patient is with dementia  Pertinent Laboratory/Diagnostic Studies:  Refer to facility chart  Current Medications   Medications reviewed and updated in facility chart      Neela Copeland MD  Internal Medicine  Senior Care Physician

## 2022-10-24 ENCOUNTER — NURSING HOME VISIT (OUTPATIENT)
Dept: GERIATRICS | Facility: OTHER | Age: 82
End: 2022-10-24
Payer: MEDICARE
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
## 2022-10-24 DIAGNOSIS — F02.80 LATE ONSET ALZHEIMER DEMENTIA, UNSPECIFIED DEMENTIA SEVERITY, UNSPECIFIED WHETHER BEHAVIORAL, PSYCHOTIC, OR MOOD DISTURBANCE OR ANXIETY (HCC): ICD-10-CM

## 2022-10-24 DIAGNOSIS — E44.0 MODERATE PROTEIN-CALORIE MALNUTRITION (HCC): ICD-10-CM

## 2022-10-24 DIAGNOSIS — I10 ESSENTIAL HYPERTENSION: ICD-10-CM

## 2022-10-24 DIAGNOSIS — I48.0 PAROXYSMAL ATRIAL FIBRILLATION (HCC): ICD-10-CM

## 2022-10-24 DIAGNOSIS — G30.1 LATE ONSET ALZHEIMER DEMENTIA, UNSPECIFIED DEMENTIA SEVERITY, UNSPECIFIED WHETHER BEHAVIORAL, PSYCHOTIC, OR MOOD DISTURBANCE OR ANXIETY (HCC): ICD-10-CM

## 2022-10-24 PROCEDURE — 99309 SBSQ NF CARE MODERATE MDM 30: CPT | Performed by: INTERNAL MEDICINE

## 2022-10-25 NOTE — PROGRESS NOTES
[facility]  Niranjan Johnson 79  [phone]  [facility] [place] [facility]  POS [address]      NAME: Imelda Seu  AGE: 80 y o  SEX: female [id number]    DATE OF ENCOUNTER: 10/24/2022    Assessment and Plan     S/p fall on 10/24  Pcc note:  Note Text: Staff responded to resident's bed alarm and found resident sitting on the left side of her bed with her back against the left side of bed  Resident has poor safety awareness and sleeps at the edge of the bed  Full ROM in all extremities  Denies pain  Skin check shows no skin alterations noted  98 6-77-20 184/99 POX 94%  Neuro check started and WNL  Resident transferred back to bed with assist X 2 using a gait belt  IRF sent  Pt seen and examined  Pt neurologically intact  No need for further w/u at this time      Vaginal bleeding   Pt with friable lesion noted in vagina last time that was bleeding  No further bleeding noted by staff  H/h stable at 15  Pt saw Ladene Credit Dr Jennifer Lopez today; pt noted to have a vulvular lesion; assessment "Lt vulvar lesion that is whitish lesion about 2 cm in size & has bleeding with touch"     Gyn recommendation:  "needs wide excision of vulva  Pt declines biopsy  Daughter will think about it and call if she decides if they want to proceed    S/p fall on 9/24  According to notes, pt did not hit head  appeared to have scraped her Rt knee  Pt able to ambulate with staff  Family already updated by RN    S/p fall on 9/12/2022  Pt went to ER after a fall on 9/12/2022  Negative CT head  Pt returned to [facility]  in stable condition  H/H stable  Pt had residual hematoma on left forehead healing  Pt had Laceration that healed    Moderate protein-calorie malnutrition (HCC)  84 3 weight gain from 82 7  Low but stable  Pt always been around 85 pounds for last 2 years  Cachetic    Fair meal completion:  51-76% majority of time  Cont Ensure BID  Cont low salt diet  Monitor weight    HTN  Controlled  Recent /62  Cont metoprolol 50 mg 1 tab po BID  Cont metoprolol with hold parameters  Cont prn tylenol for pain    Anxiety  Pt at baseline  Pt on lorazepam 0 25 mg at 8 am, 12 pm  Cont lorazepam 0 5 mg 1 tab po QHS  Sertraline 50 mg 1 tab po qam  Mirtazpaine 15 mg 1 tab po QHS    Atrial fibrillation  HR stable at 68  BP stable  Reviewed labs from July 2022; wnl  Cont metoprolol tartrate 50 mg 1 tab po BID (hold if HR <55 or SBP <90)  Pt not on anticoag as per cardiology given high fall risk    Dementia  Pt anxious  Only oriented to self  Does recognize some staff  Meal intake: Fair eats 51-76% of time  BM: almost daily  Stable     Ambulatory dysfunction  Physical therapy  Cont long term care support  Cont fall precaution    Code status: DNR    Chief Complaint     Routine Long term follow-up visit  History of Present Illness   Patient is a very pleasant 81 y/o female with has PMH of atrial fibrillation, dementia, HTN, tricuspid regurgitation, Anxiety, depression, and hypertension  Pt seen and examined as part of long term care followup  Asked by staff to see pt as she had fallen earlier in the day  See above note  Pt pleasant today laying in her bed  Pt denies any pain  The following portions of the patient's history were reviewed and updated as appropriate: allergies, current medications, past family history, past medical history, past social history, past surgical history and problem list     Review of Systems     Review of Systems   Unable to perform ROS: Dementia   All other systems reviewed and are negative        Active Problem List     Patient Active Problem List   Diagnosis   • Fall   • Ambulatory dysfunction   • Moderate protein-calorie malnutrition (HCC)   • Anxiety with depression   • Other constipation   • Headache   • Essential hypertension   • Tricuspid regurgitation   • Dementia (HCC)   • Afib (HCC)   • Metabolic encephalopathy   • AMANDA (acute kidney injury) ([facility] [address] )   • Hyperkalemia   • Traumatic hematoma of scalp   • Vaginal bleeding       Objective     Vitals:       84 3 Lbs 10/1/2022    Blood Pressure: 122 /  62 mmHg    Temperature: 97 8 °F    Pulse: 68 bpm    Respirations: 18 Breaths/min            Physical Exam  Vitals reviewed  Constitutional:       General: She is not in acute distress  Appearance: She is not ill-appearing  HENT:      Head: Normocephalic  Cardiovascular:      Rate and Rhythm: Normal rate and regular rhythm  Heart sounds: No murmur heard  Pulmonary:      Effort: Pulmonary effort is normal  No respiratory distress  Breath sounds: Normal breath sounds  No wheezing  Abdominal:      General: Bowel sounds are normal  There is no distension  Palpations: Abdomen is soft  Tenderness: There is no abdominal tenderness  Skin:     Comments: Skin: clean, dry  Legs: no cyanosis, clubbing or edema   Neurological:      Mental Status: She is alert  Mental status is at baseline  Psychiatric:      Comments: Calm cooperative     Patient is alert and oriented to self only  Patient is with dementia  Pertinent Laboratory/Diagnostic Studies:  Refer to facility chart  Current Medications   Medications reviewed and updated in facility chart      Alyssa Ang MD  Internal Medicine  Senior Care Physician

## 2022-11-04 ENCOUNTER — NURSING HOME VISIT (OUTPATIENT)
Dept: GERIATRICS | Facility: OTHER | Age: 82
End: 2022-11-04

## 2022-11-04 DIAGNOSIS — F41.8 ANXIETY WITH DEPRESSION: ICD-10-CM

## 2022-11-04 DIAGNOSIS — G30.1 LATE ONSET ALZHEIMER DEMENTIA, UNSPECIFIED DEMENTIA SEVERITY, UNSPECIFIED WHETHER BEHAVIORAL, PSYCHOTIC, OR MOOD DISTURBANCE OR ANXIETY (HCC): Primary | ICD-10-CM

## 2022-11-04 DIAGNOSIS — F02.80 LATE ONSET ALZHEIMER DEMENTIA, UNSPECIFIED DEMENTIA SEVERITY, UNSPECIFIED WHETHER BEHAVIORAL, PSYCHOTIC, OR MOOD DISTURBANCE OR ANXIETY (HCC): Primary | ICD-10-CM

## 2022-11-04 DIAGNOSIS — I10 ESSENTIAL HYPERTENSION: ICD-10-CM

## 2022-11-07 NOTE — PROGRESS NOTES
[facility]  Niranjan Johnson 79  [phone]  [facility] - [place], [place] - [facility] SNF  POS 32      NAME: Mary Muniz  AGE: 80 y o  SEX: female [id number]    DATE OF ENCOUNTER: 11/4/2022    Assessment and Plan   Vaginal bleeding   Pt with friable lesion noted in vagina last time that was bleeding  No further bleeding noted by staff  H/h stable at 15  Pt saw Netta Mckay today; pt noted to have a vulvular lesion; assessment "Lt vulvar lesion that is whitish lesion about 2 cm in size & has bleeding with touch"     Gyn recommendation:  "needs wide excision of vulva  Pt declines biopsy  Daughter will think about it and call if she decides if they want to proceed    Moderate protein-calorie malnutrition ([address] )  Pt weighs 83 2; one pound weight loss from 84 3  Low but stable  Pt always been around 85 pounds for last 2 years  Cachetic   Good meal completion:% majority of time  Cont Ensure BID  Cont low salt diet  Monitor weight     HTN  Controlled  /68  Cont metoprolol 50 mg 1 tab po BID  Cont metoprolol with hold parameters  Cont prn tylenol for pain     Anxiety  Pt at baseline  Pt on lorazepam 0 25 mg at 8 am, 12 pm  Cont lorazepam 0 5 mg 1 tab po QHS  Sertraline 50 mg 1 tab po qam  Mirtazpaine 15 mg 1 tab po QHS     Atrial fibrillation  HR stable at 68  BP stable 160/68  Reviewed labs from July 2022; wnl  Cont metoprolol tartrate 50 mg 1 tab po BID (hold if HR <55 or SBP <90)  Pt not on anticoag as per cardiology given high fall risk     Dementia  Only oriented to self  Does recognize some staff  Meal intake: Great; eats 76%-100% of time  BM: every few days  Cont 24/7 care and support at long term care for ADLs/IADLs  Maintain sleep/wake cycle  Avoid deliriogenic medications  Monitor for pain     Ambulatory dysfunction  Physical therapy  Cont long term care support  Cont fall precaution    Code status: DNR    Chief Complaint     Routine Long term follow-up visit      History of Present Illness   Patient is a very pleasant 81 y/o female with has PMH of atrial fibrillation, dementia, HTN, tricuspid regurgitation, Anxiety, depression, and hypertension  Pt seen and examined as part of long term care followup  Pt wheeling herself to the lunch room  I was able to get a quick exam in  Pt does not wish for neuro exam or full exam  Pt refusing  The following portions of the patient's history were reviewed and updated as appropriate: allergies, current medications, past family history, past medical history, past social history, past surgical history and problem list     Review of Systems     Review of Systems   Unable to perform ROS: Dementia       Active Problem List     Patient Active Problem List   Diagnosis   • Fall   • Ambulatory dysfunction   • Moderate protein-calorie malnutrition ([address] )   • Anxiety with depression   • Other constipation   • Headache   • Essential hypertension   • Tricuspid regurgitation   • Dementia ([address] )   • Afib ([address] )   • Metabolic encephalopathy   • AMANDA (acute kidney injury) ([address] )   • Hyperkalemia   • Traumatic hematoma of scalp   • Vaginal bleeding   • ERRONEOUS ENCOUNTER--DISREGARD       Objective     Vitals:   Weight: 83 2 Lbs 11/2/2022       11/3/2022 09:45 160 / 68 mmHg     11/4/2022 09:42 80 bpm     RR 16    Physical Exam  Constitutional:       General: She is not in acute distress  Appearance: She is not ill-appearing  HENT:      Head: Normocephalic  Cardiovascular:      Rate and Rhythm: Normal rate and regular rhythm  Pulses: Normal pulses  Heart sounds: Normal heart sounds  Pulmonary:      Effort: No respiratory distress  Breath sounds: Normal breath sounds  No wheezing  Abdominal:      General: Bowel sounds are normal  There is no distension  Palpations: Abdomen is soft  Tenderness: There is no abdominal tenderness  Skin:     Comments: Legs - no cyanosis, clubbing or edema   Neurological:      Mental Status: She is alert  Mental status is at baseline  Psychiatric:         Mood and Affect: Mood normal          Pertinent Laboratory/Diagnostic Studies:  Refer to facility chart  Current Medications   Medications reviewed and updated in facility chart      Yara Monk MD  Internal Medicine  Senior Care Physician

## 2022-12-13 ENCOUNTER — NURSING HOME VISIT (OUTPATIENT)
Dept: GERIATRICS | Facility: OTHER | Age: 82
End: 2022-12-13

## 2022-12-13 DIAGNOSIS — N93.9 VAGINAL BLEEDING: Primary | ICD-10-CM

## 2022-12-13 NOTE — PROGRESS NOTES
[facility]  Niranjan Johnson [address]  [phone]  [facility] -  CAMPUS SNF  POS 32      NAME: Татьяна Waldrop  AGE: 80 y o  SEX: female [id number]    DATE OF ENCOUNTER: December-    Assessment and Plan   Vaginal bleeding  Pt with friable bleeding, growing in size  Pt appears uncomfortable  Discussed with pt's family about goals of care  Pt with friable vaginal lesion  They said pt would not do good in a hospital or office location  Daughter confirmed comfort care [address]  Reassured daughter pt currently stable; VSS; just has the lesion growing  Daughter voiced understanding; RN witnessed    Code status: DNR/DNH/Comfortcare (except laceration/fx/bleeding) with daughter Carolina Sandra and Enedina Spencer       Chief Complaint     Routine Long term follow-up visit  History of Present Illness     81 y/o F with growing vaginal friable lesion:    Asked by RN to see pt as pt leaking from her vaginal lesion  Appears to cause discomfort and is growing  Called family to discuss goals of care  Pt DNH/comfort care      The following portions of the patient's history were reviewed and updated as appropriate: allergies, current medications, past family history, past medical history, past social history, past surgical history and problem list     Review of Systems     Review of Systems   Unable to perform ROS: Dementia       Active Problem List     Patient Active Problem List   Diagnosis   • Fall   • Ambulatory dysfunction   • Moderate protein-calorie malnutrition ([address] )   • Anxiety with depression   • Other constipation   • Headache   • Essential hypertension   • Tricuspid regurgitation   • Dementia ([address] )   • Afib ([address] )   • Metabolic encephalopathy   • AMANDA (acute kidney injury) ([address] )   • Hyperkalemia   • Traumatic hematoma of scalp   • Vaginal bleeding   • ERRONEOUS ENCOUNTER--DISREGARD       Objective     Vitals:   82 4 Lbs 12/1/2022   Blood Pressure: 142 /  75 mmHg   Temperature: 97 8 °F   Pulse: 76 bpm Respirations: 18 Breaths/min     Physical Exam  Exam conducted with a chaperone present  Constitutional:       General: She is not in acute distress  Appearance: She is not ill-appearing  HENT:      Head: Normocephalic and atraumatic  Cardiovascular:      Rate and Rhythm: Regular rhythm  Heart sounds: Normal heart sounds  No murmur heard  No friction rub  Pulmonary:      Effort: No respiratory distress  Breath sounds: Normal breath sounds  No wheezing  Abdominal:      General: Bowel sounds are normal  There is no distension  Palpations: Abdomen is soft  Tenderness: There is no abdominal tenderness  Genitourinary:         Comments: Pt with larger vaginal lesion cauliflower like compared to floor; pt with clotting blood dripping down her vagina when wiping; RN staff present  Neurological:      Mental Status: She is alert  Mental status is at baseline  Psychiatric:         Mood and Affect: Mood normal          Behavior: Behavior normal          Thought Content: Thought content normal          Judgment: Judgment normal          Pertinent Laboratory/Diagnostic Studies:  Refer to facility chart  Current Medications   Medications reviewed and updated in facility chart      Miriam Murray MD  Internal Medicine  Senior Care Physician

## 2022-12-18 ENCOUNTER — TELEPHONE (OUTPATIENT)
Dept: OTHER | Facility: OTHER | Age: 82
End: 2022-12-18

## 2022-12-19 NOTE — TELEPHONE ENCOUNTER
Alexis Hughes called from [facility] called, requesting a call back from on call provider regarding pt's fall   Provide paged vis TC

## 2022-12-30 DIAGNOSIS — F41.9 ANXIETY: ICD-10-CM

## 2022-12-30 RX ORDER — LORAZEPAM 0.5 MG/1
TABLET ORAL
Qty: 60 TABLET | Refills: 0 | Status: SHIPPED | OUTPATIENT
Start: 2022-12-30

## 2023-01-01 ENCOUNTER — HOME CARE VISIT (OUTPATIENT)
Dept: HOME HOSPICE | Facility: HOSPICE | Age: 83
End: 2023-01-01
Payer: MEDICARE

## 2023-01-01 ENCOUNTER — TELEPHONE (OUTPATIENT)
Dept: OTHER | Facility: OTHER | Age: 83
End: 2023-01-01

## 2023-01-01 ENCOUNTER — HOME CARE VISIT (OUTPATIENT)
Dept: HOME HEALTH SERVICES | Facility: HOME HEALTHCARE | Age: 83
End: 2023-01-01
Payer: MEDICARE

## 2023-01-01 ENCOUNTER — NURSING HOME VISIT (OUTPATIENT)
Dept: GERIATRICS | Facility: OTHER | Age: 83
End: 2023-01-01
Payer: MEDICARE

## 2023-01-01 VITALS
BODY MASS INDEX: 15.34 KG/M2 | WEIGHT: 73.4 LBS | HEART RATE: 74 BPM | TEMPERATURE: 97.7 F | RESPIRATION RATE: 18 BRPM | DIASTOLIC BLOOD PRESSURE: 71 MMHG | OXYGEN SATURATION: 94 % | SYSTOLIC BLOOD PRESSURE: 144 MMHG

## 2023-01-01 VITALS — TEMPERATURE: 97.9 F | RESPIRATION RATE: 22 BRPM | HEART RATE: 104 BPM

## 2023-01-01 VITALS — HEART RATE: 102 BPM | RESPIRATION RATE: 14 BRPM

## 2023-01-01 VITALS — SYSTOLIC BLOOD PRESSURE: 110 MMHG | RESPIRATION RATE: 28 BRPM | DIASTOLIC BLOOD PRESSURE: 64 MMHG | HEART RATE: 82 BPM

## 2023-01-01 DIAGNOSIS — Z51.5 HOSPICE CARE: ICD-10-CM

## 2023-01-01 DIAGNOSIS — G30.1 MODERATE LATE ONSET ALZHEIMER'S DEMENTIA WITH OTHER BEHAVIORAL DISTURBANCE (HCC): ICD-10-CM

## 2023-01-01 DIAGNOSIS — I10 ESSENTIAL HYPERTENSION: ICD-10-CM

## 2023-01-01 DIAGNOSIS — F02.B18 MODERATE LATE ONSET ALZHEIMER'S DEMENTIA WITH OTHER BEHAVIORAL DISTURBANCE (HCC): ICD-10-CM

## 2023-01-01 DIAGNOSIS — N76.4 FURUNCLE OF VULVA: ICD-10-CM

## 2023-01-01 DIAGNOSIS — R26.2 AMBULATORY DYSFUNCTION: ICD-10-CM

## 2023-01-01 DIAGNOSIS — E44.0 MODERATE PROTEIN-CALORIE MALNUTRITION (HCC): Primary | ICD-10-CM

## 2023-01-01 PROCEDURE — G0299 HHS/HOSPICE OF RN EA 15 MIN: HCPCS

## 2023-01-01 PROCEDURE — G0156 HHCP-SVS OF AIDE,EA 15 MIN: HCPCS

## 2023-01-01 PROCEDURE — G0155 HHCP-SVS OF CSW,EA 15 MIN: HCPCS

## 2023-01-01 PROCEDURE — 99309 SBSQ NF CARE MODERATE MDM 30: CPT

## 2023-01-01 PROCEDURE — G0300 HHS/HOSPICE OF LPN EA 15 MIN: HCPCS

## 2023-01-01 RX ORDER — MORPHINE SULFATE 100 MG/5ML
10 SOLUTION, CONCENTRATE ORAL EVERY 6 HOURS
Qty: 30 ML | Refills: 0 | Status: SHIPPED | OUTPATIENT
Start: 2023-01-01 | End: 2023-10-01 | Stop reason: CLARIF

## 2023-01-01 RX ORDER — MORPHINE SULFATE 100 MG/5ML
10 SOLUTION, CONCENTRATE ORAL
Qty: 30 ML | Refills: 0 | Status: SHIPPED | OUTPATIENT
Start: 2023-01-01 | End: 2023-10-01 | Stop reason: CLARIF

## 2023-01-01 RX ORDER — LORAZEPAM 2 MG/ML
1 CONCENTRATE ORAL
Qty: 30 ML | Refills: 0 | Status: SHIPPED | OUTPATIENT
Start: 2023-01-01 | End: 2023-10-01 | Stop reason: CLARIF

## 2023-01-01 RX ORDER — LORAZEPAM 2 MG/ML
1 CONCENTRATE ORAL EVERY 6 HOURS
Qty: 30 ML | Refills: 0 | Status: SHIPPED | OUTPATIENT
Start: 2023-01-01 | End: 2023-10-01 | Stop reason: CLARIF

## 2023-01-10 ENCOUNTER — NURSING HOME VISIT (OUTPATIENT)
Dept: GERIATRICS | Facility: OTHER | Age: 83
End: 2023-01-10

## 2023-01-10 DIAGNOSIS — R63.4 WEIGHT LOSS: ICD-10-CM

## 2023-01-10 DIAGNOSIS — F02.80 LATE ONSET ALZHEIMER DEMENTIA, UNSPECIFIED DEMENTIA SEVERITY, UNSPECIFIED WHETHER BEHAVIORAL, PSYCHOTIC, OR MOOD DISTURBANCE OR ANXIETY (HCC): ICD-10-CM

## 2023-01-10 DIAGNOSIS — F41.8 ANXIETY WITH DEPRESSION: ICD-10-CM

## 2023-01-10 DIAGNOSIS — G30.1 LATE ONSET ALZHEIMER DEMENTIA, UNSPECIFIED DEMENTIA SEVERITY, UNSPECIFIED WHETHER BEHAVIORAL, PSYCHOTIC, OR MOOD DISTURBANCE OR ANXIETY (HCC): ICD-10-CM

## 2023-01-10 DIAGNOSIS — I10 ESSENTIAL HYPERTENSION: Primary | ICD-10-CM

## 2023-01-11 NOTE — PROGRESS NOTES
[facility]  Niranjan Johnson 79  [phone]  [facility] [place] -  CAMP[facility] SNF  POS 32      NAME: Terri Rob  AGE: 80 y o  SEX: female [id number]    DATE OF ENCOUNTER: 1-    Assessment and Plan     1  Essential hypertension        2  Anxiety with depression        3  Late onset Alzheimer dementia, unspecified dementia severity, unspecified whether behavioral, psychotic, or mood disturbance or anxiety ([facility] [address] )        4  Weight loss           Essential hypertension  Uncontrolled  /78  162 / 78 mmHg Sitting l/arm    1/10/2023 08:38 159 / 87 mmHg    1/9/2023 16:30 175 / 88 mmHg    1/9/2023 09:19 141 / 82 mmHg    1/8/2023 16:59 158 / 79 mmHg    1/8/2023 09:51 142 / 74 mmHg    1/7/2023 16:24 144 / 81 mmHg    1/7/2023 08:10 157 / 73 mmHg    1/6/2023 16:48 140 / 70 mmHg    1/6/2023 08:06 154 / 75 mmHg      HR 72  Pt on metoprolol tartrate 75 mg 1 tab po BID  Increase to metoprolol 100 mg 1 tab po BID with BP parameters      Anxiety with depression  Stable  Pt pleasant and smiling  Pt on lorazepam 0 25 mg at 8 am, 12 pm  Cont lorazepam 0 5 mg 1 tab po QHS  Sertraline 25 mg 1 tab po qam  Mirtazpaine 15 mg 1 tab po QHS       Dementia ([facility] [address] )  Stable  Monitor sleep/wake cycle  Avoid antideliriogenics  Cont 24/7 supportive longterm care for ADLs, IADLs  Encourage socialization at lunchroom with other residents  Cont redirection, reorientation and distraction methods  Pt constant fall risk & does not follow directions well  Cont fall precautions    Weight loss  Pt weighs 79 7 pounds  79 7 Lbs Standing     12/1/2022 22:41 82 4 Lbs     11/2/2022 22:31 83 2 Lbs     10/1/2022 22:03 84 3 Lbs     9/1/2022 22:50 82 7 Lbs     Pt with 3 pound weight loss; close to her baseline  Encourage protein shakes     Code status: DNR/DNI/DNH/comfort care    Chief Complaint     Routine Long term follow-up visit  60 day orders    History of Present Illness     Pt seen and examined as part of 60 day visit   Pt happily eating her lunch with other tenants  The following portions of the patient's history were reviewed and updated as appropriate: allergies, current medications, past family history, past medical history, past social history, past surgical history and problem list     Review of Systems     Review of Systems   Unable to perform ROS: Dementia   All other systems reviewed and are negative  Active Problem List     Patient Active Problem List   Diagnosis   • Fall   • Ambulatory dysfunction   • Moderate protein-calorie malnutrition (HCC)   • Anxiety with depression   • Other constipation   • Headache   • Essential hypertension   • Tricuspid regurgitation   • Dementia (HCC)   • Afib (HCC)   • Metabolic encephalopathy   • AMANDA (acute kidney injury) ([facility] [address] )   • Hyperkalemia   • Traumatic hematoma of scalp   • Vaginal bleeding   • Weight loss       Objective     Vitals: Reviewed in [employer] system  VSS    79 7 Lbs 1/3/2023   Blood Pressure: 162 /  78 mmHg   Temperature: 98 6 °F   Pulse: 72 bpm     General: Awake, alert  Eyes: one pupil chronically larger than the other  Head: atraumatic, normocephalic  Cardiovascular: RRR  Lungs: Clear to auscultation bilaterally  Abdomen: nontender/nondistended, +BS  Legs: no cyanosis, clubbing or edema  Skin: clean, dry, intact  Psych: calm, cooperative    Pertinent Laboratory/Diagnostic Studies:  Refer to facility chart  Current Medications   Medications reviewed and updated in facility chart      Benedict Espitia MD  Internal Medicine  Senior Care Physician

## 2023-01-11 NOTE — ASSESSMENT & PLAN NOTE
Uncontrolled  /78  162 / 78 mmHg Sitting l/arm    1/10/2023 08:38 159 / 87 mmHg    1/9/2023 16:30 175 / 88 mmHg    1/9/2023 09:19 141 / 82 mmHg    1/8/2023 16:59 158 / 79 mmHg    1/8/2023 09:51 142 / 74 mmHg    1/7/2023 16:24 144 / 81 mmHg    1/7/2023 08:10 157 / 73 mmHg    1/6/2023 16:48 140 / 70 mmHg    1/6/2023 08:06 154 / 75 mmHg      HR 72  Pt on metoprolol tartrate 75 mg 1 tab po BID  Increase to metoprolol 100 mg 1 tab po BID with BP parameters

## 2023-01-11 NOTE — ASSESSMENT & PLAN NOTE
Stable  Pt pleasant and smiling  Pt on lorazepam 0 25 mg at 8 am, 12 pm  Cont lorazepam 0 5 mg 1 tab po QHS  Sertraline 25 mg 1 tab po qam  Mirtazpaine 15 mg 1 tab po QHS

## 2023-01-11 NOTE — ASSESSMENT & PLAN NOTE
Pt weighs 79 7 pounds  79 7 Lbs Standing     12/1/2022 22:41 82 4 Lbs     11/2/2022 22:31 83 2 Lbs     10/1/2022 22:03 84 3 Lbs     9/1/2022 22:50 82 7 Lbs     Pt with 3 pound weight loss; close to her baseline  Encourage protein shakes

## 2023-01-11 NOTE — ASSESSMENT & PLAN NOTE
Stable  Monitor sleep/wake cycle  Avoid antideliriogenics  Cont 24/7 supportive longterm care for ADLs, IADLs  Encourage socialization at lunchroom with other residents  Cont redirection, reorientation and distraction methods  Pt constant fall risk & does not follow directions well  Cont fall precautions

## 2023-01-15 ENCOUNTER — TELEPHONE (OUTPATIENT)
Dept: OTHER | Facility: OTHER | Age: 83
End: 2023-01-15

## 2023-01-16 ENCOUNTER — DOCUMENTATION (OUTPATIENT)
Dept: GERIATRICS | Facility: OTHER | Age: 83
End: 2023-01-16

## 2023-01-17 NOTE — PROGRESS NOTES
1/16/2023:  S/p fall on left knee; pt seen and examined; both knees of roughly equal size; pt is stable; see Rn note in [facility]:    1/15/2023 07:34 Nurses Note   Note Text: Nurse called into room C204B, resident transferring self into wheel chair holding a cup of water and missed the chair and landed on her buttocks at the footboard of the bed, small scratch to left knee  Resident was able to move all upper and lower extremities without difficulty  Transfered back into chair with 2 staff members and [address]  VS: 98 2, 76, 20, 160/82 93% room air, Neuro checks started, No complaint of pain or discomfort Resident alert with confusion: Resident will be transferred to room 1B to be closer to nursing station and bathroom  Daughter Harley Drake and on call Jeet Baig, Supervisor made aware

## 2023-01-24 ENCOUNTER — TRANSCRIBE ORDERS (OUTPATIENT)
Dept: HOME HEALTH SERVICES | Facility: HOME HEALTHCARE | Age: 83
End: 2023-01-24

## 2023-01-24 ENCOUNTER — HOSPICE ADMISSION (OUTPATIENT)
Dept: HOME HOSPICE | Facility: HOSPICE | Age: 83
End: 2023-01-24

## 2023-01-24 ENCOUNTER — HOME CARE VISIT (OUTPATIENT)
Dept: HOME HEALTH SERVICES | Facility: HOME HEALTHCARE | Age: 83
End: 2023-01-24

## 2023-01-24 ENCOUNTER — NURSING HOME VISIT (OUTPATIENT)
Dept: GERIATRICS | Facility: OTHER | Age: 83
End: 2023-01-24

## 2023-01-24 DIAGNOSIS — F41.8 ANXIETY WITH DEPRESSION: ICD-10-CM

## 2023-01-24 DIAGNOSIS — G96.9 CENTRAL NERVOUS SYSTEM COMPLICATION: Primary | ICD-10-CM

## 2023-01-24 DIAGNOSIS — R63.4 WEIGHT LOSS: ICD-10-CM

## 2023-01-24 DIAGNOSIS — N93.9 VAGINAL BLEEDING: Primary | ICD-10-CM

## 2023-01-24 DIAGNOSIS — F02.80 LATE ONSET ALZHEIMER DEMENTIA, UNSPECIFIED DEMENTIA SEVERITY, UNSPECIFIED WHETHER BEHAVIORAL, PSYCHOTIC, OR MOOD DISTURBANCE OR ANXIETY (HCC): ICD-10-CM

## 2023-01-24 DIAGNOSIS — G30.1 LATE ONSET ALZHEIMER DEMENTIA, UNSPECIFIED DEMENTIA SEVERITY, UNSPECIFIED WHETHER BEHAVIORAL, PSYCHOTIC, OR MOOD DISTURBANCE OR ANXIETY (HCC): ICD-10-CM

## 2023-01-24 NOTE — PROGRESS NOTES
[facility]  [address]ajoellele[address] Johnson [address]  [phone]  [facility]  POS 32      NAME: Fredrick Valiente  AGE: 80 y o  SEX: female [id number]    DATE OF ENCOUNTER: 1/24/2023    Assessment and Plan     1  Vaginal bleeding        2  Late onset Alzheimer dementia, unspecified dementia severity, unspecified whether behavioral, psychotic, or mood disturbance or anxiety ([address] )        3  Weight loss        4  Anxiety with depression           Vaginal bleeding  Per RN staff, when pt in bathroom, she is having increasing bleeding & decline in her ADLs  Pt asymptomatic - she is not dizzy or pale  Pt DNH/comfort care  Recommend hospice - family agreeable    Dementia [facility])  Per staff, pt declining in her ADLs  Pt already dependent on family for IADLs  Cont to assist with ADLs/IADLs  Cont 24/7 supportive longterm care for ADLs/IADLs  Encourage socialization in lunchroom with other residents  Cont redirection, reorientation and distraction methods  Pt constant fall risk  Cont fall precaution      Weight loss  1/3/2023 21:55 79 7 Lbs          11/2/2022 22:31 83 2 Lbs         Pt with 3 5 pound weight loss since Nov  Meal intake: 26-50%    Anxiety with depression  Stable  Pt calm & cooperative  Cont lorazepam  Cont sertraline 25 mg 1 tab po qam  Cont mirtazapine 15 mg 1 tab po QHS     Code status: DNR/DNI/DNH/comfort care  Pt's family agreeable to hospice    Chief Complaint     Routine Long term follow-up visit  History of Present Illness   Notified by RN pt has been declining  RN indicated CNAs noted a change in pt  She is now dependent for her ADLs and eating less than before  RN also noticing the vaginal mass in getting bigger and bleeding more  Pt is not dizzy & VSS  Thus RN brought up hospice  Had family meeting at noon today  Family (both daughter & son in law) both agreeable to hospice  [facility]  Pt seen and examined in the lunch room   Pt asking if she should go back to her room  Explained to pt just a doctor's visit and encouraged pt to stay happy  The following portions of the patient's history were reviewed and updated as appropriate: allergies, current medications, past family history, past medical history, past social history, past surgical history and problem list     Review of Systems     Review of Systems   Unable to perform ROS: Dementia     Active Problem List     Patient Active Problem List   Diagnosis   • Fall   • Ambulatory dysfunction   • Moderate protein-calorie malnutrition ([facility] [address] )   • Anxiety with depression   • Other constipation   • Headache   • Essential hypertension   • Tricuspid regurgitation   • Dementia (HCC)   • Afib (HCC)   • Metabolic encephalopathy   • AMANDA (acute kidney injury) ([facility] [address] )   • Hyperkalemia   • Traumatic hematoma of scalp   • Vaginal bleeding   • Weight loss       Objective     Vitals: Reviewed in [facility] system   / 73 mmHg     General: Awake, alert, dementia, cachetic  Head: atraumatic, normocephalic  Cardiovascular: RRR  Lungs: Clear to auscultation bilaterally  Abdomen: nontender/nondistended, +BS  Legs: no cyanosis, clubbing or edema  Skin: clean, dry, intact  Psych: calm, cooperative  Neuro: pt with dementia    Pertinent Laboratory/Diagnostic Studies:  Refer to facility chart  Current Medications   Medications reviewed and updated in facility chart      Abdon Crum MD  Internal Medicine  Senior Care Physician    Total time spent: 31 minutes

## 2023-01-24 NOTE — Clinical Note
Mimi Erickson 81 yo female  9 [phone]  She was on services and revoked on 2020  She is entering her 3rd benefit period and will need a F2F  Hx Dementia and has friable vaginal lesion that is bleeding  She is having increased falls  Her weight on 10/2022 was 84 3; 2022 was 83 2; 2022 was 82 4; current weight 79 7lbs  Increase in lethargy and decreased appetite  PPS 40  Approve, RLOC?

## 2023-01-25 ENCOUNTER — HOSPICE F2F VISIT (OUTPATIENT)
Dept: PALLIATIVE MEDICINE | Facility: CLINIC | Age: 83
End: 2023-01-25

## 2023-01-25 NOTE — PROGRESS NOTES
Assessment/Plan:    No problem-specific Assessment & Plan notes found for this encounter  approved for hospice at [facility] with diagnosis of severe PCM with weight loss and vaginal mass  There are no diagnoses linked to this encounter  Subjective:      Patient ID: Kye Orr is a 80 y o  female  80year old female resident of long term care facility with moderate dementia with behavioral features  She has atrial fibrillation and progressive cognitive and functional decline with weight loss of over 4 pounds over weeks down to 79 pounds with increased lethargy and decreased appetite  She has a friable vaginal mass with intermittent bleeding and she is refusing any evaluation, biopsy or treatment for this area  She has been on hospice services in the past and revoked for unclear reasons  She is requiring more assist with ADL's  Goal is to remain at her facility with comfort focused care  The following portions of the patient's history were reviewed and updated as appropriate: allergies, current medications, past family history, past medical history, past social history, past surgical history and problem list     Review of Systems   Unable to perform ROS: Dementia         Objective: There were no vitals taken for this visit  Physical Exam  Constitutional:       General: She is sleeping  Appearance: She is cachectic  She is ill-appearing  HENT:      Head: Normocephalic and atraumatic  Right Ear: External ear normal       Left Ear: External ear normal       Nose: Nose normal       Mouth/Throat:      Mouth: Mucous membranes are moist       Pharynx: Oropharynx is clear  Eyes:      Extraocular Movements: Extraocular movements intact  Conjunctiva/sclera: Conjunctivae normal       Pupils: Pupils are equal, round, and reactive to light  Cardiovascular:      Rate and Rhythm: Rhythm irregular  Pulses: Normal pulses  Heart sounds: Normal heart sounds     Pulmonary: Effort: Pulmonary effort is normal       Breath sounds: Normal breath sounds  Abdominal:      Tenderness: There is abdominal tenderness  There is guarding  Musculoskeletal:         General: Normal range of motion  Cervical back: Normal range of motion and neck supple  Skin:     General: Skin is warm and dry  Neurological:      General: No focal deficit present  Mental Status: She is easily aroused  Mental status is at baseline  She is lethargic and disoriented     Psychiatric:         Mood and Affect: Mood normal

## 2023-01-25 NOTE — ASSESSMENT & PLAN NOTE
Per RN staff, when pt in bathroom, she is having increasing bleeding & decline in her ADLs  Pt asymptomatic - she is not dizzy or pale  Pt DNH/comfort care  Recommend hospice - family agreeable

## 2023-01-25 NOTE — ASSESSMENT & PLAN NOTE
Per staff, pt declining in her ADLs  Pt already dependent on family for IADLs  Cont to assist with ADLs/IADLs  Cont 24/7 supportive longterm care for ADLs/IADLs  Encourage socialization in lunchroom with other residents  Cont redirection, reorientation and distraction methods  Pt constant fall risk  Cont fall precaution

## 2023-01-25 NOTE — CASE COMMUNICATION
After a face to face visit by me personally at her facility, she is approved for hospice with diagnosis of severe PCM with weight loss and vaginal mass

## 2023-01-25 NOTE — ASSESSMENT & PLAN NOTE
Stable  Pt calm & cooperative  Cont lorazepam  Cont sertraline 25 mg 1 tab po qam  Cont mirtazapine 15 mg 1 tab po QHS

## 2023-01-25 NOTE — ASSESSMENT & PLAN NOTE
1/3/2023 21:55 79 7 Lbs          11/2/2022 22:31 83 2 Lbs         Pt with 3 5 pound weight loss since Nov  Meal intake: 26-50%

## 2023-01-26 ENCOUNTER — HOME CARE VISIT (OUTPATIENT)
Dept: HOME HOSPICE | Facility: HOSPICE | Age: 83
End: 2023-01-26

## 2023-01-26 ENCOUNTER — HOME CARE VISIT (OUTPATIENT)
Dept: HOME HEALTH SERVICES | Facility: HOME HEALTHCARE | Age: 83
End: 2023-01-26

## 2023-01-26 VITALS
HEIGHT: 58 IN | TEMPERATURE: 98.4 F | SYSTOLIC BLOOD PRESSURE: 110 MMHG | WEIGHT: 79.7 LBS | HEART RATE: 68 BPM | DIASTOLIC BLOOD PRESSURE: 70 MMHG | RESPIRATION RATE: 18 BRPM | BODY MASS INDEX: 16.73 KG/M2

## 2023-01-27 ENCOUNTER — HOME CARE VISIT (OUTPATIENT)
Dept: HOME HEALTH SERVICES | Facility: HOME HEALTHCARE | Age: 83
End: 2023-01-27

## 2023-01-28 ENCOUNTER — HOME CARE VISIT (OUTPATIENT)
Dept: HOME HOSPICE | Facility: HOSPICE | Age: 83
End: 2023-01-28

## 2023-01-30 ENCOUNTER — HOME CARE VISIT (OUTPATIENT)
Dept: HOME HEALTH SERVICES | Facility: HOME HEALTHCARE | Age: 83
End: 2023-01-30

## 2023-02-01 ENCOUNTER — HOME CARE VISIT (OUTPATIENT)
Dept: HOME HEALTH SERVICES | Facility: HOME HEALTHCARE | Age: 83
End: 2023-02-01

## 2023-02-01 VITALS
HEART RATE: 80 BPM | RESPIRATION RATE: 19 BRPM | DIASTOLIC BLOOD PRESSURE: 40 MMHG | SYSTOLIC BLOOD PRESSURE: 100 MMHG | TEMPERATURE: 98.5 F

## 2023-02-02 ENCOUNTER — NURSING HOME VISIT (OUTPATIENT)
Dept: GERIATRICS | Facility: OTHER | Age: 83
End: 2023-02-02

## 2023-02-02 DIAGNOSIS — I10 ESSENTIAL HYPERTENSION: ICD-10-CM

## 2023-02-02 DIAGNOSIS — F02.80 LATE ONSET ALZHEIMER DEMENTIA, UNSPECIFIED DEMENTIA SEVERITY, UNSPECIFIED WHETHER BEHAVIORAL, PSYCHOTIC, OR MOOD DISTURBANCE OR ANXIETY (HCC): ICD-10-CM

## 2023-02-02 DIAGNOSIS — N93.9 VAGINAL BLEEDING: ICD-10-CM

## 2023-02-02 DIAGNOSIS — G30.1 LATE ONSET ALZHEIMER DEMENTIA, UNSPECIFIED DEMENTIA SEVERITY, UNSPECIFIED WHETHER BEHAVIORAL, PSYCHOTIC, OR MOOD DISTURBANCE OR ANXIETY (HCC): ICD-10-CM

## 2023-02-02 DIAGNOSIS — R63.4 WEIGHT LOSS: Primary | ICD-10-CM

## 2023-02-02 NOTE — PROGRESS NOTES
[facility]  [address]  [phone]   [facility]  POS 32      NAME: Killian Morse  AGE: 80 y o  SEX: female [id number]    DATE OF ENCOUNTER: 2/2/2023    Assessment and Plan     1  Weight loss        2  Vaginal bleeding        3  Late onset Alzheimer dementia, unspecified dementia severity, unspecified whether behavioral, psychotic, or mood disturbance or anxiety ([facility] [address] )        4  Essential hypertension           Weight loss  76 8 Lbs 2/1/2023     1/3/2023 21:55 79 7 Lbs     12/1/2022 22:41 82 4 Lbs     Pt with almost 3 pound weight loss  Encourage pt to eat  Meal intake: 26-50% 1/2 the time; 0-25% rest of time  BM: every couple of days  Pt on hospice      Vaginal bleeding  Pt with known vaginal lesion that bleeds periodicially  Per RN staff, size is growing  Pt unable to tolerate doctor visits/procedures  Pt very cachetic & 81 y/o & severe anxiety  Family declines further intervention  Pt on hospice    Dementia ([facility] )  Redirect & Reorient pt  Assist with ADLs/IADLs  Encourage pt to eat more  Cont 24/7 longterm supportive care  Avoid further antideliriogenics  Pt on lorazepam 0 25 mg bid & 0 5 at [facility]    Essential hypertension  156 / 69 mmHg 2/2/2023 2/2/2023 09:06 156 / 69 mmHg    2/1/2023 16:04 146 / 90 mmHg    2/1/2023 09:06 111 / 67 mmHg    1/31/2023 16:40 111 / 60 mmHg    1/31/2023 08:17 151 / 80 mmHg    1/30/2023 17:33 138 / 60 mmHg    1/30/2023 09:42 141 / 84 mmHg    BP controlled  Cont metoprolol tartrate 50 mg 1 tab po BID  Pt also has a prn metoprolol 25 mg if SBP >180 or DBP >100  Pt on hospice       Code status: DNR/DNI/DNH/hospice    Chief Complaint     Routine Long term follow-up visit  History of Present Illness   Asked by RN to see pt as pt with declining weight since 1/24/2023  Pt seen and examined  Pt at baseline except seems slightly wing  Encouraged pt to eat more  Pt denies any pain      The following portions of the patient's history were reviewed and updated as appropriate: allergies, current medications, past family history, past medical history, past social history, past surgical history and problem list     Review of Systems     Review of Systems   Unable to perform ROS: Dementia   All other systems reviewed and are negative  Active Problem List     Patient Active Problem List   Diagnosis   • Fall   • Ambulatory dysfunction   • Moderate protein-calorie malnutrition (HCC)   • Anxiety with depression   • Other constipation   • Headache   • Essential hypertension   • Tricuspid regurgitation   • Dementia (HCC)   • Afib (HCC)   • Metabolic encephalopathy   • AMANDA (acute kidney injury) ([facility] )   • Hyperkalemia   • Traumatic hematoma of scalp   • Vaginal bleeding   • Weight loss       Objective     Vitals: Reviewed in [employer] system  VSS    General: Awake, alert, dementia  Head: atraumatic, normocephalic  Cardiovascular: RRR  Lungs: Clear to auscultation bilaterally  Abdomen: nontender/nondistended, +BS  Legs: no cyanosis, clubbing or edema  Skin: clean, dry, intact  Psych: calm but confused    Pertinent Laboratory/Diagnostic Studies:  Refer to facility chart  Current Medications   Medications reviewed and updated in facility chart      Lamberto Whitaker MD  Internal Medicine  Senior Care Physician

## 2023-02-03 ENCOUNTER — HOME CARE VISIT (OUTPATIENT)
Dept: HOME HOSPICE | Facility: HOSPICE | Age: 83
End: 2023-02-03

## 2023-02-06 ENCOUNTER — HOME CARE VISIT (OUTPATIENT)
Dept: HOME HEALTH SERVICES | Facility: HOME HEALTHCARE | Age: 83
End: 2023-02-06

## 2023-02-07 NOTE — ASSESSMENT & PLAN NOTE
Redirect & Reorient pt  Assist with ADLs/IADLs  Encourage pt to eat more  Cont 24/7 longterm supportive care  Avoid further antideliriogenics  Pt on lorazepam 0 25 mg bid & 0 5 at [facility]

## 2023-02-07 NOTE — ASSESSMENT & PLAN NOTE
156 / 69 mmHg 2/2/2023 2/2/2023 09:06 156 / 69 mmHg    2/1/2023 16:04 146 / 90 mmHg    2/1/2023 09:06 111 / 67 mmHg    1/31/2023 16:40 111 / 60 mmHg    1/31/2023 08:17 151 / 80 mmHg    1/30/2023 17:33 138 / 60 mmHg    1/30/2023 09:42 141 / 84 mmHg    BP controlled  Cont metoprolol tartrate 50 mg 1 tab po BID  Pt also has a prn metoprolol 25 mg if SBP >180 or DBP >100  Pt on hospice

## 2023-02-07 NOTE — ASSESSMENT & PLAN NOTE
Pt with known vaginal lesion that bleeds periodicially  Per RN staff, size is growing  Pt unable to tolerate doctor visits/procedures  Pt very cachetic & 81 y/o & severe anxiety  Family declines further intervention  Pt on hospice

## 2023-02-07 NOTE — ASSESSMENT & PLAN NOTE
76 8 Lbs 2/1/2023     1/3/2023 21:55 79 7 Lbs     12/1/2022 22:41 82 4 Lbs     Pt with almost 3 pound weight loss  Encourage pt to eat  Meal intake: 26-50% 1/2 the time; 0-25% rest of time  BM: every couple of days  Pt on hospice

## 2023-02-08 ENCOUNTER — HOME CARE VISIT (OUTPATIENT)
Dept: HOME HEALTH SERVICES | Facility: HOME HEALTHCARE | Age: 83
End: 2023-02-08

## 2023-02-09 ENCOUNTER — HOME CARE VISIT (OUTPATIENT)
Dept: HOME HOSPICE | Facility: HOSPICE | Age: 83
End: 2023-02-09

## 2023-02-09 ENCOUNTER — HOME CARE VISIT (OUTPATIENT)
Dept: HOME HEALTH SERVICES | Facility: HOME HEALTHCARE | Age: 83
End: 2023-02-09

## 2023-02-09 VITALS
SYSTOLIC BLOOD PRESSURE: 170 MMHG | HEART RATE: 7 BPM | DIASTOLIC BLOOD PRESSURE: 80 MMHG | RESPIRATION RATE: 18 BRPM | TEMPERATURE: 98.1 F

## 2023-02-10 ENCOUNTER — HOME CARE VISIT (OUTPATIENT)
Dept: HOME HEALTH SERVICES | Facility: HOME HEALTHCARE | Age: 83
End: 2023-02-10

## 2023-02-11 ENCOUNTER — HOME CARE VISIT (OUTPATIENT)
Dept: HOME HOSPICE | Facility: HOSPICE | Age: 83
End: 2023-02-11

## 2023-02-14 ENCOUNTER — HOME CARE VISIT (OUTPATIENT)
Dept: HOME HEALTH SERVICES | Facility: HOME HEALTHCARE | Age: 83
End: 2023-02-14

## 2023-02-15 ENCOUNTER — HOME CARE VISIT (OUTPATIENT)
Dept: HOME HEALTH SERVICES | Facility: HOME HEALTHCARE | Age: 83
End: 2023-02-15

## 2023-02-15 VITALS — RESPIRATION RATE: 20 BRPM | HEART RATE: 68 BPM

## 2023-02-20 ENCOUNTER — HOME CARE VISIT (OUTPATIENT)
Dept: HOME HEALTH SERVICES | Facility: HOME HEALTHCARE | Age: 83
End: 2023-02-20

## 2023-02-22 ENCOUNTER — HOME CARE VISIT (OUTPATIENT)
Dept: HOME HEALTH SERVICES | Facility: HOME HEALTHCARE | Age: 83
End: 2023-02-22

## 2023-02-22 VITALS
TEMPERATURE: 97.8 F | HEART RATE: 72 BPM | DIASTOLIC BLOOD PRESSURE: 70 MMHG | SYSTOLIC BLOOD PRESSURE: 138 MMHG | RESPIRATION RATE: 20 BRPM

## 2023-02-24 ENCOUNTER — HOME CARE VISIT (OUTPATIENT)
Dept: HOME HEALTH SERVICES | Facility: HOME HEALTHCARE | Age: 83
End: 2023-02-24

## 2023-02-28 ENCOUNTER — HOME CARE VISIT (OUTPATIENT)
Dept: HOME HOSPICE | Facility: HOSPICE | Age: 83
End: 2023-02-28

## 2023-02-28 ENCOUNTER — HOME CARE VISIT (OUTPATIENT)
Dept: HOME HEALTH SERVICES | Facility: HOME HEALTHCARE | Age: 83
End: 2023-02-28

## 2023-03-01 ENCOUNTER — HOME CARE VISIT (OUTPATIENT)
Dept: HOME HEALTH SERVICES | Facility: HOME HEALTHCARE | Age: 83
End: 2023-03-01

## 2023-03-02 ENCOUNTER — HOME CARE VISIT (OUTPATIENT)
Dept: HOME HOSPICE | Facility: HOSPICE | Age: 83
End: 2023-03-02

## 2023-03-02 ENCOUNTER — HOME CARE VISIT (OUTPATIENT)
Dept: HOME HEALTH SERVICES | Facility: HOME HEALTHCARE | Age: 83
End: 2023-03-02

## 2023-03-02 VITALS
SYSTOLIC BLOOD PRESSURE: 130 MMHG | TEMPERATURE: 98.1 F | HEART RATE: 64 BPM | RESPIRATION RATE: 20 BRPM | DIASTOLIC BLOOD PRESSURE: 70 MMHG

## 2023-03-03 ENCOUNTER — HOME CARE VISIT (OUTPATIENT)
Dept: HOME HEALTH SERVICES | Facility: HOME HEALTHCARE | Age: 83
End: 2023-03-03

## 2023-03-06 ENCOUNTER — NURSING HOME VISIT (OUTPATIENT)
Dept: GERIATRICS | Facility: OTHER | Age: 83
End: 2023-03-06

## 2023-03-06 DIAGNOSIS — N93.9 VAGINAL BLEEDING: ICD-10-CM

## 2023-03-06 DIAGNOSIS — F02.80 LATE ONSET ALZHEIMER DEMENTIA, UNSPECIFIED DEMENTIA SEVERITY, UNSPECIFIED WHETHER BEHAVIORAL, PSYCHOTIC, OR MOOD DISTURBANCE OR ANXIETY (HCC): Primary | ICD-10-CM

## 2023-03-06 DIAGNOSIS — G30.1 LATE ONSET ALZHEIMER DEMENTIA, UNSPECIFIED DEMENTIA SEVERITY, UNSPECIFIED WHETHER BEHAVIORAL, PSYCHOTIC, OR MOOD DISTURBANCE OR ANXIETY (HCC): Primary | ICD-10-CM

## 2023-03-06 DIAGNOSIS — R63.4 WEIGHT LOSS: ICD-10-CM

## 2023-03-06 DIAGNOSIS — I10 ESSENTIAL HYPERTENSION: ICD-10-CM

## 2023-03-06 NOTE — ASSESSMENT & PLAN NOTE
Weight stable since January  Appetite overall poor  Continue SALINA diet and ensure   Encourage PO intake as tolerated - hospice

## 2023-03-06 NOTE — ASSESSMENT & PLAN NOTE
Stable  Continue with conservative tx   Known vaginal lesion that bleeds periodicially  Family declines further intervention  Pt on hospice

## 2023-03-06 NOTE — ASSESSMENT & PLAN NOTE
Stable  Continue Lorazepam  Continue hospice  Provide redirection, reorientation, distraction techniques  Fall Precautions  Assist with ADLs/IADLs  Avoid/limit deliriogenic medications such as tramadol, benzodiazepines, anticholinergics, benadryl  Encourage Hydration/ Nutrition  Implement sleep hygiene, limit night time interuptions   Encourage participation in group activities when appropriate

## 2023-03-06 NOTE — ASSESSMENT & PLAN NOTE
Overall stable  SBP avg around 130  Continue  metoprolol BID  PRN metoprolol 25 mg if SBP >180 or DBP >100

## 2023-03-06 NOTE — PROGRESS NOTES
MONTHLY VISIT  Location: [facility]   POS: [facility]    NAME: Yasir Jean Baptiste  AGE: 80 y o  SEX: female    DATE OF ENCOUNTER: 3/6/2023    ASSESSMENT AND PROBLEMS:  1  Late onset Alzheimer dementia, unspecified dementia severity, unspecified whether behavioral, psychotic, or mood disturbance or anxiety (HCC)  Assessment & Plan:  Stable  Continue Lorazepam  Continue hospice  Provide redirection, reorientation, distraction techniques  Fall Precautions  Assist with ADLs/IADLs  Avoid/limit deliriogenic medications such as tramadol, benzodiazepines, anticholinergics, benadryl  Encourage Hydration/ Nutrition  Implement sleep hygiene, limit night time interuptions   Encourage participation in group activities when appropriate           2  Essential hypertension  Assessment & Plan:  Overall stable  SBP avg around 130  Continue  metoprolol BID  PRN metoprolol 25 mg if SBP >180 or DBP >100      3  Weight loss  Assessment & Plan:  Weight stable since January  Appetite overall poor  Continue SALINA diet and ensure   Encourage PO intake as tolerated - hospice      4  Vaginal bleeding  Assessment & Plan:  Stable  Continue with conservative tx   Known vaginal lesion that bleeds periodicially  Family declines further intervention  Pt on hospice           CHIEF COMPLAINT:  Monthly Visit    HISTORY OF PRESENT ILLNESS:  History taken from patient, chart    Yasir Jean Baptiste is a 71-year-old female LTC resident of [facility] with PMH including but not limited to HTN, A-fib, dementia, AMANDA, ambulatory dysfunction, moderate protein calorie malnutrition, anxiety, seen today for monthly follow up  Continues with ongoing supportive care at [facility]  Requires assistance with ADLs in setting of dementia, wheelchair bound  Upon today's assessment, she was seen sitting in dinning room, sitting in wheelchair, awake, alert, NAD  Continues on Lorazepam, sertraline, remeron for mood, hx anxiety, depression, dementia   Although she is a limited historian, she denies any complaints  Appetite remains poor/fair, continues on hospice  REVIEW OF SYSTEMS:  ROS not able to be obtained due to cognitive/expressive limitations    HISTORY:  Medical Hx: Reviewed, unchanged  Family Hx: Reviewed, unchanged  Soc Hx: Reviewed, unchanged  Allergies   Allergen Reactions   • Chocolate Flavor - Food Allergy Other (See Comments)     unknown       PHYSICAL EXAM:  Vital Signs: /60, temp 98 2, HR 66, RR 18, O2 95% on room air  Weight 76 3 pounds    General: NAD, cachectic, sitting in wheelchair   Eye Exam: anicteric sclera, no discharge  ENT: moist mucous membranes  Cardiovascular: regular rate, regular rhythm, no murmurs, rubs, or gallops  Pulmonary: no wheeze, no rhonchi, CTA diminished, RA  Abdominal: soft, nontender, nondistended, bowel sounds audible  Neurological: Awake, alert, wheelchair bount  Skin: No significant lesions appreciated, no significant rashes appreciated    PERTINENT LABS/IMAGING DATA:    9/22/2022 CBC: Hemoglobin 14 6, WBC 7 4, platelet 152    9/70/1650 BMP: BUN 11, creatinine 0 47, sodium 140, potassium 4 3, carbon dioxide 36, EGFR 96    CURRENT MEDICATIONS:  All medications reviewed and updated in Nursing Home EMR      Provider: Antionette VÁZQUEZ  Patient: Mary Alice Gill   3/6/2023 2:25 PM

## 2023-03-07 ENCOUNTER — HOME CARE VISIT (OUTPATIENT)
Dept: HOME HOSPICE | Facility: HOSPICE | Age: 83
End: 2023-03-07

## 2023-03-07 ENCOUNTER — TELEPHONE (OUTPATIENT)
Dept: OTHER | Facility: OTHER | Age: 83
End: 2023-03-07

## 2023-03-07 DIAGNOSIS — Z51.5 HOSPICE CARE: Primary | ICD-10-CM

## 2023-03-07 RX ORDER — MORPHINE SULFATE 100 MG/5ML
5 SOLUTION ORAL EVERY 4 HOURS PRN
Qty: 15 ML | Refills: 0 | Status: SHIPPED | OUTPATIENT
Start: 2023-03-07

## 2023-03-08 ENCOUNTER — HOME CARE VISIT (OUTPATIENT)
Dept: HOME HEALTH SERVICES | Facility: HOME HEALTHCARE | Age: 83
End: 2023-03-08

## 2023-03-08 NOTE — TELEPHONE ENCOUNTER
Alfredo Ragland from [facility] called stating pt is in pain and having anxiety  On call notified via TC

## 2023-03-09 VITALS — RESPIRATION RATE: 18 BRPM | TEMPERATURE: 98.1 F | HEART RATE: 80 BPM

## 2023-03-10 ENCOUNTER — HOME CARE VISIT (OUTPATIENT)
Dept: HOME HOSPICE | Facility: HOSPICE | Age: 83
End: 2023-03-10

## 2023-03-10 ENCOUNTER — HOME CARE VISIT (OUTPATIENT)
Dept: HOME HEALTH SERVICES | Facility: HOME HEALTHCARE | Age: 83
End: 2023-03-10

## 2023-03-13 ENCOUNTER — HOME CARE VISIT (OUTPATIENT)
Dept: HOME HEALTH SERVICES | Facility: HOME HEALTHCARE | Age: 83
End: 2023-03-13

## 2023-03-15 ENCOUNTER — HOME CARE VISIT (OUTPATIENT)
Dept: HOME HEALTH SERVICES | Facility: HOME HEALTHCARE | Age: 83
End: 2023-03-15

## 2023-03-15 VITALS
SYSTOLIC BLOOD PRESSURE: 110 MMHG | HEART RATE: 76 BPM | TEMPERATURE: 98.2 F | RESPIRATION RATE: 18 BRPM | DIASTOLIC BLOOD PRESSURE: 50 MMHG

## 2023-03-16 ENCOUNTER — HOME CARE VISIT (OUTPATIENT)
Dept: HOME HEALTH SERVICES | Facility: HOME HEALTHCARE | Age: 83
End: 2023-03-16

## 2023-03-17 ENCOUNTER — HOME CARE VISIT (OUTPATIENT)
Dept: HOME HEALTH SERVICES | Facility: HOME HEALTHCARE | Age: 83
End: 2023-03-17

## 2023-03-17 ENCOUNTER — HOME CARE VISIT (OUTPATIENT)
Dept: HOME HOSPICE | Facility: HOSPICE | Age: 83
End: 2023-03-17

## 2023-03-19 ENCOUNTER — TELEPHONE (OUTPATIENT)
Dept: OTHER | Facility: OTHER | Age: 83
End: 2023-03-19

## 2023-03-19 ENCOUNTER — HOME CARE VISIT (OUTPATIENT)
Dept: HOME HOSPICE | Facility: HOSPICE | Age: 83
End: 2023-03-19

## 2023-03-19 NOTE — TELEPHONE ENCOUNTER
Adali burger/ [facility] called regarding patient's fall  TC message sent out

## 2023-03-20 ENCOUNTER — NURSING HOME VISIT (OUTPATIENT)
Dept: GERIATRICS | Facility: OTHER | Age: 83
End: 2023-03-20

## 2023-03-20 DIAGNOSIS — I48.0 PAROXYSMAL ATRIAL FIBRILLATION (HCC): ICD-10-CM
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
## 2023-03-20 DIAGNOSIS — F02.80 LATE ONSET ALZHEIMER DEMENTIA, UNSPECIFIED DEMENTIA SEVERITY, UNSPECIFIED WHETHER BEHAVIORAL, PSYCHOTIC, OR MOOD DISTURBANCE OR ANXIETY (HCC): ICD-10-CM

## 2023-03-20 DIAGNOSIS — I10 ESSENTIAL HYPERTENSION: ICD-10-CM

## 2023-03-20 DIAGNOSIS — G30.1 LATE ONSET ALZHEIMER DEMENTIA, UNSPECIFIED DEMENTIA SEVERITY, UNSPECIFIED WHETHER BEHAVIORAL, PSYCHOTIC, OR MOOD DISTURBANCE OR ANXIETY (HCC): ICD-10-CM

## 2023-03-20 NOTE — ASSESSMENT & PLAN NOTE
Monitor sleep/wake cycle  Assist with adls/iadls  Assisted pt with bread this am  Cont 24/7 longterm supportive care  Encourage participation in group activities  Encourage pt to eat  Pt on hospice

## 2023-03-20 NOTE — ASSESSMENT & PLAN NOTE
3/20/2023 12:42 164 / 79 mmHg     152 / 54 mmHg 3/20/2023    3/20/2023 00:45 148 / 67 mmHg    3/19/2023 23:30 159 / 78 mmHg    3/19/2023 20:45 134 / 73 mmHg    3/19/2023 16:45 122 / 67 mmHg    3/19/2023 16:15 109 / 50 mmHg    3/19/2023 15:45 106 / 57 mmHg    3/19/2023 15:15 128 / 59 mmHg    3/19/2023 14:45 126 / 79 mmHg    3/19/2023 14:33 117 / 77 mmHg    3/19/2023 14:15 136 / 68 mmHg    3/19/2023 14:00 156 / 74 mmHg    3/19/2023 12:48 156 / 86 mmHg    3/19/2023 06:16 132 / 64 mmHg    3/18/2023 16:16 132 / 70 mmHg    3/18/2023 14:04 133 / 71 mmHg    3/18/2023 06:17 128 / 64 mmHg    Pt with labile BP; hospice had recommended lowering metoprolol to 25 mg 1 tab po BID  Monitor BP closely

## 2023-03-20 NOTE — ASSESSMENT & PLAN NOTE
Pt had fallen this past weekend  I asked pt if any pain  Pt at baseline; pt with known different size pupils & on hospice; family says DNH/comfort care    3/19/2023 15:39 Nurses Note   Note Text: Resident found @ 1400 sitting upright in bedroom on floor with back at radiator near foot of bed  Resident stated "I guess I was going to put things away"  Full ROM to all extremities  dime-sized open area at (L) elbow with ecchymosis noted  Denies hitting head, no contusion or erythema noted  (R) pupil > (L)  (L) pupil reactive and brisk  Mental status at baseline  Alert and cooperative  Equal hand grasp and equal leg strength noted  VS- 97 5-88-24 BP- 156/74  PO- 95% with RA  (A) of 2 with gait belt to w/c  Resident brought to desk for monitoring and close supervision  chair alarm intact  Tylenol given x1 for c/o (L) elbow mild discomfort  Hospice, daughter Jahaira Brown, and [address] notified  To cont to monitor neuro status at [facility] and report to MD and hospice with any changes   New order for close observation q 15 minutes for safety

## 2023-03-20 NOTE — PROGRESS NOTES
[facility]  [address]  [phone]  [facility]  POS 32      NAME: Melina Webb  AGE: 80 y o  SEX: female [id number]    DATE OF ENCOUNTER: 3/20/2023    Assessment and Plan     1  Fall, sequela        2  Paroxysmal atrial fibrillation (HCC)        3  Late onset Alzheimer dementia, unspecified dementia severity, unspecified whether behavioral, psychotic, or mood disturbance or anxiety ([facility] [address] )        4  Essential hypertension           Fall  Pt had fallen this past weekend  I asked pt if any pain  Pt at baseline; pt with known different size pupils & on hospice; family says DNH/comfort care    3/19/2023 15:39 Nurses Note   Note Text: Resident found @ 1400 sitting upright in bedroom on floor with back at radiator near foot of bed  Resident stated "I guess I was going to put things away"  Full ROM to all extremities  dime-sized open area at (L) elbow with ecchymosis noted  Denies hitting head, no contusion or erythema noted  (R) pupil > (L)  (L) pupil reactive and brisk  Mental status at baseline  Alert and cooperative  Equal hand grasp and equal leg strength noted  VS- 97 5-88-24 BP- 156/74  PO- 95% with RA  (A) of 2 with gait belt to w/c  Resident brought to desk for monitoring and close supervision  chair alarm intact  Tylenol given x1 for c/o (L) elbow mild discomfort  Hospice, daughter Chika Clark, and [address] notified  To cont to monitor neuro status at [facility] and report to MD and hospice with any changes   New order for close observation q 15 minutes for safety         Afib ([facility] [address] )  Pt in normal sinus  Off eliquis given very high risk falls  Cont metoprolol; dose recently lowered    Essential hypertension  3/20/2023 12:42 164 / 79 mmHg     152 / 54 mmHg 3/20/2023    3/20/2023 00:45 148 / 67 mmHg    3/19/2023 23:30 159 / 78 mmHg    3/19/2023 20:45 134 / 73 mmHg    3/19/2023 16:45 122 / 67 mmHg    3/19/2023 16:15 109 / 50 mmHg    3/19/2023 15:45 106 / 57 mmHg 3/19/2023 15:15 128 / 59 mmHg    3/19/2023 14:45 126 / 79 mmHg    3/19/2023 14:33 117 / 77 mmHg    3/19/2023 14:15 136 / 68 mmHg    3/19/2023 14:00 156 / 74 mmHg    3/19/2023 12:48 156 / 86 mmHg    3/19/2023 06:16 132 / 64 mmHg    3/18/2023 16:16 132 / 70 mmHg    3/18/2023 14:04 133 / 71 mmHg    3/18/2023 06:17 128 / 64 mmHg    Pt with labile BP; hospice had recommended lowering metoprolol to 25 mg 1 tab po BID  Monitor BP closely        Dementia (HCC)  Monitor sleep/wake cycle  Assist with adls/iadls  Assisted pt with bread this am  Cont 24/7 longterm supportive care  Encourage participation in group activities  Encourage pt to eat  Pt on hospice       Code status: DNR/DNI/DNH/hospice    Chief Complaint     Acute visit  History of Present Illness   Pt seen and examined by me  Pt had fallen over the weekend  Pt awake, alert at baseline  Pt seen and examined  Her stomach was growling  (hunger)  I asked pt if she is any pain  Pt did not say yes  Pt just wanted to be left alone  Encouraged pt to eat & handed her the bread  (I wear disposable gloves)  She began eating some  She does not like the hard part of the bread  She likes the soft part      The following portions of the patient's history were reviewed and updated as appropriate: allergies, current medications, past family history, past medical history, past social history, past surgical history and problem list     Review of Systems     Review of Systems   Unable to perform ROS: Dementia       Active Problem List     Patient Active Problem List   Diagnosis   • Fall   • Ambulatory dysfunction   • Moderate protein-calorie malnutrition ([address] )   • Anxiety with depression   • Other constipation   • Headache   • Essential hypertension   • Tricuspid regurgitation   • Dementia (HCC)   • Afib (HCC)   • Metabolic encephalopathy   • AMANDA (acute kidney injury) ([address] )   • Hyperkalemia   • Traumatic hematoma of scalp   • Vaginal bleeding   • Weight loss       Objective Vitals: Reviewed in [address] [id number] system  VSS    General: Awake, alert, dementia, cachetic  Head: atraumatic, normocephalic  Cardiovascular: RRR  Lungs: Clear to auscultation bilaterally  Abdomen: nontender/nondistended, +BS (stomach growling)  Legs: no cyanosis, clubbing or edema  Skin: clean, dry, intact  Psych: anxious    Pertinent Laboratory/Diagnostic Studies:  Refer to facility chart  Current Medications   Medications reviewed and updated in facility chart      Dani Banegas MD  Internal Medicine  Senior Care Physician

## 2023-03-21 ENCOUNTER — HOME CARE VISIT (OUTPATIENT)
Dept: HOME HEALTH SERVICES | Facility: HOME HEALTHCARE | Age: 83
End: 2023-03-21

## 2023-03-22 ENCOUNTER — HOME CARE VISIT (OUTPATIENT)
Dept: HOME HEALTH SERVICES | Facility: HOME HEALTHCARE | Age: 83
End: 2023-03-22

## 2023-03-23 ENCOUNTER — HOME CARE VISIT (OUTPATIENT)
Dept: HOME HEALTH SERVICES | Facility: HOME HEALTHCARE | Age: 83
End: 2023-03-23

## 2023-03-24 ENCOUNTER — HOME CARE VISIT (OUTPATIENT)
Dept: HOME HEALTH SERVICES | Facility: HOME HEALTHCARE | Age: 83
End: 2023-03-24

## 2023-03-24 VITALS — RESPIRATION RATE: 17 BRPM | SYSTOLIC BLOOD PRESSURE: 142 MMHG | HEART RATE: 77 BPM | DIASTOLIC BLOOD PRESSURE: 82 MMHG

## 2023-03-29 ENCOUNTER — HOME CARE VISIT (OUTPATIENT)
Dept: HOME HEALTH SERVICES | Facility: HOME HEALTHCARE | Age: 83
End: 2023-03-29

## 2023-03-29 ENCOUNTER — HOME CARE VISIT (OUTPATIENT)
Dept: HOME HOSPICE | Facility: HOSPICE | Age: 83
End: 2023-03-29

## 2023-03-29 VITALS — TEMPERATURE: 98.1 F | HEART RATE: 74 BPM | RESPIRATION RATE: 18 BRPM

## 2023-04-02 PROBLEM — K59.03 DRUG-INDUCED CONSTIPATION: Status: ACTIVE | Noted: 2019-09-04

## 2023-04-02 NOTE — ASSESSMENT & PLAN NOTE
Stable  Continue Lorazepam, sertraline, remeron  Continue hospice  Provide redirection, reorientation, distraction techniques  Fall Precautions  Assist with ADLs/IADLs  Avoid/limit deliriogenic medications such as tramadol, benzodiazepines, anticholinergics, benadryl  Encourage Hydration/ Nutrition  Implement sleep hygiene, limit night time interuptions   Encourage participation in group activities when appropriate

## 2023-04-02 NOTE — ASSESSMENT & PLAN NOTE
Overall stable  SBP avg around 110s-130s this past week   Continue  metoprolol BID  PRN metoprolol 25 mg if SBP >180 or DBP >100

## 2023-04-02 NOTE — ASSESSMENT & PLAN NOTE
Weight stable - 0 3 pound weight gain since last month  Appetite overall poor  Continue SALINA diet and ensure   Encourage PO intake as tolerated - hospice

## 2023-04-02 NOTE — PROGRESS NOTES
MONTHLY VISIT  Location: [facility]   POS: [facility]    NAME: Antionette Vallejo  AGE: 80 y o  SEX: female    DATE OF ENCOUNTER: 4/3/2023    ASSESSMENT AND PROBLEMS:  1  Late onset Alzheimer dementia, unspecified dementia severity, unspecified whether behavioral, psychotic, or mood disturbance or anxiety (HCC)  Assessment & Plan:  Stable  Continue Lorazepam, sertraline, remeron  Continue hospice  Provide redirection, reorientation, distraction techniques  Fall Precautions  Assist with ADLs/IADLs  Avoid/limit deliriogenic medications such as tramadol, benzodiazepines, anticholinergics, benadryl  Encourage Hydration/ Nutrition  Implement sleep hygiene, limit night time interuptions   Encourage participation in group activities when appropriate      2  Drug-induced constipation  Assessment & Plan:  Not at goal  Last documented BM on 3/30  Known morphine use as patient is on hospice  Bowel protocol in place  Continue as needed suppository, as needed enema, as needed Colace        3  Weight loss  Assessment & Plan:  Weight stable - 0 3 pound weight gain since last month  Appetite overall poor  Continue SALINA diet and ensure   Encourage PO intake as tolerated - hospice      4  Essential hypertension  Assessment & Plan:  Overall stable  SBP avg around 110s-130s this past week   Continue  metoprolol BID  PRN metoprolol 25 mg if SBP >180 or DBP >100      5  Vaginal bleeding  Assessment & Plan:  Stable  Continue with conservative tx   Known vaginal lesion that bleeds periodicially  Family declines further intervention  Pt on hospice      6  Ambulatory dysfunction  Assessment & Plan:  Multifactorial- acute and chronic conditions  Assist with ADLs  Encourage PO nutrition and hydration  Maintain fall precautions      7  Hospice care  Assessment & Plan:  Continues with [facility] hospice  Continue scheduled Lorazepam 0 25 mg BID at 0800 and 1200 and morphine oral concentration 5mg Q4H PRN      8  Anxiety    9   Anxiety with "depression  Assessment & Plan:  Stable   Lorazepam, sertraline, and mirtazapine  Continue ongoing supportive care              CHIEF COMPLAINT:  Monthly Visit - routine 30 day visit     HISTORY OF PRESENT ILLNESS:  History taken from patient, chart    Radha Randhawa is a 80-year-old female LTC resident of [facility] with PMH including but not limited to HTN, A-fib, dementia, AMANDA, ambulatory dysfunction, moderate protein calorie malnutrition, anxiety, seen today for routine 30 day monthly follow up  Continues with ongoing supportive care at [address] [place] [address], continues on hospice  Requires assistance with ADLs in setting of dementia, wheelchair bound  Upon today's assessment, she was seen laying in bed, has no complaints except she feels \"tired\"  Continues on Lorazepam, sertraline, remeron for mood, hx anxiety, depression, dementia  SNF chart reviewed, appetite overall poor/fair eating anywhere from 0 to 100% of meals, weight has remained stable since last month with weight gain of 0 3 pounds, last BM on 3/30, denies any GI pain or nausea       REVIEW OF SYSTEMS:  ROS not able to be obtained due to cognitive/expressive limitations    HISTORY:  Medical Hx: Reviewed, unchanged  Family Hx: Reviewed, unchanged  Soc Hx: Reviewed, unchanged  Allergies   Allergen Reactions   • Chocolate Flavor - Food Allergy Other (See Comments)     unknown       PHYSICAL EXAM:  Vital Signs: /78, Temp 97 9, HR 94, RR 20, 02 96%  Weight 76 6 #    General: NAD, cachectic, laying in bed  Eye Exam: anicteric sclera, no discharge  ENT: moist mucous membranes  Cardiovascular: regular rate, regular rhythm, no murmurs, rubs, or gallops  Pulmonary: no wheeze, no rhonchi, CTA diminished, RA  Abdominal: soft, nontender, nondistended, bowel sounds audible  Neurological: Awake, alert, generalized weakness, wheelchair bound   Skin: No significant lesions appreciated, no significant rashes appreciated    PERTINENT LABS/IMAGING DATA:    9/22/2022 " CBC: Hemoglobin 14 6, WBC 7 4, platelet 031    5/28/1269 BMP: BUN 11, creatinine 0 47, sodium 140, potassium 4 3, carbon dioxide 36, EGFR 96    CURRENT MEDICATIONS:  All medications reviewed and updated in Nursing Home EMR      Provider: Belen VÁZQUEZ  Patient: Layne Sims   4/3/2023

## 2023-04-02 NOTE — ASSESSMENT & PLAN NOTE
Not at goal  Last documented BM on 3/30  Known morphine use as patient is on hospice  Bowel protocol in place  Continue as needed suppository, as needed enema, as needed Colace

## 2023-04-03 ENCOUNTER — NURSING HOME VISIT (OUTPATIENT)
Dept: GERIATRICS | Facility: OTHER | Age: 83
End: 2023-04-03

## 2023-04-03 ENCOUNTER — HOME CARE VISIT (OUTPATIENT)
Dept: HOME HEALTH SERVICES | Facility: HOME HEALTHCARE | Age: 83
End: 2023-04-03

## 2023-04-03 DIAGNOSIS — R26.2 AMBULATORY DYSFUNCTION: ICD-10-CM

## 2023-04-03 DIAGNOSIS — F02.80 LATE ONSET ALZHEIMER DEMENTIA, UNSPECIFIED DEMENTIA SEVERITY, UNSPECIFIED WHETHER BEHAVIORAL, PSYCHOTIC, OR MOOD DISTURBANCE OR ANXIETY (HCC): Primary | ICD-10-CM

## 2023-04-03 DIAGNOSIS — R63.4 WEIGHT LOSS: ICD-10-CM

## 2023-04-03 DIAGNOSIS — G30.1 LATE ONSET ALZHEIMER DEMENTIA, UNSPECIFIED DEMENTIA SEVERITY, UNSPECIFIED WHETHER BEHAVIORAL, PSYCHOTIC, OR MOOD DISTURBANCE OR ANXIETY (HCC): Primary | ICD-10-CM

## 2023-04-03 DIAGNOSIS — F41.9 ANXIETY: ICD-10-CM

## 2023-04-03 DIAGNOSIS — N93.9 VAGINAL BLEEDING: ICD-10-CM

## 2023-04-03 DIAGNOSIS — K59.03 DRUG-INDUCED CONSTIPATION: ICD-10-CM

## 2023-04-03 DIAGNOSIS — I10 ESSENTIAL HYPERTENSION: ICD-10-CM

## 2023-04-03 DIAGNOSIS — F41.8 ANXIETY WITH DEPRESSION: ICD-10-CM

## 2023-04-03 DIAGNOSIS — Z51.5 HOSPICE CARE: ICD-10-CM

## 2023-04-03 RX ORDER — LORAZEPAM 0.5 MG/1
0.5 TABLET ORAL 2 TIMES DAILY
COMMUNITY

## 2023-04-03 NOTE — ASSESSMENT & PLAN NOTE
Stable  Remains in regular rhythm  HR trends controlled avg 70-80s  Continue Metoprolol  No AC use due to high fall risk

## 2023-04-03 NOTE — ASSESSMENT & PLAN NOTE
Continues with [facility]'s hospice  Continue scheduled Lorazepam 0 25 mg BID at 0800 and 1200 and morphine oral concentration 5mg Q4H PRN

## 2023-04-03 NOTE — ASSESSMENT & PLAN NOTE
Multifactorial- acute and chronic conditions  Assist with ADLs  Encourage PO nutrition and hydration  Maintain fall precautions

## 2023-04-05 ENCOUNTER — HOME CARE VISIT (OUTPATIENT)
Dept: HOME HEALTH SERVICES | Facility: HOME HEALTHCARE | Age: 83
End: 2023-04-05

## 2023-04-06 ENCOUNTER — HOME CARE VISIT (OUTPATIENT)
Dept: HOME HEALTH SERVICES | Facility: HOME HEALTHCARE | Age: 83
End: 2023-04-06

## 2023-04-06 VITALS — DIASTOLIC BLOOD PRESSURE: 60 MMHG | SYSTOLIC BLOOD PRESSURE: 90 MMHG | RESPIRATION RATE: 20 BRPM | HEART RATE: 80 BPM

## 2023-04-07 ENCOUNTER — HOME CARE VISIT (OUTPATIENT)
Dept: HOME HEALTH SERVICES | Facility: HOME HEALTHCARE | Age: 83
End: 2023-04-07

## 2023-04-21 ENCOUNTER — HOME CARE VISIT (OUTPATIENT)
Dept: HOME HEALTH SERVICES | Facility: HOME HEALTHCARE | Age: 83
End: 2023-04-21

## 2023-04-24 ENCOUNTER — HOME CARE VISIT (OUTPATIENT)
Dept: HOME HEALTH SERVICES | Facility: HOME HEALTHCARE | Age: 83
End: 2023-04-24

## 2023-04-26 ENCOUNTER — HOME CARE VISIT (OUTPATIENT)
Dept: HOME HOSPICE | Facility: HOSPICE | Age: 83
End: 2023-04-26

## 2023-04-27 ENCOUNTER — HOME CARE VISIT (OUTPATIENT)
Dept: HOME HEALTH SERVICES | Facility: HOME HEALTHCARE | Age: 83
End: 2023-04-27

## 2023-04-27 ENCOUNTER — NURSING HOME VISIT (OUTPATIENT)
Dept: GERIATRICS | Facility: OTHER | Age: 83
End: 2023-04-27

## 2023-04-27 VITALS — HEART RATE: 76 BPM | DIASTOLIC BLOOD PRESSURE: 80 MMHG | RESPIRATION RATE: 18 BRPM | SYSTOLIC BLOOD PRESSURE: 150 MMHG

## 2023-04-27 DIAGNOSIS — F02.80 LATE ONSET ALZHEIMER DEMENTIA, UNSPECIFIED DEMENTIA SEVERITY, UNSPECIFIED WHETHER BEHAVIORAL, PSYCHOTIC, OR MOOD DISTURBANCE OR ANXIETY (HCC): Primary | ICD-10-CM

## 2023-04-27 DIAGNOSIS — F41.8 ANXIETY WITH DEPRESSION: ICD-10-CM

## 2023-04-27 DIAGNOSIS — I10 ESSENTIAL HYPERTENSION: ICD-10-CM

## 2023-04-27 DIAGNOSIS — Z51.5 HOSPICE CARE: Primary | ICD-10-CM

## 2023-04-27 DIAGNOSIS — G30.1 LATE ONSET ALZHEIMER DEMENTIA, UNSPECIFIED DEMENTIA SEVERITY, UNSPECIFIED WHETHER BEHAVIORAL, PSYCHOTIC, OR MOOD DISTURBANCE OR ANXIETY (HCC): Primary | ICD-10-CM

## 2023-04-27 DIAGNOSIS — K59.03 DRUG-INDUCED CONSTIPATION: ICD-10-CM

## 2023-04-27 RX ORDER — LORAZEPAM 0.5 MG/1
0.5 TABLET ORAL EVERY EVENING
Qty: 15 TABLET | Refills: 0 | Status: SHIPPED | OUTPATIENT
Start: 2023-04-27 | End: 2023-05-12

## 2023-04-27 RX ORDER — LORAZEPAM 0.5 MG/1
1 TABLET ORAL EVERY EVENING
COMMUNITY
End: 2023-04-27 | Stop reason: SDUPTHER

## 2023-04-27 RX ORDER — LORAZEPAM 0.5 MG/1
0.25 TABLET ORAL 2 TIMES DAILY
Qty: 15 TABLET | Refills: 0 | Status: SHIPPED | OUTPATIENT
Start: 2023-04-27 | End: 2023-05-12

## 2023-04-27 NOTE — PROGRESS NOTES
[facility]  [address]  [phone]  [facility]  POS 32      NAME: Wesley Denton  AGE: 80 y o  SEX: female [id number]    DATE OF ENCOUNTER: 4/27/2023    Assessment and Plan     1  Late onset Alzheimer dementia, unspecified dementia severity, unspecified whether behavioral, psychotic, or mood disturbance or anxiety ([address] )        2  Drug-induced constipation  polyethylene glycol (GLYCOLAX) 17 GM/SCOOP powder    magnesium hydroxide (Milk of Magnesia) 400 mg/5 mL oral suspension      3  Essential hypertension        4  Anxiety with depression           Dementia ([facility] [address] )  Pt at baseline  Redirect, reorient and provide distraction techniques  Assist with adls/iadls  Encourage group participation  Cont 24/7 longterm supportive care  Meal intake: pt often eats % & 51-75%; at times 25-50%  BM: pt often constipated; add miralax/mom  Weight: stable    76 6 Lbs 4/2/2023     3/1/2023 12:00 76 3 Lbs     2/1/2023 14:27 76 8 Lbs            Drug-induced constipation  Pt constipated quite often  Add miralax & mom  Cont care per hospice    Essential hypertension  Stable  BP ranges 120s-150s  Cont metoprolol 25 mg 1 tab po BID  Also pt has metoprolol 25 mg 1 tab po daily prn sbp >160 (in addition to standing dose)    Anxiety with depression  Stable  Cont lorazepam, sertraline & mirtazapine       Code status: DNR/DNR/comfort care    Chief Complaint     Routine Long term follow-up visit  60 day orders signed    History of Present Illness   Pt seen and examined  Pt eating lunch herself  Pt thinks its too much  But pt enjoying her meatballs & bread & pasta      The following portions of the patient's history were reviewed and updated as appropriate: allergies, current medications, past family history, past medical history, past social history, past surgical history and problem list     Review of Systems     Review of Systems   Unable to perform ROS: Dementia       Active Problem List Patient Active Problem List   Diagnosis   • Fall   • Ambulatory dysfunction   • Moderate protein-calorie malnutrition (HCC)   • Anxiety with depression   • Drug-induced constipation   • Headache   • Essential hypertension   • Tricuspid regurgitation   • Dementia (HCC)   • Afib (HCC)   • Metabolic encephalopathy   • AMANDA (acute kidney injury) ([facility] [address] )   • Hyperkalemia   • Traumatic hematoma of scalp   • Vaginal bleeding   • Weight loss   • Hospice care       Objective     Vitals: Reviewed in [employer] system  VSS    General: Awake, alert, dementia  Head: atraumatic, normocephalic  Cardiovascular: RRR  Lungs: Clear to auscultation bilaterally  Abdomen: nontender/nondistended, +BS  Legs: no cyanosis, clubbing or edema  Skin: clean, dry, intact  Psych: anxious, cooperative    Pertinent Laboratory/Diagnostic Studies:  Refer to facility chart  Current Medications   Medications reviewed and updated in facility chart      Acetaminophen Suppository 650 MG Insert 1 suppository rectally every 4 hours as needed for Mild Pain Max 3 GM APAP / 24 HR, Give Suppository If Unable to Take By Mouth AND Insert 1 suppository rectally every 4 hours as needed for Temperature = or > 100 degrees Oral / 101 degrees Rectal Max 3 GM APAP / 24 HR, Give Suppository If Unable to Take By Mouth   Bisac-Evac Suppository 10 MG (Bisacodyl) Insert 1 suppository rectally as needed for Constipation 1 Time a Day, If NO BM by AM Day # 4, Day Nurse Gives a Suppository that AM   Enema Disposable Enema (Sodium Phosphates) Insert 1 application rectally as needed for Constipation 1 Time a Day, If NO BM within 3-5 Hours of Suppository Insertion Give Enema   Sorbitol Solution 70 % Give 30 ml orally every 24 hours as needed for constipation 1 Time a Day, If No BM, give 30ml on Day # 2 and Day # 3 and then follow bowel protocol AND Give 30 ml orally every 24 hours as needed for constipation 1 Time a Day, If No BM, give 30ml on Day # 2 and Day # 3 and then follow bowel protocol   MIRTAZAPINE 15MG TABLET GIVE 1 TABLET BY MOUTH ONCE DAILY AT BEDTIME FOR INSOMNIA(Related Diagnoses: ANXIETY DISORDER, UNSPECIFIED (F41 9))   DOCUSATE 100MG CAPSULE GIVE 1 CAPSULE BY MOUTH TWICE DAILY AS NEEDED FOR CONSTIPATION(Related Diagnoses: CONSTIPATION, UNSPECIFIED (K59 00))   SERTRALINE 50MG TABLET GIVE 1 TABLET BY MOUTH ONCE DAILY AT BEDTIME FOR ANXIETY/ DEPRESSION(Related Diagnoses: MAJOR DEPRESSIVE DISORDER, RECURRENT, MODERATE (F33 1); GENERALIZED ANXIETY DISORDER (F41 1))(Indications for Use: anxiety depression)   LORAZEPAM 0 5MG TABLET GIVE 1/2 TABLET (0 25MG) BY MOUTH TWICE DAILY AT 0800 AND 1200(Related Diagnoses: GENERALIZED ANXIETY DISORDER (F41 1))(Indications for Use: anxiety) AND GIVE 1 TABLET (0 5MG) BY MOUTH EVERY EVENING AT 1800(Related Diagnoses: GENERALIZED ANXIETY DISORDER (F41 1))(Indications for Use: anxiety)   OMEPRAZOLE 20MG DR CAPSULE GIVE 1 CAPSULE BY MOUTH ONCE DAILY FOR GASTROESOPHAGEAL REFLUX DISEASE(Indications for Use: GERD)   ONDANSETRON 4MG TABLET GIVE 1 TABLET BY MOUTH EVERY 6 HOURS AS NEEDED FOR VOMITING(Related Diagnoses: NAUSEA WITH VOMITING, UNSPECIFIED (R11 2))(Indications for Use: Upset Stomach)   ONDANSETRON 4MG TABLET GIVE 1 TABLET BY MOUTH ONCE DAILY AT 0600 (BEF BREAKFAST)(Related Diagnoses: NAUSEA WITH VOMITING, UNSPECIFIED (R11 2))(Indications for Use: /)   METOPROLOL TARTRATE 25MG TAB GIVE 1 TABLET BY MOUTH TWICE DAILY(Indications for Use: //)   MORPHINE ORAL *CONC* 20MG/ML GIVE 0 25ML (5MG) BY MOUTH EVERY 4 HOURS AS NEEDED FOR MODERATE-SEVERE PAIN   METOPROLOL TARTRATE 25MG TAB GIVE 1 TABLET BY MOUTH ONCE DAILY AS NEEDED FOR SYSTOLIC BLOOD PRESSURE > 160(Indications for Use: HTN)   LORAZEPAM 0 5MG TABLET GIVE 1/2 TABLET (0 25MG) BY MOUTH TWICE DAILY FOR 15 DAYS   LORAZEPAM 0 5MG TABLET GIVE 1 TABLET BY MOUTH EVERY EVENING FOR 15 DAYS         Nay Gong MD  Internal Medicine  Senior Care Physician

## 2023-04-28 ENCOUNTER — HOME CARE VISIT (OUTPATIENT)
Dept: HOME HEALTH SERVICES | Facility: HOME HEALTHCARE | Age: 83
End: 2023-04-28

## 2023-04-28 RX ORDER — MAGNESIUM HYDROXIDE 2400 MG/30ML
30 SUSPENSION ORAL DAILY PRN
Qty: 118 ML | Refills: 0 | Status: SHIPPED | OUTPATIENT
Start: 2023-04-28

## 2023-04-28 RX ORDER — POLYETHYLENE GLYCOL 3350 17 G/17G
17 POWDER, FOR SOLUTION ORAL DAILY PRN
Qty: 116 G | Refills: 0 | Status: SHIPPED | OUTPATIENT
Start: 2023-04-28

## 2023-04-28 NOTE — ASSESSMENT & PLAN NOTE
Pt at baseline  Redirect, reorient and provide distraction techniques  Assist with adls/iadls  Encourage group participation  Cont 24/7 longterm supportive care  Meal intake: pt often eats % & 51-75%; at times 25-50%  BM: pt often constipated; add miralax/mom  Weight: stable    76 6 Lbs 4/2/2023     3/1/2023 12:00 76 3 Lbs     2/1/2023 14:27 76 8 Lbs

## 2023-04-28 NOTE — ASSESSMENT & PLAN NOTE
Stable  BP ranges 120s-150s  Cont metoprolol 25 mg 1 tab po BID  Also pt has metoprolol 25 mg 1 tab po daily prn sbp >160 (in addition to standing dose)

## 2023-05-01 ENCOUNTER — HOME CARE VISIT (OUTPATIENT)
Dept: HOME HEALTH SERVICES | Facility: HOME HEALTHCARE | Age: 83
End: 2023-05-01

## 2023-05-03 ENCOUNTER — HOME CARE VISIT (OUTPATIENT)
Dept: HOME HEALTH SERVICES | Facility: HOME HEALTHCARE | Age: 83
End: 2023-05-03

## 2023-05-03 VITALS — RESPIRATION RATE: 20 BRPM | HEART RATE: 74 BPM | SYSTOLIC BLOOD PRESSURE: 110 MMHG | DIASTOLIC BLOOD PRESSURE: 80 MMHG

## 2023-05-04 ENCOUNTER — HOME CARE VISIT (OUTPATIENT)
Dept: HOME HEALTH SERVICES | Facility: HOME HEALTHCARE | Age: 83
End: 2023-05-04

## 2023-05-05 ENCOUNTER — HOME CARE VISIT (OUTPATIENT)
Dept: HOME HEALTH SERVICES | Facility: HOME HEALTHCARE | Age: 83
End: 2023-05-05

## 2023-05-08 ENCOUNTER — HOME CARE VISIT (OUTPATIENT)
Dept: HOME HEALTH SERVICES | Facility: HOME HEALTHCARE | Age: 83
End: 2023-05-08

## 2023-05-09 ENCOUNTER — HOME CARE VISIT (OUTPATIENT)
Dept: HOME HEALTH SERVICES | Facility: HOME HEALTHCARE | Age: 83
End: 2023-05-09

## 2023-05-10 ENCOUNTER — HOME CARE VISIT (OUTPATIENT)
Dept: HOME HEALTH SERVICES | Facility: HOME HEALTHCARE | Age: 83
End: 2023-05-10

## 2023-05-11 ENCOUNTER — HOME CARE VISIT (OUTPATIENT)
Dept: HOME HOSPICE | Facility: HOSPICE | Age: 83
End: 2023-05-11

## 2023-05-11 ENCOUNTER — NURSING HOME VISIT (OUTPATIENT)
Dept: GERIATRICS | Facility: OTHER | Age: 83
End: 2023-05-11

## 2023-05-11 VITALS
DIASTOLIC BLOOD PRESSURE: 60 MMHG | SYSTOLIC BLOOD PRESSURE: 120 MMHG | BODY MASS INDEX: 16.57 KG/M2 | HEART RATE: 88 BPM | TEMPERATURE: 98.1 F | WEIGHT: 79.3 LBS | RESPIRATION RATE: 18 BRPM

## 2023-05-11 DIAGNOSIS — Z51.5 HOSPICE CARE: ICD-10-CM

## 2023-05-11 DIAGNOSIS — R05.1 ACUTE COUGH: Primary | ICD-10-CM

## 2023-05-11 NOTE — ASSESSMENT & PLAN NOTE
Continues with [facility]  Discussed current nasal and chest congestion with hospice nurse Jennifer Dumont via phone today- start guaifenesin and monitor conservatively    If clinical status or vitals become abnormal, will reach out again to hospice for further management   Continue scheduled Lorazepam 0 25 mg BID at 0800 and 1200 and morphine oral concentration 5mg Q4H PRN

## 2023-05-11 NOTE — PROGRESS NOTES
"Acute Visit  Location: [facility]   POS: 32 (LTC)    NAME: Sera Castillo  AGE: 80 y o  SEX: female    ASSESSMENT AND PROBLEMS:  1  Acute cough  Assessment & Plan: With nasal and chest congestion-congested cough  Add guaifenesin ATC and PRN to help expectorate any mucous  Add daily flonase for four days  Continue to monitor vital signs and clinical appearance  If status worsens, would speak to hospice to discuss/coordinate further workup/management/care         2  Hospice care  Assessment & Plan:  Continues with [facility] hospice  Discussed current nasal and chest congestion with hospice nurse Aram Santos via phone today- start guaifenesin and monitor conservatively  If clinical status or vitals become abnormal, will reach out again to hospice for further management   Continue scheduled Lorazepam 0 25 mg BID at 0800 and 1200 and morphine oral concentration 5mg Q4H PRN        CHIEF COMPLAINT:  \"congestion\"    HISTORY OF PRESENT ILLNESS:    Sera Castillo is a 80-year-old female LTC resident of [facility] with PMH including but not limited to HTN, A-fib, dementia, AMANDA, ambulatory dysfunction, moderate protein calorie malnutrition, anxiety, seen today for reports of congestion and cough  Patient does report cough, congested sounding, as well as \"stuffy\" nose  She is baseline fatigued throughout day but remains arousable and engaged in conversation  She is an active hospice patient with [facility] and was evaluated this morning  Spoke with hospice nurse in which patient appeared at baseline  Patient denies any SOB, fever, chills, or myalgia  She does not demonstrate any WOB or respiratory distress  REVIEW OF SYSTEMS:  Per history of present illness, all other systems reviewed and negative      HISTORY:  Past Medical History:   Diagnosis Date   • Acute and chronic respiratory failure with hypercapnia ([facility] ) 7/22/2019   • Ambulatory dysfunction    • Anemia     hgb 5 2o bleeding uler in 1970's   • " Arthritis    • Dehydration 7/24/2019   • Dementia ([facility])    • Depression    • Eating disorder    • Elevated temperature 12/29/2021   • Encephalopathy    • Furuncle of vulva 3/28/2022   • Gastrointestinal bleeding    • Gastrointestinal bleeding    • GI bleeding 7/27/2019   • Hyperlipidemia    • Hypertension    • Pressure injury of head, stage 1 7/21/2019   • Pressure injury of sacral region, stage 3 ([facility] [address] ) 7/21/2019   • Respiratory failure ([facility] [address] )    • Scalp hematoma 7/21/2019   • Severe protein-calorie malnutrition Traci Justin: less than 60% of standard weight) ([facility])    • Severe protein-calorie malnutrition ([facility] [address] ) 7/21/2019   • Skin rash 11/13/2020   • Unstageable pressure ulcer ([facility] [address] ) 9/4/2019   • Vulvar mass    • 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     Family History   Problem Relation Age of Onset   • Parkinsonism Mother    • COPD Father      Social History     Tobacco Use   • Smoking status: Never   • Smokeless tobacco: Never   Vaping Use   • Vaping Use: Never used   Substance Use Topics   • Alcohol use: Not Currently     Comment: only socially in the past   • Drug use: Never     Allergies   Allergen Reactions   • Chocolate Flavor - Food Allergy Other (See Comments)     unknown       PHYSICAL EXAM:  Vital Signs: /77, temp 97 5, , RR 20, O2 99%  Weight 79 3 pounds    General: NAD, cachectic, laying in bed  Eye Exam: anicteric sclera, no discharge  ENT: moist mucous membranes  Cardiovascular: regular rate, regular rhythm, no murmurs, rubs, or gallops  Pulmonary: no wheeze, no rhonchi, CTA diminished, RA- congested cough  Abdominal: soft, nontender, nondistended, bowel sounds audible  Neurological: Awake, alert, fatigued, generalized weakness, wheelchair bound   Skin: No significant lesions appreciated, no significant rashes appreciated    PERTINENT LABS/IMAGING DATA:      9/22/2022 CBC: Hemoglobin 14 6, WBC 7 4, platelet 695     9/19/9439 BMP: BUN 11, creatinine 0 47, sodium 140, potassium 4 3, carbon dioxide 36, EGFR 96    CURRENT MEDICATIONS:  All medications reviewed and updated in Nursing Home EMR      Provider: Jennifer VÁZQUEZ  Patient: Dimple Briscoe   5/11/2023

## 2023-05-15 ENCOUNTER — HOME CARE VISIT (OUTPATIENT)
Dept: HOME HOSPICE | Facility: HOSPICE | Age: 83
End: 2023-05-15

## 2023-05-15 ENCOUNTER — HOME CARE VISIT (OUTPATIENT)
Dept: HOME HEALTH SERVICES | Facility: HOME HEALTHCARE | Age: 83
End: 2023-05-15

## 2023-05-15 DIAGNOSIS — Z51.5 HOSPICE CARE: ICD-10-CM

## 2023-05-15 RX ORDER — LORAZEPAM 0.5 MG/1
0.25 TABLET ORAL 2 TIMES DAILY
Qty: 15 TABLET | Refills: 0 | Status: SHIPPED | OUTPATIENT
Start: 2023-05-15 | End: 2023-05-23 | Stop reason: SDUPTHER

## 2023-05-15 RX ORDER — LORAZEPAM 0.5 MG/1
0.5 TABLET ORAL EVERY EVENING
Qty: 15 TABLET | Refills: 0 | Status: SHIPPED | OUTPATIENT
Start: 2023-05-15 | End: 2023-05-30

## 2023-05-16 ENCOUNTER — HOME CARE VISIT (OUTPATIENT)
Dept: HOME HOSPICE | Facility: HOSPICE | Age: 83
End: 2023-05-16

## 2023-05-17 ENCOUNTER — HOME CARE VISIT (OUTPATIENT)
Dept: HOME HEALTH SERVICES | Facility: HOME HEALTHCARE | Age: 83
End: 2023-05-17

## 2023-05-18 ENCOUNTER — HOME CARE VISIT (OUTPATIENT)
Dept: HOME HEALTH SERVICES | Facility: HOME HEALTHCARE | Age: 83
End: 2023-05-18

## 2023-05-18 ENCOUNTER — HOME CARE VISIT (OUTPATIENT)
Dept: HOME HOSPICE | Facility: HOSPICE | Age: 83
End: 2023-05-18

## 2023-05-18 VITALS — OXYGEN SATURATION: 92 % | HEART RATE: 85 BPM | TEMPERATURE: 97.3 F | RESPIRATION RATE: 20 BRPM

## 2023-05-19 ENCOUNTER — HOME CARE VISIT (OUTPATIENT)
Dept: HOME HEALTH SERVICES | Facility: HOME HEALTHCARE | Age: 83
End: 2023-05-19

## 2023-05-22 ENCOUNTER — HOME CARE VISIT (OUTPATIENT)
Dept: HOME HEALTH SERVICES | Facility: HOME HEALTHCARE | Age: 83
End: 2023-05-22

## 2023-05-23 DIAGNOSIS — Z51.5 HOSPICE CARE: ICD-10-CM

## 2023-05-23 RX ORDER — LORAZEPAM 0.5 MG/1
0.25 TABLET ORAL 2 TIMES DAILY
Qty: 30 TABLET | Refills: 0 | Status: SHIPPED | OUTPATIENT
Start: 2023-05-23 | End: 2023-06-07

## 2023-05-24 ENCOUNTER — HOME CARE VISIT (OUTPATIENT)
Dept: HOME HEALTH SERVICES | Facility: HOME HEALTHCARE | Age: 83
End: 2023-05-24

## 2023-05-24 VITALS
OXYGEN SATURATION: 93 % | TEMPERATURE: 97.3 F | DIASTOLIC BLOOD PRESSURE: 62 MMHG | SYSTOLIC BLOOD PRESSURE: 100 MMHG | RESPIRATION RATE: 20 BRPM | HEART RATE: 80 BPM

## 2023-05-25 ENCOUNTER — HOME CARE VISIT (OUTPATIENT)
Dept: HOME HEALTH SERVICES | Facility: HOME HEALTHCARE | Age: 83
End: 2023-05-25

## 2023-05-30 ENCOUNTER — HOME CARE VISIT (OUTPATIENT)
Dept: HOME HEALTH SERVICES | Facility: HOME HEALTHCARE | Age: 83
End: 2023-05-30

## 2023-05-30 ENCOUNTER — HOME CARE VISIT (OUTPATIENT)
Dept: HOME HOSPICE | Facility: HOSPICE | Age: 83
End: 2023-05-30

## 2023-05-31 ENCOUNTER — HOME CARE VISIT (OUTPATIENT)
Dept: HOME HEALTH SERVICES | Facility: HOME HEALTHCARE | Age: 83
End: 2023-05-31

## 2023-06-01 ENCOUNTER — NURSING HOME VISIT (OUTPATIENT)
Dept: GERIATRICS | Facility: OTHER | Age: 83
End: 2023-06-01

## 2023-06-01 DIAGNOSIS — Z51.5 HOSPICE CARE: ICD-10-CM

## 2023-06-01 DIAGNOSIS — F02.80 LATE ONSET ALZHEIMER'S DEMENTIA WITHOUT BEHAVIORAL DISTURBANCE, PSYCHOTIC DISTURBANCE, MOOD DISTURBANCE, OR ANXIETY, UNSPECIFIED DEMENTIA SEVERITY (HCC): Primary | ICD-10-CM

## 2023-06-01 DIAGNOSIS — R26.2 AMBULATORY DYSFUNCTION: ICD-10-CM

## 2023-06-01 DIAGNOSIS — G30.1 LATE ONSET ALZHEIMER'S DEMENTIA WITHOUT BEHAVIORAL DISTURBANCE, PSYCHOTIC DISTURBANCE, MOOD DISTURBANCE, OR ANXIETY, UNSPECIFIED DEMENTIA SEVERITY (HCC): Primary | ICD-10-CM

## 2023-06-01 DIAGNOSIS — E44.0 MODERATE PROTEIN-CALORIE MALNUTRITION (HCC): ICD-10-CM

## 2023-06-01 DIAGNOSIS — I10 ESSENTIAL HYPERTENSION: ICD-10-CM

## 2023-06-01 DIAGNOSIS — F41.8 ANXIETY WITH DEPRESSION: ICD-10-CM

## 2023-06-01 DIAGNOSIS — K59.03 DRUG-INDUCED CONSTIPATION: ICD-10-CM

## 2023-06-02 ENCOUNTER — HOME CARE VISIT (OUTPATIENT)
Dept: HOME HEALTH SERVICES | Facility: HOME HEALTHCARE | Age: 83
End: 2023-06-02
Payer: MEDICARE

## 2023-06-02 VITALS
HEART RATE: 109 BPM | DIASTOLIC BLOOD PRESSURE: 56 MMHG | SYSTOLIC BLOOD PRESSURE: 108 MMHG | OXYGEN SATURATION: 98 % | TEMPERATURE: 97.7 F | RESPIRATION RATE: 18 BRPM

## 2023-06-02 PROCEDURE — G0156 HHCP-SVS OF AIDE,EA 15 MIN: HCPCS

## 2023-06-02 PROCEDURE — G0299 HHS/HOSPICE OF RN EA 15 MIN: HCPCS

## 2023-06-02 NOTE — ASSESSMENT & PLAN NOTE
Continues with [facility] hospice  Continue scheduled Lorazepam 0 25 mg BID at 0800 and 1200 and morphine oral concentration 5mg Q4H PRN

## 2023-06-02 NOTE — ASSESSMENT & PLAN NOTE
Stable  Monitor in setting of limited mobility and morphine use   Continue as needed suppository, as needed enema, as needed Colace

## 2023-06-02 NOTE — PROGRESS NOTES
MONTHLY VISIT  Location: [facility]   POS: [facility] 32    NAME: Praneeth Bhakta  AGE: 80 y o  SEX: female    DATE OF ENCOUNTER: 6/2/2023    ASSESSMENT AND PROBLEMS:  1  Late onset Alzheimer's dementia without behavioral disturbance, psychotic disturbance, mood disturbance, or anxiety, unspecified dementia severity (HCC)  Assessment & Plan:  Stable  Continue Lorazepam, sertraline, remeron  Continue hospice  Provide redirection, reorientation, distraction techniques  Fall Precautions  Assist with ADLs/IADLs  Avoid/limit deliriogenic medications such as tramadol, benzodiazepines, anticholinergics, benadryl  Encourage Hydration/ Nutrition  Implement sleep hygiene, limit night time interuptions   Encourage participation in group activities when appropriate      2  Hospice care  Assessment & Plan:  Continues with [facility]  Continue scheduled Lorazepam 0 25 mg BID at 0800 and 1200 and morphine oral concentration 5mg Q4H PRN        3  Anxiety with depression  Assessment & Plan:  Stable   Continue Lorazepam, sertraline, and mirtazapine  Continue ongoing supportive care         4  Ambulatory dysfunction  Assessment & Plan:  Multifactorial- acute and chronic conditions  Assist with ADLs  Encourage PO nutrition and hydration  Maintain fall precautions         5  Drug-induced constipation  Assessment & Plan:  Stable  Monitor in setting of limited mobility and morphine use   Continue as needed suppository, as needed enema, as needed Colace      6  Essential hypertension  Assessment & Plan:  Fluctuates, but overall stable  Continue  metoprolol BID  PRN metoprolol 25 mg if SBP >160       7   Moderate protein-calorie malnutrition ([address] )  Assessment & Plan:  Multifactorial  Frail, muscle wasting  BMI 15 0  Hospice care  Weight improved this month  Continue regular diet and ensure supplements             CHIEF COMPLAINT:  Monthly Visit - routine 30 day visit     HISTORY OF PRESENT ILLNESS:  History taken from patient, chart    Florencio Carcamo is a 26-year-old female LTC resident of [facility] with PMH including but not limited to HTN, A-fib, dementia, AMANDA, ambulatory dysfunction, moderate protein calorie malnutrition, anxiety, seen today for routine 30 day monthly follow up  Continues with ongoing supportive care at [address] [place] [address], continues on hospice  Requires assistance with ADLs in setting of dementia, wheelchair bound  Recently treated for possible PNA  She denies any current cough or SOB  Known dementia, mood stable, continues on lorazepam, sertraline, and mirtazapine  LTC chart reviewed, appetite fluctuates but has improved over the last few days, she had weight gain last month  Hx constipation, BMs stable  REVIEW OF SYSTEMS:  ROS not able to be obtained due to cognitive/expressive limitations    HISTORY:  Medical Hx: Reviewed, unchanged  Family Hx: Reviewed, unchanged  Soc Hx: Reviewed, unchanged  Allergies   Allergen Reactions   • Chocolate Flavor - Food Allergy Other (See Comments)     unknown       PHYSICAL EXAM:  Vital Signs:/80, temp 97 7, HR 76, RR 18, O2 95% on room air  Weight 79 3 pounds    General: NAD, cachectic, laying in bed  Eye Exam: anicteric sclera, no discharge  ENT: moist mucous membranes  Cardiovascular: regular rate, regular rhythm, no murmurs, rubs, or gallops  Pulmonary: no wheeze, no rhonchi, CTA diminished, RA  Abdominal: soft, nontender, nondistended, bowel sounds audible  Neurological: Awake, alert, generalized weakness, wheelchair bound   Skin: No significant lesions appreciated, no significant rashes appreciated    PERTINENT LABS/IMAGING DATA:    9/22/2022 CBC: Hemoglobin 14 6, WBC 7 4, platelet 317    0/50/9587 BMP: BUN 11, creatinine 0 47, sodium 140, potassium 4 3, carbon dioxide 36, EGFR 96    CURRENT MEDICATIONS:  All medications reviewed and updated in Nursing Home EMR      Provider: Tatiana VÁZQUEZ  Patient: Florencio Carcamo   6/1/2023

## 2023-06-02 NOTE — ASSESSMENT & PLAN NOTE
Multifactorial  Frail, muscle wasting  BMI 15 0  Hospice care  Weight improved this month  Continue regular diet and ensure supplements

## 2023-06-05 ENCOUNTER — HOME CARE VISIT (OUTPATIENT)
Dept: HOME HEALTH SERVICES | Facility: HOME HEALTHCARE | Age: 83
End: 2023-06-05
Payer: MEDICARE

## 2023-06-05 PROCEDURE — G0156 HHCP-SVS OF AIDE,EA 15 MIN: HCPCS

## 2023-06-05 PROCEDURE — G0299 HHS/HOSPICE OF RN EA 15 MIN: HCPCS

## 2023-06-06 VITALS
WEIGHT: 74 LBS | SYSTOLIC BLOOD PRESSURE: 144 MMHG | TEMPERATURE: 97.6 F | BODY MASS INDEX: 15.47 KG/M2 | RESPIRATION RATE: 16 BRPM | DIASTOLIC BLOOD PRESSURE: 70 MMHG | HEART RATE: 66 BPM

## 2023-06-07 ENCOUNTER — HOME CARE VISIT (OUTPATIENT)
Dept: HOME HEALTH SERVICES | Facility: HOME HEALTHCARE | Age: 83
End: 2023-06-07
Payer: MEDICARE

## 2023-06-07 ENCOUNTER — HOME CARE VISIT (OUTPATIENT)
Dept: HOME HOSPICE | Facility: HOSPICE | Age: 83
End: 2023-06-07
Payer: MEDICARE

## 2023-06-07 PROCEDURE — G0156 HHCP-SVS OF AIDE,EA 15 MIN: HCPCS

## 2023-06-08 DIAGNOSIS — Z51.5 HOSPICE CARE: ICD-10-CM

## 2023-06-08 RX ORDER — LORAZEPAM 0.5 MG/1
0.5 TABLET ORAL EVERY EVENING
Qty: 30 TABLET | Refills: 0 | Status: SHIPPED | OUTPATIENT
Start: 2023-06-08 | End: 2023-07-08

## 2023-06-08 RX ORDER — LORAZEPAM 0.5 MG/1
0.25 TABLET ORAL 2 TIMES DAILY
Qty: 30 TABLET | Refills: 0 | Status: SHIPPED | OUTPATIENT
Start: 2023-06-08 | End: 2023-07-08

## 2023-06-09 ENCOUNTER — HOME CARE VISIT (OUTPATIENT)
Dept: HOME HEALTH SERVICES | Facility: HOME HEALTHCARE | Age: 83
End: 2023-06-09
Payer: MEDICARE

## 2023-06-09 PROCEDURE — G0156 HHCP-SVS OF AIDE,EA 15 MIN: HCPCS

## 2023-06-11 ENCOUNTER — HOME CARE VISIT (OUTPATIENT)
Dept: HOME HEALTH SERVICES | Facility: HOME HEALTHCARE | Age: 83
End: 2023-06-11
Payer: MEDICARE

## 2023-06-11 VITALS — HEART RATE: 72 BPM | RESPIRATION RATE: 18 BRPM

## 2023-06-11 PROCEDURE — G0299 HHS/HOSPICE OF RN EA 15 MIN: HCPCS

## 2023-06-12 ENCOUNTER — HOME CARE VISIT (OUTPATIENT)
Dept: HOME HEALTH SERVICES | Facility: HOME HEALTHCARE | Age: 83
End: 2023-06-12
Payer: MEDICARE

## 2023-06-12 PROCEDURE — G0156 HHCP-SVS OF AIDE,EA 15 MIN: HCPCS

## 2023-06-13 ENCOUNTER — HOME CARE VISIT (OUTPATIENT)
Dept: HOME HEALTH SERVICES | Facility: HOME HEALTHCARE | Age: 83
End: 2023-06-13
Payer: MEDICARE

## 2023-06-13 ENCOUNTER — HOME CARE VISIT (OUTPATIENT)
Dept: HOME HOSPICE | Facility: HOSPICE | Age: 83
End: 2023-06-13
Payer: MEDICARE

## 2023-06-13 PROCEDURE — G0156 HHCP-SVS OF AIDE,EA 15 MIN: HCPCS

## 2023-06-13 PROCEDURE — G0155 HHCP-SVS OF CSW,EA 15 MIN: HCPCS

## 2023-06-15 ENCOUNTER — HOME CARE VISIT (OUTPATIENT)
Dept: HOME HEALTH SERVICES | Facility: HOME HEALTHCARE | Age: 83
End: 2023-06-15
Payer: MEDICARE

## 2023-06-15 PROCEDURE — G0156 HHCP-SVS OF AIDE,EA 15 MIN: HCPCS

## 2023-06-19 ENCOUNTER — HOME CARE VISIT (OUTPATIENT)
Dept: HOME HEALTH SERVICES | Facility: HOME HEALTHCARE | Age: 83
End: 2023-06-19
Payer: MEDICARE

## 2023-06-19 PROCEDURE — G0156 HHCP-SVS OF AIDE,EA 15 MIN: HCPCS

## 2023-06-22 ENCOUNTER — HOME CARE VISIT (OUTPATIENT)
Dept: HOME HEALTH SERVICES | Facility: HOME HEALTHCARE | Age: 83
End: 2023-06-22

## 2023-06-22 ENCOUNTER — NURSING HOME VISIT (OUTPATIENT)
Dept: GERIATRICS | Facility: OTHER | Age: 83
End: 2023-06-22
Payer: MEDICARE

## 2023-06-22 DIAGNOSIS — G30.1 LATE ONSET ALZHEIMER'S DEMENTIA WITHOUT BEHAVIORAL DISTURBANCE, PSYCHOTIC DISTURBANCE, MOOD DISTURBANCE, OR ANXIETY, UNSPECIFIED DEMENTIA SEVERITY (HCC): Primary | ICD-10-CM

## 2023-06-22 DIAGNOSIS — I10 ESSENTIAL HYPERTENSION: ICD-10-CM

## 2023-06-22 DIAGNOSIS — F41.8 ANXIETY WITH DEPRESSION: ICD-10-CM

## 2023-06-22 DIAGNOSIS — Z51.5 HOSPICE CARE: ICD-10-CM

## 2023-06-22 DIAGNOSIS — F02.80 LATE ONSET ALZHEIMER'S DEMENTIA WITHOUT BEHAVIORAL DISTURBANCE, PSYCHOTIC DISTURBANCE, MOOD DISTURBANCE, OR ANXIETY, UNSPECIFIED DEMENTIA SEVERITY (HCC): Primary | ICD-10-CM

## 2023-06-22 PROCEDURE — G0299 HHS/HOSPICE OF RN EA 15 MIN: HCPCS

## 2023-06-22 PROCEDURE — 99309 SBSQ NF CARE MODERATE MDM 30: CPT | Performed by: INTERNAL MEDICINE

## 2023-06-22 NOTE — PROGRESS NOTES
[facility]  [address]  [phone]  [facility]  POS 32      NAME: Farnaz Haynes  AGE: 80 y o  SEX: female [id number]    DATE OF ENCOUNTER: 6/22/2023    Assessment and Plan     1  Late onset Alzheimer's dementia without behavioral disturbance, psychotic disturbance, mood disturbance, or anxiety, unspecified dementia severity ([facility] )        2  Essential hypertension        3  Hospice care        4  Anxiety with depression           Dementia ([facility] )  Monitor sleep wake cycle  Encourage good po intake  Assist with adls/ialds  Cont care per hospice    Essential hypertension  Blood Pressure: 154 /  79 mmHg   Pt on metoprolol 25 mg 1 tab po BID  Continue additional metoprolol 25 mg prn SBP >160 (for total of 50 mg)  Cont care per hospice    Hospice care  Stable  Cont lorazepam 0 25 mg 1 tab po BID 0800 & 1200  Cont morphine oral concentration 5 mg (0 25 ml) q4 hours prn  Cont care per hospice    Anxiety with depression  Stable  Cont lorazepam, sertraline & mirtazapine  Cont supportive care from hospice       Code status: DNR/DNI/hospice    Chief Complaint     Routine Long term follow-up visit  60 day orders signed    History of Present Illness   Pt seen and examined  Pt grateful to staff  (CNA had just given her drink)  Pt pleasant  Pt has light blue nailpolish on      The following portions of the patient's history were reviewed and updated as appropriate: allergies, current medications, past family history, past medical history, past social history, past surgical history and problem list     Review of Systems     Review of Systems   Unable to perform ROS: Dementia         Active Problem List     Patient Active Problem List   Diagnosis   • Fall   • Ambulatory dysfunction   • Moderate protein-calorie malnutrition ([facility] )   • Anxiety with depression   • Drug-induced constipation   • Headache   • Essential hypertension   • Tricuspid regurgitation   • Dementia ([facility] )   • Afib [facility])   • Metabolic encephalopathy   • AMANDA (acute kidney injury) ([facility] [address] )   • Hyperkalemia   • Traumatic hematoma of scalp   • Vaginal bleeding   • Weight loss   • Hospice care   • Acute cough       Objective     Vitals: Reviewed in [employer] system  VSS    General: Awake, alert, dementia  Head: atraumatic, normocephalic  Cardiovascular: RRR  Lungs: Clear to auscultation bilaterally  Abdomen: nontender/nondistended, +BS  Legs: no cyanosis, clubbing or edema  Skin: clean, dry, intact  Psych: calm, cooperative    Pertinent Laboratory/Diagnostic Studies:  Refer to facility chart  Current Medications   Medications reviewed and updated in facility chart      Sandi Bronson MD  Internal Medicine  Senior Care Physician

## 2023-06-23 ENCOUNTER — HOME CARE VISIT (OUTPATIENT)
Dept: HOME HEALTH SERVICES | Facility: HOME HEALTHCARE | Age: 83
End: 2023-06-23
Payer: MEDICARE

## 2023-06-23 ENCOUNTER — HOME CARE VISIT (OUTPATIENT)
Dept: HOME HOSPICE | Facility: HOSPICE | Age: 83
End: 2023-06-23
Payer: MEDICARE

## 2023-06-23 PROCEDURE — G0156 HHCP-SVS OF AIDE,EA 15 MIN: HCPCS

## 2023-06-23 NOTE — ASSESSMENT & PLAN NOTE
Stable  Cont lorazepam 0 25 mg 1 tab po BID 0800 & 1200  Cont morphine oral concentration 5 mg (0 25 ml) q4 hours prn  Cont care per hospice

## 2023-06-23 NOTE — ASSESSMENT & PLAN NOTE
Blood Pressure: 154 /  79 mmHg   Pt on metoprolol 25 mg 1 tab po BID  Continue additional metoprolol 25 mg prn SBP >160 (for total of 50 mg)  Cont care per hospice

## 2023-06-25 ENCOUNTER — HOME CARE VISIT (OUTPATIENT)
Dept: HOME HEALTH SERVICES | Facility: HOME HEALTHCARE | Age: 83
End: 2023-06-25
Payer: MEDICARE

## 2023-06-25 VITALS — RESPIRATION RATE: 16 BRPM | SYSTOLIC BLOOD PRESSURE: 140 MMHG | DIASTOLIC BLOOD PRESSURE: 80 MMHG | HEART RATE: 76 BPM

## 2023-06-25 VITALS
SYSTOLIC BLOOD PRESSURE: 144 MMHG | RESPIRATION RATE: 20 BRPM | HEART RATE: 72 BPM | DIASTOLIC BLOOD PRESSURE: 44 MMHG | TEMPERATURE: 97.8 F

## 2023-06-25 PROCEDURE — G0299 HHS/HOSPICE OF RN EA 15 MIN: HCPCS

## 2023-06-26 ENCOUNTER — HOME CARE VISIT (OUTPATIENT)
Dept: HOME HOSPICE | Facility: HOSPICE | Age: 83
End: 2023-06-26
Payer: MEDICARE

## 2023-06-26 ENCOUNTER — HOME CARE VISIT (OUTPATIENT)
Dept: HOME HEALTH SERVICES | Facility: HOME HEALTHCARE | Age: 83
End: 2023-06-26
Payer: MEDICARE

## 2023-06-26 PROCEDURE — G0156 HHCP-SVS OF AIDE,EA 15 MIN: HCPCS

## 2023-06-26 PROCEDURE — G0155 HHCP-SVS OF CSW,EA 15 MIN: HCPCS

## 2023-06-28 ENCOUNTER — HOME CARE VISIT (OUTPATIENT)
Dept: HOME HEALTH SERVICES | Facility: HOME HEALTHCARE | Age: 83
End: 2023-06-28
Payer: MEDICARE

## 2023-06-28 PROCEDURE — G0156 HHCP-SVS OF AIDE,EA 15 MIN: HCPCS

## 2023-06-30 ENCOUNTER — HOME CARE VISIT (OUTPATIENT)
Dept: HOME HEALTH SERVICES | Facility: HOME HEALTHCARE | Age: 83
End: 2023-06-30
Payer: MEDICARE

## 2023-06-30 VITALS
WEIGHT: 75.31 LBS | OXYGEN SATURATION: 91 % | BODY MASS INDEX: 15.74 KG/M2 | TEMPERATURE: 97.5 F | RESPIRATION RATE: 16 BRPM | DIASTOLIC BLOOD PRESSURE: 66 MMHG | SYSTOLIC BLOOD PRESSURE: 128 MMHG

## 2023-06-30 PROCEDURE — G0299 HHS/HOSPICE OF RN EA 15 MIN: HCPCS

## 2023-07-03 ENCOUNTER — HOME CARE VISIT (OUTPATIENT)
Dept: HOME HEALTH SERVICES | Facility: HOME HEALTHCARE | Age: 83
End: 2023-07-03
Payer: MEDICARE

## 2023-07-03 VITALS — RESPIRATION RATE: 18 BRPM | DIASTOLIC BLOOD PRESSURE: 76 MMHG | HEART RATE: 72 BPM | SYSTOLIC BLOOD PRESSURE: 140 MMHG

## 2023-07-03 PROCEDURE — G0299 HHS/HOSPICE OF RN EA 15 MIN: HCPCS

## 2023-07-06 DIAGNOSIS — Z51.5 HOSPICE CARE: ICD-10-CM

## 2023-07-06 RX ORDER — LORAZEPAM 0.5 MG/1
0.5 TABLET ORAL EVERY EVENING
Qty: 30 TABLET | Refills: 0 | Status: SHIPPED | OUTPATIENT
Start: 2023-07-06 | End: 2023-08-05

## 2023-07-06 RX ORDER — LORAZEPAM 0.5 MG/1
0.25 TABLET ORAL 2 TIMES DAILY
Qty: 30 TABLET | Refills: 0 | Status: SHIPPED | OUTPATIENT
Start: 2023-07-06 | End: 2023-08-05

## 2023-07-07 ENCOUNTER — HOME CARE VISIT (OUTPATIENT)
Dept: HOME HEALTH SERVICES | Facility: HOME HEALTHCARE | Age: 83
End: 2023-07-07
Payer: MEDICARE

## 2023-07-07 PROCEDURE — G0156 HHCP-SVS OF AIDE,EA 15 MIN: HCPCS

## 2023-07-10 ENCOUNTER — HOME CARE VISIT (OUTPATIENT)
Dept: HOME HEALTH SERVICES | Facility: HOME HEALTHCARE | Age: 83
End: 2023-07-10
Payer: MEDICARE

## 2023-07-10 ENCOUNTER — HOME CARE VISIT (OUTPATIENT)
Dept: HOME HOSPICE | Facility: HOSPICE | Age: 83
End: 2023-07-10
Payer: MEDICARE

## 2023-07-10 PROBLEM — R05.1 ACUTE COUGH: Status: RESOLVED | Noted: 2023-05-11 | Resolved: 2023-07-10

## 2023-07-10 PROCEDURE — G0156 HHCP-SVS OF AIDE,EA 15 MIN: HCPCS

## 2023-07-10 PROCEDURE — G0155 HHCP-SVS OF CSW,EA 15 MIN: HCPCS

## 2023-07-12 ENCOUNTER — HOME CARE VISIT (OUTPATIENT)
Dept: HOME HOSPICE | Facility: HOSPICE | Age: 83
End: 2023-07-12
Payer: MEDICARE

## 2023-07-12 ENCOUNTER — HOME CARE VISIT (OUTPATIENT)
Dept: HOME HEALTH SERVICES | Facility: HOME HEALTHCARE | Age: 83
End: 2023-07-12
Payer: MEDICARE

## 2023-07-12 PROCEDURE — G0156 HHCP-SVS OF AIDE,EA 15 MIN: HCPCS

## 2023-07-13 ENCOUNTER — HOME CARE VISIT (OUTPATIENT)
Dept: HOME HEALTH SERVICES | Facility: HOME HEALTHCARE | Age: 83
End: 2023-07-13
Payer: MEDICARE

## 2023-07-13 VITALS
SYSTOLIC BLOOD PRESSURE: 126 MMHG | DIASTOLIC BLOOD PRESSURE: 72 MMHG | TEMPERATURE: 97.5 F | RESPIRATION RATE: 16 BRPM | OXYGEN SATURATION: 96 % | HEART RATE: 77 BPM

## 2023-07-13 PROCEDURE — G0156 HHCP-SVS OF AIDE,EA 15 MIN: HCPCS

## 2023-07-13 PROCEDURE — G0299 HHS/HOSPICE OF RN EA 15 MIN: HCPCS

## 2023-07-16 ENCOUNTER — HOME CARE VISIT (OUTPATIENT)
Dept: HOME HEALTH SERVICES | Facility: HOME HEALTHCARE | Age: 83
End: 2023-07-16
Payer: MEDICARE

## 2023-07-16 VITALS — DIASTOLIC BLOOD PRESSURE: 64 MMHG | RESPIRATION RATE: 18 BRPM | SYSTOLIC BLOOD PRESSURE: 110 MMHG | HEART RATE: 70 BPM

## 2023-07-16 PROCEDURE — G0156 HHCP-SVS OF AIDE,EA 15 MIN: HCPCS

## 2023-07-16 PROCEDURE — G0299 HHS/HOSPICE OF RN EA 15 MIN: HCPCS

## 2023-07-19 ENCOUNTER — HOME CARE VISIT (OUTPATIENT)
Dept: HOME HEALTH SERVICES | Facility: HOME HEALTHCARE | Age: 83
End: 2023-07-19
Payer: MEDICARE

## 2023-07-19 PROCEDURE — G0156 HHCP-SVS OF AIDE,EA 15 MIN: HCPCS

## 2023-07-20 ENCOUNTER — HOME CARE VISIT (OUTPATIENT)
Dept: HOME HEALTH SERVICES | Facility: HOME HEALTHCARE | Age: 83
End: 2023-07-20
Payer: MEDICARE

## 2023-07-20 ENCOUNTER — NURSING HOME VISIT (OUTPATIENT)
Dept: GERIATRICS | Facility: OTHER | Age: 83
End: 2023-07-20
Payer: MEDICARE

## 2023-07-20 DIAGNOSIS — F41.8 ANXIETY WITH DEPRESSION: ICD-10-CM

## 2023-07-20 DIAGNOSIS — I10 ESSENTIAL HYPERTENSION: ICD-10-CM

## 2023-07-20 DIAGNOSIS — Z51.5 HOSPICE CARE: Primary | ICD-10-CM

## 2023-07-20 DIAGNOSIS — F02.B18 MODERATE LATE ONSET ALZHEIMER'S DEMENTIA WITH OTHER BEHAVIORAL DISTURBANCE (HCC): ICD-10-CM

## 2023-07-20 DIAGNOSIS — E44.0 MODERATE PROTEIN-CALORIE MALNUTRITION (HCC): ICD-10-CM

## 2023-07-20 DIAGNOSIS — H61.23 BILATERAL IMPACTED CERUMEN: ICD-10-CM

## 2023-07-20 DIAGNOSIS — K59.03 DRUG-INDUCED CONSTIPATION: ICD-10-CM

## 2023-07-20 DIAGNOSIS — I48.0 PAROXYSMAL ATRIAL FIBRILLATION (HCC): ICD-10-CM

## 2023-07-20 DIAGNOSIS — R26.2 AMBULATORY DYSFUNCTION: ICD-10-CM

## 2023-07-20 DIAGNOSIS — G30.1 MODERATE LATE ONSET ALZHEIMER'S DEMENTIA WITH OTHER BEHAVIORAL DISTURBANCE (HCC): ICD-10-CM

## 2023-07-20 PROBLEM — Z91.81 HISTORY OF FALL: Status: ACTIVE | Noted: 2019-07-21

## 2023-07-20 PROBLEM — N93.9 VAGINAL BLEEDING: Status: RESOLVED | Noted: 2022-09-21 | Resolved: 2023-07-20

## 2023-07-20 PROBLEM — N17.9 AKI (ACUTE KIDNEY INJURY) (HCC): Status: RESOLVED | Noted: 2022-05-11 | Resolved: 2023-07-20

## 2023-07-20 PROBLEM — G93.41 METABOLIC ENCEPHALOPATHY: Status: RESOLVED | Noted: 2022-05-11 | Resolved: 2023-07-20

## 2023-07-20 PROBLEM — R51.9 HEADACHE: Status: RESOLVED | Noted: 2020-01-20 | Resolved: 2023-07-20

## 2023-07-20 PROBLEM — S00.03XA TRAUMATIC HEMATOMA OF SCALP: Status: RESOLVED | Noted: 2022-05-19 | Resolved: 2023-07-20

## 2023-07-20 PROBLEM — E87.5 HYPERKALEMIA: Status: RESOLVED | Noted: 2022-05-11 | Resolved: 2023-07-20

## 2023-07-20 PROCEDURE — G0156 HHCP-SVS OF AIDE,EA 15 MIN: HCPCS

## 2023-07-20 PROCEDURE — 99309 SBSQ NF CARE MODERATE MDM 30: CPT

## 2023-07-20 NOTE — ASSESSMENT & PLAN NOTE
Intermittent   In light of mild generalized abdominal discomfort, schedule Colace  Continue PRNs  Monitor also in setting of PRN Morphine use

## 2023-07-20 NOTE — ASSESSMENT & PLAN NOTE
Multifactorial  Continues on hospice care  Appetite greatly fluctuates but weight overall remains stable  BMI 14.1   Continue ensure supplements

## 2023-07-20 NOTE — PROGRESS NOTES
MONTHLY VISIT  Location: [facility]   POS: LTC 32    NAME: Quang Shaw  AGE: 80 y.o. SEX: female    DATE OF ENCOUNTER: 7/20/2023    ASSESSMENT AND PROBLEMS:  1. Hospice care  Assessment & Plan:  Continues with [facility]  Continue scheduled Lorazepam TID and PRN morphine Q4H  Remains comfortable       2. Bilateral impacted cerumen  Assessment & Plan:  Likely contributing to decreased hearing  Start Debrox drops with f/u for cerumen removal      3. Drug-induced constipation  Assessment & Plan:  Intermittent   In light of mild generalized abdominal discomfort, schedule Colace  Continue PRNs  Monitor also in setting of PRN Morphine use      4. Moderate protein-calorie malnutrition ([address])  Assessment & Plan:  Multifactorial  Continues on hospice care  Appetite greatly fluctuates but weight overall remains stable  BMI 14.1   Continue ensure supplements         5. Anxiety with depression  Assessment & Plan:  Stable   Continue Lorazepam, sertraline, and mirtazapine  Continue ongoing supportive care          6. Essential hypertension  Assessment & Plan:  Intermittently mildly elevated  Continue scheduled and PRN metoprolol  Continues on hospice      7. Paroxysmal atrial fibrillation (HCC)  Assessment & Plan:  Stable, controlled  Continue metoprolol  Not on AC due to high fall risk       8. Moderate late onset Alzheimer's dementia with other behavioral disturbance (HCC)  Assessment & Plan:  Stable  Lorazepam, mirtazapine and sertraline  Provide redirection, reorientation, distraction techniques  Fall Precautions  Assist with ADLs/IADLs  Encourage Hydration/ Nutrition  Implement sleep hygiene, limit night time interuptions   Encourage participation in group activities when appropriate           9.  Ambulatory dysfunction  Assessment & Plan:  Multifactorial- acute and chronic conditions  Assist with ADLs  Encourage PO nutrition and hydration  Maintain fall precautions  Continue ongoing supportive care at LTC           CHIEF COMPLAINT:  Monthly Visit     HISTORY OF PRESENT ILLNESS:  History taken from patient, chart    Quang Shaw is a 49-year-old female LTC resident of [facility] with PMH including but not limited to HTN, A-fib, dementia, AMANDA, ambulatory dysfunction, moderate protein calorie malnutrition, anxiety, seen today for routine monthly follow up. Continues with ongoing supportive care at [address] [place] [address], continues on hospice. Requires assistance with ADLs in setting of dementia. Nursing asking if her ears can be checked due to difficulty hearing. Patient seen at bedside, drinking Ensure. She offers no complaints. Denies any pain or SOB. LTC chart reviewed, appetite greatly fluctuates, weight stable from last month. Hx constipation, some generalized abdominal discomfort to palpation, last BM 7/19.     REVIEW OF SYSTEMS:  ROS not able to be obtained due to cognitive/expressive limitations    HISTORY:  Medical Hx: Reviewed, unchanged  Family Hx: Reviewed, unchanged  Soc Hx: Reviewed, unchanged  Allergies   Allergen Reactions   • Chocolate Flavor - Food Allergy Other (See Comments)     unknown       PHYSICAL EXAM:  Vital Signs: /74, temp 97.3, HR 87, RR 16, O2 95% on room air  Weight 74.4 pounds    General: NAD, cachectic, sitting at edge of bed  Eye Exam: anicteric sclera, no discharge  ENT: moist mucous membranes, B/L ears with impacted tan/brown cerumen   Cardiovascular: regular rate, regular rhythm, no murmurs, rubs, or gallops  Pulmonary: no wheeze, no rhonchi, CTA diminished, RA  Abdominal: soft, nontender, nondistended, bowel sounds audible  Neurological: Awake, alert, generalized weakness, wheelchair bound   Skin: No significant lesions appreciated, no significant rashes appreciated    PERTINENT LABS/IMAGING DATA:    9/22/2022 CBC: Hemoglobin 14.6, WBC 7.4, platelet 282    6/57/7659 BMP: BUN 11, creatinine 0.47, sodium 140, potassium 4.3, carbon dioxide 36, EGFR 96    CURRENT MEDICATIONS:  All medications reviewed and updated in Nursing Home EMR.     Provider: Patrick VÁZQUEZ  Patient: Mateusz Griffith   7/20/2023

## 2023-07-20 NOTE — ASSESSMENT & PLAN NOTE
Stable  Lorazepam, mirtazapine and sertraline  Provide redirection, reorientation, distraction techniques  Fall Precautions  Assist with ADLs/IADLs  Encourage Hydration/ Nutrition  Implement sleep hygiene, limit night time interuptions   Encourage participation in group activities when appropriate

## 2023-07-20 NOTE — ASSESSMENT & PLAN NOTE
Multifactorial- acute and chronic conditions  Assist with ADLs  Encourage PO nutrition and hydration  Maintain fall precautions  Continue ongoing supportive care at LTC

## 2023-07-20 NOTE — ASSESSMENT & PLAN NOTE
Continues with [facility] hospice  Continue scheduled Lorazepam TID and PRN morphine Q4H  Remains comfortable

## 2023-07-24 ENCOUNTER — HOME CARE VISIT (OUTPATIENT)
Dept: HOME HOSPICE | Facility: HOSPICE | Age: 83
End: 2023-07-24
Payer: MEDICARE

## 2023-07-24 ENCOUNTER — HOME CARE VISIT (OUTPATIENT)
Dept: HOME HEALTH SERVICES | Facility: HOME HEALTHCARE | Age: 83
End: 2023-07-24
Payer: MEDICARE

## 2023-07-24 PROCEDURE — G0155 HHCP-SVS OF CSW,EA 15 MIN: HCPCS

## 2023-07-24 PROCEDURE — G0156 HHCP-SVS OF AIDE,EA 15 MIN: HCPCS

## 2023-07-26 ENCOUNTER — HOME CARE VISIT (OUTPATIENT)
Dept: HOME HEALTH SERVICES | Facility: HOME HEALTHCARE | Age: 83
End: 2023-07-26
Payer: MEDICARE

## 2023-07-26 PROCEDURE — G0156 HHCP-SVS OF AIDE,EA 15 MIN: HCPCS

## 2023-07-27 ENCOUNTER — HOME CARE VISIT (OUTPATIENT)
Dept: HOME HEALTH SERVICES | Facility: HOME HEALTHCARE | Age: 83
End: 2023-07-27
Payer: MEDICARE

## 2023-07-27 VITALS
HEART RATE: 71 BPM | SYSTOLIC BLOOD PRESSURE: 130 MMHG | RESPIRATION RATE: 24 BRPM | OXYGEN SATURATION: 91 % | TEMPERATURE: 97.3 F | DIASTOLIC BLOOD PRESSURE: 82 MMHG

## 2023-07-27 PROCEDURE — G0299 HHS/HOSPICE OF RN EA 15 MIN: HCPCS

## 2023-07-28 ENCOUNTER — HOME CARE VISIT (OUTPATIENT)
Dept: HOME HEALTH SERVICES | Facility: HOME HEALTHCARE | Age: 83
End: 2023-07-28
Payer: MEDICARE

## 2023-07-28 PROCEDURE — G0156 HHCP-SVS OF AIDE,EA 15 MIN: HCPCS

## 2023-07-31 ENCOUNTER — HOME CARE VISIT (OUTPATIENT)
Dept: HOME HEALTH SERVICES | Facility: HOME HEALTHCARE | Age: 83
End: 2023-07-31
Payer: MEDICARE

## 2023-07-31 ENCOUNTER — NURSING HOME VISIT (OUTPATIENT)
Dept: GERIATRICS | Facility: OTHER | Age: 83
End: 2023-07-31
Payer: MEDICARE

## 2023-07-31 VITALS — DIASTOLIC BLOOD PRESSURE: 82 MMHG | SYSTOLIC BLOOD PRESSURE: 161 MMHG | TEMPERATURE: 97.3 F | HEART RATE: 87 BPM

## 2023-07-31 DIAGNOSIS — K59.03 DRUG-INDUCED CONSTIPATION: Primary | ICD-10-CM

## 2023-07-31 PROCEDURE — G0156 HHCP-SVS OF AIDE,EA 15 MIN: HCPCS

## 2023-07-31 PROCEDURE — 99309 SBSQ NF CARE MODERATE MDM 30: CPT

## 2023-07-31 RX ORDER — SENNOSIDES 8.6 MG
8.6 TABLET ORAL
Start: 2023-07-31

## 2023-07-31 RX ORDER — POLYETHYLENE GLYCOL 3350 17 G/17G
17 POWDER, FOR SOLUTION ORAL DAILY PRN
Start: 2023-07-31

## 2023-07-31 NOTE — ASSESSMENT & PLAN NOTE
Per nursing has not had bowel movement in 6 days  In the setting of chronic opioid use in hospice care patient   currently on colace  Will order senna daily and miralax prn   Encourage po hydration

## 2023-07-31 NOTE — PROGRESS NOTES
[address] acute   POS 32    NAME: Madina Ortiz  AGE: 80 y.o. SEX: female  : 1940     DATE: 2023    CODE STATUS:   Code Status: DNR     Assessment and Plan:     Problem List Items Addressed This Visit        Digestive    Drug-induced constipation - Primary     Per nursing has not had bowel movement in 6 days  In the setting of chronic opioid use in hospice care patient   currently on colace  Will order senna daily and miralax prn   Encourage po hydration          Relevant Medications    senna (SENOKOT) 8.6 mg    polyethylene glycol (MIRALAX) 17 g packet          History of Present Illness:     Patient is a 80 yr old female being seen at [address] [place] [address] for acute complaints of constipation. Per nursing home records she has not had a bowel movement in 6 days. She is currently on hospice and has prn morphine ordered contributing to constipation. The following portions of the patient's history were reviewed and updated as appropriate: allergies, current medications, past family history, past medical history, past social history, past surgical history and problem list.     Review of Systems:     Review of Systems   Gastrointestinal: Positive for constipation. All other systems reviewed and are negative. Problem List:     Patient Active Problem List   Diagnosis   • History of fall   • Ambulatory dysfunction   • Moderate protein-calorie malnutrition (HCC)   • Anxiety with depression   • Drug-induced constipation   • Essential hypertension   • Tricuspid regurgitation   • Dementia (HCC)   • Afib (HCC)   • Weight loss   • Hospice care   • Bilateral impacted cerumen        Objective:     /82   Pulse 87   Temp (!) 97.3 °F (36.3 °C)     Physical Exam  Vitals and nursing note reviewed. Constitutional:       General: She is not in acute distress. Appearance: She is cachectic.    HENT: Head: Normocephalic and atraumatic. Eyes:      Conjunctiva/sclera: Conjunctivae normal.   Cardiovascular:      Rate and Rhythm: Normal rate and regular rhythm. Heart sounds: No murmur heard. Pulmonary:      Effort: Pulmonary effort is normal. No respiratory distress. Breath sounds: Normal breath sounds. Abdominal:      Palpations: Abdomen is soft. Tenderness: There is no abdominal tenderness. Musculoskeletal:         General: No swelling. Cervical back: Neck supple. Skin:     General: Skin is warm and dry. Capillary Refill: Capillary refill takes less than 2 seconds. Neurological:      Mental Status: She is alert. Psychiatric:         Mood and Affect: Mood normal.         Pertinent Laboratory/Diagnostic Studies:    Laboratory Results: I have personally reviewed the pertinent laboratory results/reports     Radiology/Other Diagnostic Testing Results: I have personally reviewed pertinent reports.       Fern Sandoval, [address] [facility]  Geriatric Medicine

## 2023-08-01 ENCOUNTER — HOME CARE VISIT (OUTPATIENT)
Dept: HOME HEALTH SERVICES | Facility: HOME HEALTHCARE | Age: 83
End: 2023-08-01
Payer: MEDICARE

## 2023-08-01 PROCEDURE — G0299 HHS/HOSPICE OF RN EA 15 MIN: HCPCS

## 2023-08-02 ENCOUNTER — HOME CARE VISIT (OUTPATIENT)
Dept: HOME HOSPICE | Facility: HOSPICE | Age: 83
End: 2023-08-02
Payer: MEDICARE

## 2023-08-02 ENCOUNTER — HOME CARE VISIT (OUTPATIENT)
Dept: HOME HEALTH SERVICES | Facility: HOME HEALTHCARE | Age: 83
End: 2023-08-02
Payer: MEDICARE

## 2023-08-02 PROCEDURE — G0156 HHCP-SVS OF AIDE,EA 15 MIN: HCPCS

## 2023-08-03 ENCOUNTER — HOME CARE VISIT (OUTPATIENT)
Dept: HOME HEALTH SERVICES | Facility: HOME HEALTHCARE | Age: 83
End: 2023-08-03
Payer: MEDICARE

## 2023-08-03 PROCEDURE — G0156 HHCP-SVS OF AIDE,EA 15 MIN: HCPCS

## 2023-08-07 ENCOUNTER — HOME CARE VISIT (OUTPATIENT)
Dept: HOME HEALTH SERVICES | Facility: HOME HEALTHCARE | Age: 83
End: 2023-08-07
Payer: MEDICARE

## 2023-08-07 ENCOUNTER — HOME CARE VISIT (OUTPATIENT)
Dept: HOME HOSPICE | Facility: HOSPICE | Age: 83
End: 2023-08-07
Payer: MEDICARE

## 2023-08-07 PROCEDURE — G0155 HHCP-SVS OF CSW,EA 15 MIN: HCPCS

## 2023-08-07 PROCEDURE — G0156 HHCP-SVS OF AIDE,EA 15 MIN: HCPCS

## 2023-08-09 ENCOUNTER — HOME CARE VISIT (OUTPATIENT)
Dept: HOME HEALTH SERVICES | Facility: HOME HEALTHCARE | Age: 83
End: 2023-08-09
Payer: MEDICARE

## 2023-08-09 PROCEDURE — G0156 HHCP-SVS OF AIDE,EA 15 MIN: HCPCS

## 2023-08-10 ENCOUNTER — HOME CARE VISIT (OUTPATIENT)
Dept: HOME HEALTH SERVICES | Facility: HOME HEALTHCARE | Age: 83
End: 2023-08-10
Payer: MEDICARE

## 2023-08-10 VITALS
HEART RATE: 88 BPM | TEMPERATURE: 97.3 F | DIASTOLIC BLOOD PRESSURE: 72 MMHG | OXYGEN SATURATION: 94 % | RESPIRATION RATE: 16 BRPM | SYSTOLIC BLOOD PRESSURE: 138 MMHG

## 2023-08-10 PROCEDURE — G0299 HHS/HOSPICE OF RN EA 15 MIN: HCPCS

## 2023-08-10 PROCEDURE — G0156 HHCP-SVS OF AIDE,EA 15 MIN: HCPCS

## 2023-08-13 ENCOUNTER — HOME CARE VISIT (OUTPATIENT)
Dept: HOME HEALTH SERVICES | Facility: HOME HEALTHCARE | Age: 83
End: 2023-08-13
Payer: MEDICARE

## 2023-08-13 VITALS — SYSTOLIC BLOOD PRESSURE: 118 MMHG | HEART RATE: 88 BPM | RESPIRATION RATE: 16 BRPM | DIASTOLIC BLOOD PRESSURE: 78 MMHG

## 2023-08-13 PROCEDURE — G0299 HHS/HOSPICE OF RN EA 15 MIN: HCPCS

## 2023-08-14 ENCOUNTER — HOME CARE VISIT (OUTPATIENT)
Dept: HOME HEALTH SERVICES | Facility: HOME HEALTHCARE | Age: 83
End: 2023-08-14
Payer: MEDICARE

## 2023-08-14 VITALS
TEMPERATURE: 97.5 F | WEIGHT: 74.13 LBS | BODY MASS INDEX: 15.49 KG/M2 | RESPIRATION RATE: 16 BRPM | OXYGEN SATURATION: 94 % | HEART RATE: 74 BPM

## 2023-08-14 PROCEDURE — G0156 HHCP-SVS OF AIDE,EA 15 MIN: HCPCS

## 2023-08-15 ENCOUNTER — HOME CARE VISIT (OUTPATIENT)
Dept: HOME HEALTH SERVICES | Facility: HOME HEALTHCARE | Age: 83
End: 2023-08-15
Payer: MEDICARE

## 2023-08-15 PROCEDURE — G0156 HHCP-SVS OF AIDE,EA 15 MIN: HCPCS

## 2023-08-18 ENCOUNTER — HOME CARE VISIT (OUTPATIENT)
Dept: HOME HEALTH SERVICES | Facility: HOME HEALTHCARE | Age: 83
End: 2023-08-18
Payer: MEDICARE

## 2023-08-18 ENCOUNTER — HOME CARE VISIT (OUTPATIENT)
Dept: HOME HOSPICE | Facility: HOSPICE | Age: 83
End: 2023-08-18
Payer: MEDICARE

## 2023-08-18 VITALS
SYSTOLIC BLOOD PRESSURE: 120 MMHG | RESPIRATION RATE: 16 BRPM | DIASTOLIC BLOOD PRESSURE: 75 MMHG | HEART RATE: 86 BPM | TEMPERATURE: 97.9 F

## 2023-08-18 DIAGNOSIS — N89.8 MASS OF VAGINA: ICD-10-CM

## 2023-08-18 DIAGNOSIS — Z51.5 HOSPICE CARE: Primary | ICD-10-CM

## 2023-08-18 PROCEDURE — G0299 HHS/HOSPICE OF RN EA 15 MIN: HCPCS

## 2023-08-18 RX ORDER — OXYMETAZOLINE HYDROCHLORIDE 0.05 G/100ML
SPRAY NASAL
Qty: 30 ML | Refills: 0 | Status: SHIPPED | OUTPATIENT
Start: 2023-08-18

## 2023-08-18 NOTE — CASE COMMUNICATION
Received call from [address] at [facility]. She informed pt has a lot of  bleeding , as the blood is "gushing out"  from her vaginal area and they need a nurse to assess her. Vitals are 131/71 Pulse 87 and Temp 97.3. Pt is alert and oriented x 3 and denies pain or  dizziness or any other symptoms at this time. Tegan aware ill have Terese Santa come and assess patient, as well as i made Provider Charly VÁZQUEZ aware. Zarate Law will update Servando Moralez after her assessment.

## 2023-08-18 NOTE — PROGRESS NOTES
8/18/2023 3:57 PM  [facility] facility patient requests afrin for bleeding vaginal mass. Filled electronically via Epic as per [place] State Law. Requested Prescriptions     Signed Prescriptions Disp Refills   • oxymetazoline (AFRIN) 0.05 % nasal spray 30 mL 0     Sig: Soak gauze with afrin, apply to bleeding area and apply pressure       Shira Hatchet, [place] [facility]swering Service: [phone]  You can find me on [employer]!

## 2023-08-19 ENCOUNTER — HOME CARE VISIT (OUTPATIENT)
Dept: HOME HEALTH SERVICES | Facility: HOME HEALTHCARE | Age: 83
End: 2023-08-19
Payer: MEDICARE

## 2023-08-19 PROCEDURE — G0156 HHCP-SVS OF AIDE,EA 15 MIN: HCPCS

## 2023-08-22 ENCOUNTER — HOME CARE VISIT (OUTPATIENT)
Dept: HOME HEALTH SERVICES | Facility: HOME HEALTHCARE | Age: 83
End: 2023-08-22
Payer: MEDICARE

## 2023-08-22 PROCEDURE — G0156 HHCP-SVS OF AIDE,EA 15 MIN: HCPCS

## 2023-08-23 ENCOUNTER — HOME CARE VISIT (OUTPATIENT)
Dept: HOME HOSPICE | Facility: HOSPICE | Age: 83
End: 2023-08-23
Payer: MEDICARE

## 2023-08-24 ENCOUNTER — NURSING HOME VISIT (OUTPATIENT)
Dept: GERIATRICS | Facility: OTHER | Age: 83
End: 2023-08-24
Payer: MEDICARE

## 2023-08-24 ENCOUNTER — HOME CARE VISIT (OUTPATIENT)
Dept: HOME HEALTH SERVICES | Facility: HOME HEALTHCARE | Age: 83
End: 2023-08-24
Payer: MEDICARE

## 2023-08-24 VITALS
TEMPERATURE: 97.7 F | RESPIRATION RATE: 16 BRPM | SYSTOLIC BLOOD PRESSURE: 110 MMHG | DIASTOLIC BLOOD PRESSURE: 72 MMHG | OXYGEN SATURATION: 91 % | HEART RATE: 77 BPM

## 2023-08-24 DIAGNOSIS — F41.8 ANXIETY WITH DEPRESSION: ICD-10-CM

## 2023-08-24 DIAGNOSIS — I48.0 PAROXYSMAL ATRIAL FIBRILLATION (HCC): ICD-10-CM

## 2023-08-24 DIAGNOSIS — I10 ESSENTIAL HYPERTENSION: Primary | ICD-10-CM

## 2023-08-24 PROCEDURE — G0299 HHS/HOSPICE OF RN EA 15 MIN: HCPCS

## 2023-08-24 PROCEDURE — 99308 SBSQ NF CARE LOW MDM 20: CPT | Performed by: INTERNAL MEDICINE

## 2023-08-24 NOTE — PROGRESS NOTES
[address]  [phone]  [facility]  POS 32      NAME: Ashley Contreras  AGE: 80 y.o. SEX: female [phone]    DATE OF ENCOUNTER: 8/24/2023    Assessment and Plan     1. Essential hypertension        2. Paroxysmal atrial fibrillation (HCC)        3. Anxiety with depression           Essential hypertension  BP stable  Cont metoprolol    Afib (HCC)  HR stable  Cont metoprolol  No anticoag as per cardio given recurrent falls    Anxiety with depression  Pt at baseline  Cont lorazepam & sertraline & mirtazapine       Code status: DNR/DNI    Chief Complaint     Long term follow-up visit. 60 day orders signed    History of Present Illness   Pt seen and examined. Pt does not remember me. Pt appears at baseline. The following portions of the patient's history were reviewed and updated as appropriate: allergies, current medications, past family history, past medical history, past social history, past surgical history and problem list.    Review of Systems     Review of Systems   Unable to perform ROS: Dementia     Active Problem List     Patient Active Problem List   Diagnosis   • History of fall   • Ambulatory dysfunction   • Moderate protein-calorie malnutrition ([address])   • Anxiety with depression   • Drug-induced constipation   • Essential hypertension   • Tricuspid regurgitation   • Dementia ([address])   • Afib ([address])   • Weight loss   • Hospice care   • Bilateral impacted cerumen       Objective     Vitals: Reviewed in [address] system. VSS    General: Awake, alert, dementia; cachetic  Head: atraumatic, normocephalic  Cardiovascular: RRR  Lungs: Clear to auscultation bilaterally  Abdomen: nontender/nondistended, +BS  Legs: no cyanosis, clubbing or edema  Skin: clean, dry, intact  Psych: calm, cooperative    Pertinent Laboratory/Diagnostic Studies:  Refer to facility chart. Current Medications   Medications reviewed and updated in facility chart.  Total time spent: 15 minutes    Genaro Milton MD  Internal Medicine  Senior Care Physician

## 2023-08-25 ENCOUNTER — HOME CARE VISIT (OUTPATIENT)
Dept: HOME HEALTH SERVICES | Facility: HOME HEALTHCARE | Age: 83
End: 2023-08-25
Payer: MEDICARE

## 2023-08-25 PROCEDURE — G0156 HHCP-SVS OF AIDE,EA 15 MIN: HCPCS

## 2023-08-26 ENCOUNTER — HOME CARE VISIT (OUTPATIENT)
Dept: HOME HEALTH SERVICES | Facility: HOME HEALTHCARE | Age: 83
End: 2023-08-26
Payer: MEDICARE

## 2023-08-26 PROCEDURE — G0156 HHCP-SVS OF AIDE,EA 15 MIN: HCPCS

## 2023-08-27 ENCOUNTER — HOME CARE VISIT (OUTPATIENT)
Dept: HOME HEALTH SERVICES | Facility: HOME HEALTHCARE | Age: 83
End: 2023-08-27
Payer: MEDICARE

## 2023-08-27 PROCEDURE — G0156 HHCP-SVS OF AIDE,EA 15 MIN: HCPCS

## 2023-08-28 ENCOUNTER — HOME CARE VISIT (OUTPATIENT)
Dept: HOME HOSPICE | Facility: HOSPICE | Age: 83
End: 2023-08-28
Payer: MEDICARE

## 2023-08-28 PROCEDURE — G0155 HHCP-SVS OF CSW,EA 15 MIN: HCPCS

## 2023-08-31 ENCOUNTER — HOME CARE VISIT (OUTPATIENT)
Dept: HOME HEALTH SERVICES | Facility: HOME HEALTHCARE | Age: 83
End: 2023-08-31
Payer: MEDICARE

## 2023-08-31 VITALS — TEMPERATURE: 97.8 F | HEART RATE: 75 BPM | RESPIRATION RATE: 18 BRPM

## 2023-08-31 PROCEDURE — G0156 HHCP-SVS OF AIDE,EA 15 MIN: HCPCS

## 2023-08-31 PROCEDURE — G0299 HHS/HOSPICE OF RN EA 15 MIN: HCPCS

## 2023-09-01 ENCOUNTER — HOME CARE VISIT (OUTPATIENT)
Dept: HOME HEALTH SERVICES | Facility: HOME HEALTHCARE | Age: 83
End: 2023-09-01
Payer: MEDICARE

## 2023-09-01 PROCEDURE — G0156 HHCP-SVS OF AIDE,EA 15 MIN: HCPCS

## 2023-09-04 ENCOUNTER — HOME CARE VISIT (OUTPATIENT)
Dept: HOME HOSPICE | Facility: HOSPICE | Age: 83
End: 2023-09-04
Payer: MEDICARE

## 2023-09-05 ENCOUNTER — HOME CARE VISIT (OUTPATIENT)
Dept: HOME HEALTH SERVICES | Facility: HOME HEALTHCARE | Age: 83
End: 2023-09-05
Payer: MEDICARE

## 2023-09-05 PROCEDURE — G0299 HHS/HOSPICE OF RN EA 15 MIN: HCPCS

## 2023-09-06 ENCOUNTER — HOME CARE VISIT (OUTPATIENT)
Dept: HOME HEALTH SERVICES | Facility: HOME HEALTHCARE | Age: 83
End: 2023-09-06
Payer: MEDICARE

## 2023-09-06 VITALS
HEART RATE: 76 BPM | OXYGEN SATURATION: 93 % | TEMPERATURE: 97.9 F | DIASTOLIC BLOOD PRESSURE: 60 MMHG | SYSTOLIC BLOOD PRESSURE: 140 MMHG | RESPIRATION RATE: 16 BRPM

## 2023-09-06 PROCEDURE — G0156 HHCP-SVS OF AIDE,EA 15 MIN: HCPCS

## 2023-09-07 ENCOUNTER — HOME CARE VISIT (OUTPATIENT)
Dept: HOME HEALTH SERVICES | Facility: HOME HEALTHCARE | Age: 83
End: 2023-09-07
Payer: MEDICARE

## 2023-09-07 PROCEDURE — G0156 HHCP-SVS OF AIDE,EA 15 MIN: HCPCS

## 2023-09-08 ENCOUNTER — HOME CARE VISIT (OUTPATIENT)
Dept: HOME HEALTH SERVICES | Facility: HOME HEALTHCARE | Age: 83
End: 2023-09-08
Payer: MEDICARE

## 2023-09-08 PROCEDURE — G0299 HHS/HOSPICE OF RN EA 15 MIN: HCPCS

## 2023-09-11 ENCOUNTER — HOME CARE VISIT (OUTPATIENT)
Dept: HOME HOSPICE | Facility: HOSPICE | Age: 83
End: 2023-09-11
Payer: MEDICARE

## 2023-09-11 ENCOUNTER — HOME CARE VISIT (OUTPATIENT)
Dept: HOME HEALTH SERVICES | Facility: HOME HEALTHCARE | Age: 83
End: 2023-09-11
Payer: MEDICARE

## 2023-09-11 VITALS
HEART RATE: 80 BPM | WEIGHT: 74.2 LBS | SYSTOLIC BLOOD PRESSURE: 128 MMHG | OXYGEN SATURATION: 94 % | DIASTOLIC BLOOD PRESSURE: 70 MMHG | BODY MASS INDEX: 15.51 KG/M2 | RESPIRATION RATE: 16 BRPM | TEMPERATURE: 97.7 F

## 2023-09-11 VITALS
HEART RATE: 70 BPM | TEMPERATURE: 97.4 F | SYSTOLIC BLOOD PRESSURE: 130 MMHG | OXYGEN SATURATION: 94 % | RESPIRATION RATE: 16 BRPM | DIASTOLIC BLOOD PRESSURE: 70 MMHG

## 2023-09-11 PROCEDURE — G0156 HHCP-SVS OF AIDE,EA 15 MIN: HCPCS

## 2023-09-11 PROCEDURE — G0299 HHS/HOSPICE OF RN EA 15 MIN: HCPCS

## 2023-09-11 PROCEDURE — G0155 HHCP-SVS OF CSW,EA 15 MIN: HCPCS

## 2023-09-12 ENCOUNTER — HOME CARE VISIT (OUTPATIENT)
Dept: HOME HEALTH SERVICES | Facility: HOME HEALTHCARE | Age: 83
End: 2023-09-12
Payer: MEDICARE

## 2023-09-12 ENCOUNTER — NURSING HOME VISIT (OUTPATIENT)
Dept: GERIATRICS | Facility: OTHER | Age: 83
End: 2023-09-12
Payer: MEDICARE

## 2023-09-12 ENCOUNTER — TELEPHONE (OUTPATIENT)
Dept: OTHER | Facility: OTHER | Age: 83
End: 2023-09-12

## 2023-09-12 ENCOUNTER — HOME CARE VISIT (OUTPATIENT)
Dept: HOME HOSPICE | Facility: HOSPICE | Age: 83
End: 2023-09-12
Payer: MEDICARE

## 2023-09-12 VITALS — TEMPERATURE: 97.6 F | RESPIRATION RATE: 18 BRPM | HEART RATE: 64 BPM | OXYGEN SATURATION: 94 %

## 2023-09-12 DIAGNOSIS — G30.1 MODERATE LATE ONSET ALZHEIMER'S DEMENTIA WITH OTHER BEHAVIORAL DISTURBANCE (HCC): Primary | ICD-10-CM

## 2023-09-12 DIAGNOSIS — F02.B18 MODERATE LATE ONSET ALZHEIMER'S DEMENTIA WITH OTHER BEHAVIORAL DISTURBANCE (HCC): Primary | ICD-10-CM

## 2023-09-12 DIAGNOSIS — Z51.5 HOSPICE CARE: ICD-10-CM

## 2023-09-12 DIAGNOSIS — F41.8 ANXIETY WITH DEPRESSION: ICD-10-CM

## 2023-09-12 DIAGNOSIS — I10 ESSENTIAL HYPERTENSION: ICD-10-CM

## 2023-09-12 DIAGNOSIS — Z91.81 HISTORY OF FALL: ICD-10-CM

## 2023-09-12 PROCEDURE — G0156 HHCP-SVS OF AIDE,EA 15 MIN: HCPCS

## 2023-09-12 PROCEDURE — G0299 HHS/HOSPICE OF RN EA 15 MIN: HCPCS

## 2023-09-12 PROCEDURE — 99309 SBSQ NF CARE MODERATE MDM 30: CPT | Performed by: INTERNAL MEDICINE

## 2023-09-12 RX ORDER — LORAZEPAM 0.5 MG/1
0.5 TABLET ORAL 3 TIMES DAILY
Qty: 90 TABLET | Refills: 0 | Status: SHIPPED | OUTPATIENT
Start: 2023-09-12 | End: 2023-10-12

## 2023-09-12 NOTE — ASSESSMENT & PLAN NOTE
Blood Pressure: 130 /  74 mmHg 9/12/2023   BP stable  Cont metoprolol tartrate 25 mg 1 tab po BID  Cont metoprolol tartrate 25 mg 1 tab po daily prn SBP >160

## 2023-09-12 NOTE — ASSESSMENT & PLAN NOTE
Pt awake, alert & happy  Pt on lorazepam 0.25 mg BID during the day; 0.5 mg in the evening  Hospice eval pt & recommend increase to 0.5 mg 1 tab po TID  Thus agree with hospice's recommendation given pt with severe anxiety & dosing adjusted  Staff calling family for shared decision making  Pt also on prn morphine q4  Cont hospice care

## 2023-09-12 NOTE — ASSESSMENT & PLAN NOTE
Pt fell this am  Pt appears comfortable  Pt denies pain  No overt bruising noted  Cont fall precaution  Pt frequently falls given her dementia  Point Care Click note:  1/35/1417 00:45 Nurses Note   Note Text: CNA alerted RN to resident room to report unwitnessed fall. Upon entering room resident bed noted to be in low position, B/L fall mats in place and resident observed on L side fall mat sitting on buttocks. Resident arm and elbow remained on bed at time ofincident resulting in no sounding of bed alarm. Bed alarm checked post fall and noted to be connected correctly and functioning. Gown and B/L gripper socks were donned. Brief unsoiled at time of incident. Rounds at change of shift performed and resident observed in bed with no complaints. Resident MS at baseline, able to perform AROM to all extremities and respond to questions appropriately. Initial neuro check completed and WRNL with PERRLA noted. Resident denied pain and discomfort. Skin assessment completed with no new skin abnormalities r/t fall present. Resident assisted off fall mat x 2 assist with use of gait belt. On call Dr Randal Riedel contacted with [facility] given. Maggie Gimenez with [facility] contacted to inform of fall and request for nurse visit with med review r/t increased restlessness, difficulty sleeping and s/s of increased pain/use of PRN morphine.  Resident daughter to be contacted in am.

## 2023-09-12 NOTE — ASSESSMENT & PLAN NOTE
This am pt happy  However staff notes pt with increased restlessness and insomnia  Hospice recommends increase lorazepam to 0.5 mg 1 tab po TID (pt already tolerating 0.5 mg at nighttime)  Cont sertraline 50 mg 1 tab po daily  Cont mirtazapine 15 mg 1 tab po daily

## 2023-09-12 NOTE — PROGRESS NOTES
[address]  [phone]  [facility]  POS 32      NAME: Janette Matthews  AGE: 80 y.o. SEX: female [phone]    DATE OF ENCOUNTER: 9-    Assessment and Plan     1. 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 Hospice care  LORazepam (ATIVAN) 0.5 mg tablet      3. Essential hypertension        4. Anxiety with depression        5.  History of fall           Hospice care  Pt awake, alert & happy  Pt on lorazepam 0.25 mg BID during the day; 0.5 mg in the evening  Hospice eval pt & recommend increase to 0.5 mg 1 tab po TID  Thus agree with hospice's recommendation given pt with severe anxiety & dosing adjusted  Staff calling family for shared decision making  Pt also on prn morphine q4  Cont hospice care    Dementia ([address])  Stable  Monitor sleep/wake cycle  Assist with adls/iadls  Encourage good po intake of solids/liquids  Provide redirection, reorientation and distraction techniques  Pt on hospice  Meal intake:  Weight: mostly 51-75%; at times surprisingly %; pt enjoying her meal today  Pt with 0.8 pound weight loss  73.4 Lbs 9/3/2023 15:17     8/1/2023 12:45 74.2 Lbs     BM:  Pt often constipated; pt had BM on:  9-3, 9-4, 9-11, 9-12    Essential hypertension  Blood Pressure: 130 /  74 mmHg 9/12/2023   BP stable  Cont metoprolol tartrate 25 mg 1 tab po BID  Cont metoprolol tartrate 25 mg 1 tab po daily prn SBP >160    Anxiety with depression  This am pt happy  However staff notes pt with increased restlessness and insomnia  Hospice recommends increase lorazepam to 0.5 mg 1 tab po TID (pt already tolerating 0.5 mg at nighttime)  Cont sertraline 50 mg 1 tab po daily  Cont mirtazapine 15 mg 1 tab po daily    History of fall  Pt fell this am  Pt appears comfortable  Pt denies pain  No overt bruising noted  Cont fall precaution  Pt frequently falls given her dementia  Point Care Click note:  9/02/8043 00:45 Nurses Note   Note Text: CNA alerted RN to resident room to report unwitnessed fall. Upon entering room resident bed noted to be in low position, B/L fall mats in place and resident observed on L side fall mat sitting on buttocks. Resident arm and elbow remained on bed at time ofincident resulting in no sounding of bed alarm. Bed alarm checked post fall and noted to be connected correctly and functioning. Gown and B/L gripper socks were donned. Brief unsoiled at time of incident. Rounds at change of shift performed and resident observed in bed with no complaints. Resident MS at baseline, able to perform AROM to all extremities and respond to questions appropriately. Initial neuro check completed and WRNL with JEAN noted. Resident denied pain and discomfort. Skin assessment completed with no new skin abnormalities r/t fall present. Resident assisted off fall mat x 2 assist with use of gait belt. On call Dr Dilip Macdonald contacted with [facility] given. Soy Cosby with [facility] contacted to inform of fall and request for nurse visit with med review r/t increased restlessness, difficulty sleeping and s/s of increased pain/use of PRN morphine. Resident daughter to be contacted in am.            Code status: DNR/DNI/hospice    Chief Complaint     Acute follow-up visit. History of Present Illness   Notified by staff pt had fallen. Went to examine pt. Pt appears at baseline. Pt happy and eating her meat & eggs. Pt denies any pain and appears medically stable at this time. No bruises noted. Hospice also evaluated pt & recommended increasing pt's ativan to TID given her anxiety.     The following portions of the patient's history were reviewed and updated as appropriate: allergies, current medications, past family history, past medical history, past social history, past surgical history and problem list.    Review of Systems     Review of Systems   Unable to perform ROS: Dementia     Active Problem List     Patient Active Problem List   Diagnosis   • History of fall   • Ambulatory dysfunction   • Moderate protein-calorie malnutrition (HCC)   • Anxiety with depression   • Drug-induced constipation   • Essential hypertension   • Tricuspid regurgitation   • Dementia (HCC)   • Afib (HCC)   • Weight loss   • Hospice care   • Bilateral impacted cerumen       Objective     Vitals: Reviewed in [address] system. VSS    General: Awake, alert, dementia, cachetic (chronically)  Head: atraumatic, normocephalic  Cardiovascular: RRR  Lungs: Clear to auscultation bilaterally  Abdomen: nontender/nondistended, +BS  Legs: no cyanosis, clubbing or edema  Feet: positive pedal pulses; c/d/i  Skin: clean, dry; no overt bruising noted  Psych: calm, cooperative    Pertinent Laboratory/Diagnostic Studies:  Refer to facility chart. Current Medications   Medications reviewed and updated in facility chart.     Acetaminophen Tablet 325 MG    Acetaminophen Suppository 650 MG Insert 1 suppository rectally every 4 hours as needed for Mild Pain Max 3 GM APAP / 24 HR, Give Suppository If Unable to Take By Mouth AND Insert 1 suppository rectally every 4 hours as needed for Temperature = or > 100 degrees Oral / 101 degrees Rectal Max 3 GM APAP / 24 HR, Give Suppository If Unable to Take By Mouth   Bisac-Evac Suppository 10 MG (Bisacodyl) Insert 1 suppository rectally as needed for Constipation 1 Time a Day, If NO BM by AM Day # 4, Day Nurse Gives a Suppository that AM   Enema Disposable Enema (Sodium Phosphates) Insert 1 application rectally as needed for Constipation 1 Time a Day, If NO BM within 3-5 Hours of Suppository Insertion Give Enema   MIRTAZAPINE 15MG TABLET GIVE 1 TABLET BY MOUTH ONCE DAILY AT BEDTIME FOR INSOMNIA(Related Diagnoses: ANXIETY DISORDER, UNSPECIFIED (F41.9))   SERTRALINE 50MG TABLET GIVE 1 TABLET BY MOUTH ONCE DAILY AT BEDTIME FOR ANXIETY/ DEPRESSION(Related Diagnoses: MAJOR DEPRESSIVE DISORDER, RECURRENT, MODERATE (F33.1); GENERALIZED ANXIETY DISORDER (F41.1))(Indications for Use: anxiety depression)   OMEPRAZOLE 20MG DR CAPSULE GIVE 1 CAPSULE BY MOUTH ONCE DAILY FOR GASTROESOPHAGEAL REFLUX DISEASE(Related Diagnoses: GASTRO-ESOPHAGEAL REFLUX DISEASE WITHOUT ESOPHAGITIS (K21.9))(Indications for Use: GERD)   ONDANSETRON 4MG TABLET GIVE 1 TABLET BY MOUTH EVERY 6 HOURS AS NEEDED FOR VOMITING(Related Diagnoses: NAUSEA WITH VOMITING, UNSPECIFIED (R11.2))(Indications for Use: Upset Stomach)   ONDANSETRON 4MG TABLET GIVE 1 TABLET BY MOUTH ONCE DAILY AT 0600 (BEF BREAKFAST)(Related Diagnoses: NAUSEA WITH VOMITING, UNSPECIFIED (R11.2))(Indications for Use: /)   METOPROLOL TARTRATE 25MG TAB GIVE 1 TABLET BY MOUTH TWICE DAILY(Related Diagnoses: ESSENTIAL (PRIMARY) HYPERTENSION (I10))(Indications for Use: //)   MORPHINE ORAL *CONC* 20MG/ML GIVE 0.25ML (5MG) BY MOUTH EVERY 4 HOURS AS NEEDED FOR MODERATE-SEVERE PAIN   METOPROLOL TARTRATE 25MG TAB GIVE 1 TABLET BY MOUTH ONCE DAILY AS NEEDED FOR SYSTOLIC BLOOD PRESSURE > 160(Related Diagnoses: ESSENTIAL (PRIMARY) HYPERTENSION (I10))(Indications for Use: HTN)   Carolyn-Tussin Oral Liquid 100 MG/5ML (Guaifenesin) Give 5 ml by mouth every 6 hours as needed for cough/congestion   ALBUTEROL 0.083% 2.5MG/3ML NEB INHALE 1 UNIT DOSE VIA NEBULIZER EVERY 6 HOURS AS NEEDED FOR SHORTNESS OF BREATH(Related Diagnoses: SHORTNESS OF BREATH (R06.02))(Indications for Use: /)   Sorbitol Solution 70 % Give 30 ml orally as needed for constipation 1 Time a Day, If No BM on by Day # 2, Evening Nurse Gives a Laxative on Day # 2 AND Give 30 ml orally as needed for constipation 1 Time a Day, If No BM on by Day # 3, Evening Nurse Gives a Laxative on Day # 3   DOCUSATE SODIUM 100MG CAPSULE GIVE 1 CAPSULE BY MOUTH TWICE DAILY(Indications for Use: constipation)   MiraLax Powder (Polyethylene Glycol 3350) Give 17 gram orally every 24 hours as needed for constipation   SENNA PLUS 8.6-50MG TABLET GIVE 2 TABLETS BY MOUTH ONCE DAILY AT BEDTIME FOR CONSTIPATION   Oxymetazoline HCl Nasal Solution 0.05 % (Oxymetazoline HCl) Apply to Vaginal bleeding area topically every 8 hours as needed for Bleeding Soak guaze with afrin to bleeding area and apply pressure   LORazepam Oral Tablet 0.5 MG (Lorazepam) Give 1/2  tablet by mouth two times a day for Anxiety and 1 tablet [facility] bed[facility] (F41.[facility])     Dorothy Car MD  Internal Medicine  Senior Care Physician normal...

## 2023-09-12 NOTE — ASSESSMENT & PLAN NOTE
Stable  Monitor sleep/wake cycle  Assist with adls/iadls  Encourage good po intake of solids/liquids  Provide redirection, reorientation and distraction techniques  Pt on hospice  Meal intake:  Weight: mostly 51-75%; at times surprisingly %; pt enjoying her meal today  Pt with 0.8 pound weight loss  73.4 Lbs 9/3/2023 15:17     8/1/2023 12:45 74.2 Lbs     BM:  Pt often constipated; pt had BM on:  9-3, 9-4, 9-11, 9-12

## 2023-09-13 ENCOUNTER — HOME CARE VISIT (OUTPATIENT)
Dept: HOME HOSPICE | Facility: HOSPICE | Age: 83
End: 2023-09-13
Payer: MEDICARE

## 2023-09-13 ENCOUNTER — HOME CARE VISIT (OUTPATIENT)
Dept: HOME HEALTH SERVICES | Facility: HOME HEALTHCARE | Age: 83
End: 2023-09-13
Payer: MEDICARE

## 2023-09-13 VITALS — TEMPERATURE: 97.4 F | RESPIRATION RATE: 14 BRPM | HEART RATE: 71 BPM | OXYGEN SATURATION: 91 %

## 2023-09-13 PROCEDURE — G0299 HHS/HOSPICE OF RN EA 15 MIN: HCPCS

## 2023-09-14 ENCOUNTER — HOME CARE VISIT (OUTPATIENT)
Dept: HOME HOSPICE | Facility: HOSPICE | Age: 83
End: 2023-09-14
Payer: MEDICARE

## 2023-09-15 ENCOUNTER — HOME CARE VISIT (OUTPATIENT)
Dept: HOME HEALTH SERVICES | Facility: HOME HEALTHCARE | Age: 83
End: 2023-09-15
Payer: MEDICARE

## 2023-09-15 PROCEDURE — G0156 HHCP-SVS OF AIDE,EA 15 MIN: HCPCS

## 2023-09-15 PROCEDURE — G0299 HHS/HOSPICE OF RN EA 15 MIN: HCPCS

## 2023-09-18 ENCOUNTER — NURSING HOME VISIT (OUTPATIENT)
Dept: GERIATRICS | Facility: OTHER | Age: 83
End: 2023-09-18
Payer: MEDICARE

## 2023-09-18 ENCOUNTER — HOME CARE VISIT (OUTPATIENT)
Dept: HOME HEALTH SERVICES | Facility: HOME HEALTHCARE | Age: 83
End: 2023-09-18
Payer: MEDICARE

## 2023-09-18 VITALS
SYSTOLIC BLOOD PRESSURE: 110 MMHG | TEMPERATURE: 97.1 F | HEART RATE: 80 BPM | DIASTOLIC BLOOD PRESSURE: 68 MMHG | RESPIRATION RATE: 16 BRPM

## 2023-09-18 DIAGNOSIS — Z51.5 HOSPICE CARE: Primary | ICD-10-CM

## 2023-09-18 DIAGNOSIS — I10 ESSENTIAL HYPERTENSION: ICD-10-CM

## 2023-09-18 DIAGNOSIS — F41.8 ANXIETY WITH DEPRESSION: ICD-10-CM

## 2023-09-18 DIAGNOSIS — N76.4 FURUNCLE OF VULVA: Primary | ICD-10-CM

## 2023-09-18 PROBLEM — H61.23 BILATERAL IMPACTED CERUMEN: Status: RESOLVED | Noted: 2023-01-01 | Resolved: 2023-01-01

## 2023-09-18 PROCEDURE — 99308 SBSQ NF CARE LOW MDM 20: CPT | Performed by: INTERNAL MEDICINE

## 2023-09-18 PROCEDURE — G0299 HHS/HOSPICE OF RN EA 15 MIN: HCPCS

## 2023-09-18 PROCEDURE — G0156 HHCP-SVS OF AIDE,EA 15 MIN: HCPCS

## 2023-09-18 RX ORDER — MORPHINE SULFATE 100 MG/5ML
5 SOLUTION, CONCENTRATE ORAL EVERY 6 HOURS
Qty: 30 ML | Refills: 0 | Status: SHIPPED | OUTPATIENT
Start: 2023-09-18 | End: 2023-09-25 | Stop reason: SDUPTHER

## 2023-09-18 NOTE — PROGRESS NOTES
Pt on hospice; adding a scheduled dose in addition to already standing prn order of morphine; d/w hospice

## 2023-09-18 NOTE — PROGRESS NOTES
[address]  [phone]  [facility]  POS 32      NAME: Rian Lacy  AGE: 80 y.o. SEX: female [phone]    DATE OF ENCOUNTER: 9/18/2023    Assessment and Plan     1. Hospice care        2. Anxiety with depression        3. Essential hypertension           Hospice care  Pt with known vaginal lesion  Appears uncomfortable and holding her lower belly  Agree with morphine q6 scheduled  Cont lorazepam  Cont hospice care    Anxiety with depression  Pt calm but appears uncomfortable due to pain  Cont lorazapem, sertraline & mirtazapine  Adding scheduled morphine    Essential hypertension  Stable  Cont metoprolol    Code status: DNR/DNI/hospice    Chief Complaint     Acute follow-up visit. History of Present Illness   Asked by nurse to see pt as pt having more pain. Pt seen and examined. Pt calling out for Emy Mac. Pt appears to be uncomfortable. Pt with known vaginal lesion. Agree with morphine scheduled. The following portions of the patient's history were reviewed and updated as appropriate: allergies, current medications, past family history, past medical history, past social history, past surgical history and problem list.    Review of Systems     Review of Systems   Unable to perform ROS: Dementia     Active Problem List     Patient Active Problem List   Diagnosis   • History of fall   • Ambulatory dysfunction   • Moderate protein-calorie malnutrition ([address])   • Anxiety with depression   • Drug-induced constipation   • Essential hypertension   • Tricuspid regurgitation   • Dementia ([address])   • Afib ([address])   • Weight loss   • Hospice care       Objective     Vitals: Reviewed in [address] system.  VSS    General: Awake, alert, talkative, appears to be holding her lower belly  Cachetic (baseline)  Head: atraumatic, normocephalic  Cardiovascular: RRR  Lungs: Clear to auscultation bilaterally  Abdomen: nontender/nondistended, +BS  Legs: no cyanosis, clubbing or edema  Feet: positive pedal pulses  Skin: clean, dry  Psych: calm, cooperative    Pertinent Laboratory/Diagnostic Studies:  Refer to facility chart. Current Medications   Medications reviewed and updated in facility chart.  Total time spent: 15 minutes    Hemal Meng MD  Internal Medicine  Senior Care Physician

## 2023-09-20 ENCOUNTER — HOME CARE VISIT (OUTPATIENT)
Dept: HOME HEALTH SERVICES | Facility: HOME HEALTHCARE | Age: 83
End: 2023-09-20
Payer: MEDICARE

## 2023-09-20 PROCEDURE — G0156 HHCP-SVS OF AIDE,EA 15 MIN: HCPCS

## 2023-09-21 ENCOUNTER — HOME CARE VISIT (OUTPATIENT)
Dept: HOME HEALTH SERVICES | Facility: HOME HEALTHCARE | Age: 83
End: 2023-09-21
Payer: MEDICARE

## 2023-09-21 PROCEDURE — G0156 HHCP-SVS OF AIDE,EA 15 MIN: HCPCS

## 2023-09-22 ENCOUNTER — HOME CARE VISIT (OUTPATIENT)
Dept: HOME HOSPICE | Facility: HOSPICE | Age: 83
End: 2023-09-22
Payer: MEDICARE

## 2023-09-22 ENCOUNTER — HOME CARE VISIT (OUTPATIENT)
Dept: HOME HEALTH SERVICES | Facility: HOME HEALTHCARE | Age: 83
End: 2023-09-22
Payer: MEDICARE

## 2023-09-22 PROCEDURE — G0299 HHS/HOSPICE OF RN EA 15 MIN: HCPCS

## 2023-09-22 PROCEDURE — G0155 HHCP-SVS OF CSW,EA 15 MIN: HCPCS

## 2023-09-24 NOTE — ASSESSMENT & PLAN NOTE
Pt calm but appears uncomfortable due to pain  Cont lorazapem, sertraline & mirtazapine  Adding scheduled morphine

## 2023-09-24 NOTE — ASSESSMENT & PLAN NOTE
Pt with known vaginal lesion  Appears uncomfortable and holding her lower belly  Agree with morphine q6 scheduled  Cont lorazepam  Cont hospice care

## 2023-09-24 NOTE — TELEPHONE ENCOUNTER
Prachi calling back in requesting to speak with the provider regarding changes to be made to the patient's pain medication. Stated they called in around 0900 this AM but still have not heard from the provider. On call provider paged and given callback information.

## 2023-09-25 NOTE — PROGRESS NOTES
Noted night team asked by hospice to increase morphine/ativan    Reviewed hospice recommendations & followed their recommendations; escript sent

## 2023-09-26 NOTE — PROGRESS NOTES
[facility]  [address] [place] [address] [address]  [phone]    NAME: Shawn Cardenas  AGE: 80 y.o. SEX: female    Progress Note    Location: [facility]  POS: 28 ([facility])  Code Status: DNR/DNH/Comfort Care    Chief complaint / Reason for visit:  Follow-up visit    Assessment/Plan: Moderate protein-calorie malnutrition ([address])  Malnutrition Findings:     · Weight loss  · Resident continues to gradually lose weight  · Dec.= 82.4 lbs ---> Sept.=73.4 lbs  · Loss of subcutaneous fat  · Loss of muscle mass    BMI Findings: Body mass index is 15.34 kg/m². Hospice care  · Continue hospice care with diagnosis of protein calorie malnutrition/failure to thrive  · Continue to manage pain with morphine sulfate solution  · Continue to manage anxiety with lorazepam oral concentrate  · Ensure resident is comfortable  · Continue to provide communication, education, and emotional support to resident and family    Ambulatory dysfunction  · Encourage use of assistive device  • Continue fall precautions  • Encourage patient to participate with activities  • Provide education for patient, family, and caregivers    Dementia [facility])  · Resident is currently on hospice  · Continue to provide 24/7 supportive care  · Resident may require frequent reorientation and redirection  · Provide assistance with ADLs/IADLs    Essential hypertension  · Continue to monitor weekly  · Resident stable off blood pressure medications      This is an 80 y.o. female seen today at [facility]. Medical history includes, not limited to, atrial fibrillation, hyperlipidemia, hypertension, dysphagia, GERD, generalized anxiety disorder, major depressive disorder, and moderate protein-calorie malnutrition. Resident with severe dementia, sleeping at time of assessment. She was resting comfortably in bed. Nursing staff reports she is at her baseline of recently. She is followed by Hospice.   No concerns from nursing at this time. Changes to medications, vitals and labs verified. Nursing and prior provider notes reviewed on this visit. Discussed visit with PCP and nursing staff/ supervisor. ROS: unable to obtain due to severe dementia      ALLERGY: Reviewed, unchanged  Allergies   Allergen Reactions   • Chocolate Flavor - Food Allergy Other (See Comments)     unknown       HISTORY:  Medical Hx: Reviewed, unchanged  Family Hx: Reviewed, unchanged  Soc Hx: Reviewed,  unchanged      Physical Exam  Vitals reviewed. Constitutional:       General: She is not in acute distress. Appearance: Normal appearance. She is not ill-appearing. HENT:      Head: Normocephalic. Right Ear: Tympanic membrane normal.      Left Ear: Tympanic membrane normal.      Nose: Nose normal. No congestion. Mouth/Throat:      Mouth: Mucous membranes are moist.      Pharynx: Oropharynx is clear. Eyes:      General:         Right eye: No discharge. Left eye: No discharge. Extraocular Movements: Extraocular movements intact. Conjunctiva/sclera: Conjunctivae normal.      Pupils: Pupils are equal, round, and reactive to light. Cardiovascular:      Rate and Rhythm: Normal rate and regular rhythm. Pulses: Normal pulses. Heart sounds: Normal heart sounds. Pulmonary:      Effort: Pulmonary effort is normal. No respiratory distress. Breath sounds: Normal breath sounds. Chest:      Chest wall: No tenderness. Abdominal:      General: Bowel sounds are normal.      Palpations: Abdomen is soft. Tenderness: There is no abdominal tenderness. Musculoskeletal:         General: No swelling. Normal range of motion. Cervical back: Normal range of motion. Right lower leg: No edema. Left lower leg: No edema. Skin:     General: Skin is warm and dry. Neurological:      Mental Status: She is alert. Mental status is at baseline. She is disoriented. Motor: No weakness. Comments: Alert and oriented x 1   Psychiatric:         Mood and Affect: Mood normal.         Behavior: Behavior normal.            Laboratory results / Imaging reviewed: Hard copy/ies in medical chart:    Vitals:    09/25/23 2246   BP: 144/71   Pulse: 74   Resp: 18   Temp: 97.7 °F (36.5 °C)   SpO2: 94%       Current Medications: All medications reviewed and updated in Nursing Home Chart    Please note: This note was completed in part utilizing a voice-recognition software may have been used in the preparation of this document. Grammatical errors, random word insertion, spelling mistakes, and incomplete sentences may be an occasional consequence of the system secondary to software limitations, ambient noise and hardware issues. Occasional wrong word or "sound-alike" substitutions may have occurred due to the inherent limitations of voice recognition software. At the time of dictation, efforts were made to edit, clarify and/or correct errors. Interpretation should be guided by context. Please read the chart carefully and recognize, using context, where substitutions have occurred. If you have any questions or concerns about the context, text or information contained within the body of this dictation, please contact myself, the provider, for further clarification.       Elinda Severs, CRNP  9/30/2023

## 2023-09-30 NOTE — TELEPHONE ENCOUNTER
/ Dee Muñoz LPN [facility] /Reporting that resident passed away need an order to release body.  Patient Mamadou Ferguson  1940     VIA TT

## 2023-09-30 NOTE — ASSESSMENT & PLAN NOTE
Malnutrition Findings:     · Weight loss  · Resident continues to gradually lose weight  · Dec.= 82.4 lbs ---> Sept.=73.4 lbs  · Loss of subcutaneous fat  · Loss of muscle mass    BMI Findings: Body mass index is 15.34 kg/m².

## 2023-09-30 NOTE — ASSESSMENT & PLAN NOTE
· Resident is currently on hospice  · Continue to provide 24/7 supportive care  · Resident may require frequent reorientation and redirection  · Provide assistance with ADLs/IADLs

## 2023-09-30 NOTE — ASSESSMENT & PLAN NOTE
· Encourage use of assistive device  • Continue fall precautions  • Encourage patient to participate with activities  • Provide education for patient, family, and caregivers

## 2023-09-30 NOTE — ASSESSMENT & PLAN NOTE
· Continue hospice care with diagnosis of protein calorie malnutrition/failure to thrive  · Continue to manage pain with morphine sulfate solution  · Continue to manage anxiety with lorazepam oral concentrate  · Ensure resident is comfortable  · Continue to provide communication, education, and emotional support to resident and family

## 2023-10-02 ENCOUNTER — DOCUMENTATION (OUTPATIENT)
Dept: GERIATRICS | Facility: OTHER | Age: 83
End: 2023-10-02

## 2023-10-02 NOTE — PROGRESS NOTES
Physician Progress Note: Dx upon death:  Dementia  Vaginal mass  Pt on hospice; DNR/DNI  Time of death: 1244 pm on 9/30/2023

## 2023-10-03 ENCOUNTER — HOME CARE VISIT (OUTPATIENT)
Dept: HOME HOSPICE | Facility: HOSPICE | Age: 83
End: 2023-10-03
Payer: MEDICARE

## 2023-10-06 ENCOUNTER — HOME CARE VISIT (OUTPATIENT)
Dept: HOME HOSPICE | Facility: HOSPICE | Age: 83
End: 2023-10-06
Payer: MEDICARE

## 2024-04-09 NOTE — PROGRESS NOTES
[facility]  [address]  [phone]  [facility]          NAME: Thelma Carpenter  AGE: 78 y o  SEX: female    DATE OF ENCOUNTER: 11/6/2019    Chief Complaint     Pt has no complaint    History of Present Illness     HPI    The following portions of the patient's history were reviewed and updated as appropriate (from facility chart and hospital records): allergies, current medications, past family history, past medical history, past social history, past surgical history and problem list   Pt was seen and examined for f/u on anxiety disorder, PCM, Constipation, pt continues with NH care and hospice care, wt for last 2-3 months has been stable with some wt gain, pt has some elevated BP but otherwise asymptomatic, no hx of HTN, on scheduled Ativan  Pt is on restorative program for walking with staff daily, overall stable  No issues or report of BM problem  Review of Systems     Review of Systems   Reason unable to perform ROS: limited with pt's impaired cognition  Constitutional: Negative  HENT: Negative  Eyes: Negative  Respiratory: Negative  Cardiovascular: Negative  Gastrointestinal: Negative  Endocrine: Negative  Genitourinary: Negative  Musculoskeletal: Negative  Skin: Negative  Allergic/Immunologic: Negative  Neurological: Negative  Hematological: Negative  Psychiatric/Behavioral: Negative  All other systems reviewed and are negative        Active Problem List     Patient Active Problem List   Diagnosis    Fall    Pressure injury of head, stage 1    Pressure injury of sacral region, stage 3 ([address] )    Scalp hematoma    Severe protein-calorie malnutrition ([address] )    Ambulatory dysfunction    Acute and chronic respiratory failure with hypercapnia (HCC)    Vulvar mass    Dehydration    Encephalopathy    GI bleeding    Severe protein-calorie malnutrition [place] Lack: less than 60% of standard weight) ([address] )    Respiratory failure (HCC)    Gastrointestinal bleeding    Anxiety    Other constipation    Unstageable pressure ulcer (HCC)    Elevated blood-pressure reading, without diagnosis of hypertension       Objective     Vitals: Wt:72 6 Ibs (no wt for this month yet)  BP log was reviewed     Physical Exam   Constitutional: She appears well-developed and well-nourished  No distress  HENT:   Head: Normocephalic and atraumatic  Right Ear: External ear normal    Left Ear: External ear normal    Nose: Nose normal    Mouth/Throat: Oropharynx is clear and moist  No oropharyngeal exudate  Eyes: Pupils are equal, round, and reactive to light  Conjunctivae and EOM are normal  Right eye exhibits no discharge  Left eye exhibits no discharge  No scleral icterus  Neck: Normal range of motion  Neck supple  Cardiovascular: Normal rate, regular rhythm and normal heart sounds  Exam reveals no gallop and no friction rub  No murmur heard  Pulmonary/Chest: Effort normal and breath sounds normal  No stridor  No respiratory distress  She has no wheezes  She has no rales  She exhibits no tenderness  Abdominal: Soft  Bowel sounds are normal  She exhibits no distension and no mass  There is no tenderness  There is no rebound and no guarding  No hernia  Genitourinary:   Genitourinary Comments: Deferred  Musculoskeletal: She exhibits no edema, tenderness or deformity  Limited ROM while sitting in a WC  Lymphadenopathy:     She has no cervical adenopathy  Neurological: She is alert  A cranial nerve deficit is present  [place], forgetful, not oriented , follows commands   Skin: Skin is warm and dry  No rash noted  She is not diaphoretic  There is erythema  No pallor  Didn't examine sacral area  Psychiatric: She has a normal mood and affect  Her behavior is normal    Nursing note and vitals reviewed        Pertinent Laboratory/Diagnostic Studies:  No labs, pt is on hospice    Current Medications   Medication list in facility chart was reviewed and no changes made  Assessment and Plan     Other constipation  Stable with prn colace and bowel protocol, will monitor closely  Severe protein-calorie malnutrition Alexis Clayton: less than 60% of standard weight) ([facility])  Stable, has gained some wt, with wt stable for last 2 months, on protein supplement  Continues with NH and hospice care  Anxiety  On scheduled Ativan,and on Mirtazapine, stable  Elevated blood-pressure reading, without diagnosis of hypertension  Otherwise asymptomatic, will monitor closely  Name: Jorge Weathers  : 1940  MRN: [id number]  DOS: 2019  BILLING CODE: [id number]  Diagnoses:   Diagnosis ICD-10-CM Associated Orders   1  Other constipation K59 09    2  Severe protein-calorie malnutrition Alexis [place]: less than 60% of standard weight) ([facility] E43    3  Anxiety F41 9    4   Elevated blood-pressure reading, without diagnosis of hypertension R03 0          Alissa Abdul MD  44778:94 PM [Negative] : Gastrointestinal [FreeTextEntry4] : neck tenderness - right sides ,no mass